# Patient Record
Sex: MALE | Race: OTHER | HISPANIC OR LATINO
[De-identification: names, ages, dates, MRNs, and addresses within clinical notes are randomized per-mention and may not be internally consistent; named-entity substitution may affect disease eponyms.]

---

## 2017-02-16 ENCOUNTER — RESULT REVIEW (OUTPATIENT)
Age: 82
End: 2017-02-16

## 2017-02-16 ENCOUNTER — INPATIENT (INPATIENT)
Facility: HOSPITAL | Age: 82
LOS: 7 days | Discharge: NURSING, REHAB OR LONG-TERM CARE | End: 2017-02-24
Attending: INTERNAL MEDICINE | Admitting: INTERNAL MEDICINE
Payer: MEDICARE

## 2017-02-16 VITALS
SYSTOLIC BLOOD PRESSURE: 157 MMHG | HEIGHT: 60 IN | WEIGHT: 126.1 LBS | TEMPERATURE: 98 F | RESPIRATION RATE: 18 BRPM | HEART RATE: 81 BPM | OXYGEN SATURATION: 99 % | DIASTOLIC BLOOD PRESSURE: 81 MMHG

## 2017-02-16 DIAGNOSIS — Z98.890 OTHER SPECIFIED POSTPROCEDURAL STATES: Chronic | ICD-10-CM

## 2017-02-16 LAB
ALBUMIN SERPL ELPH-MCNC: 3.1 G/DL — LOW (ref 3.3–5)
ALP SERPL-CCNC: 218 U/L — HIGH (ref 40–120)
ALT FLD-CCNC: 297 U/L — HIGH (ref 12–78)
AMYLASE P1 CFR SERPL: 68 U/L — SIGNIFICANT CHANGE UP (ref 25–115)
ANION GAP SERPL CALC-SCNC: 9 MMOL/L — SIGNIFICANT CHANGE UP (ref 5–17)
APPEARANCE UR: CLEAR — SIGNIFICANT CHANGE UP
APTT BLD: 31.7 SEC — SIGNIFICANT CHANGE UP (ref 27.5–37.4)
AST SERPL-CCNC: 47 U/L — HIGH (ref 15–37)
BACTERIA # UR AUTO: ABNORMAL
BASOPHILS # BLD AUTO: 0.1 K/UL — SIGNIFICANT CHANGE UP (ref 0–0.2)
BASOPHILS NFR BLD AUTO: 0 % — SIGNIFICANT CHANGE UP (ref 0–2)
BILIRUB SERPL-MCNC: 0.8 MG/DL — SIGNIFICANT CHANGE UP (ref 0.2–1.2)
BILIRUB UR-MCNC: NEGATIVE — SIGNIFICANT CHANGE UP
BUN SERPL-MCNC: 25 MG/DL — HIGH (ref 7–23)
CALCIUM SERPL-MCNC: 9.3 MG/DL — SIGNIFICANT CHANGE UP (ref 8.5–10.1)
CHLORIDE SERPL-SCNC: 112 MMOL/L — HIGH (ref 96–108)
CO2 SERPL-SCNC: 29 MMOL/L — SIGNIFICANT CHANGE UP (ref 22–31)
COLOR SPEC: YELLOW — SIGNIFICANT CHANGE UP
COMMENT - URINE: SIGNIFICANT CHANGE UP
COMMENT - URINE: SIGNIFICANT CHANGE UP
CREAT SERPL-MCNC: 0.98 MG/DL — SIGNIFICANT CHANGE UP (ref 0.5–1.3)
DIFF PNL FLD: ABNORMAL
EOSINOPHIL # BLD AUTO: 0 K/UL — SIGNIFICANT CHANGE UP (ref 0–0.5)
EOSINOPHIL NFR BLD AUTO: 0 % — SIGNIFICANT CHANGE UP (ref 0–6)
EPI CELLS # UR: SIGNIFICANT CHANGE UP
GLUCOSE SERPL-MCNC: 399 MG/DL — HIGH (ref 70–99)
GLUCOSE UR QL: 1000 MG/DL
HCT VFR BLD CALC: 37.7 % — LOW (ref 39–50)
HGB BLD-MCNC: 13.3 G/DL — SIGNIFICANT CHANGE UP (ref 13–17)
INR BLD: 1.04 RATIO — SIGNIFICANT CHANGE UP (ref 0.88–1.16)
KETONES UR-MCNC: ABNORMAL
LACTATE SERPL-SCNC: 2.8 MMOL/L — HIGH (ref 0.7–2)
LACTATE SERPL-SCNC: 3.6 MMOL/L — HIGH (ref 0.7–2)
LEUKOCYTE ESTERASE UR-ACNC: NEGATIVE — SIGNIFICANT CHANGE UP
LIDOCAIN IGE QN: 53 U/L — LOW (ref 73–393)
LYMPHOCYTES # BLD AUTO: 1.8 K/UL — SIGNIFICANT CHANGE UP (ref 1–3.3)
LYMPHOCYTES # BLD AUTO: 8 % — LOW (ref 13–44)
MCHC RBC-ENTMCNC: 32.9 PG — SIGNIFICANT CHANGE UP (ref 27–34)
MCHC RBC-ENTMCNC: 35.2 GM/DL — SIGNIFICANT CHANGE UP (ref 32–36)
MCV RBC AUTO: 93.6 FL — SIGNIFICANT CHANGE UP (ref 80–100)
MONOCYTES # BLD AUTO: 1.3 K/UL — HIGH (ref 0–0.9)
MONOCYTES NFR BLD AUTO: 2 % — SIGNIFICANT CHANGE UP (ref 2–14)
NEUTROPHILS # BLD AUTO: 26.4 K/UL — HIGH (ref 1.8–7.4)
NEUTROPHILS NFR BLD AUTO: 90 % — HIGH (ref 43–77)
NITRITE UR-MCNC: NEGATIVE — SIGNIFICANT CHANGE UP
PH UR: 5 — SIGNIFICANT CHANGE UP (ref 4.8–8)
PLAT MORPH BLD: NORMAL — SIGNIFICANT CHANGE UP
PLATELET # BLD AUTO: 223 K/UL — SIGNIFICANT CHANGE UP (ref 150–400)
POTASSIUM SERPL-MCNC: 4 MMOL/L — SIGNIFICANT CHANGE UP (ref 3.5–5.3)
POTASSIUM SERPL-SCNC: 4 MMOL/L — SIGNIFICANT CHANGE UP (ref 3.5–5.3)
PROT SERPL-MCNC: 7.4 GM/DL — SIGNIFICANT CHANGE UP (ref 6–8.3)
PROT UR-MCNC: 15 MG/DL
PROTHROM AB SERPL-ACNC: 11.7 SEC — SIGNIFICANT CHANGE UP (ref 10–13.1)
RBC # BLD: 4.03 M/UL — LOW (ref 4.2–5.8)
RBC # FLD: 12.1 % — SIGNIFICANT CHANGE UP (ref 11–15)
RBC BLD AUTO: NORMAL — SIGNIFICANT CHANGE UP
RBC CASTS # UR COMP ASSIST: ABNORMAL /HPF (ref 0–4)
SODIUM SERPL-SCNC: 150 MMOL/L — HIGH (ref 135–145)
SP GR SPEC: 1.01 — SIGNIFICANT CHANGE UP (ref 1.01–1.02)
UROBILINOGEN FLD QL: NEGATIVE MG/DL — SIGNIFICANT CHANGE UP
WBC # BLD: 29.6 K/UL — HIGH (ref 3.8–10.5)
WBC # FLD AUTO: 29.6 K/UL — HIGH (ref 3.8–10.5)
WBC UR QL: SIGNIFICANT CHANGE UP

## 2017-02-16 PROCEDURE — 76700 US EXAM ABDOM COMPLETE: CPT | Mod: 26

## 2017-02-16 PROCEDURE — 99291 CRITICAL CARE FIRST HOUR: CPT

## 2017-02-16 PROCEDURE — 74177 CT ABD & PELVIS W/CONTRAST: CPT | Mod: 26

## 2017-02-16 PROCEDURE — 71010: CPT | Mod: 26

## 2017-02-16 PROCEDURE — 70450 CT HEAD/BRAIN W/O DYE: CPT | Mod: 26

## 2017-02-16 RX ORDER — SODIUM CHLORIDE 9 MG/ML
500 INJECTION INTRAMUSCULAR; INTRAVENOUS; SUBCUTANEOUS ONCE
Qty: 0 | Refills: 0 | Status: COMPLETED | OUTPATIENT
Start: 2017-02-16 | End: 2017-02-16

## 2017-02-16 RX ORDER — SODIUM CHLORIDE 9 MG/ML
2000 INJECTION INTRAMUSCULAR; INTRAVENOUS; SUBCUTANEOUS ONCE
Qty: 0 | Refills: 0 | Status: COMPLETED | OUTPATIENT
Start: 2017-02-16 | End: 2017-02-16

## 2017-02-16 RX ORDER — SODIUM CHLORIDE 9 MG/ML
1000 INJECTION INTRAMUSCULAR; INTRAVENOUS; SUBCUTANEOUS ONCE
Qty: 0 | Refills: 0 | Status: COMPLETED | OUTPATIENT
Start: 2017-02-16 | End: 2017-02-16

## 2017-02-16 RX ORDER — CIPROFLOXACIN LACTATE 400MG/40ML
400 VIAL (ML) INTRAVENOUS ONCE
Qty: 0 | Refills: 0 | Status: COMPLETED | OUTPATIENT
Start: 2017-02-16 | End: 2017-02-16

## 2017-02-16 RX ORDER — PIPERACILLIN AND TAZOBACTAM 4; .5 G/20ML; G/20ML
3.38 INJECTION, POWDER, LYOPHILIZED, FOR SOLUTION INTRAVENOUS ONCE
Qty: 0 | Refills: 0 | Status: COMPLETED | OUTPATIENT
Start: 2017-02-16 | End: 2017-02-16

## 2017-02-16 RX ORDER — INSULIN HUMAN 100 [IU]/ML
5 INJECTION, SOLUTION SUBCUTANEOUS ONCE
Qty: 0 | Refills: 0 | Status: COMPLETED | OUTPATIENT
Start: 2017-02-16 | End: 2017-02-16

## 2017-02-16 RX ADMIN — SODIUM CHLORIDE 2000 MILLILITER(S): 9 INJECTION INTRAMUSCULAR; INTRAVENOUS; SUBCUTANEOUS at 23:40

## 2017-02-16 RX ADMIN — PIPERACILLIN AND TAZOBACTAM 200 GRAM(S): 4; .5 INJECTION, POWDER, LYOPHILIZED, FOR SOLUTION INTRAVENOUS at 19:12

## 2017-02-16 RX ADMIN — Medication 200 MILLIGRAM(S): at 20:00

## 2017-02-16 RX ADMIN — SODIUM CHLORIDE 4000 MILLILITER(S): 9 INJECTION INTRAMUSCULAR; INTRAVENOUS; SUBCUTANEOUS at 19:09

## 2017-02-16 RX ADMIN — SODIUM CHLORIDE 1000 MILLILITER(S): 9 INJECTION INTRAMUSCULAR; INTRAVENOUS; SUBCUTANEOUS at 19:05

## 2017-02-16 NOTE — ED ADULT TRIAGE NOTE - CHIEF COMPLAINT QUOTE
as per daughter pt is having difficulty swallowing and fever since yesterday. pt in no respiratory distress at triage. pt has history of dementia and htn.

## 2017-02-16 NOTE — ED PROVIDER NOTE - CARE PLAN
Principal Discharge DX:	Acute cholecystitis  Secondary Diagnosis:	Sepsis, due to unspecified organism

## 2017-02-16 NOTE — ED PROVIDER NOTE - OBJECTIVE STATEMENT
Patient presents with daughter c/o decreased po intake over last 24 hours and fever. Patient was taken to urgent care and then referred to ED. Patient with history of dementia, unable to provide information.

## 2017-02-16 NOTE — ED PROVIDER NOTE - CRITICAL CARE PROVIDED
interpretation of diagnostic studies/additional history taking/direct patient care (not related to procedure)/consultation with other physicians/documentation

## 2017-02-16 NOTE — ED ADULT NURSE NOTE - OBJECTIVE STATEMENT
patient received, daughter by bedside, stated patient has been having difficulty swallowing and is usually able to speak or respond, but not speaking since yesterday. daughter also stated patient had fever yesterday. no acute distress noted.

## 2017-02-17 ENCOUNTER — TRANSCRIPTION ENCOUNTER (OUTPATIENT)
Age: 82
End: 2017-02-17

## 2017-02-17 DIAGNOSIS — Z96.7 PRESENCE OF OTHER BONE AND TENDON IMPLANTS: Chronic | ICD-10-CM

## 2017-02-17 DIAGNOSIS — G92 TOXIC ENCEPHALOPATHY: ICD-10-CM

## 2017-02-17 DIAGNOSIS — F02.80 DEMENTIA IN OTHER DISEASES CLASSIFIED ELSEWHERE, UNSPECIFIED SEVERITY, WITHOUT BEHAVIORAL DISTURBANCE, PSYCHOTIC DISTURBANCE, MOOD DISTURBANCE, AND ANXIETY: ICD-10-CM

## 2017-02-17 DIAGNOSIS — E11.65 TYPE 2 DIABETES MELLITUS WITH HYPERGLYCEMIA: ICD-10-CM

## 2017-02-17 DIAGNOSIS — A41.9 SEPSIS, UNSPECIFIED ORGANISM: ICD-10-CM

## 2017-02-17 DIAGNOSIS — K83.0 CHOLANGITIS: ICD-10-CM

## 2017-02-17 LAB
ACETONE SERPL-MCNC: NEGATIVE — SIGNIFICANT CHANGE UP
ALBUMIN SERPL ELPH-MCNC: 2.7 G/DL — LOW (ref 3.3–5)
ALP SERPL-CCNC: 182 U/L — HIGH (ref 40–120)
ALT FLD-CCNC: 223 U/L — HIGH (ref 12–78)
AMYLASE P1 CFR SERPL: 110 U/L — SIGNIFICANT CHANGE UP (ref 25–115)
ANION GAP SERPL CALC-SCNC: 11 MMOL/L — SIGNIFICANT CHANGE UP (ref 5–17)
ANION GAP SERPL CALC-SCNC: 12 MMOL/L — SIGNIFICANT CHANGE UP (ref 5–17)
AST SERPL-CCNC: 33 U/L — SIGNIFICANT CHANGE UP (ref 15–37)
BASE EXCESS BLDA CALC-SCNC: 2.4 MMOL/L — HIGH (ref -2–2)
BILIRUB SERPL-MCNC: 0.9 MG/DL — SIGNIFICANT CHANGE UP (ref 0.2–1.2)
BLD GP AB SCN SERPL QL: SIGNIFICANT CHANGE UP
BLOOD GAS COMMENTS: SIGNIFICANT CHANGE UP
BLOOD GAS COMMENTS: SIGNIFICANT CHANGE UP
BLOOD GAS SOURCE: SIGNIFICANT CHANGE UP
BUN SERPL-MCNC: 10 MG/DL — SIGNIFICANT CHANGE UP (ref 7–23)
BUN SERPL-MCNC: 13 MG/DL — SIGNIFICANT CHANGE UP (ref 7–23)
CALCIUM SERPL-MCNC: 7.4 MG/DL — LOW (ref 8.5–10.1)
CALCIUM SERPL-MCNC: 8.2 MG/DL — LOW (ref 8.5–10.1)
CHLORIDE SERPL-SCNC: 116 MMOL/L — HIGH (ref 96–108)
CHLORIDE SERPL-SCNC: 119 MMOL/L — HIGH (ref 96–108)
CO2 SERPL-SCNC: 23 MMOL/L — SIGNIFICANT CHANGE UP (ref 22–31)
CO2 SERPL-SCNC: 26 MMOL/L — SIGNIFICANT CHANGE UP (ref 22–31)
CREAT SERPL-MCNC: 0.52 MG/DL — SIGNIFICANT CHANGE UP (ref 0.5–1.3)
CREAT SERPL-MCNC: 0.67 MG/DL — SIGNIFICANT CHANGE UP (ref 0.5–1.3)
GLUCOSE SERPL-MCNC: 248 MG/DL — HIGH (ref 70–99)
GLUCOSE SERPL-MCNC: 287 MG/DL — HIGH (ref 70–99)
HBA1C BLD-MCNC: 7.2 % — HIGH (ref 4–5.6)
HCO3 BLDA-SCNC: 26 MMOL/L — SIGNIFICANT CHANGE UP (ref 21–29)
HCT VFR BLD CALC: 27.4 % — LOW (ref 39–50)
HCT VFR BLD CALC: 28.9 % — LOW (ref 39–50)
HCT VFR BLD CALC: 36.2 % — LOW (ref 39–50)
HGB BLD-MCNC: 10.3 G/DL — LOW (ref 13–17)
HGB BLD-MCNC: 12.5 G/DL — LOW (ref 13–17)
HGB BLD-MCNC: 9.9 G/DL — LOW (ref 13–17)
HOROWITZ INDEX BLDA+IHG-RTO: 28 — SIGNIFICANT CHANGE UP
LIDOCAIN IGE QN: 55 U/L — LOW (ref 73–393)
MAGNESIUM SERPL-MCNC: 2.2 MG/DL — SIGNIFICANT CHANGE UP (ref 1.8–2.4)
MCHC RBC-ENTMCNC: 31.9 PG — SIGNIFICANT CHANGE UP (ref 27–34)
MCHC RBC-ENTMCNC: 32.2 PG — SIGNIFICANT CHANGE UP (ref 27–34)
MCHC RBC-ENTMCNC: 32.6 PG — SIGNIFICANT CHANGE UP (ref 27–34)
MCHC RBC-ENTMCNC: 34.7 GM/DL — SIGNIFICANT CHANGE UP (ref 32–36)
MCHC RBC-ENTMCNC: 35.6 GM/DL — SIGNIFICANT CHANGE UP (ref 32–36)
MCHC RBC-ENTMCNC: 36.2 GM/DL — HIGH (ref 32–36)
MCV RBC AUTO: 89.1 FL — SIGNIFICANT CHANGE UP (ref 80–100)
MCV RBC AUTO: 89.7 FL — SIGNIFICANT CHANGE UP (ref 80–100)
MCV RBC AUTO: 94.1 FL — SIGNIFICANT CHANGE UP (ref 80–100)
PCO2 BLDA: 36 MMHG — SIGNIFICANT CHANGE UP (ref 32–46)
PH BLD: 7.46 — HIGH (ref 7.35–7.45)
PHOSPHATE SERPL-MCNC: 1.6 MG/DL — LOW (ref 2.5–4.5)
PLATELET # BLD AUTO: 182 K/UL — SIGNIFICANT CHANGE UP (ref 150–400)
PLATELET # BLD AUTO: 188 K/UL — SIGNIFICANT CHANGE UP (ref 150–400)
PLATELET # BLD AUTO: 224 K/UL — SIGNIFICANT CHANGE UP (ref 150–400)
PO2 BLDA: 78 MMHG — SIGNIFICANT CHANGE UP (ref 74–108)
POTASSIUM SERPL-MCNC: 3.2 MMOL/L — LOW (ref 3.5–5.3)
POTASSIUM SERPL-MCNC: 3.2 MMOL/L — LOW (ref 3.5–5.3)
POTASSIUM SERPL-SCNC: 3.2 MMOL/L — LOW (ref 3.5–5.3)
POTASSIUM SERPL-SCNC: 3.2 MMOL/L — LOW (ref 3.5–5.3)
PROT SERPL-MCNC: 6.6 GM/DL — SIGNIFICANT CHANGE UP (ref 6–8.3)
RBC # BLD: 3.08 M/UL — LOW (ref 4.2–5.8)
RBC # BLD: 3.22 M/UL — LOW (ref 4.2–5.8)
RBC # BLD: 3.84 M/UL — LOW (ref 4.2–5.8)
RBC # FLD: 11.5 % — SIGNIFICANT CHANGE UP (ref 11–15)
RBC # FLD: 11.6 % — SIGNIFICANT CHANGE UP (ref 11–15)
RBC # FLD: 12.4 % — SIGNIFICANT CHANGE UP (ref 11–15)
SAO2 % BLDA: 96 % — SIGNIFICANT CHANGE UP (ref 92–96)
SODIUM SERPL-SCNC: 153 MMOL/L — HIGH (ref 135–145)
SODIUM SERPL-SCNC: 154 MMOL/L — HIGH (ref 135–145)
TSH SERPL-MCNC: 0.5 UIU/ML — SIGNIFICANT CHANGE UP (ref 0.36–3.74)
WBC # BLD: 20.8 K/UL — HIGH (ref 3.8–10.5)
WBC # BLD: 23.1 K/UL — HIGH (ref 3.8–10.5)
WBC # BLD: 26.2 K/UL — HIGH (ref 3.8–10.5)
WBC # FLD AUTO: 20.8 K/UL — HIGH (ref 3.8–10.5)
WBC # FLD AUTO: 23.1 K/UL — HIGH (ref 3.8–10.5)
WBC # FLD AUTO: 26.2 K/UL — HIGH (ref 3.8–10.5)

## 2017-02-17 PROCEDURE — 71010: CPT | Mod: 26

## 2017-02-17 PROCEDURE — 88304 TISSUE EXAM BY PATHOLOGIST: CPT | Mod: 26

## 2017-02-17 PROCEDURE — 78226 HEPATOBILIARY SYSTEM IMAGING: CPT | Mod: 26

## 2017-02-17 PROCEDURE — 99291 CRITICAL CARE FIRST HOUR: CPT

## 2017-02-17 RX ORDER — PIPERACILLIN AND TAZOBACTAM 4; .5 G/20ML; G/20ML
3.38 INJECTION, POWDER, LYOPHILIZED, FOR SOLUTION INTRAVENOUS EVERY 8 HOURS
Qty: 0 | Refills: 0 | Status: DISCONTINUED | OUTPATIENT
Start: 2017-02-17 | End: 2017-02-17

## 2017-02-17 RX ORDER — ACETAMINOPHEN 500 MG
650 TABLET ORAL ONCE
Qty: 0 | Refills: 0 | Status: COMPLETED | OUTPATIENT
Start: 2017-02-17 | End: 2017-02-17

## 2017-02-17 RX ORDER — CHLORHEXIDINE GLUCONATE 213 G/1000ML
15 SOLUTION TOPICAL
Qty: 0 | Refills: 0 | Status: DISCONTINUED | OUTPATIENT
Start: 2017-02-17 | End: 2017-02-18

## 2017-02-17 RX ORDER — SODIUM CHLORIDE 9 MG/ML
1000 INJECTION, SOLUTION INTRAVENOUS
Qty: 0 | Refills: 0 | Status: DISCONTINUED | OUTPATIENT
Start: 2017-02-17 | End: 2017-02-19

## 2017-02-17 RX ORDER — DEXTROSE 50 % IN WATER 50 %
1 SYRINGE (ML) INTRAVENOUS ONCE
Qty: 0 | Refills: 0 | Status: DISCONTINUED | OUTPATIENT
Start: 2017-02-17 | End: 2017-02-24

## 2017-02-17 RX ORDER — ENOXAPARIN SODIUM 100 MG/ML
40 INJECTION SUBCUTANEOUS EVERY 24 HOURS
Qty: 0 | Refills: 0 | Status: DISCONTINUED | OUTPATIENT
Start: 2017-02-17 | End: 2017-02-24

## 2017-02-17 RX ORDER — METRONIDAZOLE 500 MG
500 TABLET ORAL ONCE
Qty: 0 | Refills: 0 | Status: COMPLETED | OUTPATIENT
Start: 2017-02-17 | End: 2017-02-17

## 2017-02-17 RX ORDER — SODIUM CHLORIDE 9 MG/ML
1000 INJECTION, SOLUTION INTRAVENOUS
Qty: 0 | Refills: 0 | Status: DISCONTINUED | OUTPATIENT
Start: 2017-02-17 | End: 2017-02-24

## 2017-02-17 RX ORDER — SODIUM CHLORIDE 9 MG/ML
1000 INJECTION, SOLUTION INTRAVENOUS
Qty: 0 | Refills: 0 | Status: DISCONTINUED | OUTPATIENT
Start: 2017-02-17 | End: 2017-02-17

## 2017-02-17 RX ORDER — METRONIDAZOLE 500 MG
TABLET ORAL
Qty: 0 | Refills: 0 | Status: DISCONTINUED | OUTPATIENT
Start: 2017-02-17 | End: 2017-02-24

## 2017-02-17 RX ORDER — DEXTROSE 50 % IN WATER 50 %
12.5 SYRINGE (ML) INTRAVENOUS ONCE
Qty: 0 | Refills: 0 | Status: DISCONTINUED | OUTPATIENT
Start: 2017-02-17 | End: 2017-02-24

## 2017-02-17 RX ORDER — METRONIDAZOLE 500 MG
500 TABLET ORAL EVERY 8 HOURS
Qty: 0 | Refills: 0 | Status: DISCONTINUED | OUTPATIENT
Start: 2017-02-17 | End: 2017-02-24

## 2017-02-17 RX ORDER — PROPOFOL 10 MG/ML
10 INJECTION, EMULSION INTRAVENOUS
Qty: 1000 | Refills: 0 | Status: DISCONTINUED | OUTPATIENT
Start: 2017-02-17 | End: 2017-02-18

## 2017-02-17 RX ORDER — FENTANYL CITRATE 50 UG/ML
50 INJECTION INTRAVENOUS EVERY 4 HOURS
Qty: 0 | Refills: 0 | Status: DISCONTINUED | OUTPATIENT
Start: 2017-02-17 | End: 2017-02-18

## 2017-02-17 RX ORDER — POTASSIUM CHLORIDE 20 MEQ
10 PACKET (EA) ORAL
Qty: 0 | Refills: 0 | Status: COMPLETED | OUTPATIENT
Start: 2017-02-17 | End: 2017-02-17

## 2017-02-17 RX ORDER — POTASSIUM PHOSPHATE, MONOBASIC POTASSIUM PHOSPHATE, DIBASIC 236; 224 MG/ML; MG/ML
15 INJECTION, SOLUTION INTRAVENOUS ONCE
Qty: 0 | Refills: 0 | Status: COMPLETED | OUTPATIENT
Start: 2017-02-17 | End: 2017-02-17

## 2017-02-17 RX ORDER — GLUCAGON INJECTION, SOLUTION 0.5 MG/.1ML
1 INJECTION, SOLUTION SUBCUTANEOUS ONCE
Qty: 0 | Refills: 0 | Status: DISCONTINUED | OUTPATIENT
Start: 2017-02-17 | End: 2017-02-24

## 2017-02-17 RX ORDER — INSULIN LISPRO 100/ML
VIAL (ML) SUBCUTANEOUS EVERY 4 HOURS
Qty: 0 | Refills: 0 | Status: DISCONTINUED | OUTPATIENT
Start: 2017-02-17 | End: 2017-02-19

## 2017-02-17 RX ORDER — MORPHINE SULFATE 50 MG/1
2.3 CAPSULE, EXTENDED RELEASE ORAL ONCE
Qty: 0 | Refills: 0 | Status: DISCONTINUED | OUTPATIENT
Start: 2017-02-17 | End: 2017-02-17

## 2017-02-17 RX ORDER — CEFTRIAXONE 500 MG/1
2 INJECTION, POWDER, FOR SOLUTION INTRAMUSCULAR; INTRAVENOUS EVERY 24 HOURS
Qty: 0 | Refills: 0 | Status: DISCONTINUED | OUTPATIENT
Start: 2017-02-17 | End: 2017-02-24

## 2017-02-17 RX ADMIN — Medication 2: at 05:47

## 2017-02-17 RX ADMIN — Medication 100 MILLIEQUIVALENT(S): at 05:58

## 2017-02-17 RX ADMIN — POTASSIUM PHOSPHATE, MONOBASIC POTASSIUM PHOSPHATE, DIBASIC 63.75 MILLIMOLE(S): 236; 224 INJECTION, SOLUTION INTRAVENOUS at 13:39

## 2017-02-17 RX ADMIN — Medication 3: at 09:51

## 2017-02-17 RX ADMIN — Medication 2: at 14:08

## 2017-02-17 RX ADMIN — Medication 100 MILLIEQUIVALENT(S): at 09:40

## 2017-02-17 RX ADMIN — SODIUM CHLORIDE 75 MILLILITER(S): 9 INJECTION, SOLUTION INTRAVENOUS at 01:25

## 2017-02-17 RX ADMIN — FENTANYL CITRATE 50 MICROGRAM(S): 50 INJECTION INTRAVENOUS at 23:00

## 2017-02-17 RX ADMIN — Medication 650 MILLIGRAM(S): at 13:45

## 2017-02-17 RX ADMIN — Medication 100 MILLIGRAM(S): at 13:40

## 2017-02-17 RX ADMIN — INSULIN HUMAN 5 UNIT(S): 100 INJECTION, SOLUTION SUBCUTANEOUS at 02:19

## 2017-02-17 RX ADMIN — SODIUM CHLORIDE 75 MILLILITER(S): 9 INJECTION, SOLUTION INTRAVENOUS at 05:47

## 2017-02-17 RX ADMIN — Medication 100 MILLIEQUIVALENT(S): at 07:54

## 2017-02-17 RX ADMIN — CHLORHEXIDINE GLUCONATE 15 MILLILITER(S): 213 SOLUTION TOPICAL at 18:24

## 2017-02-17 RX ADMIN — MORPHINE SULFATE 2.3 MILLIGRAM(S): 50 CAPSULE, EXTENDED RELEASE ORAL at 12:28

## 2017-02-17 RX ADMIN — PIPERACILLIN AND TAZOBACTAM 25 GRAM(S): 4; .5 INJECTION, POWDER, LYOPHILIZED, FOR SOLUTION INTRAVENOUS at 05:49

## 2017-02-17 RX ADMIN — CEFTRIAXONE 100 GRAM(S): 500 INJECTION, POWDER, FOR SOLUTION INTRAMUSCULAR; INTRAVENOUS at 18:23

## 2017-02-17 RX ADMIN — FENTANYL CITRATE 50 MICROGRAM(S): 50 INJECTION INTRAVENOUS at 22:48

## 2017-02-17 RX ADMIN — Medication 3: at 18:35

## 2017-02-17 RX ADMIN — Medication 100 MILLIGRAM(S): at 22:48

## 2017-02-17 RX ADMIN — PROPOFOL 3.43 MICROGRAM(S)/KG/MIN: 10 INJECTION, EMULSION INTRAVENOUS at 17:30

## 2017-02-17 RX ADMIN — ENOXAPARIN SODIUM 40 MILLIGRAM(S): 100 INJECTION SUBCUTANEOUS at 05:48

## 2017-02-17 RX ADMIN — Medication 4: at 23:26

## 2017-02-17 RX ADMIN — MORPHINE SULFATE 2.3 MILLIGRAM(S): 50 CAPSULE, EXTENDED RELEASE ORAL at 11:58

## 2017-02-17 RX ADMIN — SODIUM CHLORIDE 1000 MILLILITER(S): 9 INJECTION, SOLUTION INTRAVENOUS at 13:55

## 2017-02-17 RX ADMIN — Medication 3: at 03:07

## 2017-02-17 NOTE — H&P ADULT. - HISTORY OF PRESENT ILLNESS
81 y/o male with h/o dementia ans DM presented to ED  with daughter c/o decreased po intake over last 24 hours and fever. Patient ED workup revealed cholecystitis and elevated lactate. Patient with altered mental status unable to participate as historian. Signs and symptoms of icterus, dehydration and elevated blood sugar. Admitted to ICU for stabilization before surgical or interventional radiology intervention

## 2017-02-17 NOTE — H&P ADULT. - ATTENDING COMMENTS
Agree with above.  82M with dementia (performs ADLs with assistance of home health aides) with decreased PO intake.  Lactate elevated, admitted to ICU.  Equivocal data for possible cholecystitis  - On exam with abdominal tenderness (?RUQ > LUQ) but no rebound. No icterus  - Normal bilirubin levels, but elevated alk phos and ALT with normal AST.  - U/S with normal CBD but CT abd/pelvis distended gallbladder ?pericholecystic stranding.  - HIDA scan to determine if there is cholecystitis. Surgery following patient.  Empiric antibiotics with ceftriaxone / Flagyl.  Trend lactate. Now s/p 3.5L IVF.  NPO for now.  Patient will remain in ICU for now for closer monitoring.

## 2017-02-17 NOTE — CONSULT NOTE ADULT - ASSESSMENT
the patient has been unable to clear his lactate and in fact has risen  Will get a stat HIDA and if (+) will aske IR to perc the GB.

## 2017-02-17 NOTE — CONSULT NOTE ADULT - SUBJECTIVE AND OBJECTIVE BOX
Dr. Claude Nunez contact information [phone]    GENERAL SURGERY CONSULT NOTE    Patient is a 82y old  Male who presents with a chief complaint of Decreased PO intake and fever as per patient's H and P. (2017 03:52)      HPI:  83 y/o male with h/o dementia ans DM presented to ED  with daughter c/o decreased po intake over last 24 hours and fever. Patient ED workup revealed cholecystitis and elevated lactate. Patient with altered mental status unable to participate as historian. Signs and symptoms of icterus, dehydration and elevated blood sugar. Admitted to ICU for stabilization before surgical or interventional radiology intervention (2017 00:25)      PAST MEDICAL & SURGICAL HISTORY:  Glaucoma  Dementia  DM (diabetes mellitus)  HTN (hypertension)  Status post open reduction with internal fixation of fracture: right femur  History of hip surgery: right      Review of Systems:    I have reviewed 9 systems with the patient and the only positive findings were     MEDICATIONS  (STANDING):  enoxaparin Injectable 40milliGRAM(s) SubCutaneous every 24 hours  insulin lispro (HumaLOG) corrective regimen sliding scale  SubCutaneous every 4 hours  dextrose 5%. 1000milliLiter(s) IV Continuous <Continuous>  dextrose 50% Injectable 12.5Gram(s) IV Push once  dextrose 5% + sodium chloride 0.45%. 1000milliLiter(s) IV Continuous <Continuous>  cefTRIAXone   IVPB 2Gram(s) IV Intermittent every 24 hours  metroNIDAZOLE  IVPB  IV Intermittent   potassium phosphate IVPB 15milliMole(s) IV Intermittent once  metroNIDAZOLE  IVPB 500milliGRAM(s) IV Intermittent once  metroNIDAZOLE  IVPB 500milliGRAM(s) IV Intermittent every 8 hours  sodium chloride 0.45%. 1000milliLiter(s) IV Continuous <Continuous>    MEDICATIONS  (PRN):  dextrose Gel 1Dose(s) Oral once PRN Blood Glucose LESS THAN 70 milliGRAM(s)/deciliter  glucagon  Injectable 1milliGRAM(s) IntraMuscular once PRN Glucose LESS THAN 70 milligrams/deciliter      Allergies    No Known Allergies    Intolerances      FAMILY HISTORY:  No pertinent family history in first degree relatives      Vital Signs Last 24 Hrs  T(C): 37.7, Max: 37.7 ( @ 07:52)  T(F): 99.8, Max: 99.8 ( @ 07:52)  HR: 67 (60 - 81)  BP: 117/49 (107/86 - 157/81)  BP(mean): 64 (64 - 146)  RR: 19 (15 - 20)  SpO2: 96% (96% - 99%)    I&O's Detail    I & Os for current day (as of 2017 10:21)  =============================================  IN:    dextrose 5% + sodium chloride 0.9%: 300 ml    dextrose 5% + sodium chloride 0.45%.: 150 ml    Solution: 100 ml    Total IN: 550 ml  ---------------------------------------------  OUT:    Total OUT: 0 ml  ---------------------------------------------  Total NET: 550 ml      Physical Exam:    General:  Appears stated age  Eyes : ROSE - no icterus  Abdomen:  some RUQ tenderness - no mass    LABS:                        12.5   26.2  )-----------( 224      ( 2017 08:22 )             36.2     2017 04:06    153    |  116    |  13     ----------------------------<  287    3.2     |  26     |  0.67     Ca    8.2        2017 04:06  Phos  1.6       2017 04:06  Mg     2.2       2017 04:06    TPro  6.6    /  Alb  2.7    /  TBili  0.9    /  DBili  x      /  AST  33     /  ALT  223    /  AlkPhos  182    2017 04:06    PT/INR - ( 2017 18:31 )   PT: 11.7 sec;   INR: 1.04 ratio         PTT - ( 2017 18:31 )  PTT:31.7 sec  Urinalysis Basic - ( 2017 22:20 )    Color: Yellow / Appearance: Clear / S.010 / pH: x  Gluc: x / Ketone: Trace  / Bili: Negative / Urobili: Negative mg/dL   Blood: x / Protein: 15 mg/dL / Nitrite: Negative   Leuk Esterase: Negative / RBC: 11-25 /HPF / WBC 3-5   Sq Epi: x / Non Sq Epi: Occasional / Bacteria: Occasional        RADIOLOGY & ADDITIONAL STUDIES:  IMPRESSION:     No significant bowel wall thickening .    Distended gallbladder. Question pericholecystic stranding. If there is   clinical suspicion for acute cholecystitis, additional imaging may be   obtained for further evaluation.    Indeterminate pancreatic lesions, which canbe further characterized on a   nonemergent MRI/MRCP.

## 2017-02-17 NOTE — H&P ADULT. - PROBLEM SELECTOR PLAN 1
Admit to ICU, NPO, continue IV Zosyn, surgical consult, referral to interventional radiology in am for assessment of percutaneous cholecystectomy. Case d/w EICU Dr. RICKEY Calero

## 2017-02-18 DIAGNOSIS — D64.9 ANEMIA, UNSPECIFIED: ICD-10-CM

## 2017-02-18 DIAGNOSIS — E87.6 HYPOKALEMIA: ICD-10-CM

## 2017-02-18 DIAGNOSIS — F03.90 UNSPECIFIED DEMENTIA, UNSPECIFIED SEVERITY, WITHOUT BEHAVIORAL DISTURBANCE, PSYCHOTIC DISTURBANCE, MOOD DISTURBANCE, AND ANXIETY: ICD-10-CM

## 2017-02-18 DIAGNOSIS — Z90.49 ACQUIRED ABSENCE OF OTHER SPECIFIED PARTS OF DIGESTIVE TRACT: ICD-10-CM

## 2017-02-18 DIAGNOSIS — E83.39 OTHER DISORDERS OF PHOSPHORUS METABOLISM: ICD-10-CM

## 2017-02-18 LAB
ANION GAP SERPL CALC-SCNC: 10 MMOL/L — SIGNIFICANT CHANGE UP (ref 5–17)
ANION GAP SERPL CALC-SCNC: 11 MMOL/L — SIGNIFICANT CHANGE UP (ref 5–17)
BASE EXCESS BLDA CALC-SCNC: 1.4 MMOL/L — SIGNIFICANT CHANGE UP (ref -2–2)
BLOOD GAS COMMENTS: SIGNIFICANT CHANGE UP
BLOOD GAS SOURCE: SIGNIFICANT CHANGE UP
BUN SERPL-MCNC: 10 MG/DL — SIGNIFICANT CHANGE UP (ref 7–23)
BUN SERPL-MCNC: 5 MG/DL — LOW (ref 7–23)
CALCIUM SERPL-MCNC: 7.1 MG/DL — LOW (ref 8.5–10.1)
CALCIUM SERPL-MCNC: 7.2 MG/DL — LOW (ref 8.5–10.1)
CHLORIDE SERPL-SCNC: 113 MMOL/L — HIGH (ref 96–108)
CHLORIDE SERPL-SCNC: 117 MMOL/L — HIGH (ref 96–108)
CO2 SERPL-SCNC: 24 MMOL/L — SIGNIFICANT CHANGE UP (ref 22–31)
CO2 SERPL-SCNC: 24 MMOL/L — SIGNIFICANT CHANGE UP (ref 22–31)
CREAT SERPL-MCNC: 0.44 MG/DL — LOW (ref 0.5–1.3)
CREAT SERPL-MCNC: 0.56 MG/DL — SIGNIFICANT CHANGE UP (ref 0.5–1.3)
CULTURE RESULTS: NO GROWTH — SIGNIFICANT CHANGE UP
GLUCOSE SERPL-MCNC: 284 MG/DL — HIGH (ref 70–99)
GLUCOSE SERPL-MCNC: 285 MG/DL — HIGH (ref 70–99)
HCO3 BLDA-SCNC: 24 MMOL/L — SIGNIFICANT CHANGE UP (ref 21–29)
HCT VFR BLD CALC: 26.8 % — LOW (ref 39–50)
HCT VFR BLD CALC: 26.8 % — LOW (ref 39–50)
HCT VFR BLD CALC: 28.3 % — LOW (ref 39–50)
HGB BLD-MCNC: 10.2 G/DL — LOW (ref 13–17)
HGB BLD-MCNC: 9.6 G/DL — LOW (ref 13–17)
HGB BLD-MCNC: 9.7 G/DL — LOW (ref 13–17)
HOROWITZ INDEX BLDA+IHG-RTO: 32 — SIGNIFICANT CHANGE UP
MAGNESIUM SERPL-MCNC: 1.8 MG/DL — SIGNIFICANT CHANGE UP (ref 1.8–2.4)
MAGNESIUM SERPL-MCNC: 2 MG/DL — SIGNIFICANT CHANGE UP (ref 1.8–2.4)
MCHC RBC-ENTMCNC: 32.8 PG — SIGNIFICANT CHANGE UP (ref 27–34)
MCHC RBC-ENTMCNC: 32.9 PG — SIGNIFICANT CHANGE UP (ref 27–34)
MCHC RBC-ENTMCNC: 33 PG — SIGNIFICANT CHANGE UP (ref 27–34)
MCHC RBC-ENTMCNC: 36 GM/DL — SIGNIFICANT CHANGE UP (ref 32–36)
MCHC RBC-ENTMCNC: 36.1 GM/DL — HIGH (ref 32–36)
MCHC RBC-ENTMCNC: 36.2 GM/DL — HIGH (ref 32–36)
MCV RBC AUTO: 90.8 FL — SIGNIFICANT CHANGE UP (ref 80–100)
MCV RBC AUTO: 90.9 FL — SIGNIFICANT CHANGE UP (ref 80–100)
MCV RBC AUTO: 91.7 FL — SIGNIFICANT CHANGE UP (ref 80–100)
PCO2 BLDA: 30 MMHG — LOW (ref 32–46)
PH BLD: 7.51 — HIGH (ref 7.35–7.45)
PHOSPHATE SERPL-MCNC: 1.3 MG/DL — LOW (ref 2.5–4.5)
PHOSPHATE SERPL-MCNC: 1.3 MG/DL — LOW (ref 2.5–4.5)
PLATELET # BLD AUTO: 164 K/UL — SIGNIFICANT CHANGE UP (ref 150–400)
PLATELET # BLD AUTO: 166 K/UL — SIGNIFICANT CHANGE UP (ref 150–400)
PLATELET # BLD AUTO: 184 K/UL — SIGNIFICANT CHANGE UP (ref 150–400)
PO2 BLDA: 165 MMHG — HIGH (ref 74–108)
POTASSIUM SERPL-MCNC: 2.8 MMOL/L — CRITICAL LOW (ref 3.5–5.3)
POTASSIUM SERPL-MCNC: 2.9 MMOL/L — CRITICAL LOW (ref 3.5–5.3)
POTASSIUM SERPL-SCNC: 2.8 MMOL/L — CRITICAL LOW (ref 3.5–5.3)
POTASSIUM SERPL-SCNC: 2.9 MMOL/L — CRITICAL LOW (ref 3.5–5.3)
RBC # BLD: 2.92 M/UL — LOW (ref 4.2–5.8)
RBC # BLD: 2.95 M/UL — LOW (ref 4.2–5.8)
RBC # BLD: 3.11 M/UL — LOW (ref 4.2–5.8)
RBC # FLD: 11.5 % — SIGNIFICANT CHANGE UP (ref 11–15)
RBC # FLD: 11.6 % — SIGNIFICANT CHANGE UP (ref 11–15)
RBC # FLD: 11.9 % — SIGNIFICANT CHANGE UP (ref 11–15)
SAO2 % BLDA: 99 % — HIGH (ref 92–96)
SODIUM SERPL-SCNC: 148 MMOL/L — HIGH (ref 135–145)
SODIUM SERPL-SCNC: 151 MMOL/L — HIGH (ref 135–145)
SPECIMEN SOURCE: SIGNIFICANT CHANGE UP
WBC # BLD: 17.2 K/UL — HIGH (ref 3.8–10.5)
WBC # BLD: 17.7 K/UL — HIGH (ref 3.8–10.5)
WBC # BLD: 18.3 K/UL — HIGH (ref 3.8–10.5)
WBC # FLD AUTO: 17.2 K/UL — HIGH (ref 3.8–10.5)
WBC # FLD AUTO: 17.7 K/UL — HIGH (ref 3.8–10.5)
WBC # FLD AUTO: 18.3 K/UL — HIGH (ref 3.8–10.5)

## 2017-02-18 PROCEDURE — 99291 CRITICAL CARE FIRST HOUR: CPT

## 2017-02-18 RX ORDER — FENTANYL CITRATE 50 UG/ML
25 INJECTION INTRAVENOUS EVERY 4 HOURS
Qty: 0 | Refills: 0 | Status: DISCONTINUED | OUTPATIENT
Start: 2017-02-18 | End: 2017-02-24

## 2017-02-18 RX ORDER — POTASSIUM CHLORIDE 20 MEQ
20 PACKET (EA) ORAL
Qty: 0 | Refills: 0 | Status: DISCONTINUED | OUTPATIENT
Start: 2017-02-18 | End: 2017-02-18

## 2017-02-18 RX ORDER — METOPROLOL TARTRATE 50 MG
2.5 TABLET ORAL ONCE
Qty: 0 | Refills: 0 | Status: COMPLETED | OUTPATIENT
Start: 2017-02-18 | End: 2017-02-18

## 2017-02-18 RX ORDER — MAGNESIUM SULFATE 500 MG/ML
2 VIAL (ML) INJECTION ONCE
Qty: 0 | Refills: 0 | Status: COMPLETED | OUTPATIENT
Start: 2017-02-18 | End: 2017-02-18

## 2017-02-18 RX ORDER — POTASSIUM PHOSPHATE, MONOBASIC POTASSIUM PHOSPHATE, DIBASIC 236; 224 MG/ML; MG/ML
30 INJECTION, SOLUTION INTRAVENOUS ONCE
Qty: 0 | Refills: 0 | Status: COMPLETED | OUTPATIENT
Start: 2017-02-18 | End: 2017-02-18

## 2017-02-18 RX ORDER — POTASSIUM PHOSPHATE, MONOBASIC POTASSIUM PHOSPHATE, DIBASIC 236; 224 MG/ML; MG/ML
15 INJECTION, SOLUTION INTRAVENOUS ONCE
Qty: 0 | Refills: 0 | Status: COMPLETED | OUTPATIENT
Start: 2017-02-18 | End: 2017-02-18

## 2017-02-18 RX ORDER — METOPROLOL TARTRATE 50 MG
2.5 TABLET ORAL EVERY 6 HOURS
Qty: 0 | Refills: 0 | Status: DISCONTINUED | OUTPATIENT
Start: 2017-02-18 | End: 2017-02-18

## 2017-02-18 RX ORDER — METOPROLOL TARTRATE 50 MG
5 TABLET ORAL EVERY 6 HOURS
Qty: 0 | Refills: 0 | Status: DISCONTINUED | OUTPATIENT
Start: 2017-02-18 | End: 2017-02-19

## 2017-02-18 RX ORDER — PANTOPRAZOLE SODIUM 20 MG/1
40 TABLET, DELAYED RELEASE ORAL DAILY
Qty: 0 | Refills: 0 | Status: DISCONTINUED | OUTPATIENT
Start: 2017-02-18 | End: 2017-02-24

## 2017-02-18 RX ORDER — POTASSIUM CHLORIDE 20 MEQ
10 PACKET (EA) ORAL
Qty: 0 | Refills: 0 | Status: COMPLETED | OUTPATIENT
Start: 2017-02-18 | End: 2017-02-18

## 2017-02-18 RX ADMIN — PANTOPRAZOLE SODIUM 40 MILLIGRAM(S): 20 TABLET, DELAYED RELEASE ORAL at 14:18

## 2017-02-18 RX ADMIN — Medication 2: at 14:17

## 2017-02-18 RX ADMIN — POTASSIUM PHOSPHATE, MONOBASIC POTASSIUM PHOSPHATE, DIBASIC 63.75 MILLIMOLE(S): 236; 224 INJECTION, SOLUTION INTRAVENOUS at 07:39

## 2017-02-18 RX ADMIN — SODIUM CHLORIDE 75 MILLILITER(S): 9 INJECTION, SOLUTION INTRAVENOUS at 14:18

## 2017-02-18 RX ADMIN — Medication 100 MILLIEQUIVALENT(S): at 11:48

## 2017-02-18 RX ADMIN — SODIUM CHLORIDE 75 MILLILITER(S): 9 INJECTION, SOLUTION INTRAVENOUS at 06:03

## 2017-02-18 RX ADMIN — Medication 100 MILLIEQUIVALENT(S): at 07:37

## 2017-02-18 RX ADMIN — Medication 2.5 MILLIGRAM(S): at 11:17

## 2017-02-18 RX ADMIN — Medication 100 MILLIEQUIVALENT(S): at 21:21

## 2017-02-18 RX ADMIN — Medication 100 MILLIGRAM(S): at 14:18

## 2017-02-18 RX ADMIN — FENTANYL CITRATE 50 MICROGRAM(S): 50 INJECTION INTRAVENOUS at 08:26

## 2017-02-18 RX ADMIN — Medication 100 MILLIEQUIVALENT(S): at 22:30

## 2017-02-18 RX ADMIN — CEFTRIAXONE 100 GRAM(S): 500 INJECTION, POWDER, FOR SOLUTION INTRAMUSCULAR; INTRAVENOUS at 17:12

## 2017-02-18 RX ADMIN — FENTANYL CITRATE 50 MICROGRAM(S): 50 INJECTION INTRAVENOUS at 12:12

## 2017-02-18 RX ADMIN — Medication 100 MILLIGRAM(S): at 06:01

## 2017-02-18 RX ADMIN — ENOXAPARIN SODIUM 40 MILLIGRAM(S): 100 INJECTION SUBCUTANEOUS at 06:03

## 2017-02-18 RX ADMIN — Medication 50 MILLIEQUIVALENT(S): at 18:03

## 2017-02-18 RX ADMIN — PROPOFOL 3.43 MICROGRAM(S)/KG/MIN: 10 INJECTION, EMULSION INTRAVENOUS at 06:06

## 2017-02-18 RX ADMIN — Medication 3: at 06:06

## 2017-02-18 RX ADMIN — FENTANYL CITRATE 50 MICROGRAM(S): 50 INJECTION INTRAVENOUS at 08:46

## 2017-02-18 RX ADMIN — Medication 100 MILLIEQUIVALENT(S): at 06:02

## 2017-02-18 RX ADMIN — Medication 5 MILLIGRAM(S): at 17:12

## 2017-02-18 RX ADMIN — Medication 2.5 MILLIGRAM(S): at 09:51

## 2017-02-18 RX ADMIN — POTASSIUM PHOSPHATE, MONOBASIC POTASSIUM PHOSPHATE, DIBASIC 65 MILLIMOLE(S): 236; 224 INJECTION, SOLUTION INTRAVENOUS at 20:12

## 2017-02-18 RX ADMIN — Medication 100 MILLIGRAM(S): at 21:21

## 2017-02-18 RX ADMIN — SODIUM CHLORIDE 75 MILLILITER(S): 9 INJECTION, SOLUTION INTRAVENOUS at 20:12

## 2017-02-18 RX ADMIN — Medication 2: at 10:07

## 2017-02-18 RX ADMIN — Medication 2: at 21:23

## 2017-02-18 RX ADMIN — FENTANYL CITRATE 50 MICROGRAM(S): 50 INJECTION INTRAVENOUS at 11:57

## 2017-02-18 RX ADMIN — Medication 50 GRAM(S): at 18:54

## 2017-02-18 RX ADMIN — CHLORHEXIDINE GLUCONATE 15 MILLILITER(S): 213 SOLUTION TOPICAL at 06:01

## 2017-02-18 RX ADMIN — Medication 4: at 17:12

## 2017-02-18 RX ADMIN — FENTANYL CITRATE 25 MICROGRAM(S): 50 INJECTION INTRAVENOUS at 18:40

## 2017-02-18 RX ADMIN — Medication: at 03:27

## 2017-02-18 RX ADMIN — Medication 100 MILLIEQUIVALENT(S): at 20:12

## 2017-02-18 NOTE — PROGRESS NOTE ADULT - SUBJECTIVE AND OBJECTIVE BOX
Surgery Attending    Tmax 38.2, afeb at present  Still intubated but weaning trial in progress  WBC 17,700  UO 2,600 yest    Abdomen:  Soft, nondistended, apparent mild incisional tenderness. Dressings intact    Plan:  Wean & extubate per Critical Care  Continue anti'bx  DVT prophylaxis

## 2017-02-18 NOTE — PROGRESS NOTE ADULT - SUBJECTIVE AND OBJECTIVE BOX
History of Present Illness:  History of Present Illness		  81 y/o male with h/o dementia ans DM presented to ED  with daughter c/o decreased po intake over last 24 hours and fever. Patient ED workup revealed cholecystitis and elevated lactate. Patient s/p open  cholecystectomy secondary  to  gangrenous  cholecystitis now  intubated ventilated medicated    INTERVAL HPI/OVERNIGHT EVENTS:        REVIEW OF SYSTEMS:  patient  intubated  medicated      MEDICATION:  enoxaparin Injectable 40milliGRAM(s) SubCutaneous every 24 hours  insulin lispro (HumaLOG) corrective regimen sliding scale  SubCutaneous every 4 hours  dextrose 5%. 1000milliLiter(s) IV Continuous <Continuous>  dextrose Gel 1Dose(s) Oral once PRN  dextrose 50% Injectable 12.5Gram(s) IV Push once  glucagon  Injectable 1milliGRAM(s) IntraMuscular once PRN  dextrose 5% + sodium chloride 0.45%. 1000milliLiter(s) IV Continuous <Continuous>  cefTRIAXone   IVPB 2Gram(s) IV Intermittent every 24 hours  metroNIDAZOLE  IVPB  IV Intermittent   metroNIDAZOLE  IVPB 500milliGRAM(s) IV Intermittent every 8 hours  sodium chloride 0.45%. 1000milliLiter(s) IV Continuous <Continuous>  chlorhexidine 0.12% Liquid 15milliLiter(s) Swish and Spit two times a day  propofol Infusion 10MICROgram(s)/kG/Min IV Continuous <Continuous>  fentaNYL    Injectable 50MICROGram(s) IV Push every 4 hours PRN  potassium chloride  10 mEq/100 mL IVPB 10milliEquivalent(s) IV Intermittent every 1 hour    Vital Signs Last 24 Hrs  T(C): 37.7, Max: 38.3 (-17 @ 13:12)  T(F): 99.8, Max: 101 (02-17 @ 13:12)  HR: 63 (51 - 73)  BP: 120/44 (97/40 - 135/53)  BP(mean): 64 (62 - 106)  RR: 14 (14 - 18)  SpO2: 100% (86% - 100%)    PHYSICAL EXAM:  GENERAL: Medicated  NAD    EYES:  conjunctiva and sclera clear     NECK: Supple, No JVD, Normal thyroid  NERVOUS SYSTEM:   CHEST/LUNG: Clear    HEART: Regular rate and rhythm; No murmurs, rubs, or gallops  ABDOMEN: Soft, Nontender, Nondistended; Bowel sounds present  EXTREMITIES:  no  edema no  tenderness  SKIN: No rashes   LABS:                        9.6    17.2  )-----------( 164      ( 2017 03:24 )             26.8     2017 03:24    151    |  117    |  10     ----------------------------<  284    2.9     |  24     |  0.56     Ca    7.2        2017 03:24  Phos  1.3       2017 03:24  Mg     2.0       2017 03:24    TPro  6.6    /  Alb  2.7    /  TBili  0.9    /  DBili  x      /  AST  33     /  ALT  223    /  AlkPhos  182    2017 04:06    PT/INR - ( 2017 18:31 )   PT: 11.7 sec;   INR: 1.04 ratio         PTT - ( 2017 18:31 )  PTT:31.7 sec  Urinalysis Basic - ( 2017 22:20 )    Color: Yellow / Appearance: Clear / S.010 / pH: x  Gluc: x / Ketone: Trace  / Bili: Negative / Urobili: Negative mg/dL   Blood: x / Protein: 15 mg/dL / Nitrite: Negative   Leuk Esterase: Negative / RBC: 11-25 /HPF / WBC 3-5   Sq Epi: x / Non Sq Epi: Occasional / Bacteria: Occasional      CAPILLARY BLOOD GLUCOSE  284 (2017 03:27)  348 (2017 23:23)  258 (2017 18:33)  233 (2017 14:05)  272 (2017 09:48)      RADIOLOGY & ADDITIONAL TESTS:    Imaging reports  Personally Reviewed:  [ x] YES  [ ] NO    Consultant(s) Notes Reviewed:  [x ] YES  [ ] NO    Care Discussed with Consultants/Other Providers [x ] YES  [ ] NO

## 2017-02-18 NOTE — PROGRESS NOTE ADULT - SUBJECTIVE AND OBJECTIVE BOX
Patient seen and examined at bedside, intubated responsive to touch.     Vital Signs Last 24 Hrs  T(F): 100.8, Max: 101 (02-17 @ 13:12)  HR: 69  BP: 138/59  RR: 14  SpO2: 100%  CAPILLARY BLOOD GLUCOSE:  284 (18 Feb 2017 03:27)    GENERAL: Sedated, intubated, responsive to light touch  CHEST/LUNG: Clear to auscultation bilaterally, respirations nonlabored  HEART: S1S2, Regular rate and rhythm; mild bradycardia on telemetry in 50's  ABDOMEN: quiet bowel sounds, soft, appropriate incisional tenderness, Nondistended, RUQ and midline dressing dry and intact with mild blood staining. NGT in place, not on suction  EXTREMITIES:  IPCD's in place, No edema  : parrish catheter in place draining clear yellow urine. 550ml/24 hours.     I&O's Detail  I & Os for 24h ending 18 Feb 2017 07:00  =============================================  IN:    dextrose 5% + sodium chloride 0.45%.: 1800 ml    sodium chloride 0.45%.: 1000 ml    Solution: 500 ml    Solution: 300 ml    propofol Infusion: 230.4 ml    Total IN: 3830.4 ml  ---------------------------------------------  OUT:    Indwelling Catheter - Urethral: 550 ml    Total OUT: 550 ml  ---------------------------------------------  Total NET: 3280.4 ml    I & Os for current day (as of 18 Feb 2017 09:30)  =============================================  IN:    dextrose 5% + sodium chloride 0.45%.: 150 ml    propofol Infusion: 17.2 ml    Total IN: 167.2 ml  ---------------------------------------------  OUT:    Indwelling Catheter - Urethral: 250 ml    Total OUT: 250 ml  ---------------------------------------------  Total NET: -82.8 ml    LABS:                        9.7    17.7  )-----------( 166      ( 18 Feb 2017 09:09 )             26.8     18 Feb 2017 03:24    151    |  117    |  10     ----------------------------<  284    2.9     |  24     |  0.56     Ca    7.2        18 Feb 2017 03:24  Phos  1.3       18 Feb 2017 03:24  Mg     2.0       18 Feb 2017 03:24    TPro  6.6    /  Alb  2.7    /  TBili  0.9    /  DBili  x      /  AST  33     /  ALT  223    /  AlkPhos  182    17 Feb 2017 04:06    82y old  Male s/p laparoscopic cholecystectomy converted to open cholecystectomy secondary to sepsis secondary to acute cholecystitis, POD# 1. Leukocytosis improving. Hypokalemia. PMH Glaucoma, Dementia, DM (diabetes mellitus), HTN (hypertension)    - continue local wound care  - antibiotics per CCU, trend WBC and vital signs  - f/u labs, replace potassium   - cont NGT  - cont parrish catheter, monitor urine output, trend BUN/Cr  - vent management per CCU, wean as tolerated  - DVT prophylaxis, Incentive Spirometer, pain control  - continue current management per medicine  - will discuss with surgical attending

## 2017-02-18 NOTE — PROGRESS NOTE ADULT - SUBJECTIVE AND OBJECTIVE BOX
CC: Decreased PO intake and fever    ## HPI:  82M PMH dementia / DM2 p/w decreased PO intake and fever.  Lactate elevated, admitted to ICU.  Initially, equivocal data for possible cholecystitis  - Abdominal tenderness (?RUQ > LUQ) but no rebound. No icterus  - Normal bilirubin levels, but elevated alk phos and ALT with normal AST.  - U/S with normal CBD but CT abd/pelvis distended gallbladder ?pericholecystic stranding.  HIDA scan positive, so sent to OR. Now s/p lap to open cholecystectomy found gangrenous gallbladder; sent back to ICU post-op, intubated.    O/N: Remained intubated and sedated. Hgb trended: 12.5 / 10.3 / 9.9 9.6 9.7.    ## ROS: Unobtainable due to intubation and sedation    ## Labs:  CBC:             10.2   18.3  )-----------( 184      ( 18 Feb 2017 17:01 )             28.3     Chem:  18 Feb 2017 17:01    148    |  113    |  5      ----------------------------<  285    2.8     |  24     |  0.44     Ca    7.1        18 Feb 2017 17:01  Phos  1.3       18 Feb 2017 17:01  Mg     1.8       18 Feb 2017 17:01    TPro  6.6    /  Alb  2.7    /  TBili  0.9    /  DBili  x      /  AST  33     /  ALT  223    /  AlkPhos  182    17 Feb 2017 04:06      Coags:  PT/INR - ( 16 Feb 2017 18:31 )   PT: 11.7 sec;   INR: 1.04 ratio    PTT - ( 16 Feb 2017 18:31 )  PTT:31.7 sec    CAPILLARY BLOOD GLUCOSE  304 (18 Feb 2017 17:10)  252 (18 Feb 2017 14:14)  205 (18 Feb 2017 10:05)  284 (18 Feb 2017 03:27)  348 (17 Feb 2017 23:23)  258 (17 Feb 2017 18:33)    Hemoglobin:  10.2 g/dL <L> (02-18 @ 17:01)  9.7 g/dL <L> (02-18 @ 09:09)  9.6 g/dL <L> (02-18 @ 03:24)  9.9 g/dL <L> (02-17 @ 21:30)  10.3 g/dL <L> (02-17 @ 18:59)  12.5 g/dL <L> (02-17 @ 08:22)  13.3 g/dL (02-16 @ 18:31)    ## Imaging:  No new imaging    ## Medications:  ## Meds:  cefTRIAXone   IVPB 2Gram(s) IV Intermittent every 24 hours  metroNIDAZOLE  IVPB 500milliGRAM(s) IV Intermittent every 8 hours    propofol Infusion 10MICROgram(s)/kG/Min IV Continuous <Continuous>  fentaNYL    Injectable 25MICROGram(s) IV Push every 4 hours PRN    metoprolol Injectable 5milliGRAM(s) IV Push every 6 hours  insulin lispro (HumaLOG) corrective regimen sliding scale  SubCutaneous every 4 hours    enoxaparin Injectable 40milliGRAM(s) SubCutaneous every 24 hours  pantoprazole  Injectable 40milliGRAM(s) IV Push daily    ## O/E:  T(C): 37.6, Max: 38.2 (02-18 @ 08:00)  HR: 71 (51 - 74)  BP: 133/52 (119/55 - 164/71)  BP(mean): 80 (64 - 80)  ABP: 170/58 (106/44 - 177/72)  ABP(mean): 96 (64 - 112)  RR: 22 (14 - 22)  SpO2: 100% (97% - 100%)  IN: 3830.4 ml / OUT: 550 ml / NET: 3280.4 ml    Mode: CPAP with PS, FiO2: 40, PEEP: 5, PS: 5, ITime: 0.9  Gen: somnolent on vent but moving all four extremities, opens eyes to voice  HEENT: PERRL, ETT in place  Resp: mechanical breath sounds B/L no c/r/w  CVS: S1S2 no m/r/g  Abd: abd dressing in place, c/d/i, mild tenderness to palpation  Ext: no c/c/e  Neuro: sedated but arousable    ## Daily Issues  - Analgesia: fentanyl  - Sedation: propofol  - HOB elevation: Y  - GI ppx (ulcers): PPI  - DVT ppx: Lovenox  - Nutrition: NPO  - Central line: N  - Parrish: Y    ## Assessment / Plan:  82M with cholecystitis s/p open cholecystectomy  - Patient tolerated pressure support this AM during rounds and was extubated successfully.  - Continue fentanyl PRN pain given abdominal surgery  - Remove parrish  - Post-op CBCs are stable, change to CBC Q12h and continue to monitor.  - c/w ceftriaxone / Flagyl for possible infection. No positive cultures. If cultures remain negative, can continue antibiotics for total of four days after surgery [ref: Maribel NÚÑEZ et. al. J Gastrointest Surg. 2013 Nov; 17 (11): 1947-52].  - NPO for now. F/U with surgery re: any additional post-op care    ## Code status:  Goals of care discussion: [x] yes [ ] no  [x] full code  [ ] DNR  [ ] DNI  [ ] MOLST    Critical care time: 40 min

## 2017-02-19 LAB
ALBUMIN SERPL ELPH-MCNC: 2 G/DL — LOW (ref 3.3–5)
ALP SERPL-CCNC: 124 U/L — HIGH (ref 40–120)
ALT FLD-CCNC: 109 U/L — HIGH (ref 12–78)
ANION GAP SERPL CALC-SCNC: 12 MMOL/L — SIGNIFICANT CHANGE UP (ref 5–17)
AST SERPL-CCNC: 39 U/L — HIGH (ref 15–37)
BILIRUB SERPL-MCNC: 0.7 MG/DL — SIGNIFICANT CHANGE UP (ref 0.2–1.2)
BUN SERPL-MCNC: 4 MG/DL — LOW (ref 7–23)
CALCIUM SERPL-MCNC: 7 MG/DL — LOW (ref 8.5–10.1)
CHLORIDE SERPL-SCNC: 112 MMOL/L — HIGH (ref 96–108)
CO2 SERPL-SCNC: 23 MMOL/L — SIGNIFICANT CHANGE UP (ref 22–31)
CREAT SERPL-MCNC: 0.41 MG/DL — LOW (ref 0.5–1.3)
GLUCOSE SERPL-MCNC: 272 MG/DL — HIGH (ref 70–99)
HCT VFR BLD CALC: 27.4 % — LOW (ref 39–50)
HGB BLD-MCNC: 9.9 G/DL — LOW (ref 13–17)
MAGNESIUM SERPL-MCNC: 2.2 MG/DL — SIGNIFICANT CHANGE UP (ref 1.8–2.4)
MCHC RBC-ENTMCNC: 31.8 PG — SIGNIFICANT CHANGE UP (ref 27–34)
MCHC RBC-ENTMCNC: 36 GM/DL — SIGNIFICANT CHANGE UP (ref 32–36)
MCV RBC AUTO: 88.5 FL — SIGNIFICANT CHANGE UP (ref 80–100)
PHOSPHATE SERPL-MCNC: 1.6 MG/DL — LOW (ref 2.5–4.5)
PLATELET # BLD AUTO: 176 K/UL — SIGNIFICANT CHANGE UP (ref 150–400)
POTASSIUM SERPL-MCNC: 3.5 MMOL/L — SIGNIFICANT CHANGE UP (ref 3.5–5.3)
POTASSIUM SERPL-SCNC: 3.5 MMOL/L — SIGNIFICANT CHANGE UP (ref 3.5–5.3)
PROT SERPL-MCNC: 5.1 GM/DL — LOW (ref 6–8.3)
RBC # BLD: 3.1 M/UL — LOW (ref 4.2–5.8)
RBC # FLD: 11 % — SIGNIFICANT CHANGE UP (ref 11–15)
SODIUM SERPL-SCNC: 147 MMOL/L — HIGH (ref 135–145)
WBC # BLD: 17.8 K/UL — HIGH (ref 3.8–10.5)
WBC # FLD AUTO: 17.8 K/UL — HIGH (ref 3.8–10.5)

## 2017-02-19 PROCEDURE — 99233 SBSQ HOSP IP/OBS HIGH 50: CPT | Mod: GC

## 2017-02-19 RX ORDER — DORZOLAMIDE HYDROCHLORIDE TIMOLOL MALEATE 20; 5 MG/ML; MG/ML
1 SOLUTION/ DROPS OPHTHALMIC
Qty: 0 | Refills: 0 | Status: DISCONTINUED | OUTPATIENT
Start: 2017-02-19 | End: 2017-02-24

## 2017-02-19 RX ORDER — POTASSIUM PHOSPHATE, MONOBASIC POTASSIUM PHOSPHATE, DIBASIC 236; 224 MG/ML; MG/ML
15 INJECTION, SOLUTION INTRAVENOUS ONCE
Qty: 0 | Refills: 0 | Status: COMPLETED | OUTPATIENT
Start: 2017-02-19 | End: 2017-02-19

## 2017-02-19 RX ORDER — METOPROLOL TARTRATE 50 MG
25 TABLET ORAL
Qty: 0 | Refills: 0 | Status: DISCONTINUED | OUTPATIENT
Start: 2017-02-19 | End: 2017-02-24

## 2017-02-19 RX ORDER — AMLODIPINE BESYLATE 2.5 MG/1
5 TABLET ORAL DAILY
Qty: 0 | Refills: 0 | Status: DISCONTINUED | OUTPATIENT
Start: 2017-02-19 | End: 2017-02-24

## 2017-02-19 RX ORDER — INSULIN LISPRO 100/ML
VIAL (ML) SUBCUTANEOUS
Qty: 0 | Refills: 0 | Status: DISCONTINUED | OUTPATIENT
Start: 2017-02-19 | End: 2017-02-24

## 2017-02-19 RX ORDER — INSULIN LISPRO 100/ML
VIAL (ML) SUBCUTANEOUS AT BEDTIME
Qty: 0 | Refills: 0 | Status: DISCONTINUED | OUTPATIENT
Start: 2017-02-19 | End: 2017-02-24

## 2017-02-19 RX ORDER — MEMANTINE HYDROCHLORIDE 10 MG/1
5 TABLET ORAL DAILY
Qty: 0 | Refills: 0 | Status: DISCONTINUED | OUTPATIENT
Start: 2017-02-19 | End: 2017-02-23

## 2017-02-19 RX ADMIN — Medication 25 MILLIGRAM(S): at 17:58

## 2017-02-19 RX ADMIN — Medication 5 MILLIGRAM(S): at 05:51

## 2017-02-19 RX ADMIN — DORZOLAMIDE HYDROCHLORIDE TIMOLOL MALEATE 1 DROP(S): 20; 5 SOLUTION/ DROPS OPHTHALMIC at 17:57

## 2017-02-19 RX ADMIN — POTASSIUM PHOSPHATE, MONOBASIC POTASSIUM PHOSPHATE, DIBASIC 63.75 MILLIMOLE(S): 236; 224 INJECTION, SOLUTION INTRAVENOUS at 06:47

## 2017-02-19 RX ADMIN — PANTOPRAZOLE SODIUM 40 MILLIGRAM(S): 20 TABLET, DELAYED RELEASE ORAL at 12:30

## 2017-02-19 RX ADMIN — MEMANTINE HYDROCHLORIDE 5 MILLIGRAM(S): 10 TABLET ORAL at 14:23

## 2017-02-19 RX ADMIN — Medication 2: at 02:29

## 2017-02-19 RX ADMIN — Medication 100 MILLIGRAM(S): at 14:24

## 2017-02-19 RX ADMIN — Medication 100 MILLIGRAM(S): at 22:51

## 2017-02-19 RX ADMIN — Medication 100 MILLIGRAM(S): at 05:51

## 2017-02-19 RX ADMIN — Medication 2: at 10:52

## 2017-02-19 RX ADMIN — AMLODIPINE BESYLATE 5 MILLIGRAM(S): 2.5 TABLET ORAL at 12:30

## 2017-02-19 RX ADMIN — CEFTRIAXONE 100 GRAM(S): 500 INJECTION, POWDER, FOR SOLUTION INTRAMUSCULAR; INTRAVENOUS at 17:57

## 2017-02-19 RX ADMIN — Medication 2: at 05:52

## 2017-02-19 RX ADMIN — Medication 6: at 16:42

## 2017-02-19 RX ADMIN — Medication 5 MILLIGRAM(S): at 00:06

## 2017-02-19 RX ADMIN — ENOXAPARIN SODIUM 40 MILLIGRAM(S): 100 INJECTION SUBCUTANEOUS at 05:51

## 2017-02-19 NOTE — DIETITIAN INITIAL EVALUATION ADULT. - PERTINENT LABORATORY DATA
02-19 Na147 mmol/L<H> Glu 272 mg/dL<H> K+ 3.5 mmol/L Cr  0.41 mg/dL<L> BUN 4 mg/dL<L> Phos 1.6 mg/dL<L> Alb 2.0 g/dL<L> PAB n/a, LFTs all elevated, Phos 1.6L; (2/17) HgbA1c 7.2H

## 2017-02-19 NOTE — PROGRESS NOTE ADULT - ASSESSMENT
Stable POD#2 since extubation  Leukocytosis noted  Continuing AB Rx  Continuing supportive ICU care  BS management    Requires better BS control: Level 272 this AM

## 2017-02-19 NOTE — PROGRESS NOTE ADULT - SUBJECTIVE AND OBJECTIVE BOX
CC: Decreased PO intake and fever    ## HPI:  82M PMH dementia / DM2 p/w decreased PO intake and fever.  Lactate elevated, admitted to ICU.  Initially, equivocal data for possible cholecystitis  - Abdominal tenderness (?RUQ > LUQ) but no rebound. No icterus  - Normal bilirubin levels, but elevated alk phos and ALT with normal AST.  - U/S with normal CBD but CT abd/pelvis distended gallbladder ?pericholecystic stranding.  HIDA scan positive, so sent to OR. Now s/p lap to open cholecystectomy found gangrenous gallbladder; sent back to ICU post-op, intubated.    O/N: Extubated, no events.    ## ROS:  Somnolent but arousable.  Dementia makes patient poor historian and .  Denies any complaints other than thirst.    ## Labs:  CBC:             9.9    17.8  )-----------( 176      ( 19 Feb 2017 03:27 )             27.4       Chem:  19 Feb 2017 03:27  147    |  112    |  4      ----------------------------<  272    3.5     |  23     |  0.41     Ca    7.0        19 Feb 2017 03:27  Phos  1.6       19 Feb 2017 03:27  Mg     2.2       19 Feb 2017 03:27    TPro  5.1    /  Alb  2.0    /  TBili  0.7    /  DBili  x      /  AST  39     /  ALT  109    /  AlkPhos  124    19 Feb 2017 03:27    CAPILLARY BLOOD GLUCOSE  248 (19 Feb 2017 09:59)  244 (19 Feb 2017 05:50)  243 (19 Feb 2017 02:27)  239 (18 Feb 2017 21:23)  304 (18 Feb 2017 17:10)  252 (18 Feb 2017 14:14)    Hemoglobin / stable:  9.9 g/dL <L> (02-19 @ 03:27)  10.2 g/dL <L> (02-18 @ 17:01)  9.7 g/dL <L> (02-18 @ 09:09)  9.6 g/dL <L> (02-18 @ 03:24)    ## Imaging:  No new imaging    ## Medications:  amLODIPine   Tablet 5milliGRAM(s) Oral daily  metoprolol 25milliGRAM(s) Oral two times a day    cefTRIAXone   IVPB 2Gram(s) IV Intermittent every 24 hours  metroNIDAZOLE  IVPB 500milliGRAM(s) IV Intermittent every 8 hours    insulin lispro (HumaLOG) corrective regimen sliding scale  SubCutaneous three times a day before meals  insulin lispro (HumaLOG) corrective regimen sliding scale  SubCutaneous at bedtime    fentaNYL    Injectable 25MICROGram(s) IV Push every 4 hours PRN  memantine 5milliGRAM(s) Oral daily    enoxaparin Injectable 40milliGRAM(s) SubCutaneous every 24 hours  pantoprazole  Injectable 40milliGRAM(s) IV Push daily    ## O/E:  T(C): 37.4, Max: 37.8 (02-19 @ 08:00)  HR: 68 (63 - 91)  BP: 142/57 (127/46 - 171/105)  BP(mean): 78 (67 - 115)  ABP: 165/61 (162/60 - 167/70)  ABP(mean): 95 (94 - 106)  RR: 18 (12 - 26)  SpO2: 97% (97% - 100%)    Total IN: 3145.6 ml  Total OUT: 1000 ml  Total NET: + 2145.6 ml    Gen: somnolent but arousable  Resp: CTA B/L no c/r/w  CVS: S1S2 no m/r/g  Abd: abd dressing in place, c/d/i, mild tenderness to palpation  Ext: no c/c/e  Neuro: responsive no gross focal deficits    ## Daily Issues  - Analgesia: fentanyl PRN  - Sedation: N/A  - HOB elevation: Y  - GI ppx (ulcers): N/A  - DVT ppx: Lovenox  - Nutrition: PO diet  - Central line: N  - Christina: Y    ## Assessment / Plan:  82M with cholecystitis s/p open cholecystectomy  - Extubated yesterday with no respiratory issues overnight.  - Continue fentanyl PRN pain given abdominal surgery  - Post-op CBCs are stable, can do daily  - c/w ceftriaxone / Flagyl for possible infection. No positive cultures, afebrile. If cultures remain negative, can continue antibiotics for total of four days after surgery, currently POD #2.  - Dysphagia diet  - F/U with surgery re: any additional post-op care    ## Code status:  Goals of care discussion: [x] yes [ ] no  [x] full code  [ ] DNR  [ ] DNI  [ ] MOLST    Patient can be transferred to general medical floors for further care.

## 2017-02-19 NOTE — DIETITIAN INITIAL EVALUATION ADULT. - ENERGY NEEDS
Height (cm): 144.8 (02-17)  Weight (kg): 57.2 (02-17)  BMI (kg/m2): 27.3 (02-17)  IBW:  42.7 kg +/- 10%        % IBW:  140%           UBW:  unknown     %UBW: unknown

## 2017-02-19 NOTE — DIETITIAN INITIAL EVALUATION ADULT. - PERTINENT MEDS FT
Lovenox, Rocephin, Glagly, Fentanyl, 5% dextrose + 1/2 NS, Lopressor, Protonix, Humalog sliding scale

## 2017-02-19 NOTE — DIETITIAN INITIAL EVALUATION ADULT. - NS AS NUTRI INTERV MEALS SNACK
[place] - modified diet/When medically appropriate, advance to PO diet: CCHO c snack + low Na diets/Carbohydrate - modified diet

## 2017-02-19 NOTE — DIETITIAN INITIAL EVALUATION ADULT. - NS AS NUTRI INTERV MEDICAL AND FOOD SUPPLEMENTS
Commercial beverage/When medically appropriate, add Glucerna Shakes x 2/day (provides 440 kcal, 20 g protein) to promote wound healing

## 2017-02-19 NOTE — DIETITIAN INITIAL EVALUATION ADULT. - OTHER INFO
Pt seen for consult - chew/swallowing difficulty, pressure ulcer > 2; also HgbA1c > 7%. Unable to interview pt; no family present. Pt NPO s/p cholecystectomy; extubated this am; per MD note, diet to be advanced as medically appropriate. Per chart info, pt c dementia; lives c daughter. No reports of N/V/C/D or chew/swallowing difficulty PTA. BM regular; last x 3 (2/18)

## 2017-02-19 NOTE — CHART NOTE - NSCHARTNOTEFT_GEN_A_CORE
[facility] NP NOTE    HPI:  81 y/o male with h/o dementia ans DM presented to ED  with daughter c/o decreased po intake over last 24 hours and fever. Patient ED workup revealed cholecystitis and elevated lactate. Patient with altered mental status unable to participate as historian. Signs and symptoms of icterus, dehydration and elevated blood sugar. Admitted to ICU for stabilization before surgical or interventional radiology intervention. U/S with normal CBD but CT abd/pelvis distended gallbladder ?pericholecystic stranding. HIDA scan positive, so sent to OR. Now s/p lap to open cholecystectomy found gangrenous gallbladder; sent back to ICU post-op, intubated. Extubated 2/18. FS still remain elevated today adjusted insulin sliding scale. VSS for transfer to non monitored bed.  Further management as per Dr. Patricio medical service.   Report given to Dr. Patricio. Medicine service.

## 2017-02-19 NOTE — PROGRESS NOTE ADULT - SUBJECTIVE AND OBJECTIVE BOX
INTERVAL HPI/OVERNIGHT EVENTS:  patient  succesfully  extubated  comfortable no  O2  via NC    REVIEW OF SYSTEMS:  CONSTITUTIONAL:  no  complaints      MEDICATION:  enoxaparin Injectable 40milliGRAM(s) SubCutaneous every 24 hours  insulin lispro (HumaLOG) corrective regimen sliding scale  SubCutaneous every 4 hours  dextrose 5%. 1000milliLiter(s) IV Continuous <Continuous>  dextrose Gel 1Dose(s) Oral once PRN  dextrose 50% Injectable 12.5Gram(s) IV Push once  glucagon  Injectable 1milliGRAM(s) IntraMuscular once PRN  dextrose 5% + sodium chloride 0.45%. 1000milliLiter(s) IV Continuous <Continuous>  cefTRIAXone   IVPB 2Gram(s) IV Intermittent every 24 hours  metroNIDAZOLE  IVPB  IV Intermittent   metroNIDAZOLE  IVPB 500milliGRAM(s) IV Intermittent every 8 hours  sodium chloride 0.45%. 1000milliLiter(s) IV Continuous <Continuous>  metoprolol Injectable 5milliGRAM(s) IV Push every 6 hours  pantoprazole  Injectable 40milliGRAM(s) IV Push daily  fentaNYL    Injectable 25MICROGram(s) IV Push every 4 hours PRN    Vital Signs Last 24 Hrs  T(C): 37.2, Max: 37.6 (02-18 @ 12:00)  T(F): 99, Max: 99.6 (02-18 @ 12:00)  HR: 63 (58 - 91)  BP: 140/53 (127/46 - 171/105)  BP(mean): 76 (67 - 115)  RR: 18 (14 - 26)  SpO2: 99% (97% - 100%)    PHYSICAL EXAM:  GENERAL: NAD,   EYES:  conjunctiva and sclera clear  ENMT:  Moist mucous membranes,   NECK: Supple, No JVD, Normal thyroid  NERVOUS SYSTEM:  Alert confused moves  all extr  CHEST/LUNG: Clear    HEART: Regular rate and rhythm; No murmurs, rubs, or gallops  ABDOMEN: Soft, Nontender, Nondistended; Bowel sounds present  EXTREMITIES:  no  edema no  tenderness  SKIN: No rashes   LABS:                        9.9    17.8  )-----------( 176      ( 19 Feb 2017 03:27 )             27.4     19 Feb 2017 03:27    147    |  112    |  4      ----------------------------<  272    3.5     |  23     |  0.41     Ca    7.0        19 Feb 2017 03:27  Phos  1.6       19 Feb 2017 03:27  Mg     2.2       19 Feb 2017 03:27    TPro  5.1    /  Alb  2.0    /  TBili  0.7    /  DBili  x      /  AST  39     /  ALT  109    /  AlkPhos  124    19 Feb 2017 03:27        CAPILLARY BLOOD GLUCOSE  244 (19 Feb 2017 05:50)  243 (19 Feb 2017 02:27)  239 (18 Feb 2017 21:23)  304 (18 Feb 2017 17:10)  252 (18 Feb 2017 14:14)  205 (18 Feb 2017 10:05)      RADIOLOGY & ADDITIONAL TESTS:    Imaging reports  Personally Reviewed:  [ x] YES  [ ] NO    Consultant(s) Notes Reviewed:  [x ] YES  [ ] NO    Care Discussed with Consultants/Other Providers [x ] YES  [ ] NO  Assessment and Plan:   Problem/Plan - 1:  ·  Problem: Status post cholecystectomy  diet  as  per  surgery    Problem/Plan - 2:  ·  Problem: Sepsis, due to unspecified organism.  Plan: ceftriaxone  flagyl.     Problem/Plan - 3:  ·  Problem: Type 2 diabetes mellitus with hyperglycemia, without long-term current use of insulin.  Plan: insulin  coverage.     Problem/Plan - 4:  ·  Problem: Dementia.  Plan: continue  aricept. once  oral  diet  resumed    Problem/Plan - 5:  ·  Problem: Hypokalemia.  Plan: supplement.     Problem/Plan - 6:  Problem: Anemia. Plan: monitor further  w/u  and treatment  as per  clinical  course.    Problem/Plan - 7:  ·  Problem: Hypophosphatemia. improving will  probably need  supplement  once oral  diet  resumed

## 2017-02-19 NOTE — PROGRESS NOTE ADULT - SUBJECTIVE AND OBJECTIVE BOX
Surgical Attending Roland Gilbert MD  [phone]    Post Op Day #: 2    Procedure:  Lap to Open Cholecystectomy for acute gangrenous cholecystitis      Stable clinically since extubation yesterday  Tmax 99.6  BP OK   noted   Bili is WNL      Poorly responsive this AM.  Dressing intact: C&D  Abdomen is soft with some apparent incisional tenderness.    Vital Signs Last 24 Hrs  T(C): 37.2, Max: 37.6 (02-18 @ 12:00)  T(F): 99, Max: 99.6 (02-18 @ 12:00)  HR: 66 (58 - 91)  BP: 145/59 (127/46 - 171/105)  BP(mean): 82 (67 - 115)  RR: 20 (14 - 26)  SpO2: 100% (97% - 100%)    I&O's Detail  I & Os for 24h ending 19 Feb 2017 07:00  =============================================  IN:    dextrose 5% + sodium chloride 0.45%.: 1725 ml    Solution: 600 ml    Solution: 500 ml    Solution: 200 ml    Solution: 100 ml    propofol Infusion: 20.6 ml    Total IN: 3145.6 ml  ---------------------------------------------  OUT:    Indwelling Catheter - Urethral: 1000 ml    Total OUT: 1000 ml  ---------------------------------------------  Total NET: 2145.6 ml    I & Os for current day (as of 19 Feb 2017 09:38)  =============================================  IN:    dextrose 5% + sodium chloride 0.45%.: 150 ml    Total IN: 150 ml  ---------------------------------------------  OUT:    Total OUT: 0 ml  ---------------------------------------------  Total NET: 150 ml                            9.9    17.8  )-----------( 176      ( 19 Feb 2017 03:27 )             27.4       19 Feb 2017 03:27    147    |  112    |  4      ----------------------------<  272    3.5     |  23     |  0.41     Ca    7.0        19 Feb 2017 03:27  Phos  1.6       19 Feb 2017 03:27  Mg     2.2       19 Feb 2017 03:27    TPro  5.1    /  Alb  2.0    /  TBili  0.7    /  DBili  x      /  AST  39     /  ALT  109    /  AlkPhos  124    19 Feb 2017 03:27

## 2017-02-20 LAB
ALBUMIN SERPL ELPH-MCNC: 1.9 G/DL — LOW (ref 3.3–5)
ALP SERPL-CCNC: 119 U/L — SIGNIFICANT CHANGE UP (ref 40–120)
ALT FLD-CCNC: 78 U/L — SIGNIFICANT CHANGE UP (ref 12–78)
ANION GAP SERPL CALC-SCNC: 10 MMOL/L — SIGNIFICANT CHANGE UP (ref 5–17)
AST SERPL-CCNC: 33 U/L — SIGNIFICANT CHANGE UP (ref 15–37)
BILIRUB SERPL-MCNC: 0.6 MG/DL — SIGNIFICANT CHANGE UP (ref 0.2–1.2)
BUN SERPL-MCNC: 5 MG/DL — LOW (ref 7–23)
CALCIUM SERPL-MCNC: 7 MG/DL — LOW (ref 8.5–10.1)
CHLORIDE SERPL-SCNC: 107 MMOL/L — SIGNIFICANT CHANGE UP (ref 96–108)
CO2 SERPL-SCNC: 26 MMOL/L — SIGNIFICANT CHANGE UP (ref 22–31)
CREAT SERPL-MCNC: 0.43 MG/DL — LOW (ref 0.5–1.3)
GLUCOSE SERPL-MCNC: 234 MG/DL — HIGH (ref 70–99)
MAGNESIUM SERPL-MCNC: 1.9 MG/DL — SIGNIFICANT CHANGE UP (ref 1.8–2.4)
PHOSPHATE SERPL-MCNC: 1 MG/DL — CRITICAL LOW (ref 2.5–4.5)
POTASSIUM SERPL-MCNC: 3.3 MMOL/L — LOW (ref 3.5–5.3)
POTASSIUM SERPL-SCNC: 3.3 MMOL/L — LOW (ref 3.5–5.3)
PROT SERPL-MCNC: 5 GM/DL — LOW (ref 6–8.3)
SODIUM SERPL-SCNC: 143 MMOL/L — SIGNIFICANT CHANGE UP (ref 135–145)

## 2017-02-20 RX ORDER — POTASSIUM PHOSPHATE, MONOBASIC POTASSIUM PHOSPHATE, DIBASIC 236; 224 MG/ML; MG/ML
15 INJECTION, SOLUTION INTRAVENOUS ONCE
Qty: 0 | Refills: 0 | Status: COMPLETED | OUTPATIENT
Start: 2017-02-20 | End: 2017-02-20

## 2017-02-20 RX ORDER — POTASSIUM CHLORIDE 20 MEQ
40 PACKET (EA) ORAL EVERY 4 HOURS
Qty: 0 | Refills: 0 | Status: DISCONTINUED | OUTPATIENT
Start: 2017-02-20 | End: 2017-02-20

## 2017-02-20 RX ORDER — POTASSIUM PHOSPHATE, MONOBASIC POTASSIUM PHOSPHATE, DIBASIC 236; 224 MG/ML; MG/ML
30 INJECTION, SOLUTION INTRAVENOUS ONCE
Qty: 0 | Refills: 0 | Status: DISCONTINUED | OUTPATIENT
Start: 2017-02-20 | End: 2017-02-20

## 2017-02-20 RX ORDER — POTASSIUM CHLORIDE 20 MEQ
40 PACKET (EA) ORAL EVERY 4 HOURS
Qty: 0 | Refills: 0 | Status: COMPLETED | OUTPATIENT
Start: 2017-02-20 | End: 2017-02-20

## 2017-02-20 RX ADMIN — AMLODIPINE BESYLATE 5 MILLIGRAM(S): 2.5 TABLET ORAL at 05:33

## 2017-02-20 RX ADMIN — POTASSIUM PHOSPHATE, MONOBASIC POTASSIUM PHOSPHATE, DIBASIC 63.75 MILLIMOLE(S): 236; 224 INJECTION, SOLUTION INTRAVENOUS at 09:11

## 2017-02-20 RX ADMIN — ENOXAPARIN SODIUM 40 MILLIGRAM(S): 100 INJECTION SUBCUTANEOUS at 05:33

## 2017-02-20 RX ADMIN — CEFTRIAXONE 100 GRAM(S): 500 INJECTION, POWDER, FOR SOLUTION INTRAMUSCULAR; INTRAVENOUS at 17:35

## 2017-02-20 RX ADMIN — Medication 40 MILLIEQUIVALENT(S): at 13:27

## 2017-02-20 RX ADMIN — DORZOLAMIDE HYDROCHLORIDE TIMOLOL MALEATE 1 DROP(S): 20; 5 SOLUTION/ DROPS OPHTHALMIC at 17:03

## 2017-02-20 RX ADMIN — Medication 100 MILLIGRAM(S): at 22:00

## 2017-02-20 RX ADMIN — Medication 100 MILLIGRAM(S): at 13:28

## 2017-02-20 RX ADMIN — Medication 100 MILLIGRAM(S): at 05:33

## 2017-02-20 RX ADMIN — Medication 2: at 11:09

## 2017-02-20 RX ADMIN — PANTOPRAZOLE SODIUM 40 MILLIGRAM(S): 20 TABLET, DELAYED RELEASE ORAL at 11:11

## 2017-02-20 RX ADMIN — Medication 25 MILLIGRAM(S): at 17:03

## 2017-02-20 RX ADMIN — Medication 4: at 08:00

## 2017-02-20 RX ADMIN — MEMANTINE HYDROCHLORIDE 5 MILLIGRAM(S): 10 TABLET ORAL at 11:10

## 2017-02-20 RX ADMIN — Medication 40 MILLIEQUIVALENT(S): at 11:10

## 2017-02-20 RX ADMIN — DORZOLAMIDE HYDROCHLORIDE TIMOLOL MALEATE 1 DROP(S): 20; 5 SOLUTION/ DROPS OPHTHALMIC at 05:33

## 2017-02-20 RX ADMIN — Medication 25 MILLIGRAM(S): at 05:33

## 2017-02-20 RX ADMIN — Medication 2: at 16:13

## 2017-02-20 NOTE — CONSULT NOTE ADULT - SUBJECTIVE AND OBJECTIVE BOX
Patient is a 82y old  Male who presents with a chief complaint of Decreased PO intake and fever as per patient's H and P. (17 Feb 2017 03:52)    HPI: 83 y/o male with h/o dementia ans DM presented to ED  with daughter c/o decreased po intake over last 24 hours and fever. Patient ED workup revealed cholecystitis and elevated lactate, underwent laparoscopic -> converted to open cholecystectomy by Dr. Nunez 2/17, intubated in ICU afterward. Now stable enough for med/surg floor.    Currently lying in bed, asleep, responds to stimuli, NAD      REVIEW OF SYSTEMS - Unobtainable    PAST MEDICAL & SURGICAL HISTORY  Glaucoma  Dementia  DM (diabetes mellitus)  HTN (hypertension)  R hip ORIF 2013    FAMILY HISTORY:  No pertinent family history in first degree relatives    SOCHX: Unknown    FMHX: FA/MO Unobtainalbe    ALLERGIES  No Known Allergies      MEDICATIONS   MEDICATIONS  (STANDING):  enoxaparin Injectable 40milliGRAM(s) SubCutaneous every 24 hours  dextrose 5%. 1000milliLiter(s) IV Continuous <Continuous>  dextrose 50% Injectable 12.5Gram(s) IV Push once  cefTRIAXone   IVPB 2Gram(s) IV Intermittent every 24 hours  metroNIDAZOLE  IVPB  IV Intermittent   metroNIDAZOLE  IVPB 500milliGRAM(s) IV Intermittent every 8 hours  pantoprazole  Injectable 40milliGRAM(s) IV Push daily  insulin lispro (HumaLOG) corrective regimen sliding scale  SubCutaneous three times a day before meals  insulin lispro (HumaLOG) corrective regimen sliding scale  SubCutaneous at bedtime  amLODIPine   Tablet 5milliGRAM(s) Oral daily  memantine 5milliGRAM(s) Oral daily  dorzolamide 2%/timolol 0.5% Ophthalmic Solution 1Drop(s) Both EYES two times a day  metoprolol 25milliGRAM(s) Oral two times a day  potassium phosphate IVPB 15milliMole(s) IV Intermittent once    MEDICATIONS  (PRN):  dextrose Gel 1Dose(s) Oral once PRN Blood Glucose LESS THAN 70 milliGRAM(s)/deciliter  glucagon  Injectable 1milliGRAM(s) IntraMuscular once PRN Glucose LESS THAN 70 milligrams/deciliter  fentaNYL    Injectable 25MICROGram(s) IV Push every 4 hours PRN Moderate Pain (4 - 6)      --------------------------------------------------------------------  VITALS  T(C): 37.3, Max: 37.8 (02-19 @ 21:17)  HR: 70 (63 - 83)  BP: 141/52 (99/58 - 157/63)  RR: 17 (12 - 22)  SpO2: 99% (97% - 100%)  Wt(kg): --      PHYSICAL EXAM Lying comfortably in bed, O2 NC, responds to stimulus - voice, but nonverbal.  Constitutional - NAD, Comfortable  HEENT - NCAT  Neck - Supple, functional ROM  Cardiovascular - RRR  Abdomen - DPD over sound, binder  Extremities - Functional range of motion groslly   Neurologic -                    Cognitive - arousable     Speech cannot evaluate     Lanuage  cannot evaluate     Motor - cannot evaluate     Sensory -cannot evaluate     Psychiatric - cannot evaluate      RECENT LABS/IMAGING  CBC Full  -  ( 19 Feb 2017 03:27 )  WBC Count : 17.8 K/uL  Hemoglobin : 9.9 g/dL  Hematocrit : 27.4 %  Platelet Count - Automated : 176 K/uL  Mean Cell Volume : 88.5 fl  Mean Cell Hemoglobin : 31.8 pg  Mean Cell Hemoglobin Concentration : 36.0 gm/dL  20 Feb 2017 06:41    143    |  107    |  5      ----------------------------<  234    3.3     |  26     |  0.43     Ca    7.0        20 Feb 2017 06:41  Phos  1.0       20 Feb 2017 06:41  Mg     1.9       20 Feb 2017 06:41    TPro  5.0    /  Alb  1.9    /  TBili  0.6    /  DBili  x      /  AST  33     /  ALT  78     /  AlkPhos  119    20 Feb 2017 06:41    IMAGING reviewed:    FUNCTIONAL HISTORY - Lives at home with daughter - will determine.    CURRENT FUNCTIONAL STATUS - cannot evaluate    IMPRESSION: Dementia, s/p open choley for gangrenous cholecystitis,     PLAN: Will follow.  Recommendations will depend upon clinical and functional course.

## 2017-02-20 NOTE — PROGRESS NOTE ADULT - SUBJECTIVE AND OBJECTIVE BOX
INTERVAL HPI/OVERNIGHT EVENTS:        REVIEW OF SYSTEMS:  CONSTITUTIONAL:  no  complaints    NECK: No pain or stiffnes  RESPIRATORY: No SOB   CARDIOVASCULAR: No chest pain, palpitations, dizziness,   GASTROINTESTINAL: No abdominal pain. No nausea, vomiting,   NEUROLOGICAL: No headaches, no  blurry  vision no  dizziness  SKIN: No itching,   MUSCULOSKELETAL: No pain    MEDICATION:  enoxaparin Injectable 40milliGRAM(s) SubCutaneous every 24 hours  dextrose 5%. 1000milliLiter(s) IV Continuous <Continuous>  dextrose Gel 1Dose(s) Oral once PRN  dextrose 50% Injectable 12.5Gram(s) IV Push once  glucagon  Injectable 1milliGRAM(s) IntraMuscular once PRN  cefTRIAXone   IVPB 2Gram(s) IV Intermittent every 24 hours  metroNIDAZOLE  IVPB  IV Intermittent   metroNIDAZOLE  IVPB 500milliGRAM(s) IV Intermittent every 8 hours  pantoprazole  Injectable 40milliGRAM(s) IV Push daily  fentaNYL    Injectable 25MICROGram(s) IV Push every 4 hours PRN  insulin lispro (HumaLOG) corrective regimen sliding scale  SubCutaneous three times a day before meals  insulin lispro (HumaLOG) corrective regimen sliding scale  SubCutaneous at bedtime  amLODIPine   Tablet 5milliGRAM(s) Oral daily  memantine 5milliGRAM(s) Oral daily  dorzolamide 2%/timolol 0.5% Ophthalmic Solution 1Drop(s) Both EYES two times a day  metoprolol 25milliGRAM(s) Oral two times a day    Vital Signs Last 24 Hrs  T(C): 37.3, Max: 37.8 (02-19 @ 21:17)  T(F): 99.2, Max: 100 (02-19 @ 21:17)  HR: 70 (63 - 83)  BP: 141/52 (99/58 - 157/63)  BP(mean): 78 (61 - 92)  RR: 17 (12 - 22)  SpO2: 99% (97% - 100%)    PHYSICAL EXAM:  GENERAL: NAD, well-groomed, well-developed  EYES:  conjunctiva and sclera clear  ENMT:  Moist mucous membranes,   NECK: Supple, No JVD, Normal thyroid  NERVOUS SYSTEM:  Alert oriented   no  focal  deficits;   CHEST/LUNG: Clear    HEART: Regular rate and rhythm; No murmurs, rubs, or gallops  ABDOMEN: Soft, Nontender, Nondistended; Bowel sounds present  EXTREMITIES:  no  edema no  tenderness  SKIN: No rashes   LABS:                        9.9    17.8  )-----------( 176      ( 19 Feb 2017 03:27 )             27.4     20 Feb 2017 06:41    143    |  107    |  5      ----------------------------<  234    3.3     |  26     |  0.43     Ca    7.0        20 Feb 2017 06:41  Phos  1.0       20 Feb 2017 06:41  Mg     1.9       20 Feb 2017 06:41    TPro  5.0    /  Alb  1.9    /  TBili  0.6    /  DBili  x      /  AST  33     /  ALT  78     /  AlkPhos  119    20 Feb 2017 06:41        CAPILLARY BLOOD GLUCOSE  222 (20 Feb 2017 07:59)  233 (19 Feb 2017 22:51)  292 (19 Feb 2017 16:40)  248 (19 Feb 2017 09:59)      RADIOLOGY & ADDITIONAL TESTS:    Imaging reports  Personally Reviewed:  [x ] YES  [ ] NO    Consultant(s) Notes Reviewed:  [x ] YES  [ ] NO    Care Discussed with Consultants/Other Providers [x ] YES  [ ] NO  Assessment and Plan:   Problem/Plan - 1:  ·  Problem: Status post cholecystectomy  diet  as  per  surgery    Problem/Plan - 2:  ·  Problem: Sepsis, due to unspecified organism.  Plan: ceftriaxone  flagyl.     Problem/Plan - 3:  ·  Problem: Type 2 diabetes mellitus with hyperglycemia, without long-term current use of insulin.  Plan: insulin  coverage.     Problem/Plan - 4:  ·  Problem: Dementia.  Plan: continue  aricept. once  oral  diet  resumed    Problem/Plan - 5:  ·  Problem: Hypokalemia.  Plan: supplement.     Problem/Plan - 6:  Problem: Anemia. Plan: monitor further  w/u  and treatment  as per  clinical  course.    Problem/Plan - 7:  ·  Problem: Hypophosphatemia. improving will  probably need  supplement  once oral  diet  resumed

## 2017-02-20 NOTE — PROGRESS NOTE ADULT - SUBJECTIVE AND OBJECTIVE BOX
Patient seen and examined at bedside. Pt lethargic and poorly responsive; arousable to verbal stimuli.     Vital Signs Last 24 Hrs  T(F): 99.2, Max: 100 (02-19 @ 08:00)  HR: 70  BP: 141/52  RR: 17  SpO2: 99%    GENERAL: Alert, somnolent, NAD  CHEST/LUNG: Clear to auscultation bilaterally, respirations nonlabored  HEART: S1S2, Regular rate and rhythm  ABDOMEN: + Bowel sounds, soft, nondistended, appropriate incisional tenderness. Abd binder removed to reveal RUQ and midline incisions with dressing c/d/i. Binder reapplied.   EXTREMITIES:  no calf tenderness, no pedal edema b/l, SCDs in situ    AM labs pending    Impression: 82 year old male PMH dementia, DM, HTN, POD#3 s/p lap converted to open cholecystectomy 2/2 gangrenous cholecystitis, remained intubated postoperatively- now extubated and downgraded from CCU to med/surg floor, leukocytosis, ANSLEY- improving  Plan:   - continue with abx  - f/u AM labs, monitor WBC  - continue with IV hydration  - continue with medical management; monitor blood sugar  - VTE ppx  - incentive spirometer, OOB to ambulate as tolerated  - pain control PRN  - will discuss with surgical attending

## 2017-02-20 NOTE — PHYSICAL THERAPY INITIAL EVALUATION ADULT - ADDITIONAL COMMENTS
Pt is nonverbal at this times and is a poor historian. Difficult to arouse during evaluation. Pt followed manual cues at times.

## 2017-02-20 NOTE — PHYSICAL THERAPY INITIAL EVALUATION ADULT - MODIFIED CLINICAL TEST OF SENSORY INTEGRATION IN BALANCE TEST
Barthel Index: Feeding Score __0_, Bathing Score _0__, Grooming Score _0__, Dressing Score __0_, Bowels Score 0___, Bladder Score _0__, Toilet Score _0__, Transfers Score _5__, Mobility Score _0__, Stairs Score __0_,     Total Score ___

## 2017-02-21 LAB
ANION GAP SERPL CALC-SCNC: 8 MMOL/L — SIGNIFICANT CHANGE UP (ref 5–17)
BUN SERPL-MCNC: 6 MG/DL — LOW (ref 7–23)
CALCIUM SERPL-MCNC: 7.5 MG/DL — LOW (ref 8.5–10.1)
CHLORIDE SERPL-SCNC: 107 MMOL/L — SIGNIFICANT CHANGE UP (ref 96–108)
CO2 SERPL-SCNC: 26 MMOL/L — SIGNIFICANT CHANGE UP (ref 22–31)
CREAT SERPL-MCNC: 0.47 MG/DL — LOW (ref 0.5–1.3)
GLUCOSE SERPL-MCNC: 246 MG/DL — HIGH (ref 70–99)
HCT VFR BLD CALC: 28.6 % — LOW (ref 39–50)
HGB BLD-MCNC: 10.5 G/DL — LOW (ref 13–17)
MCHC RBC-ENTMCNC: 32.5 PG — SIGNIFICANT CHANGE UP (ref 27–34)
MCHC RBC-ENTMCNC: 36.8 GM/DL — HIGH (ref 32–36)
MCV RBC AUTO: 88.4 FL — SIGNIFICANT CHANGE UP (ref 80–100)
PHOSPHATE SERPL-MCNC: 1.3 MG/DL — LOW (ref 2.5–4.5)
PLATELET # BLD AUTO: 264 K/UL — SIGNIFICANT CHANGE UP (ref 150–400)
POTASSIUM SERPL-MCNC: 3.9 MMOL/L — SIGNIFICANT CHANGE UP (ref 3.5–5.3)
POTASSIUM SERPL-SCNC: 3.9 MMOL/L — SIGNIFICANT CHANGE UP (ref 3.5–5.3)
RBC # BLD: 3.23 M/UL — LOW (ref 4.2–5.8)
RBC # FLD: 11.1 % — SIGNIFICANT CHANGE UP (ref 11–15)
SODIUM SERPL-SCNC: 141 MMOL/L — SIGNIFICANT CHANGE UP (ref 135–145)
WBC # BLD: 13.2 K/UL — HIGH (ref 3.8–10.5)
WBC # FLD AUTO: 13.2 K/UL — HIGH (ref 3.8–10.5)

## 2017-02-21 RX ORDER — POTASSIUM PHOSPHATE, MONOBASIC POTASSIUM PHOSPHATE, DIBASIC 236; 224 MG/ML; MG/ML
15 INJECTION, SOLUTION INTRAVENOUS ONCE
Qty: 0 | Refills: 0 | Status: COMPLETED | OUTPATIENT
Start: 2017-02-21 | End: 2017-02-21

## 2017-02-21 RX ADMIN — Medication 6: at 07:49

## 2017-02-21 RX ADMIN — Medication 100 MILLIGRAM(S): at 05:53

## 2017-02-21 RX ADMIN — DORZOLAMIDE HYDROCHLORIDE TIMOLOL MALEATE 1 DROP(S): 20; 5 SOLUTION/ DROPS OPHTHALMIC at 17:06

## 2017-02-21 RX ADMIN — Medication 2: at 16:18

## 2017-02-21 RX ADMIN — Medication 25 MILLIGRAM(S): at 17:07

## 2017-02-21 RX ADMIN — Medication 100 MILLIGRAM(S): at 13:26

## 2017-02-21 RX ADMIN — Medication 100 MILLIGRAM(S): at 22:28

## 2017-02-21 RX ADMIN — Medication 25 MILLIGRAM(S): at 05:53

## 2017-02-21 RX ADMIN — CEFTRIAXONE 100 GRAM(S): 500 INJECTION, POWDER, FOR SOLUTION INTRAMUSCULAR; INTRAVENOUS at 17:58

## 2017-02-21 RX ADMIN — Medication 4: at 11:25

## 2017-02-21 RX ADMIN — MEMANTINE HYDROCHLORIDE 5 MILLIGRAM(S): 10 TABLET ORAL at 11:26

## 2017-02-21 RX ADMIN — AMLODIPINE BESYLATE 5 MILLIGRAM(S): 2.5 TABLET ORAL at 05:53

## 2017-02-21 RX ADMIN — ENOXAPARIN SODIUM 40 MILLIGRAM(S): 100 INJECTION SUBCUTANEOUS at 05:53

## 2017-02-21 RX ADMIN — PANTOPRAZOLE SODIUM 40 MILLIGRAM(S): 20 TABLET, DELAYED RELEASE ORAL at 11:26

## 2017-02-21 RX ADMIN — DORZOLAMIDE HYDROCHLORIDE TIMOLOL MALEATE 1 DROP(S): 20; 5 SOLUTION/ DROPS OPHTHALMIC at 05:54

## 2017-02-21 RX ADMIN — POTASSIUM PHOSPHATE, MONOBASIC POTASSIUM PHOSPHATE, DIBASIC 63.75 MILLIMOLE(S): 236; 224 INJECTION, SOLUTION INTRAVENOUS at 18:55

## 2017-02-21 NOTE — PROGRESS NOTE ADULT - SUBJECTIVE AND OBJECTIVE BOX
Dr. Claude Nunez contact information [phone]    Post Op Day#:     Subjective:     Procedure:  Cholecystectomy      Vital Signs Last 24 Hrs  T(C): 37.4, Max: 37.4 (02-21 @ 06:02)  T(F): 99.4, Max: 99.4 (02-21 @ 06:02)  HR: 66 (64 - 75)  BP: 115/54 (94/44 - 148/63)  BP(mean): --  RR: 17 (16 - 17)  SpO2: 97% (97% - 98%)    I&O's Summary    I & Os for current day (as of 21 Feb 2017 09:35)  =============================================  IN: 620 ml / OUT: 0 ml / NET: 620 ml      I&O's Detail    I & Os for current day (as of 21 Feb 2017 09:35)  =============================================  IN:    Solution: 250 ml    Solution: 200 ml    Oral Fluid: 120 ml    Solution: 50 ml    Total IN: 620 ml  ---------------------------------------------  OUT:    Total OUT: 0 ml  ---------------------------------------------  Total NET: 620 ml                            10.5   13.2  )-----------( 264      ( 21 Feb 2017 07:47 )             28.6       21 Feb 2017 07:47    141    |  107    |  6      ----------------------------<  246    3.9     |  26     |  0.47     Ca    7.5        21 Feb 2017 07:47  Phos  1.0       20 Feb 2017 06:41  Mg     1.9       20 Feb 2017 06:41    TPro  5.0    /  Alb  1.9    /  TBili  0.6    /  DBili  x      /  AST  33     /  ALT  78     /  AlkPhos  119    20 Feb 2017 06:41      INR:     Radiology:    Physical Exam:    General:  Appears stated age, no distress    Abdomen:  soft and non-tender except in the are of the incision which is clean and dry.

## 2017-02-21 NOTE — PROGRESS NOTE ADULT - SUBJECTIVE AND OBJECTIVE BOX
INTERVAL HPI/OVERNIGHT EVENTS:        REVIEW OF SYSTEMS:  CONSTITUTIONAL: somnolent  tolerating  oral  diet     NECK: No pain or stiffnes  RESPIRATORY: No SOB   CARDIOVASCULAR: No chest pain, palpitations, dizziness,   GASTROINTESTINAL: No abdominal pain. No nausea, vomiting,   NEUROLOGICAL: No headaches, no  blurry  vision no  dizziness  SKIN: No itching,   MUSCULOSKELETAL: No pain    MEDICATION:  enoxaparin Injectable 40milliGRAM(s) SubCutaneous every 24 hours  dextrose 5%. 1000milliLiter(s) IV Continuous <Continuous>  dextrose Gel 1Dose(s) Oral once PRN  dextrose 50% Injectable 12.5Gram(s) IV Push once  glucagon  Injectable 1milliGRAM(s) IntraMuscular once PRN  cefTRIAXone   IVPB 2Gram(s) IV Intermittent every 24 hours  metroNIDAZOLE  IVPB  IV Intermittent   metroNIDAZOLE  IVPB 500milliGRAM(s) IV Intermittent every 8 hours  pantoprazole  Injectable 40milliGRAM(s) IV Push daily  fentaNYL    Injectable 25MICROGram(s) IV Push every 4 hours PRN  insulin lispro (HumaLOG) corrective regimen sliding scale  SubCutaneous three times a day before meals  insulin lispro (HumaLOG) corrective regimen sliding scale  SubCutaneous at bedtime  amLODIPine   Tablet 5milliGRAM(s) Oral daily  memantine 5milliGRAM(s) Oral daily  dorzolamide 2%/timolol 0.5% Ophthalmic Solution 1Drop(s) Both EYES two times a day  metoprolol 25milliGRAM(s) Oral two times a day    Vital Signs Last 24 Hrs  T(C): 37.4, Max: 37.4 (02-21 @ 06:02)  T(F): 99.4, Max: 99.4 (02-21 @ 06:02)  HR: 75 (64 - 75)  BP: 143/84 (137/55 - 148/63)  BP(mean): --  RR: 17 (16 - 18)  SpO2: 98% (98% - 99%)    PHYSICAL EXAM:  GENERAL: NAD, well-groomed, well-developed  EYES:  conjunctiva and sclera clear  ENMT:  Moist mucous membranes,   NECK: Supple, No JVD, Normal thyroid  NERVOUS SYSTEM:  somnolelnt arousable moves all  extr   no  focal  deficits;   CHEST/LUNG: Clear    HEART: Regular rate and rhythm; No murmurs, rubs, or gallops  ABDOMEN: Soft, Nontender, Nondistended; Bowel sounds present  EXTREMITIES:  no  edema no  tenderness  SKIN: No rashes   LABS:                        10.5   13.2  )-----------( 264      ( 21 Feb 2017 07:47 )             28.6     21 Feb 2017 07:47    141    |  107    |  6      ----------------------------<  246    3.9     |  26     |  0.47     Ca    7.5        21 Feb 2017 07:47  Phos  1.0       20 Feb 2017 06:41  Mg     1.9       20 Feb 2017 06:41    TPro  5.0    /  Alb  1.9    /  TBili  0.6    /  DBili  x      /  AST  33     /  ALT  78     /  AlkPhos  119    20 Feb 2017 06:41        CAPILLARY BLOOD GLUCOSE  278 (21 Feb 2017 07:49)  186 (20 Feb 2017 21:59)  190 (20 Feb 2017 16:12)  173 (20 Feb 2017 11:09)      RADIOLOGY & ADDITIONAL TESTS:    Imaging reports  Personally Reviewed:  [ x] YES  [ ] NO    Consultant(s) Notes Reviewed:  [x ] YES  [ ] NO    Care Discussed with Consultants/Other Providers [x ] YES  [ ] NO  Problem/Plan - 1:  ·  Problem: Status post cholecystectomy  diet  as  per  surgery     Problem/Plan - 2:  ·  Problem: Sepsis, due to unspecified organism.  Plan: ceftriaxone  flagyl.     Problem/Plan - 3:  ·  Problem: Type 2 diabetes mellitus with hyperglycemia, without long-term current use of insulin.  Plan: insulin  coverage.     Problem/Plan - 4:  ·  Problem: Dementia.  Plan: discussed  with pt's  daughter  at  bedside patient  is  somnolent  most  of  times  at  home and  He's  presently  at  his  normal  baseline. Do  not feel Aricept  and  Namenda  are  of  clinical  benefit  it  this  patient  with  advanced  dementia    Problem/Plan - 5:  ·  Problem: Hypokalemia.  Plan: supplement.     Problem/Plan - 6:  Problem: Anemia. Plan: monitor further  w/u  and treatment  as per  clinical  course.    Problem/Plan - 7:  ·  Problem: Hypophosphatemia. will re check one two  days

## 2017-02-21 NOTE — OCCUPATIONAL THERAPY INITIAL EVALUATION ADULT - BALANCE DISTURBANCE, IDENTIFIED IMPAIRMENT CONTRIBUTE, REHAB EVAL
impaired motor control/decreased strength/impaired coordination/decreased ROM/pain/abnormal muscle tone/impaired postural control

## 2017-02-21 NOTE — OCCUPATIONAL THERAPY INITIAL EVALUATION ADULT - PERTINENT HX OF CURRENT PROBLEM, REHAB EVAL
Pt presented to ER on 2/16/17 due to acute onset of neurological deficit. Pt is diagnosed with _. MRI on _ results confirm Pt presented to ER on 2/16/17 due to daughter reporting c/o of decreased po intake over last 24 hours and fever. Pt is diagnosed with acute choleycystitis & sepsis. Hepatobiliary scan of gallbladder on 2-17-17 confirmed abnormal morphine augmented hepatobiliary scan with acute cholecystysis. Pt presented to ER on 2/16/17 due to daughter reporting c/o of decreased po intake over last 24 hours and fever. Pt is diagnosed with acute choleycystitis & sepsis. Pt underwent gall bladder surgery on 2-17-17. Hepatobiliary scan of gallbladder on 2-17-17 confirmed abnormal morphine augmented hepatobiliary scan with acute cholecystysis.

## 2017-02-21 NOTE — OCCUPATIONAL THERAPY INITIAL EVALUATION ADULT - IMPAIRMENTS CONTRIBUTING IMPAIRED BED MOBILITY, REHAB EVAL
decreased flexibility/impaired coordination/impaired postural control/cognition/impaired balance/decreased ROM/decreased strength/pain/impaired motor control

## 2017-02-21 NOTE — OCCUPATIONAL THERAPY INITIAL EVALUATION ADULT - ADDITIONAL COMMENTS
Prior to admission, pt was ambulating with a rolling walker & assistance from HHA. HHA provided assistance for all BADL & IADL. Daughter reports that pt was more conversant, alert, and able to assist with functional tasks. Presently, pt requires more assistance with self care & functional mobility due to decreased strength, balance, endurance, coordination, & cognition. The scale below depicts a picture of the pt's current level of functioning. Barthel Index: Feeding Score___0___, Bathing Score__0____, Grooming Score___0__, Dressing Score___0__, Bowel Score___0__, Bladder Score__0____, Toilet Score_0____, Transfer Score__0____, Mobility Score__0___, Stairs Score__0___, Total Score__0___. Prior to admission, pt was ambulating with a rolling walker & assistance from HHA. HHA provided assistance for all BADL & IADL. Daughter reports that pt was more conversant, alert, and able to assist with functional tasks. Pt sat at edge of bed with support for 5 minutes to improve postural control, sitting balance, & endurance. Presently, pt requires more assistance with self care & functional mobility due to decreased strength, balance, endurance, coordination, & cognition. The scale below depicts a picture of the pt's current level of functioning. Barthel Index: Feeding Score___0___, Bathing Score__0____, Grooming Score___0__, Dressing Score___0__, Bowel Score___0__, Bladder Score__0____, Toilet Score_0____, Transfer Score__0____, Mobility Score__0___, Stairs Score__0___, Total Score__0___.

## 2017-02-21 NOTE — OCCUPATIONAL THERAPY INITIAL EVALUATION ADULT - PATIENT/FAMILY/SIGNIFICANT OTHER GOALS STATEMENT, OT EVAL
Pt's daughter & Proxy Geraldine Chandra stated that she agrees with recommendation for pt to go to rehab to assist with return to prior level of function.

## 2017-02-21 NOTE — OCCUPATIONAL THERAPY INITIAL EVALUATION ADULT - DIAGNOSIS, OT EVAL
Muscle weakness M68.2; deficits in balance, coordination, endurance, ADL tasks & functional mobility

## 2017-02-21 NOTE — OCCUPATIONAL THERAPY INITIAL EVALUATION ADULT - RANGE OF MOTION EXAMINATION, LOWER EXTREMITY
bilateral LE Passive ROM was WFL  (within functional limits)/bilateral LE Active ROM was WFL  (within functional limits)

## 2017-02-21 NOTE — PROGRESS NOTE ADULT - ASSESSMENT
Diet to be advanced.  Will follow WBC and when normal and patient tolerating a regular diet will discharge home where he has 24/7 help/

## 2017-02-21 NOTE — OCCUPATIONAL THERAPY INITIAL EVALUATION ADULT - GENERAL OBSERVATIONS, REHAB EVAL
Mr. Don seen in bed, on cardiac monitor, with daughter Sarah at bed side providing Libyan translation. Proxy Geraldine (2nd daughter) gave permission for translation over phone.  Pt nonverbal, difficult to arouse with eyes closed throughout evaluation & followed manual cues inconsistently. Pt able to move all extremities with increased stiffness in BUE/BLE. Facial grimace noted due to pain with movement. Pt has band around stomach s/p gallbladder surgery. Decreased arousal level with deficits in memory & cognition noted during functional activity, requiring redirection to task. Mr. Don seen in bed, on cardiac monitor, with daughter Sarah at bed side providing Finnish translation. Proxy Geraldine Chandra (2nd daughter) gave permission for translation over phone.  Pt nonverbal, difficult to arouse with eyes closed throughout evaluation & followed manual cues inconsistently. Pt able to move all extremities but has increased stiffness in BUE/BLE. Facial grimace noted due to pain with movement. Pt has band around stomach s/p gallbladder surgery. Decreased arousal level with deficits in memory & cognition noted during functional activity, requiring redirection to task. Mr. Don seen in bed, on cardiac monitor, with daughter Sarah at bed side providing Macedonian translation. Proxy Geraldine Chandra (2nd daughter) gave permission for Sarah translation over phone.  Pt was confused, nonverbal, & difficult to arouse with eyes closed throughout evaluation & followed manual cues inconsistently. Pt able to move all extremities but has increased stiffness in BUE/BLE. Facial grimace noted due to pain with movements. Pt has band around stomach s/p gallbladder surgery. Decreased arousal level with deficits in memory & cognition noted during functional activity, requiring redirection to task. Mr. Don seen in bed, on cardiac monitor, with daughter Sarah at bed side providing Ugandan translation. Proxy Geraldine Chandra (2nd daughter) gave permission for Sarah translation over phone.  Pt was confused, nonverbal, & difficult to arouse with eyes closed throughout evaluation & followed manual cues inconsistently. Pt able to move all extremities but has increased stiffness in BUE/BLE. Facial grimace noted due to pain with movements. Pt has abdominal binder donned due to gallbladder surgery. Decreased arousal level with deficits in memory & cognition noted during functional activity, requiring redirection to task.

## 2017-02-21 NOTE — PROGRESS NOTE ADULT - SUBJECTIVE AND OBJECTIVE BOX
Patient seen and examined on rounds with Dr Nunez.  No acute events overnight.  No new complaints. S/P laparoscopic converted to open cholecystectomy POD #4  Daughter at the bedside    HPI:  83 y/o male with h/o dementia ans DM presented to ED  with daughter c/o decreased po intake over last 24 hours and fever. Patient ED workup revealed cholecystitis and elevated lactate. Patient with altered mental status unable to participate as historian. Signs and symptoms of icterus, dehydration and elevated blood sugar. Admitted to ICU for stabilization before surgical or interventional radiology intervention (17 Feb 2017 00:25)      Vital Signs Last 24 Hrs  T(C): 37.4, Max: 37.4 (02-21 @ 06:02)  T(F): 99.4, Max: 99.4 (02-21 @ 06:02)  HR: 75 (64 - 75)  BP: 143/84 (137/55 - 148/63)  BP(mean): --  RR: 17 (16 - 18)  SpO2: 98% (98% - 99%)                          10.5   13.2  )-----------( 264      ( 21 Feb 2017 07:47 )             28.6       21 Feb 2017 07:47    141    |  107    |  6      ----------------------------<  246    3.9     |  26     |  0.47     Ca    7.5        21 Feb 2017 07:47  Phos  1.0       20 Feb 2017 06:41  Mg     1.9       20 Feb 2017 06:41    TPro  5.0    /  Alb  1.9    /  TBili  0.6    /  DBili  x      /  AST  33     /  ALT  78     /  AlkPhos  119    20 Feb 2017 06:41  LIVER FUNCTIONS - ( 20 Feb 2017 06:41 )  Alb: 1.9 g/dL / Pro: 5.0 gm/dL / ALK PHOS: 119 U/L / ALT: 78 U/L / AST: 33 U/L / GGT: x           I&O's Summary    I & Os for current day (as of 21 Feb 2017 08:41)  =============================================  IN: 620 ml / OUT: 0 ml / NET: 620 ml        Physical Exam  GEN: responds to touch and voice, comfortable appearing  HEENT: NCAT, Trachea midline, no scleral icterus  Resp: unlabored, No SOB  CV: No Tachycardia,  ABD: Soft, ND RUQ surgical site, milan umbilical surgical sites all benign no drainage, erythema or purulence evident.  Surgical clips in place. Dressings changed. +abd binder  EXT: warm well perfused,     A/P: 82yMale dementia at baseline, a/w cholecystitis now S/P laparoscopic converted to open cholecystectomy POD #4, improving, tolerating dysphagia diet, leukocytosis down trending, anticipate d/c likely Thursday will require re-instatement of home services.    -pain control prn  -DVT prophylaxis  -daily labs, trend wbc, replete prn  -change dressing prn or leave open to air tomorrow.  -s/w for d/c planning      ACUTE CHOLEYCYSTITIS AND SEPSIS  TROUBLE SWALLOWING, FEVER, SWEATS, NON VERBAL, BODY ACHES  No h/o HF  No pertinent family history in first degree relatives  Handoff  MEWS Score  Glaucoma  Dementia  DM (diabetes mellitus)  HTN (hypertension)  Cholecystitis, acute  Cholangitis  Dementia  Acute cholecystitis  Sepsis  Hypophosphatemia  Anemia  Hypokalemia  Dementia  Status post cholecystectomy  Toxic encephalopathy  Dementia associated with other underlying disease without behavioral disturbance  Sepsis, due to unspecified organism  Type 2 diabetes mellitus with hyperglycemia, without long-term current use of insulin  Cholangitis  Cholecystectomy  Status post open reduction with internal fixation of fracture  History of hip surgery  90+  Sepsis, due to unspecified organism

## 2017-02-21 NOTE — PROGRESS NOTE ADULT - SUBJECTIVE AND OBJECTIVE BOX
Patient is a 82y old  Male who presents with a chief complaint of Decreased PO intake and fever as per patient's H and P. (17 Feb 2017 03:52)    HPI: 81 y/o male with h/o dementia ans DM presented to ED  with daughter c/o decreased po intake over last 24 hours and fever. Patient ED workup revealed cholecystitis and elevated lactate, underwent laparoscopic -> converted to open cholecystectomy by Dr. Nunez 2/17, intubated in ICU afterward. Now stable enough for med/surg floor.    Currently lying in bed, asleep, responds to stimuli, NAD      REVIEW OF SYSTEMS - Unobtainable    PAST MEDICAL & SURGICAL HISTORY  Glaucoma  Dementia  DM (diabetes mellitus)  HTN (hypertension)  R hip ORIF 2013    SOCHX: Lives with his daughter, has an aide 24/7. Was able to transfer, ambulate with directions.      FMHX: FA/MO Unobtainable    ALLERGIES  No Known Allergies    MEDICATIONS reviewed    PHYSICAL EXAM Lying comfortably in bed, O2 NC, more responsive, but still nonverbal.  Constitutional - NAD, appears comfortable  Cardiovascular - RRR  Abdomen - DPD over sound, binder  Extremities - Functional range of motion grossly   Neurologic -                    Cognitive - arousable     Speech cannot evaluate     Lanuage  cannot evaluate     Motor - cannot evaluate     Sensory -cannot evaluate     Psychiatric - cannot evaluate      RECENT LABS/IMAGING - No recent studies    FUNCTIONAL HISTORY - Lives at home with daughter - HCP.      CURRENT FUNCTIONAL STATUS - cannot evaluate    IMPRESSION: Dementia, s/p open choley for gangrenous cholecystitis.      PLAN: Family states they will take him home with 24/7 care.  We discussed rehab programs - can discuss further if family feels they cannot provide sufficient care.  Will follow. Patient is a 82y old  Male who presents with a chief complaint of Decreased PO intake and fever as per patient's H and P. (17 Feb 2017 03:52)    HPI: 81 y/o male with h/o dementia ans DM presented to ED  with daughter c/o decreased po intake over last 24 hours and fever. Patient ED workup revealed cholecystitis and elevated lactate, underwent laparoscopic -> converted to open cholecystectomy by Dr. Nunez 2/17, intubated in ICU afterward. Now stable enough for med/surg floor.    Currently lying in bed, asleep, responds to stimuli, NAD      REVIEW OF SYSTEMS - Unobtainable    PAST MEDICAL & SURGICAL HISTORY  Glaucoma  Dementia  DM (diabetes mellitus)  HTN (hypertension)  R hip ORIF 2013    SOCHX: Lives with his daughter, has an aide 24/7. Was able to transfer, ambulate with directions.      FMHX: FA/MO Unobtainable    ALLERGIES  No Known Allergies    MEDICATIONS reviewed    PHYSICAL EXAM Lying comfortably in bed, O2 NC, more responsive, but still nonverbal.  Constitutional - NAD, appears comfortable  Cardiovascular - RRR  Abdomen - DPD over sound, binder  Extremities - Functional range of motion grossly   Neurologic -                    Cognitive - arousable     Speech cannot evaluate     Lanuage  cannot evaluate     Motor - cannot evaluate     Sensory -cannot evaluate     Psychiatric - cannot evaluate      RECENT LABS/IMAGING - No recent studies    FUNCTIONAL HISTORY - Lives at home with daughter - HCP.      CURRENT FUNCTIONAL STATUS - cannot evaluate    IMPRESSION: Dementia, s/p open choley for gangrenous cholecystitis.      PLAN: Family states they will take him home with 24/7 care.  We discussed rehab programs as he may be too weak and dependent to return home with the assistance they have - they can discuss rehab further if family feels they cannot provide sufficient care.  Will follow.

## 2017-02-21 NOTE — OCCUPATIONAL THERAPY INITIAL EVALUATION ADULT - PRECAUTIONS/LIMITATIONS, REHAB EVAL
fall precautions/cardiac precautions/diminished reaction time due to age related changes. Pt's vitals need to be monitored as BP drop noted during transfer from supine to sit. Pt at risk for injury due to decreased safety awareness, memory deficits & cognitive limitations. diminished reaction time due to age related changes. Pt's vitals need to be monitored as BP drop noted during transfer from supine to sit. Pt at risk for injury due to decreased safety awareness, memory deficits & cognitive limitations. Pt should be turned & positioned every 2 hours due to lack of mobility to prevent skin breakdown./fall precautions/cardiac precautions

## 2017-02-21 NOTE — OCCUPATIONAL THERAPY INITIAL EVALUATION ADULT - PLANNED THERAPY INTERVENTIONS, OT EVAL
balance training/parent/caregiver training.../ADL retraining/IADL retraining/bed mobility training/cognitive, visual perceptual/joint mobilization/neuromuscular re-education/ROM/energy conservation techniques/strengthening/fine motor coordination training/motor coordination training/transfer training/stretching

## 2017-02-21 NOTE — OCCUPATIONAL THERAPY INITIAL EVALUATION ADULT - PERSONAL SAFETY AND JUDGMENT, REHAB EVAL
at risk behaviors demonstrated/Pt has decreased safety awareness due to memory deficits & cognitive limitations. Pt is unaware of surroundings.

## 2017-02-21 NOTE — OCCUPATIONAL THERAPY INITIAL EVALUATION ADULT - LIVES WITH, PROFILE
alone/in an apartment with a 24 hour HHA. Pt has no steps to enter. Pt's bathroom has a walk in shower with shower chair & grab bars.

## 2017-02-22 LAB
ANION GAP SERPL CALC-SCNC: 8 MMOL/L — SIGNIFICANT CHANGE UP (ref 5–17)
BUN SERPL-MCNC: 6 MG/DL — LOW (ref 7–23)
CALCIUM SERPL-MCNC: 7.5 MG/DL — LOW (ref 8.5–10.1)
CHLORIDE SERPL-SCNC: 107 MMOL/L — SIGNIFICANT CHANGE UP (ref 96–108)
CO2 SERPL-SCNC: 26 MMOL/L — SIGNIFICANT CHANGE UP (ref 22–31)
CREAT SERPL-MCNC: 0.53 MG/DL — SIGNIFICANT CHANGE UP (ref 0.5–1.3)
CULTURE RESULTS: SIGNIFICANT CHANGE UP
GLUCOSE SERPL-MCNC: 281 MG/DL — HIGH (ref 70–99)
HCT VFR BLD CALC: 28.4 % — LOW (ref 39–50)
HGB BLD-MCNC: 10.2 G/DL — LOW (ref 13–17)
MAGNESIUM SERPL-MCNC: 2 MG/DL — SIGNIFICANT CHANGE UP (ref 1.8–2.4)
MCHC RBC-ENTMCNC: 32.4 PG — SIGNIFICANT CHANGE UP (ref 27–34)
MCHC RBC-ENTMCNC: 35.8 GM/DL — SIGNIFICANT CHANGE UP (ref 32–36)
MCV RBC AUTO: 90.5 FL — SIGNIFICANT CHANGE UP (ref 80–100)
PHOSPHATE SERPL-MCNC: 1.5 MG/DL — LOW (ref 2.5–4.5)
PLATELET # BLD AUTO: 294 K/UL — SIGNIFICANT CHANGE UP (ref 150–400)
POTASSIUM SERPL-MCNC: 4 MMOL/L — SIGNIFICANT CHANGE UP (ref 3.5–5.3)
POTASSIUM SERPL-SCNC: 4 MMOL/L — SIGNIFICANT CHANGE UP (ref 3.5–5.3)
RBC # BLD: 3.14 M/UL — LOW (ref 4.2–5.8)
RBC # FLD: 11.6 % — SIGNIFICANT CHANGE UP (ref 11–15)
SODIUM SERPL-SCNC: 141 MMOL/L — SIGNIFICANT CHANGE UP (ref 135–145)
SPECIMEN SOURCE: SIGNIFICANT CHANGE UP
SURGICAL PATHOLOGY FINAL REPORT - CH: SIGNIFICANT CHANGE UP
WBC # BLD: 11.9 K/UL — HIGH (ref 3.8–10.5)
WBC # FLD AUTO: 11.9 K/UL — HIGH (ref 3.8–10.5)

## 2017-02-22 RX ADMIN — ENOXAPARIN SODIUM 40 MILLIGRAM(S): 100 INJECTION SUBCUTANEOUS at 05:45

## 2017-02-22 RX ADMIN — Medication 100 MILLIGRAM(S): at 05:44

## 2017-02-22 RX ADMIN — Medication 100 MILLIGRAM(S): at 14:56

## 2017-02-22 RX ADMIN — Medication 4: at 16:50

## 2017-02-22 RX ADMIN — CEFTRIAXONE 100 GRAM(S): 500 INJECTION, POWDER, FOR SOLUTION INTRAMUSCULAR; INTRAVENOUS at 17:30

## 2017-02-22 RX ADMIN — Medication 25 MILLIGRAM(S): at 17:01

## 2017-02-22 RX ADMIN — DORZOLAMIDE HYDROCHLORIDE TIMOLOL MALEATE 1 DROP(S): 20; 5 SOLUTION/ DROPS OPHTHALMIC at 17:01

## 2017-02-22 RX ADMIN — Medication 6: at 08:49

## 2017-02-22 RX ADMIN — AMLODIPINE BESYLATE 5 MILLIGRAM(S): 2.5 TABLET ORAL at 05:45

## 2017-02-22 RX ADMIN — Medication 25 MILLIGRAM(S): at 05:45

## 2017-02-22 RX ADMIN — MEMANTINE HYDROCHLORIDE 5 MILLIGRAM(S): 10 TABLET ORAL at 11:37

## 2017-02-22 RX ADMIN — PANTOPRAZOLE SODIUM 40 MILLIGRAM(S): 20 TABLET, DELAYED RELEASE ORAL at 11:35

## 2017-02-22 RX ADMIN — Medication 6: at 11:42

## 2017-02-22 RX ADMIN — DORZOLAMIDE HYDROCHLORIDE TIMOLOL MALEATE 1 DROP(S): 20; 5 SOLUTION/ DROPS OPHTHALMIC at 05:45

## 2017-02-22 RX ADMIN — Medication 100 MILLIGRAM(S): at 21:47

## 2017-02-22 NOTE — PROGRESS NOTE ADULT - SUBJECTIVE AND OBJECTIVE BOX
Patient seen and examined bedside resting comfortably.  Pt is responsive to verbal and tactile stimuli. Lethargic and not answering questions.  As per PCA Cristela, pt is tolerating diet. States he has +BM.     T(F): 99.4, Max: 99.8 (02-21 @ 17:05)  HR: 67 (56 - 67)  BP: 160/64 (124/74 - 160/64)  RR: 17 (17 - 17)  SpO2: 95% (94% - 99%)  252 (22 Feb 2017 08:48)  231 (21 Feb 2017 22:27)  189 (21 Feb 2017 16:18)  201 (21 Feb 2017 11:25)    PHYSICAL EXAM:  General: NAD  Heart: +S1,S2. Rate and rhythm regular   Lungs: CTA b/l  Abdomen: Non distended. Abdominal binder removed. Abd dressings c/d/i. +BS. Soft. +TTP around incision sites. Abdominal binder reapplied.   : No SP tenderness  Extremities: No LE edema. No calf tenderness      LABS:                        10.2   11.9  )-----------( 294      ( 22 Feb 2017 07:50 )             28.4     22 Feb 2017 07:50    141    |  107    |  6      ----------------------------<  281    4.0     |  26     |  0.53     Ca    7.5        22 Feb 2017 07:50  Phos  1.5       22 Feb 2017 07:50  Mg     2.0       22 Feb 2017 07:50        I&O's Detail    I & Os for current day (as of 22 Feb 2017 10:43)  =============================================  IN:    Solution: 200 ml    Oral Fluid: 170 ml    Total IN: 370 ml  ---------------------------------------------  OUT:    Total OUT: 0 ml  ---------------------------------------------  Total NET: 370 ml      Impression: 82 year old male PMH dementia, DM, HTN, POD#5 s/p lap converted to open cholecystectomy 2/2 gangrenous cholecystitis. WBC trending down     Plan:  -As per Dr. Nunez, pt to be d/c home when WBC stable and tolerating regular diet   -continue ABX   -continue dysphagia 1 diet. speech and swallow pending to advance diet??  -f/u AM labs   -DVT ppx with enoxaparin and venodynes   -continue medical mgmt Patient seen and examined bedside resting comfortably.  Pt is responsive to verbal and tactile stimuli. Lethargic and not answering questions.  As per PCA Cristela, pt is tolerating diet. States he has +BM.     T(F): 99.4, Max: 99.8 (02-21 @ 17:05)  HR: 67 (56 - 67)  BP: 160/64 (124/74 - 160/64)  RR: 17 (17 - 17)  SpO2: 95% (94% - 99%)  252 (22 Feb 2017 08:48)  231 (21 Feb 2017 22:27)  189 (21 Feb 2017 16:18)  201 (21 Feb 2017 11:25)    PHYSICAL EXAM:  General: NAD  Heart: +S1,S2. Rate and rhythm regular   Lungs: CTA b/l  Abdomen: Non distended. Abdominal binder removed. c/d/i dressings removed. Incision sites with minimal erythema and staples intact. No drainage from incision sites. +BS. Soft. +TTP around incision sites. Abdominal binder reapplied with underlying abdominal pads.   : No SP tenderness  Extremities: No LE edema. No calf tenderness      LABS:                        10.2   11.9  )-----------( 294      ( 22 Feb 2017 07:50 )             28.4     22 Feb 2017 07:50    141    |  107    |  6      ----------------------------<  281    4.0     |  26     |  0.53     Ca    7.5        22 Feb 2017 07:50  Phos  1.5       22 Feb 2017 07:50  Mg     2.0       22 Feb 2017 07:50        I&O's Detail    I & Os for current day (as of 22 Feb 2017 10:43)  =============================================  IN:    Solution: 200 ml    Oral Fluid: 170 ml    Total IN: 370 ml  ---------------------------------------------  OUT:    Total OUT: 0 ml  ---------------------------------------------  Total NET: 370 ml      Impression: 82 year old male PMH dementia, DM, HTN, POD#5 s/p lap converted to open cholecystectomy 2/2 gangrenous cholecystitis. WBC trending down     Plan:  -As per Dr. Nunez, pt to be d/c home when WBC stable and tolerating regular diet   -continue ABX   -continue dysphagia 1 diet. speech and swallow pending to advance diet??  -f/u AM labs   -DVT ppx with enoxaparin and venodynes   -continue medical mgmt   -will discuss with surgical attending     TheSHARYN Penn Patient seen and examined bedside resting comfortably.  Pt is responsive to verbal and tactile stimuli. Lethargic and not answering questions.  As per PCA Cristela, pt is tolerating diet. States he has +BM.     T(F): 99.4, Max: 99.8 (02-21 @ 17:05)  HR: 67 (56 - 67)  BP: 160/64 (124/74 - 160/64)  RR: 17 (17 - 17)  SpO2: 95% (94% - 99%)  252 (22 Feb 2017 08:48)  231 (21 Feb 2017 22:27)  189 (21 Feb 2017 16:18)  201 (21 Feb 2017 11:25)    PHYSICAL EXAM:  General: NAD  Heart: +S1,S2. Rate and rhythm regular   Lungs: CTA b/l  Abdomen: Non distended. Abdominal binder removed. c/d/i dressings removed. Incision sites with minimal erythema and staples intact. No drainage from incision sites. +BS. Soft. +TTP around incision sites. Abdominal binder reapplied with underlying abdominal pads.   : No SP tenderness  Extremities: No LE edema. No calf tenderness      LABS:                        10.2   11.9  )-----------( 294      ( 22 Feb 2017 07:50 )             28.4     22 Feb 2017 07:50    141    |  107    |  6      ----------------------------<  281    4.0     |  26     |  0.53     Ca    7.5        22 Feb 2017 07:50  Phos  1.5       22 Feb 2017 07:50  Mg     2.0       22 Feb 2017 07:50        I&O's Detail    I & Os for current day (as of 22 Feb 2017 10:43)  =============================================  IN:    Solution: 200 ml    Oral Fluid: 170 ml    Total IN: 370 ml  ---------------------------------------------  OUT:    Total OUT: 0 ml  ---------------------------------------------  Total NET: 370 ml      Impression: 82 year old male PMH dementia, DM, HTN, POD#5 s/p lap converted to open cholecystectomy 2/2 gangrenous cholecystitis. WBC trending down     Plan:  -As per Dr. Nunez, pt to be d/c home when WBC stable and tolerating regular diet   -continue ABX   -continue dysphagia 1 diet. speech and swallow pending to advance diet??  -f/u AM labs   -DVT ppx with enoxaparin and venodynes   -continue medical mgmt   -will discuss with surgical attending     [employer] SHARYN Bernardo     I have examined this patient. I agree with the above.  J.M.Behan, PA-C

## 2017-02-22 NOTE — SWALLOW BEDSIDE ASSESSMENT ADULT - SWALLOW EVAL: DIAGNOSIS
pt presented with oropharyngeal phases of swallow marked by dementia- suspected impacting performance/behaviors, at times difficulty initiating mastication, reduced labial seal to the cup, suspect timely swallow with adequate laryngeal elevation. no overt signs of aspiration

## 2017-02-22 NOTE — CHART NOTE - NSCHARTNOTEFT_GEN_A_CORE
Seen and examined. Arousable but sleeping most of the time. Tolerating full liquids when fed. Abdomen is soft. Incision with drsg D&I. Medicine following and treating hypokalemia and hypophosphatemia. Being evaluated for subacute rehab. To advance diet.

## 2017-02-22 NOTE — SWALLOW BEDSIDE ASSESSMENT ADULT - ORAL PREPARATORY PHASE
Decreased mastication ability/initially pt with difficulty accepting solid bolus and initiating mastication. Reduced oral grading/reduced labial seal to the cup

## 2017-02-22 NOTE — SWALLOW BEDSIDE ASSESSMENT ADULT - SLP PERTINENT HISTORY OF CURRENT PROBLEM
pt admitted Impression: 82 year old male PMH dementia, DM, HTN, POD#5 s/p lap converted to open cholecystectomy 2/2 gangrenous cholecystitis. WBC trending down

## 2017-02-22 NOTE — PROGRESS NOTE ADULT - SUBJECTIVE AND OBJECTIVE BOX
INTERVAL HPI/OVERNIGHT EVENTS:        REVIEW OF SYSTEMS:  CONSTITUTIONAL: somnolence  arouseable feels  OK no  complaints    NECK: No pain or stiffnes  RESPIRATORY: No SOB   CARDIOVASCULAR: No chest pain, palpitations, dizziness,   GASTROINTESTINAL: No abdominal pain. No nausea, vomiting,   NEUROLOGICAL: No headaches, no  blurry  vision no  dizziness  SKIN: No itching,   MUSCULOSKELETAL: No pain    MEDICATION:  enoxaparin Injectable 40milliGRAM(s) SubCutaneous every 24 hours  dextrose 5%. 1000milliLiter(s) IV Continuous <Continuous>  dextrose Gel 1Dose(s) Oral once PRN  dextrose 50% Injectable 12.5Gram(s) IV Push once  glucagon  Injectable 1milliGRAM(s) IntraMuscular once PRN  cefTRIAXone   IVPB 2Gram(s) IV Intermittent every 24 hours  metroNIDAZOLE  IVPB  IV Intermittent   metroNIDAZOLE  IVPB 500milliGRAM(s) IV Intermittent every 8 hours  pantoprazole  Injectable 40milliGRAM(s) IV Push daily  fentaNYL    Injectable 25MICROGram(s) IV Push every 4 hours PRN  insulin lispro (HumaLOG) corrective regimen sliding scale  SubCutaneous three times a day before meals  insulin lispro (HumaLOG) corrective regimen sliding scale  SubCutaneous at bedtime  amLODIPine   Tablet 5milliGRAM(s) Oral daily  memantine 5milliGRAM(s) Oral daily  dorzolamide 2%/timolol 0.5% Ophthalmic Solution 1Drop(s) Both EYES two times a day  metoprolol 25milliGRAM(s) Oral two times a day    Vital Signs Last 24 Hrs  T(C): 37.4, Max: 37.7 (02-21 @ 17:05)  T(F): 99.4, Max: 99.8 (02-21 @ 17:05)  HR: 67 (56 - 67)  BP: 160/64 (124/74 - 160/64)  BP(mean): --  RR: 17 (17 - 17)  SpO2: 95% (94% - 99%)    PHYSICAL EXAM:  GENERAL: NAD, well-groomed, well-developed  EYES:  conjunctiva and sclera clear  ENMT:  Moist mucous membranes,   NECK: Supple, No JVD, Normal thyroid  NERVOUS SYSTEM:  Alert oriented   no  focal  deficits;   CHEST/LUNG: Clear    HEART: Regular rate and rhythm; No murmurs, rubs, or gallops  ABDOMEN: Soft, Nontender, Nondistended; Bowel sounds present  EXTREMITIES:  no  edema no  tenderness  SKIN: No rashes   LABS:                        10.2   11.9  )-----------( 294      ( 22 Feb 2017 07:50 )             28.4     22 Feb 2017 07:50    141    |  107    |  6      ----------------------------<  281    4.0     |  26     |  0.53     Ca    7.5        22 Feb 2017 07:50  Phos  1.5       22 Feb 2017 07:50  Mg     2.0       22 Feb 2017 07:50          CAPILLARY BLOOD GLUCOSE  252 (22 Feb 2017 08:48)  231 (21 Feb 2017 22:27)  189 (21 Feb 2017 16:18)  201 (21 Feb 2017 11:25)      RADIOLOGY & ADDITIONAL TESTS:    Imaging reports  Personally Reviewed:  [x ] YES  [ ] NO    Consultant(s) Notes Reviewed:  [x ] YES  [ ] NO    Care Discussed with Consultants/Other Providers [x ] YES  [ ] NO    Problem: Status post cholecystectomy  diet  as  per  surgery     Problem/Plan - 2:  ·  Problem: Sepsis, due to unspecified organism.  Plan: ceftriaxone  flagyl.     Problem/Plan - 3:  ·  Problem: Type 2 diabetes mellitus with hyperglycemia, without long-term current use of insulin.  Plan: insulin  coverage.     Problem/Plan - 4:  ·  Problem: Dementia.  Plan: discussed  with pt's  daughter  at  bedside patient  is  somnolent  most  of  times  at  home and  He's  presently  at  his  normal  baseline.continue  off  aricept  and  namenda    Problem/Plan - 5:  ·  Problem: Hypokalemia.  resolved    Problem/Plan - 6:  Problem: Anemia. Plan: monitor further  w/u  and treatment  as per  clinical  course.    Problem/Plan - 7:  ·  Problem: Hypophosphatemia. improving  with  diet

## 2017-02-22 NOTE — SWALLOW BEDSIDE ASSESSMENT ADULT - COMMENTS
CXR 2/17/2017 IMPRESSION: Stable chest. There lungs.    CT head 2/16/2017Impression: Motion degraded study. No acute intracranial hemorrhage, large cortical infarct or mass effect, given the extent of artifact.

## 2017-02-22 NOTE — SWALLOW BEDSIDE ASSESSMENT ADULT - SLP GENERAL OBSERVATIONS
pt seen bedside, alert and oriented to self. pt responded to questions however noted confusion and reduced speech intelligibility. he did follow directions/models and poor eye contact. dtr at the bedside.

## 2017-02-22 NOTE — SWALLOW BEDSIDE ASSESSMENT ADULT - SPECIFY REASON(S)
possible diet upgrade. CNA reported the pt ate breakfast without difficulty and the dtr denied difficulty swallowing

## 2017-02-22 NOTE — PROGRESS NOTE ADULT - SUBJECTIVE AND OBJECTIVE BOX
Patient is a 82y old  Male who presents with a chief complaint of Decreased PO intake and fever as per patient's H and P. (17 Feb 2017 03:52)    HPI: 83 y/o male with h/o dementia ans DM presented to ED  with daughter c/o decreased po intake over last 24 hours and fever. Patient ED workup revealed cholecystitis and elevated lactate, underwent laparoscopic -> converted to open cholecystectomy by Dr. Nunez 2/17, intubated in ICU afterward. Now stable enough for med/surg floor.    Currently lying in bed, awake, more alert - in NAD.  Not yet following daughters directions.      REVIEW OF SYSTEMS - Unobtainable    PAST MEDICAL & SURGICAL HISTORY  Glaucoma  Dementia  DM (diabetes mellitus)  HTN (hypertension)  R hip ORIF 2013    SOCHX: Lives with his daughter, has an aide 24/7. Was able to transfer, ambulate with directions.      FMHX: FA/MO Unobtainable    ALLERGIES  No Known Allergies    MEDICATIONS reviewed    PHYSICAL EXAM Lying comfortably in bed, still nonverbal.  Constitutional - NAD, appears comfortable  Abdomen - DPD over sound, binder  Extremities - Functional range of motion grossly   Neurologic -                    Cognitive - arousable     Speech cannot evaluate     Lanuage  cannot evaluate     Motor - cannot evaluate     Sensory -cannot evaluate     Psychiatric - cannot evaluate    RECENT LABS - WBC returning to normal  IMAGING  - No recent studies    FUNCTIONAL HISTORY - Lives at home with daughter, A 24/7 - dtr is HCP.      CURRENT FUNCTIONAL STATUS - OTx note appreciated - requires Max A for most ADLs - Rec: Short term rehab    IMPRESSION: Dementia, s/p open choley for gangrenous cholecystitis.    Family was awaiting OTx recommendation.  I spoke with daughter at bedside and daughter who is HCP by phone - discussed OTx recommendation - now considering STR when stable, and appreciate the need for him to participate in program.  If he is not able to do so, they appreciate they will need A 24/7, but likely increased services overall.    PLAN: Will follow.

## 2017-02-23 LAB
ALBUMIN SERPL ELPH-MCNC: 2.1 G/DL — LOW (ref 3.3–5)
ALP SERPL-CCNC: 107 U/L — SIGNIFICANT CHANGE UP (ref 40–120)
ALT FLD-CCNC: 47 U/L — SIGNIFICANT CHANGE UP (ref 12–78)
ANION GAP SERPL CALC-SCNC: 6 MMOL/L — SIGNIFICANT CHANGE UP (ref 5–17)
AST SERPL-CCNC: 29 U/L — SIGNIFICANT CHANGE UP (ref 15–37)
BILIRUB SERPL-MCNC: 0.4 MG/DL — SIGNIFICANT CHANGE UP (ref 0.2–1.2)
BUN SERPL-MCNC: 5 MG/DL — LOW (ref 7–23)
CALCIUM SERPL-MCNC: 7.6 MG/DL — LOW (ref 8.5–10.1)
CHLORIDE SERPL-SCNC: 105 MMOL/L — SIGNIFICANT CHANGE UP (ref 96–108)
CO2 SERPL-SCNC: 29 MMOL/L — SIGNIFICANT CHANGE UP (ref 22–31)
CREAT SERPL-MCNC: 0.61 MG/DL — SIGNIFICANT CHANGE UP (ref 0.5–1.3)
GLUCOSE SERPL-MCNC: 284 MG/DL — HIGH (ref 70–99)
HCT VFR BLD CALC: 28.5 % — LOW (ref 39–50)
HGB BLD-MCNC: 9.9 G/DL — LOW (ref 13–17)
MCHC RBC-ENTMCNC: 32 PG — SIGNIFICANT CHANGE UP (ref 27–34)
MCHC RBC-ENTMCNC: 34.8 GM/DL — SIGNIFICANT CHANGE UP (ref 32–36)
MCV RBC AUTO: 91.9 FL — SIGNIFICANT CHANGE UP (ref 80–100)
PHOSPHATE SERPL-MCNC: 1.6 MG/DL — LOW (ref 2.5–4.5)
PLATELET # BLD AUTO: 342 K/UL — SIGNIFICANT CHANGE UP (ref 150–400)
POTASSIUM SERPL-MCNC: 3.8 MMOL/L — SIGNIFICANT CHANGE UP (ref 3.5–5.3)
POTASSIUM SERPL-SCNC: 3.8 MMOL/L — SIGNIFICANT CHANGE UP (ref 3.5–5.3)
PROT SERPL-MCNC: 5.2 GM/DL — LOW (ref 6–8.3)
RBC # BLD: 3.1 M/UL — LOW (ref 4.2–5.8)
RBC # FLD: 12.1 % — SIGNIFICANT CHANGE UP (ref 11–15)
SODIUM SERPL-SCNC: 140 MMOL/L — SIGNIFICANT CHANGE UP (ref 135–145)
WBC # BLD: 12.4 K/UL — HIGH (ref 3.8–10.5)
WBC # FLD AUTO: 12.4 K/UL — HIGH (ref 3.8–10.5)

## 2017-02-23 RX ORDER — METOPROLOL TARTRATE 50 MG
1 TABLET ORAL
Qty: 0 | Refills: 0 | COMMUNITY
Start: 2017-02-23

## 2017-02-23 RX ORDER — SODIUM,POTASSIUM PHOSPHATES 278-250MG
1 POWDER IN PACKET (EA) ORAL
Qty: 0 | Refills: 0 | Status: DISCONTINUED | OUTPATIENT
Start: 2017-02-23 | End: 2017-02-23

## 2017-02-23 RX ORDER — DORZOLAMIDE HYDROCHLORIDE TIMOLOL MALEATE 20; 5 MG/ML; MG/ML
1 SOLUTION/ DROPS OPHTHALMIC
Qty: 0 | Refills: 0 | COMMUNITY
Start: 2017-02-23

## 2017-02-23 RX ORDER — DONEPEZIL HYDROCHLORIDE 10 MG/1
1 TABLET, FILM COATED ORAL
Qty: 0 | Refills: 0 | COMMUNITY

## 2017-02-23 RX ORDER — MEMANTINE HYDROCHLORIDE 10 MG/1
1 TABLET ORAL
Qty: 0 | Refills: 0 | COMMUNITY

## 2017-02-23 RX ORDER — ENOXAPARIN SODIUM 100 MG/ML
40 INJECTION SUBCUTANEOUS
Qty: 0 | Refills: 0 | COMMUNITY
Start: 2017-02-23

## 2017-02-23 RX ORDER — METRONIDAZOLE 500 MG
500 TABLET ORAL
Qty: 0 | Refills: 0 | COMMUNITY
Start: 2017-02-23

## 2017-02-23 RX ORDER — INSULIN LISPRO 100/ML
0 VIAL (ML) SUBCUTANEOUS
Qty: 0 | Refills: 0 | COMMUNITY
Start: 2017-02-23

## 2017-02-23 RX ORDER — POTASSIUM PHOSPHATE, MONOBASIC POTASSIUM PHOSPHATE, DIBASIC 236; 224 MG/ML; MG/ML
15 INJECTION, SOLUTION INTRAVENOUS ONCE
Qty: 0 | Refills: 0 | Status: COMPLETED | OUTPATIENT
Start: 2017-02-23 | End: 2017-02-23

## 2017-02-23 RX ORDER — CEFTRIAXONE 500 MG/1
2 INJECTION, POWDER, FOR SOLUTION INTRAMUSCULAR; INTRAVENOUS
Qty: 0 | Refills: 0 | COMMUNITY
Start: 2017-02-23

## 2017-02-23 RX ADMIN — DORZOLAMIDE HYDROCHLORIDE TIMOLOL MALEATE 1 DROP(S): 20; 5 SOLUTION/ DROPS OPHTHALMIC at 05:44

## 2017-02-23 RX ADMIN — DORZOLAMIDE HYDROCHLORIDE TIMOLOL MALEATE 1 DROP(S): 20; 5 SOLUTION/ DROPS OPHTHALMIC at 18:37

## 2017-02-23 RX ADMIN — ENOXAPARIN SODIUM 40 MILLIGRAM(S): 100 INJECTION SUBCUTANEOUS at 05:43

## 2017-02-23 RX ADMIN — Medication 100 MILLIGRAM(S): at 14:31

## 2017-02-23 RX ADMIN — Medication 25 MILLIGRAM(S): at 05:43

## 2017-02-23 RX ADMIN — Medication 8: at 11:10

## 2017-02-23 RX ADMIN — CEFTRIAXONE 100 GRAM(S): 500 INJECTION, POWDER, FOR SOLUTION INTRAMUSCULAR; INTRAVENOUS at 18:37

## 2017-02-23 RX ADMIN — Medication 100 MILLIGRAM(S): at 21:56

## 2017-02-23 RX ADMIN — Medication 100 MILLIGRAM(S): at 05:44

## 2017-02-23 RX ADMIN — PANTOPRAZOLE SODIUM 40 MILLIGRAM(S): 20 TABLET, DELAYED RELEASE ORAL at 11:04

## 2017-02-23 RX ADMIN — Medication 6: at 07:36

## 2017-02-23 RX ADMIN — Medication 4: at 18:37

## 2017-02-23 RX ADMIN — AMLODIPINE BESYLATE 5 MILLIGRAM(S): 2.5 TABLET ORAL at 05:43

## 2017-02-23 RX ADMIN — POTASSIUM PHOSPHATE, MONOBASIC POTASSIUM PHOSPHATE, DIBASIC 63.75 MILLIMOLE(S): 236; 224 INJECTION, SOLUTION INTRAVENOUS at 11:03

## 2017-02-23 RX ADMIN — Medication 25 MILLIGRAM(S): at 18:44

## 2017-02-23 NOTE — DISCHARGE NOTE ADULT - HOSPITAL COURSE
patient underwent  open  cholecystectomy  treated  for  sepsis  hypokalemia  supplemented hypophosphatemia  improving  with  nutrition aricept  and  namenda  discontinued  as patient  continue s with  baseline  somnolence  poorly interactive  appetite  good  discharged  to  rehab  to  achieve  previous  ADL  walking  with  assistance

## 2017-02-23 NOTE — DISCHARGE NOTE ADULT - MEDICATION SUMMARY - MEDICATIONS TO STOP TAKING
I will STOP taking the medications listed below when I get home from the hospital:    Namenda 5 mg oral tablet  -- 1 tab(s) by mouth 2 times a day    Aricept 5 mg oral tablet  -- 1 tab(s) by mouth once a day (at bedtime)

## 2017-02-23 NOTE — DISCHARGE NOTE ADULT - CARE PROVIDERS DIRECT ADDRESSES
,DirectAddress_Unknown,[email or web address]

## 2017-02-23 NOTE — PROGRESS NOTE ADULT - SUBJECTIVE AND OBJECTIVE BOX
Patient seen and examined at bedside with no complaints.     Vital Signs Last 24 Hrs  T(F): 99.6, Max: 99.6 (02-23 @ 05:10)  HR: 75  BP: 145/96  RR: 16  SpO2: 98%  CAPILLARY BLOOD GLUCOSE:  263 (23 Feb 2017 07:36)    GENERAL: Alert, NAD  CHEST/LUNG: Clear to auscultation bilaterally, respirations nonlabored  HEART: S1S2, Regular rate and rhythm;   ABDOMEN: + Bowel sounds, soft, appropriate incisional tenderness, Nondistended, staple line c/d/i, no erythema or discharge noted. Abd binder repositioned.   EXTREMITIES:  contractures of upper and lower extremities    I&O's Detail    I & Os for current day (as of 23 Feb 2017 08:44)  =============================================  IN:    Oral Fluid: 700 ml    Solution: 200 ml    Solution: 50 ml    Total IN: 950 ml  ---------------------------------------------  OUT:    Total OUT: 0 ml  ---------------------------------------------  Total NET: 950 ml      LABS:                        9.9    12.4  )-----------( 342      ( 23 Feb 2017 07:07 )             28.5     23 Feb 2017 07:07    140    |  105    |  5      ----------------------------<  284    3.8     |  29     |  0.61     Ca    7.6        23 Feb 2017 07:07  Phos  1.6       23 Feb 2017 07:07  Mg     2.0       22 Feb 2017 07:50    TPro  5.2    /  Alb  2.1    /  TBili  0.4    /  DBili  x      /  AST  29     /  ALT  47     /  AlkPhos  107    23 Feb 2017 07:07    82y old  Male s/p open cholecystectomy secondary to acute gangrenous cholecystitis, POD# 6 with PMH Glaucoma, Dementia, DM, HTN     - continue local wound care  - antibiotics per medicine  - f/u labs, replace potassium phosphate ordered  - DVT prophylaxis, Incentive Spirometer, pain control  - continue current management per medicine  - surgically stable for discharge  - will discuss with surgical attending

## 2017-02-23 NOTE — DISCHARGE NOTE ADULT - MEDICATION SUMMARY - MEDICATIONS TO TAKE
I will START or STAY ON the medications listed below when I get home from the hospital:    metroNIDAZOLE 500 mg/100 mL intravenous solution  -- 500 milligram(s) intravenous 3 times a day for  5  days  -- Indication: For Sepsis    enoxaparin  -- 40 milligram(s) subcutaneous once a day  -- Indication: For Dvt prophylaxis    insulin lispro 100 units/mL subcutaneous solution  --  subcutaneous 3 times a day (before meals) ; 2 Unit(s) if Glucose 151 - 200  4 Unit(s) if Glucose 201 - 250  6 Unit(s) if Glucose 251 - 300  8 Unit(s) if Glucose 301 - 350  10 Unit(s) if Glucose 351 - 400  12 Unit(s) if Glucose Greater Than 400  -- Indication: For DM (diabetes mellitus)    Lantus 100 units/mL subcutaneous solution  -- 5 unit(s) subcutaneous once a day  -- Indication: For Diabetes    metoprolol tartrate 25 mg oral tablet  -- 1 tab(s) by mouth 2 times a day  -- Indication: For HTN (hypertension)    amLODIPine 10 mg oral tablet  -- 1 tab(s) by mouth once a day  -- Indication: For HTN (hypertension)    cefTRIAXone  -- 2 gram(s) intravenous once a day for 5 days  -- Indication: For Sepsis    dorzolamide-timolol 2%-0.5% preservative-free ophthalmic solution  -- 1 drop(s) to each affected eye 2 times a day  -- Indication: For Glaucoma    pantoprazole 40 mg oral granule, enteric coated  -- 1 each by mouth once a day  -- Indication: For Gastritis

## 2017-02-23 NOTE — PROGRESS NOTE ADULT - SUBJECTIVE AND OBJECTIVE BOX
INTERVAL HPI/OVERNIGHT EVENTS:        REVIEW OF SYSTEMS:  CONSTITUTIONAL: somnolent  arousebale appetite  good no  complaints    NECK: No pain or stiffnes  RESPIRATORY: No SOB   CARDIOVASCULAR: No chest pain, palpitations, dizziness,   GASTROINTESTINAL: No abdominal pain. No nausea, vomiting,   NEUROLOGICAL: No headaches, no  blurry  vision no  dizziness  SKIN: No itching,   MUSCULOSKELETAL: No pain    MEDICATION:  enoxaparin Injectable 40milliGRAM(s) SubCutaneous every 24 hours  dextrose 5%. 1000milliLiter(s) IV Continuous <Continuous>  dextrose Gel 1Dose(s) Oral once PRN  dextrose 50% Injectable 12.5Gram(s) IV Push once  glucagon  Injectable 1milliGRAM(s) IntraMuscular once PRN  cefTRIAXone   IVPB 2Gram(s) IV Intermittent every 24 hours  metroNIDAZOLE  IVPB  IV Intermittent   metroNIDAZOLE  IVPB 500milliGRAM(s) IV Intermittent every 8 hours  pantoprazole  Injectable 40milliGRAM(s) IV Push daily  fentaNYL    Injectable 25MICROGram(s) IV Push every 4 hours PRN  insulin lispro (HumaLOG) corrective regimen sliding scale  SubCutaneous three times a day before meals  insulin lispro (HumaLOG) corrective regimen sliding scale  SubCutaneous at bedtime  amLODIPine   Tablet 5milliGRAM(s) Oral daily  dorzolamide 2%/timolol 0.5% Ophthalmic Solution 1Drop(s) Both EYES two times a day  metoprolol 25milliGRAM(s) Oral two times a day  potassium phosphate IVPB 15milliMole(s) IV Intermittent once    Vital Signs Last 24 Hrs  T(C): 37.6, Max: 37.6 (02-23 @ 05:10)  T(F): 99.6, Max: 99.6 (02-23 @ 05:10)  HR: 75 (64 - 75)  BP: 145/96 (108/50 - 151/59)  BP(mean): --  RR: 16 (16 - 17)  SpO2: 98% (96% - 99%)    PHYSICAL EXAM:  GENERAL: NAD, well-groomed, well-developed  EYES:  conjunctiva and sclera clear  ENMT:  Moist mucous membranes,   NECK: Supple, No JVD, Normal thyroid  NERVOUS SYSTEM:  somnolent arouseable  no  focal  deficits;   CHEST/LUNG: Clear    HEART: Regular rate and rhythm; No murmurs, rubs, or gallops  ABDOMEN: Soft, Nontender, Nondistended; Bowel sounds present  EXTREMITIES:  no  edema no  tenderness  SKIN: No rashes   LABS:                        9.9    12.4  )-----------( 342      ( 23 Feb 2017 07:07 )             28.5     23 Feb 2017 07:07    140    |  105    |  5      ----------------------------<  284    3.8     |  29     |  0.61     Ca    7.6        23 Feb 2017 07:07  Phos  1.6       23 Feb 2017 07:07  Mg     2.0       22 Feb 2017 07:50    TPro  5.2    /  Alb  2.1    /  TBili  0.4    /  DBili  x      /  AST  29     /  ALT  47     /  AlkPhos  107    23 Feb 2017 07:07        CAPILLARY BLOOD GLUCOSE  263 (23 Feb 2017 07:36)  212 (22 Feb 2017 21:48)  243 (22 Feb 2017 16:49)  252 (22 Feb 2017 11:42)      RADIOLOGY & ADDITIONAL TESTS:    Imaging reports  Personally Reviewed:  [ x] YES  [ ] NO    Consultant(s) Notes Reviewed:  [x ] YES  [ ] NO    Care Discussed with Consultants/Other Providers [ x] YES  [ ] NO  Problem/Plan - 2:  ·  Problem: Sepsis, due to unspecified organism.  Plan: ceftriaxone  flagyl.     Problem/Plan - 3:  ·  Problem: Type 2 diabetes mellitus with hyperglycemia, without long-term current use of insulin.  Plan: insulin  coverage.     Problem/Plan - 4:  ·  Problem: Dementia.  Plan: discussed  with pt's  daughter  at  bedside patient  is  somnolent  most  of  times  at  home and  He's  presently  at  his  normal  baseline.continue  off  aricept  and  namenda    Problem/Plan - 5:  ·  Problem: Hypokalemia.  resolved    Problem/Plan - 6:  Problem: Anemia. Plan: monitor further  w/u  and treatment  as per  clinical  course.    Problem/Plan - 7:  ·  Problem: Hypophosphatemia. improving  with  diet  discussed  with  pt's  family  prefer  rehab  ar  [facility]

## 2017-02-23 NOTE — DISCHARGE NOTE ADULT - SECONDARY DIAGNOSIS.
Sepsis, due to unspecified organism HTN (hypertension) Type 2 diabetes mellitus with hyperglycemia, without long-term current use of insulin Glaucoma Hypokalemia Hypophosphatemia

## 2017-02-23 NOTE — DISCHARGE NOTE ADULT - CARE PLAN
Principal Discharge DX:	Acute cholecystitis  Goal:	return  to  baseline  Instructions for follow-up, activity and diet:	finish  AB  Secondary Diagnosis:	Sepsis, due to unspecified organism  Secondary Diagnosis:	HTN (hypertension)  Secondary Diagnosis:	Type 2 diabetes mellitus with hyperglycemia, without long-term current use of insulin  Secondary Diagnosis:	Glaucoma  Secondary Diagnosis:	Hypokalemia  Secondary Diagnosis:	Hypophosphatemia

## 2017-02-23 NOTE — PROGRESS NOTE ADULT - SUBJECTIVE AND OBJECTIVE BOX
Patient is a 82y old  Male who presents with a chief complaint of Decreased PO intake and fever as per patient's H and P. (17 Feb 2017 03:52)    HPI: 83 y/o male with h/o dementia ans DM presented to ED  with daughter c/o decreased po intake over last 24 hours and fever. Patient ED workup revealed cholecystitis and elevated lactate, underwent laparoscopic -> converted to open cholecystectomy by Dr. Nunez 2/17, intubated in ICU afterward. Now stable enough for med/surg floor.    Sill lying in bed, awake, more alert - in NAD. Responds to voice, karl. daughter's.  Following daughter's commands.    REVIEW OF SYSTEMS - Unobtainable    PAST MEDICAL & SURGICAL HISTORY  Glaucoma  Dementia  DM (diabetes mellitus)  HTN (hypertension)  R hip ORIF 2013    SOCHX: Lives with his daughter, has an aide 24/7. Was able to transfer, ambulate with directions.      ALLERGIES  No Known Allergies    MEDICATIONS reviewed    PHYSICAL EXAM Lying comfortably in bed, still nonverbal.  Constitutional - NAD, appears comfortable  Abdomen - DPD over sound, binder  Extremities - Functional range of motion grossly   Neurologic -                    Cognitive - Improving     Speech cannot evaluate     Lanuage  cannot evaluate     Motor - Moving 4 extremities upon daughter's direction.     Sensory -cannot evaluate     Psychiatric - cannot evaluate    IMAGING  - No recent studies    FUNCTIONAL HISTORY - Lives at home with daughter, [facility] 24/7 - dtr is HCP.      CURRENT FUNCTIONAL STATUS - OTx note appreciated - Getting out of bed with OTx - Rec: Short term rehab    IMPRESSION: Dementia, s/p open choley for gangrenous cholecystitis.    Family was awaiting OTx recommendation.  I spoke with daughter at bedside and daughter who is HCP by phone - discussed OTx recommendation - now considering STR when stable, and appreciate the need for him to participate in program.  If he is not able to do so, they appreciate they will need A 24/7, but likely increased services overall.    PLAN: Will follow.

## 2017-02-23 NOTE — DISCHARGE NOTE ADULT - PATIENT PORTAL LINK FT
“You can access the [facility] Patient Portal, offered by [facility], by registering with the following website: [email or web address]”

## 2017-02-24 VITALS
DIASTOLIC BLOOD PRESSURE: 56 MMHG | TEMPERATURE: 98 F | RESPIRATION RATE: 16 BRPM | SYSTOLIC BLOOD PRESSURE: 118 MMHG | OXYGEN SATURATION: 96 % | HEART RATE: 70 BPM

## 2017-02-24 LAB
ALBUMIN SERPL ELPH-MCNC: 2.3 G/DL — LOW (ref 3.3–5)
ALP SERPL-CCNC: 110 U/L — SIGNIFICANT CHANGE UP (ref 40–120)
ALT FLD-CCNC: 41 U/L — SIGNIFICANT CHANGE UP (ref 12–78)
ANION GAP SERPL CALC-SCNC: 10 MMOL/L — SIGNIFICANT CHANGE UP (ref 5–17)
AST SERPL-CCNC: 26 U/L — SIGNIFICANT CHANGE UP (ref 15–37)
BILIRUB SERPL-MCNC: 0.3 MG/DL — SIGNIFICANT CHANGE UP (ref 0.2–1.2)
BUN SERPL-MCNC: 5 MG/DL — LOW (ref 7–23)
CALCIUM SERPL-MCNC: 7.7 MG/DL — LOW (ref 8.5–10.1)
CHLORIDE SERPL-SCNC: 104 MMOL/L — SIGNIFICANT CHANGE UP (ref 96–108)
CO2 SERPL-SCNC: 27 MMOL/L — SIGNIFICANT CHANGE UP (ref 22–31)
CREAT SERPL-MCNC: 0.54 MG/DL — SIGNIFICANT CHANGE UP (ref 0.5–1.3)
GLUCOSE SERPL-MCNC: 224 MG/DL — HIGH (ref 70–99)
HCT VFR BLD CALC: 30.5 % — LOW (ref 39–50)
HGB BLD-MCNC: 10.9 G/DL — LOW (ref 13–17)
MAGNESIUM SERPL-MCNC: 1.8 MG/DL — SIGNIFICANT CHANGE UP (ref 1.8–2.4)
MCHC RBC-ENTMCNC: 33.1 PG — SIGNIFICANT CHANGE UP (ref 27–34)
MCHC RBC-ENTMCNC: 35.7 GM/DL — SIGNIFICANT CHANGE UP (ref 32–36)
MCV RBC AUTO: 92.6 FL — SIGNIFICANT CHANGE UP (ref 80–100)
PHOSPHATE SERPL-MCNC: 1.8 MG/DL — LOW (ref 2.5–4.5)
PLATELET # BLD AUTO: 377 K/UL — SIGNIFICANT CHANGE UP (ref 150–400)
POTASSIUM SERPL-MCNC: 4 MMOL/L — SIGNIFICANT CHANGE UP (ref 3.5–5.3)
POTASSIUM SERPL-SCNC: 4 MMOL/L — SIGNIFICANT CHANGE UP (ref 3.5–5.3)
PROT SERPL-MCNC: 5.6 GM/DL — LOW (ref 6–8.3)
RBC # BLD: 3.29 M/UL — LOW (ref 4.2–5.8)
RBC # FLD: 13.2 % — SIGNIFICANT CHANGE UP (ref 11–15)
SODIUM SERPL-SCNC: 141 MMOL/L — SIGNIFICANT CHANGE UP (ref 135–145)
WBC # BLD: 13.4 K/UL — HIGH (ref 3.8–10.5)
WBC # FLD AUTO: 13.4 K/UL — HIGH (ref 3.8–10.5)

## 2017-02-24 RX ADMIN — Medication 6: at 07:43

## 2017-02-24 RX ADMIN — DORZOLAMIDE HYDROCHLORIDE TIMOLOL MALEATE 1 DROP(S): 20; 5 SOLUTION/ DROPS OPHTHALMIC at 06:03

## 2017-02-24 RX ADMIN — Medication 63.75 MILLIMOLE(S): at 14:22

## 2017-02-24 RX ADMIN — AMLODIPINE BESYLATE 5 MILLIGRAM(S): 2.5 TABLET ORAL at 06:03

## 2017-02-24 RX ADMIN — Medication 25 MILLIGRAM(S): at 06:03

## 2017-02-24 RX ADMIN — PANTOPRAZOLE SODIUM 40 MILLIGRAM(S): 20 TABLET, DELAYED RELEASE ORAL at 11:02

## 2017-02-24 RX ADMIN — ENOXAPARIN SODIUM 40 MILLIGRAM(S): 100 INJECTION SUBCUTANEOUS at 06:04

## 2017-02-24 RX ADMIN — Medication 100 MILLIGRAM(S): at 13:50

## 2017-02-24 RX ADMIN — Medication 100 MILLIGRAM(S): at 06:03

## 2017-02-24 RX ADMIN — Medication 10: at 10:57

## 2017-02-24 NOTE — PROGRESS NOTE ADULT - ASSESSMENT
patient needs 5 dasy of IV ABX and probably will go to [facility]

## 2017-02-24 NOTE — PROGRESS NOTE ADULT - SUBJECTIVE AND OBJECTIVE BOX
Dr. Claude Nunez contact information [phone]    Post Op Day#:7     Subjective: in acute issues    Procedure: laparoscopic converted to open Cholecystectomy      Vital Signs Last 24 Hrs  T(C): 36.8, Max: 37.4 (02-23 @ 23:56)  T(F): 98.2, Max: 99.4 (02-24 @ 06:15)  HR: 70 (70 - 77)  BP: 118/56 (118/56 - 150/80)  BP(mean): --  RR: 16 (16 - 17)  SpO2: 96% (96% - 100%)    I&O's Summary  I & Os for 24h ending 24 Feb 2017 07:00  =============================================  IN: 1325 ml / OUT: 0 ml / NET: 1325 ml    I & Os for current day (as of 24 Feb 2017 14:21)  =============================================  IN: 960 ml / OUT: 0 ml / NET: 960 ml      I&O's Detail  I & Os for 24h ending 24 Feb 2017 07:00  =============================================  IN:    Oral Fluid: 720 ml    Solution: 300 ml    Solution: 255 ml    Solution: 50 ml    Total IN: 1325 ml  ---------------------------------------------  OUT:    Total OUT: 0 ml  ---------------------------------------------  Total NET: 1325 ml    I & Os for current day (as of 24 Feb 2017 14:21)  =============================================  IN:    Oral Fluid: 960 ml    Total IN: 960 ml  ---------------------------------------------  OUT:    Total OUT: 0 ml  ---------------------------------------------  Total NET: 960 ml                            10.9   13.4  )-----------( 377      ( 24 Feb 2017 06:25 )             30.5       24 Feb 2017 06:25    141    |  104    |  5      ----------------------------<  224    4.0     |  27     |  0.54     Ca    7.7        24 Feb 2017 06:25  Phos  1.8       24 Feb 2017 06:25  Mg     1.8       24 Feb 2017 06:25    TPro  5.6    /  Alb  2.3    /  TBili  0.3    /  DBili  x      /  AST  26     /  ALT  41     /  AlkPhos  110    24 Feb 2017 06:25      INR:     Radiology:    Physical Exam:    Abdomen: soft and non-tender - incisional are aclean and dry - skin clips to be removed on PO day 10

## 2017-02-24 NOTE — PROGRESS NOTE ADULT - SUBJECTIVE AND OBJECTIVE BOX
Patient seen and examined bedside with family present  No new complaints offered.   + incisional Abdominal pain, Denies nausea and vomiting. Tolerating diet.   flatus/BM.       T(F): 98.2, Max: 99.4 (02-24 @ 06:15)  HR: 70 (70 - 77)  BP: 118/56 (118/56 - 150/80)  RR: 16 (16 - 17)  SpO2: 96% (96% - 100%)  Wt(kg): --  CAPILLARY BLOOD GLUCOSE  364 (24 Feb 2017 10:53)  257 (24 Feb 2017 07:38)  215 (23 Feb 2017 21:56)  249 (23 Feb 2017 18:36)      PHYSICAL EXAM:  General: NAD, WDWN.   Neuro:  Alert & oriented x 3  HEENT: NCAT, EOMI, conjunctiva clear  CV: +S1+S2 regular rate and rhythm  Lung: clear to ausculation bilaterally, respirations nonlabored, good inspiratory effort  Abdomen: soft,  non tender, no distention, + bowel sounds  Extremities: no pedal edema or calf tenderness noted   : No CVA or SP tenderness    LABS:                        10.9   13.4  )-----------( 377      ( 24 Feb 2017 06:25 )             30.5     24 Feb 2017 06:25    141    |  104    |  5      ----------------------------<  224    4.0     |  27     |  0.54     Ca    7.7        24 Feb 2017 06:25  Phos  1.8       24 Feb 2017 06:25  Mg     1.8       24 Feb 2017 06:25    TPro  5.6    /  Alb  2.3    /  TBili  0.3    /  DBili  x      /  AST  26     /  ALT  41     /  AlkPhos  110    24 Feb 2017 06:25      I&O's Detail  I & Os for 24h ending 24 Feb 2017 07:00  =============================================  IN:    Oral Fluid: 720 ml    Solution: 300 ml    Solution: 255 ml    Solution: 50 ml    Total IN: 1325 ml  ---------------------------------------------  OUT:    Total OUT: 0 ml  ---------------------------------------------  Total NET: 1325 ml    I & Os for current day (as of 24 Feb 2017 14:04)  =============================================  IN:    Oral Fluid: 960 ml    Total IN: 960 ml  ---------------------------------------------  OUT:    Total OUT: 0 ml  ---------------------------------------------  Total NET: 960 ml        Impression: 82y Male admitted with Gangrenous Cholecystitis, s/p [place] convertedd to open Cholecystectomy  PMH Glaucoma  Dementia  DM (diabetes mellitus)  HTN (hypertension)      Plan:    -continue VTE prophylaxis with SQ heparin and SCDs  -Increase activity with PT, OOB, Ambulate  -Patient instructed on and encouraged incentive spirometry use  -Surgically stable for Discharge to home vs rehab  -will discuss with surgical attending for Recommendations

## 2017-02-24 NOTE — PROGRESS NOTE ADULT - SUBJECTIVE AND OBJECTIVE BOX
INTERVAL HPI/OVERNIGHT EVENTS:    was  unable  to be  transferred  2/23/17    REVIEW OF SYSTEMS:  CONSTITUTIONAL:  no  complaints    NECK: No pain or stiffness  RESPIRATORY: No SOB   CARDIOVASCULAR: No chest pain, palpitations, dizziness,   GASTROINTESTINAL: No abdominal pain. No nausea, vomiting,   NEUROLOGICAL: No headaches, no  blurry  vision no  dizziness  SKIN: No itching,   MUSCULOSKELETAL: No pain    MEDICATION:  enoxaparin Injectable 40milliGRAM(s) SubCutaneous every 24 hours  dextrose 5%. 1000milliLiter(s) IV Continuous <Continuous>  dextrose Gel 1Dose(s) Oral once PRN  dextrose 50% Injectable 12.5Gram(s) IV Push once  glucagon  Injectable 1milliGRAM(s) IntraMuscular once PRN  cefTRIAXone   IVPB 2Gram(s) IV Intermittent every 24 hours  metroNIDAZOLE  IVPB  IV Intermittent   metroNIDAZOLE  IVPB 500milliGRAM(s) IV Intermittent every 8 hours  pantoprazole  Injectable 40milliGRAM(s) IV Push daily  fentaNYL    Injectable 25MICROGram(s) IV Push every 4 hours PRN  insulin lispro (HumaLOG) corrective regimen sliding scale  SubCutaneous three times a day before meals  insulin lispro (HumaLOG) corrective regimen sliding scale  SubCutaneous at bedtime  amLODIPine   Tablet 5milliGRAM(s) Oral daily  dorzolamide 2%/timolol 0.5% Ophthalmic Solution 1Drop(s) Both EYES two times a day  metoprolol 25milliGRAM(s) Oral two times a day    Vital Signs Last 24 Hrs  T(C): 37.4, Max: 37.4 (02-23 @ 23:56)  T(F): 99.4, Max: 99.4 (02-24 @ 06:15)  HR: 71 (71 - 77)  BP: 139/61 (110/43 - 150/80)  BP(mean): --  RR: 16 (16 - 17)  SpO2: 100% (97% - 100%)    PHYSICAL EXAM:  GENERAL: NAD, well-groomed, well-developed  EYES:  conjunctiva and sclera clear  ENMT:  Moist mucous membranes,   NECK: Supple, No JVD, Normal thyroid  NERVOUS SYSTEM:  somnolent  arouseable no  focal  deficits;   CHEST/LUNG: Clear    HEART: Regular rate and rhythm; No murmurs, rubs, or gallops  ABDOMEN: Soft, Nontender, Nondistended; Bowel sounds present  EXTREMITIES:  no  edema no  tenderness  SKIN: No rashes   LABS:                        10.9   13.4  )-----------( 377      ( 24 Feb 2017 06:25 )             30.5     24 Feb 2017 06:25    141    |  104    |  5      ----------------------------<  224    4.0     |  27     |  0.54     Ca    7.7        24 Feb 2017 06:25  Phos  1.8       24 Feb 2017 06:25  Mg     1.8       24 Feb 2017 06:25    TPro  5.6    /  Alb  2.3    /  TBili  0.3    /  DBili  x      /  AST  26     /  ALT  41     /  AlkPhos  110    24 Feb 2017 06:25        CAPILLARY BLOOD GLUCOSE  257 (24 Feb 2017 07:38)  215 (23 Feb 2017 21:56)  249 (23 Feb 2017 18:36)  306 (23 Feb 2017 11:10)      RADIOLOGY & ADDITIONAL TESTS:    Imaging reports  Personally Reviewed:  [ x] YES  [ ] NO    Consultant(s) Notes Reviewed:  [x ] YES  [ ] NO    Care Discussed with Consultants/Other Providers [x ] YES  [ ] NO   Problem: Sepsis, due to unspecified organism.  Plan: ceftriaxone  flagyl.     Problem/Plan - 3:  ·  Problem: Type 2 diabetes mellitus with hyperglycemia, without long-term current use of insulin.  Plan: insulin  coverage.     Problem/Plan - 4:  ·  Problem: Dementia.  Plan: discussed  with pt's  daughter  at  bedside patient  is  somnolent  most  of  times  at  home and  He's  presently  at  his  normal  baseline.continue  off  aricept  and  namenda    Problem/Plan - 5:  ·  Problem: Hypokalemia.  resolved    Problem/Plan - 6:  Problem: Anemia. Plan: monitor further  w/u  and treatment  as per  clinical  course.    Problem/Plan - 7:  ·  Problem: Hypophosphatemia. improving  with  diet  awaiting  for  insurance  issues  to  be  sorted  out for  transfer to rehab

## 2017-02-24 NOTE — PROGRESS NOTE ADULT - SUBJECTIVE AND OBJECTIVE BOX
Patient is a 82y old  Male who presents with a chief complaint of Decreased PO intake and fever as per patient's H and P. (17 Feb 2017 03:52)    HPI: 81 y/o male with h/o dementia ans DM presented to ED  with daughter c/o decreased po intake over last 24 hours and fever. Patient ED workup revealed cholecystitis and elevated lactate, underwent laparoscopic -> converted to open cholecystectomy by Dr. Nunez 2/17, intubated in ICU afterward. Now stable enough for med/surg floor.    Sill lying in bed, awake, more alert - in NAD. More Responsive to voice, cooperates with daughter and therapist for transfers bed - chair.  Daughter feeding him breakfast, but feeds himself at home.     REVIEW OF SYSTEMS - Unobtainable    PAST MEDICAL & SURGICAL HISTORY  Glaucoma  Dementia  DM (diabetes mellitus)  HTN (hypertension)  R hip ORIF 2013    SOCHX: Lives with his daughter, has an aide 24/7. Was able to transfer with min A, ambulate with CG, VC at home PTA      ALLERGIES  No Known Allergies    MEDICATIONS reviewed    PHYSICAL EXAM Lying comfortably in bed, still nonverbal.  Constitutional - NAD, appears comfortable  Abdomen - DPD over sound, binder  Extremities - Functional range of motion grossly   Neurologic -                    Cognitive - Improving     Speech cannot evaluate     Lanuage  cannot evaluate     Motor - Moving 4 extremities upon daughter's direction.     Sensory -cannot evaluate     Psychiatric - cannot evaluate    IMAGING  - No recent studies    FUNCTIONAL HISTORY - Lives at home with daughter, [facility] 24/7 - dtr is HCP.      CURRENT FUNCTIONAL STATUS - OTx note appreciated - Getting out of bed with OTx - OTx Rec is short term rehab    IMPRESSION: Dementia, s/p open choley for gangrenous cholecystitis.    Family was awaiting OTx recommendation.  I spoke with daughter at bedside and daughter who is HCP by phone - discussed OTx recommendation - now considering STR when stable, and appreciate the need for him to participate in program.  If he is not able to participate and progress in a short term rehab program ,they appreciate they will need to take him home with [facility] 24/7, but likely increased services overall.    PLAN: Will follow.

## 2017-03-01 DIAGNOSIS — K83.0 CHOLANGITIS: ICD-10-CM

## 2017-03-01 DIAGNOSIS — E83.39 OTHER DISORDERS OF PHOSPHORUS METABOLISM: ICD-10-CM

## 2017-03-01 DIAGNOSIS — E87.6 HYPOKALEMIA: ICD-10-CM

## 2017-03-01 DIAGNOSIS — F03.90 UNSPECIFIED DEMENTIA, UNSPECIFIED SEVERITY, WITHOUT BEHAVIORAL DISTURBANCE, PSYCHOTIC DISTURBANCE, MOOD DISTURBANCE, AND ANXIETY: ICD-10-CM

## 2017-03-01 DIAGNOSIS — E87.0 HYPEROSMOLALITY AND HYPERNATREMIA: ICD-10-CM

## 2017-03-01 DIAGNOSIS — H40.9 UNSPECIFIED GLAUCOMA: ICD-10-CM

## 2017-03-01 DIAGNOSIS — I10 ESSENTIAL (PRIMARY) HYPERTENSION: ICD-10-CM

## 2017-03-01 DIAGNOSIS — K80.00 CALCULUS OF GALLBLADDER WITH ACUTE CHOLECYSTITIS WITHOUT OBSTRUCTION: ICD-10-CM

## 2017-03-01 DIAGNOSIS — E86.0 DEHYDRATION: ICD-10-CM

## 2017-03-01 DIAGNOSIS — G92 TOXIC ENCEPHALOPATHY: ICD-10-CM

## 2017-03-01 DIAGNOSIS — E11.65 TYPE 2 DIABETES MELLITUS WITH HYPERGLYCEMIA: ICD-10-CM

## 2017-03-01 DIAGNOSIS — K81.0 ACUTE CHOLECYSTITIS: ICD-10-CM

## 2017-03-01 DIAGNOSIS — D64.9 ANEMIA, UNSPECIFIED: ICD-10-CM

## 2017-03-01 DIAGNOSIS — A41.9 SEPSIS, UNSPECIFIED ORGANISM: ICD-10-CM

## 2017-03-28 PROBLEM — H40.9 UNSPECIFIED GLAUCOMA: Chronic | Status: ACTIVE | Noted: 2017-02-16

## 2017-03-28 PROBLEM — F03.90 UNSPECIFIED DEMENTIA, UNSPECIFIED SEVERITY, WITHOUT BEHAVIORAL DISTURBANCE, PSYCHOTIC DISTURBANCE, MOOD DISTURBANCE, AND ANXIETY: Chronic | Status: ACTIVE | Noted: 2017-02-16

## 2017-03-28 PROBLEM — I10 ESSENTIAL (PRIMARY) HYPERTENSION: Chronic | Status: ACTIVE | Noted: 2017-02-16

## 2017-03-28 PROBLEM — E11.9 TYPE 2 DIABETES MELLITUS WITHOUT COMPLICATIONS: Chronic | Status: ACTIVE | Noted: 2017-02-16

## 2017-04-07 ENCOUNTER — APPOINTMENT (OUTPATIENT)
Dept: HOME HEALTH SERVICES | Facility: HOME HEALTH | Age: 82
End: 2017-04-07

## 2018-04-12 ENCOUNTER — INPATIENT (INPATIENT)
Facility: HOSPITAL | Age: 83
LOS: 5 days | Discharge: HOME | End: 2018-04-18
Attending: HOSPITALIST | Admitting: HOSPITALIST
Payer: MEDICARE

## 2018-04-12 VITALS
DIASTOLIC BLOOD PRESSURE: 70 MMHG | WEIGHT: 120.15 LBS | SYSTOLIC BLOOD PRESSURE: 143 MMHG | RESPIRATION RATE: 16 BRPM | HEART RATE: 60 BPM | OXYGEN SATURATION: 99 % | TEMPERATURE: 98 F

## 2018-04-12 DIAGNOSIS — H40.2230 CHRONIC ANGLE-CLOSURE GLAUCOMA, BILATERAL, STAGE UNSPECIFIED: ICD-10-CM

## 2018-04-12 DIAGNOSIS — R73.9 HYPERGLYCEMIA, UNSPECIFIED: ICD-10-CM

## 2018-04-12 DIAGNOSIS — E87.0 HYPEROSMOLALITY AND HYPERNATREMIA: ICD-10-CM

## 2018-04-12 DIAGNOSIS — Z96.7 PRESENCE OF OTHER BONE AND TENDON IMPLANTS: Chronic | ICD-10-CM

## 2018-04-12 DIAGNOSIS — Z87.442 PERSONAL HISTORY OF URINARY CALCULI: Chronic | ICD-10-CM

## 2018-04-12 DIAGNOSIS — G93.41 METABOLIC ENCEPHALOPATHY: ICD-10-CM

## 2018-04-12 DIAGNOSIS — I10 ESSENTIAL (PRIMARY) HYPERTENSION: ICD-10-CM

## 2018-04-12 LAB
ALBUMIN SERPL ELPH-MCNC: 3.4 G/DL — SIGNIFICANT CHANGE UP (ref 3.3–5)
ALP SERPL-CCNC: 89 U/L — SIGNIFICANT CHANGE UP (ref 40–120)
ALT FLD-CCNC: 42 U/L — SIGNIFICANT CHANGE UP (ref 12–78)
ANION GAP SERPL CALC-SCNC: 4 MMOL/L — LOW (ref 5–17)
ANION GAP SERPL CALC-SCNC: 6 MMOL/L — SIGNIFICANT CHANGE UP (ref 5–17)
ANION GAP SERPL CALC-SCNC: 6 MMOL/L — SIGNIFICANT CHANGE UP (ref 5–17)
APPEARANCE UR: CLEAR — SIGNIFICANT CHANGE UP
APTT BLD: 25.5 SEC — LOW (ref 27.5–37.4)
AST SERPL-CCNC: 26 U/L — SIGNIFICANT CHANGE UP (ref 15–37)
BACTERIA # UR AUTO: ABNORMAL
BASOPHILS # BLD AUTO: 0.06 K/UL — SIGNIFICANT CHANGE UP (ref 0–0.2)
BASOPHILS NFR BLD AUTO: 0.3 % — SIGNIFICANT CHANGE UP (ref 0–2)
BILIRUB SERPL-MCNC: 0.5 MG/DL — SIGNIFICANT CHANGE UP (ref 0.2–1.2)
BILIRUB UR-MCNC: NEGATIVE — SIGNIFICANT CHANGE UP
BUN SERPL-MCNC: 15 MG/DL — SIGNIFICANT CHANGE UP (ref 7–23)
BUN SERPL-MCNC: 24 MG/DL — HIGH (ref 7–23)
BUN SERPL-MCNC: 27 MG/DL — HIGH (ref 7–23)
CALCIUM SERPL-MCNC: 8.4 MG/DL — LOW (ref 8.5–10.1)
CALCIUM SERPL-MCNC: 8.6 MG/DL — SIGNIFICANT CHANGE UP (ref 8.5–10.1)
CALCIUM SERPL-MCNC: 9.1 MG/DL — SIGNIFICANT CHANGE UP (ref 8.5–10.1)
CHLORIDE SERPL-SCNC: 125 MMOL/L — HIGH (ref 96–108)
CHLORIDE SERPL-SCNC: 128 MMOL/L — HIGH (ref 96–108)
CHLORIDE SERPL-SCNC: 128 MMOL/L — HIGH (ref 96–108)
CK MB CFR SERPL CALC: 2 NG/ML — SIGNIFICANT CHANGE UP (ref 0.5–3.6)
CO2 SERPL-SCNC: 28 MMOL/L — SIGNIFICANT CHANGE UP (ref 22–31)
CO2 SERPL-SCNC: 29 MMOL/L — SIGNIFICANT CHANGE UP (ref 22–31)
CO2 SERPL-SCNC: 30 MMOL/L — SIGNIFICANT CHANGE UP (ref 22–31)
COLOR SPEC: YELLOW — SIGNIFICANT CHANGE UP
CREAT SERPL-MCNC: 0.64 MG/DL — SIGNIFICANT CHANGE UP (ref 0.5–1.3)
CREAT SERPL-MCNC: 0.92 MG/DL — SIGNIFICANT CHANGE UP (ref 0.5–1.3)
CREAT SERPL-MCNC: 0.98 MG/DL — SIGNIFICANT CHANGE UP (ref 0.5–1.3)
DIFF PNL FLD: ABNORMAL
EOSINOPHIL # BLD AUTO: 0.01 K/UL — SIGNIFICANT CHANGE UP (ref 0–0.5)
EOSINOPHIL NFR BLD AUTO: 0.1 % — SIGNIFICANT CHANGE UP (ref 0–6)
EPI CELLS # UR: SIGNIFICANT CHANGE UP
GLUCOSE BLDC GLUCOMTR-MCNC: 149 MG/DL — HIGH (ref 70–99)
GLUCOSE BLDC GLUCOMTR-MCNC: 173 MG/DL — HIGH (ref 70–99)
GLUCOSE BLDC GLUCOMTR-MCNC: 279 MG/DL — HIGH (ref 70–99)
GLUCOSE BLDC GLUCOMTR-MCNC: 302 MG/DL — HIGH (ref 70–99)
GLUCOSE SERPL-MCNC: 197 MG/DL — HIGH (ref 70–99)
GLUCOSE SERPL-MCNC: 315 MG/DL — HIGH (ref 70–99)
GLUCOSE SERPL-MCNC: 440 MG/DL — HIGH (ref 70–99)
GLUCOSE UR QL: 1000 MG/DL
HCT VFR BLD CALC: 44.2 % — SIGNIFICANT CHANGE UP (ref 39–50)
HGB BLD-MCNC: 13.8 G/DL — SIGNIFICANT CHANGE UP (ref 13–17)
IMM GRANULOCYTES NFR BLD AUTO: 0.5 % — SIGNIFICANT CHANGE UP (ref 0–1.5)
INR BLD: 1.13 RATIO — SIGNIFICANT CHANGE UP (ref 0.88–1.16)
KETONES UR-MCNC: NEGATIVE — SIGNIFICANT CHANGE UP
LACTATE SERPL-SCNC: 1.7 MMOL/L — SIGNIFICANT CHANGE UP (ref 0.7–2)
LACTATE SERPL-SCNC: 2.3 MMOL/L — HIGH (ref 0.7–2)
LEUKOCYTE ESTERASE UR-ACNC: ABNORMAL
LYMPHOCYTES # BLD AUTO: 12.2 % — LOW (ref 13–44)
LYMPHOCYTES # BLD AUTO: 2.32 K/UL — SIGNIFICANT CHANGE UP (ref 1–3.3)
MAGNESIUM SERPL-MCNC: 2.4 MG/DL — SIGNIFICANT CHANGE UP (ref 1.6–2.6)
MCHC RBC-ENTMCNC: 31.1 PG — SIGNIFICANT CHANGE UP (ref 27–34)
MCHC RBC-ENTMCNC: 31.2 GM/DL — LOW (ref 32–36)
MCV RBC AUTO: 99.5 FL — SIGNIFICANT CHANGE UP (ref 80–100)
MONOCYTES # BLD AUTO: 0.87 K/UL — SIGNIFICANT CHANGE UP (ref 0–0.9)
MONOCYTES NFR BLD AUTO: 4.6 % — SIGNIFICANT CHANGE UP (ref 2–14)
NEUTROPHILS # BLD AUTO: 15.65 K/UL — HIGH (ref 1.8–7.4)
NEUTROPHILS NFR BLD AUTO: 82.3 % — HIGH (ref 43–77)
NITRITE UR-MCNC: NEGATIVE — SIGNIFICANT CHANGE UP
NRBC # BLD: 0 /100 WBCS — SIGNIFICANT CHANGE UP (ref 0–0)
PH UR: 6 — SIGNIFICANT CHANGE UP (ref 5–8)
PLATELET # BLD AUTO: 250 K/UL — SIGNIFICANT CHANGE UP (ref 150–400)
POTASSIUM SERPL-MCNC: 3.3 MMOL/L — LOW (ref 3.5–5.3)
POTASSIUM SERPL-MCNC: 3.3 MMOL/L — LOW (ref 3.5–5.3)
POTASSIUM SERPL-MCNC: 3.8 MMOL/L — SIGNIFICANT CHANGE UP (ref 3.5–5.3)
POTASSIUM SERPL-SCNC: 3.3 MMOL/L — LOW (ref 3.5–5.3)
POTASSIUM SERPL-SCNC: 3.3 MMOL/L — LOW (ref 3.5–5.3)
POTASSIUM SERPL-SCNC: 3.8 MMOL/L — SIGNIFICANT CHANGE UP (ref 3.5–5.3)
PROT SERPL-MCNC: 7 GM/DL — SIGNIFICANT CHANGE UP (ref 6–8.3)
PROT UR-MCNC: 30 MG/DL
PROTHROM AB SERPL-ACNC: 12.4 SEC — SIGNIFICANT CHANGE UP (ref 9.8–12.7)
RBC # BLD: 4.44 M/UL — SIGNIFICANT CHANGE UP (ref 4.2–5.8)
RBC # FLD: 13.5 % — SIGNIFICANT CHANGE UP (ref 10.3–14.5)
RBC CASTS # UR COMP ASSIST: ABNORMAL /HPF (ref 0–4)
SODIUM SERPL-SCNC: 157 MMOL/L — HIGH (ref 135–145)
SODIUM SERPL-SCNC: 163 MMOL/L — CRITICAL HIGH (ref 135–145)
SODIUM SERPL-SCNC: 164 MMOL/L — CRITICAL HIGH (ref 135–145)
SP GR SPEC: 1.01 — SIGNIFICANT CHANGE UP (ref 1.01–1.02)
TROPONIN I SERPL-MCNC: 0.02 NG/ML — SIGNIFICANT CHANGE UP (ref 0.01–0.04)
UROBILINOGEN FLD QL: NEGATIVE MG/DL — SIGNIFICANT CHANGE UP
WBC # BLD: 19.01 K/UL — HIGH (ref 3.8–10.5)
WBC # FLD AUTO: 19.01 K/UL — HIGH (ref 3.8–10.5)
WBC UR QL: SIGNIFICANT CHANGE UP

## 2018-04-12 PROCEDURE — 70450 CT HEAD/BRAIN W/O DYE: CPT | Mod: 26

## 2018-04-12 PROCEDURE — 99291 CRITICAL CARE FIRST HOUR: CPT

## 2018-04-12 PROCEDURE — 71045 X-RAY EXAM CHEST 1 VIEW: CPT | Mod: 26

## 2018-04-12 PROCEDURE — 99223 1ST HOSP IP/OBS HIGH 75: CPT

## 2018-04-12 RX ORDER — SODIUM CHLORIDE 9 MG/ML
2000 INJECTION, SOLUTION INTRAVENOUS ONCE
Qty: 0 | Refills: 0 | Status: COMPLETED | OUTPATIENT
Start: 2018-04-12 | End: 2018-04-12

## 2018-04-12 RX ORDER — SODIUM CHLORIDE 9 MG/ML
1000 INJECTION, SOLUTION INTRAVENOUS
Qty: 0 | Refills: 0 | Status: DISCONTINUED | OUTPATIENT
Start: 2018-04-12 | End: 2018-04-12

## 2018-04-12 RX ORDER — AMLODIPINE BESYLATE 2.5 MG/1
10 TABLET ORAL DAILY
Qty: 0 | Refills: 0 | Status: DISCONTINUED | OUTPATIENT
Start: 2018-04-12 | End: 2018-04-18

## 2018-04-12 RX ORDER — INSULIN LISPRO 100/ML
VIAL (ML) SUBCUTANEOUS
Qty: 0 | Refills: 0 | Status: DISCONTINUED | OUTPATIENT
Start: 2018-04-12 | End: 2018-04-18

## 2018-04-12 RX ORDER — DEXTROSE 50 % IN WATER 50 %
12.5 SYRINGE (ML) INTRAVENOUS ONCE
Qty: 0 | Refills: 0 | Status: DISCONTINUED | OUTPATIENT
Start: 2018-04-12 | End: 2018-04-18

## 2018-04-12 RX ORDER — ACETAMINOPHEN 500 MG
650 TABLET ORAL EVERY 6 HOURS
Qty: 0 | Refills: 0 | Status: DISCONTINUED | OUTPATIENT
Start: 2018-04-12 | End: 2018-04-18

## 2018-04-12 RX ORDER — GLUCAGON INJECTION, SOLUTION 0.5 MG/.1ML
1 INJECTION, SOLUTION SUBCUTANEOUS ONCE
Qty: 0 | Refills: 0 | Status: DISCONTINUED | OUTPATIENT
Start: 2018-04-12 | End: 2018-04-18

## 2018-04-12 RX ORDER — ENOXAPARIN SODIUM 100 MG/ML
40 INJECTION SUBCUTANEOUS EVERY 24 HOURS
Qty: 0 | Refills: 0 | Status: DISCONTINUED | OUTPATIENT
Start: 2018-04-12 | End: 2018-04-18

## 2018-04-12 RX ORDER — DEXTROSE 50 % IN WATER 50 %
25 SYRINGE (ML) INTRAVENOUS ONCE
Qty: 0 | Refills: 0 | Status: DISCONTINUED | OUTPATIENT
Start: 2018-04-12 | End: 2018-04-18

## 2018-04-12 RX ORDER — INSULIN LISPRO 100/ML
VIAL (ML) SUBCUTANEOUS AT BEDTIME
Qty: 0 | Refills: 0 | Status: DISCONTINUED | OUTPATIENT
Start: 2018-04-12 | End: 2018-04-12

## 2018-04-12 RX ORDER — INSULIN LISPRO 100/ML
VIAL (ML) SUBCUTANEOUS AT BEDTIME
Qty: 0 | Refills: 0 | Status: DISCONTINUED | OUTPATIENT
Start: 2018-04-12 | End: 2018-04-18

## 2018-04-12 RX ORDER — DEXTROSE 50 % IN WATER 50 %
1 SYRINGE (ML) INTRAVENOUS ONCE
Qty: 0 | Refills: 0 | Status: DISCONTINUED | OUTPATIENT
Start: 2018-04-12 | End: 2018-04-18

## 2018-04-12 RX ORDER — PIPERACILLIN AND TAZOBACTAM 4; .5 G/20ML; G/20ML
3.38 INJECTION, POWDER, LYOPHILIZED, FOR SOLUTION INTRAVENOUS EVERY 8 HOURS
Qty: 0 | Refills: 0 | Status: DISCONTINUED | OUTPATIENT
Start: 2018-04-12 | End: 2018-04-18

## 2018-04-12 RX ORDER — INSULIN LISPRO 100/ML
VIAL (ML) SUBCUTANEOUS
Qty: 0 | Refills: 0 | Status: DISCONTINUED | OUTPATIENT
Start: 2018-04-12 | End: 2018-04-12

## 2018-04-12 RX ORDER — SODIUM CHLORIDE 9 MG/ML
1000 INJECTION, SOLUTION INTRAVENOUS
Qty: 0 | Refills: 0 | Status: DISCONTINUED | OUTPATIENT
Start: 2018-04-12 | End: 2018-04-18

## 2018-04-12 RX ORDER — DORZOLAMIDE HYDROCHLORIDE TIMOLOL MALEATE 20; 5 MG/ML; MG/ML
1 SOLUTION/ DROPS OPHTHALMIC
Qty: 0 | Refills: 0 | Status: DISCONTINUED | OUTPATIENT
Start: 2018-04-12 | End: 2018-04-18

## 2018-04-12 RX ORDER — INSULIN HUMAN 100 [IU]/ML
12 INJECTION, SOLUTION SUBCUTANEOUS ONCE
Qty: 0 | Refills: 0 | Status: COMPLETED | OUTPATIENT
Start: 2018-04-12 | End: 2018-04-12

## 2018-04-12 RX ORDER — PIPERACILLIN AND TAZOBACTAM 4; .5 G/20ML; G/20ML
3.38 INJECTION, POWDER, LYOPHILIZED, FOR SOLUTION INTRAVENOUS ONCE
Qty: 0 | Refills: 0 | Status: COMPLETED | OUTPATIENT
Start: 2018-04-12 | End: 2018-04-12

## 2018-04-12 RX ORDER — DEXTROSE MONOHYDRATE, SODIUM CHLORIDE, AND POTASSIUM CHLORIDE 50; .745; 4.5 G/1000ML; G/1000ML; G/1000ML
1000 INJECTION, SOLUTION INTRAVENOUS
Qty: 0 | Refills: 0 | Status: DISCONTINUED | OUTPATIENT
Start: 2018-04-12 | End: 2018-04-15

## 2018-04-12 RX ADMIN — PIPERACILLIN AND TAZOBACTAM 100 GRAM(S): 4; .5 INJECTION, POWDER, LYOPHILIZED, FOR SOLUTION INTRAVENOUS at 14:45

## 2018-04-12 RX ADMIN — PIPERACILLIN AND TAZOBACTAM 25 GRAM(S): 4; .5 INJECTION, POWDER, LYOPHILIZED, FOR SOLUTION INTRAVENOUS at 22:28

## 2018-04-12 RX ADMIN — ENOXAPARIN SODIUM 40 MILLIGRAM(S): 100 INJECTION SUBCUTANEOUS at 19:31

## 2018-04-12 RX ADMIN — SODIUM CHLORIDE 250 MILLILITER(S): 9 INJECTION, SOLUTION INTRAVENOUS at 15:31

## 2018-04-12 RX ADMIN — INSULIN HUMAN 12 UNIT(S): 100 INJECTION, SOLUTION SUBCUTANEOUS at 13:48

## 2018-04-12 RX ADMIN — SODIUM CHLORIDE 2000 MILLILITER(S): 9 INJECTION, SOLUTION INTRAVENOUS at 13:15

## 2018-04-12 RX ADMIN — DORZOLAMIDE HYDROCHLORIDE TIMOLOL MALEATE 1 DROP(S): 20; 5 SOLUTION/ DROPS OPHTHALMIC at 18:39

## 2018-04-12 RX ADMIN — DEXTROSE MONOHYDRATE, SODIUM CHLORIDE, AND POTASSIUM CHLORIDE 150 MILLILITER(S): 50; .745; 4.5 INJECTION, SOLUTION INTRAVENOUS at 18:20

## 2018-04-12 RX ADMIN — Medication 6: at 18:01

## 2018-04-12 NOTE — H&P ADULT - HISTORY OF PRESENT ILLNESS
83m hx dm on insulin and dementia pw decreased responsiveness. patients baseline is verbal with a few words to express his desires, but not oriented to time or situation. over the past 3 days, patient has been less responsive than normal, not opening eyes at all. he has not seemed to have any complaints over the past 3 days, but this is hard to assess. his appetite has been suppressed. 83m hx dm on insulin and dementia pw decreased responsiveness. patients baseline is verbal with a few words to express his desires, but not oriented to time or situation. over the past 3 days, patient has been less responsive than normal, not opening eyes at all. he has not seemed to have any complaints over the past 3 days, but this is hard to assess. his appetite has been suppressed. and he has been bedbound - no coughing noted - in the emergency room he initially had a glucose of 600

## 2018-04-12 NOTE — ED ADULT TRIAGE NOTE - CHIEF COMPLAINT QUOTE
as per EMS patient was found by family in the morning confuse responsive to pain stimuli only as per EMS

## 2018-04-12 NOTE — ED PROVIDER NOTE - MEDICAL DECISION MAKING DETAILS
patient pw hyperglycemia, likely due to infection. there is no evidence of ketosis at this time. received 1L NS from ems, we will give at least 2 liters here and insulin. CT scan pending. patient pw hyperglycemia, likely due to infection. there is no evidence of ketosis at this time. received 1L NS from ems, we will give at least 2 liters here and insulin. CT scan pending. ct reassuring. labs notable for hypernatremia, most likely related to dehydration. will give 1/2 NS after the LR boluses. admit. I read ekg as sinus rhythm at 92 with fusion complexes.

## 2018-04-12 NOTE — ED PROVIDER NOTE - CARE PLAN
Principal Discharge DX:	Hyperglycemia Principal Discharge DX:	Hyperglycemia  Secondary Diagnosis:	Hypernatremia

## 2018-04-12 NOTE — ED ADULT NURSE NOTE - CAS EDN DISCHARGE ASSESSMENT
Patient baseline mental status/Patient drowsy, easily arousable, per daughter Geraldine patient has been drowsy at home, patient mostly non-verbal per daughter/Drowsy

## 2018-04-12 NOTE — ED PROVIDER NOTE - OBJECTIVE STATEMENT
Pertinent PMH/PSH/FHx/SHx and Review of Systems contained within:  83m hx dm on insulin and dementia pw decreased responsiveness. patients baseline is verbal with a few words to express his desires, but not oriented to time or situation. over the past 3 days, patient has been less responsive than normal, not opening eyes at all. he has not seemed to have any complaints over the past 3 days, but this is hard to assess. his appetite has been suppressed. Pertinent PMH/PSH/FHx/SHx and Review of Systems contained within:  83m hx dm on insulin and dementia pw decreased responsiveness. patients baseline is verbal with a few words to express his desires, but not oriented to time or situation. over the past 3 days, patient has been less responsive than normal, not opening eyes at all. he has not seemed to have any complaints over the past 3 days, but this is hard to assess. his appetite has been suppressed.  Fh and Sh not otherwise contributory  ROS otherwise negative

## 2018-04-12 NOTE — H&P ADULT - ASSESSMENT
83m with history of diabetes and dementia with metabolic encephalopathy from  hyperosmolar state	  IMPROVE VTE Individual Risk Assessment          RISK                                                          Points    [  ] Previous VTE                                                3    [  ] Thrombophilia                                             2    [  ] Lower limb paralysis                                    2        (unable to hold up >15 seconds)      [  ] Current Cancer                                             2         (within 6 months)    [  x] Immobilization > 24 hrs                              1    [  ] ICU/CCU stay > 24 hours                            1    [ x ] Age > 60                                                    1    IMPROVE VTE Score __2_______

## 2018-04-12 NOTE — H&P ADULT - NSHPLABSRESULTS_GEN_ALL_CORE
13.8   19.01 )-----------( 250      ( 12 Apr 2018 13:07 )             44.2   04-12    163<HH>  |  128<H>  |  24<H>  ----------------------------<  315<H>  3.3<L>   |  29  |  0.92    Ca    8.6      12 Apr 2018 14:11  Mg     2.4     04-12    TPro  7.0  /  Alb  3.4  /  TBili  0.5  /  DBili  x   /  AST  26  /  ALT  42  /  AlkPhos  89  04-12  < from: CT Head No Cont (04.12.18 @ 13:37) >    1)  chronic ischemic changes with mild to moderate volume loss. No acute   abnormality noted.  2)  grossly clear sinuses and mastoids.    < from: Xray Chest 1 View AP/PA. (04.12.18 @ 14:51) >    Clear  lungs.  No acute airspace disease suggested.

## 2018-04-12 NOTE — ED PROVIDER NOTE - PHYSICAL EXAMINATION
Gen: decreased responsiveness  Head: NC, AT   Eyes: not opening eyes   ENT: normal hearing, patent oropharynx without erythema/exudate, uvula midline  Neck: supple, no tenderness, Trachea midline  Pulm: Bilateral BS, normal resp effort, no wheeze/stridor/retractions  CV: RRR, no M/R/G, 2+ radial and dp pulses bl, no edema  Abd: soft, NT/ND, +BS, no hepatosplenomegaly  Mskel: extremities x4 with normal ROM and no joint effusions. no ctl spine ttp.   Skin: no rash, no bruising   Neuro: moving all extremities

## 2018-04-13 LAB
ANION GAP SERPL CALC-SCNC: 6 MMOL/L — SIGNIFICANT CHANGE UP (ref 5–17)
B-OH-BUTYR SERPL-SCNC: 0.1 MMOL/L — SIGNIFICANT CHANGE UP
BUN SERPL-MCNC: 13 MG/DL — SIGNIFICANT CHANGE UP (ref 7–23)
CALCIUM SERPL-MCNC: 8.1 MG/DL — LOW (ref 8.5–10.1)
CHLORIDE SERPL-SCNC: 117 MMOL/L — HIGH (ref 96–108)
CO2 SERPL-SCNC: 28 MMOL/L — SIGNIFICANT CHANGE UP (ref 22–31)
CREAT SERPL-MCNC: 0.53 MG/DL — SIGNIFICANT CHANGE UP (ref 0.5–1.3)
CULTURE RESULTS: SIGNIFICANT CHANGE UP
GLUCOSE BLDC GLUCOMTR-MCNC: 106 MG/DL — HIGH (ref 70–99)
GLUCOSE BLDC GLUCOMTR-MCNC: 133 MG/DL — HIGH (ref 70–99)
GLUCOSE BLDC GLUCOMTR-MCNC: 141 MG/DL — HIGH (ref 70–99)
GLUCOSE BLDC GLUCOMTR-MCNC: 153 MG/DL — HIGH (ref 70–99)
GLUCOSE SERPL-MCNC: 158 MG/DL — HIGH (ref 70–99)
HBA1C BLD-MCNC: 9 % — HIGH (ref 4–5.6)
HCT VFR BLD CALC: 40.3 % — SIGNIFICANT CHANGE UP (ref 39–50)
HGB BLD-MCNC: 12.7 G/DL — LOW (ref 13–17)
MCHC RBC-ENTMCNC: 30.8 PG — SIGNIFICANT CHANGE UP (ref 27–34)
MCHC RBC-ENTMCNC: 31.5 GM/DL — LOW (ref 32–36)
MCV RBC AUTO: 97.6 FL — SIGNIFICANT CHANGE UP (ref 80–100)
NRBC # BLD: 0 /100 WBCS — SIGNIFICANT CHANGE UP (ref 0–0)
PLATELET # BLD AUTO: 187 K/UL — SIGNIFICANT CHANGE UP (ref 150–400)
POTASSIUM SERPL-MCNC: 3.3 MMOL/L — LOW (ref 3.5–5.3)
POTASSIUM SERPL-SCNC: 3.3 MMOL/L — LOW (ref 3.5–5.3)
RBC # BLD: 4.13 M/UL — LOW (ref 4.2–5.8)
RBC # FLD: 13 % — SIGNIFICANT CHANGE UP (ref 10.3–14.5)
SODIUM SERPL-SCNC: 151 MMOL/L — HIGH (ref 135–145)
SODIUM UR-SCNC: 168 MMOL/L — SIGNIFICANT CHANGE UP
SPECIMEN SOURCE: SIGNIFICANT CHANGE UP
WBC # BLD: 16.06 K/UL — HIGH (ref 3.8–10.5)
WBC # FLD AUTO: 16.06 K/UL — HIGH (ref 3.8–10.5)

## 2018-04-13 PROCEDURE — 99233 SBSQ HOSP IP/OBS HIGH 50: CPT

## 2018-04-13 RX ADMIN — DEXTROSE MONOHYDRATE, SODIUM CHLORIDE, AND POTASSIUM CHLORIDE 150 MILLILITER(S): 50; .745; 4.5 INJECTION, SOLUTION INTRAVENOUS at 15:40

## 2018-04-13 RX ADMIN — DORZOLAMIDE HYDROCHLORIDE TIMOLOL MALEATE 1 DROP(S): 20; 5 SOLUTION/ DROPS OPHTHALMIC at 05:40

## 2018-04-13 RX ADMIN — DORZOLAMIDE HYDROCHLORIDE TIMOLOL MALEATE 1 DROP(S): 20; 5 SOLUTION/ DROPS OPHTHALMIC at 17:09

## 2018-04-13 RX ADMIN — PIPERACILLIN AND TAZOBACTAM 25 GRAM(S): 4; .5 INJECTION, POWDER, LYOPHILIZED, FOR SOLUTION INTRAVENOUS at 14:29

## 2018-04-13 RX ADMIN — DEXTROSE MONOHYDRATE, SODIUM CHLORIDE, AND POTASSIUM CHLORIDE 150 MILLILITER(S): 50; .745; 4.5 INJECTION, SOLUTION INTRAVENOUS at 21:54

## 2018-04-13 RX ADMIN — Medication 2: at 11:05

## 2018-04-13 RX ADMIN — PIPERACILLIN AND TAZOBACTAM 25 GRAM(S): 4; .5 INJECTION, POWDER, LYOPHILIZED, FOR SOLUTION INTRAVENOUS at 07:10

## 2018-04-13 RX ADMIN — PIPERACILLIN AND TAZOBACTAM 25 GRAM(S): 4; .5 INJECTION, POWDER, LYOPHILIZED, FOR SOLUTION INTRAVENOUS at 21:52

## 2018-04-13 RX ADMIN — DEXTROSE MONOHYDRATE, SODIUM CHLORIDE, AND POTASSIUM CHLORIDE 150 MILLILITER(S): 50; .745; 4.5 INJECTION, SOLUTION INTRAVENOUS at 04:05

## 2018-04-13 RX ADMIN — ENOXAPARIN SODIUM 40 MILLIGRAM(S): 100 INJECTION SUBCUTANEOUS at 21:52

## 2018-04-13 NOTE — DIETITIAN INITIAL EVALUATION ADULT. - PERTINENT LABORATORY DATA
04-13 Na151 mmol/L<H> Glu 158 mg/dL<H> K+ 3.3 mmol/L<L> Cr  0.53 mg/dL BUN 13 mg/dL 04-12 Alb 3.4 g/dL 04-13 [id number] 9.0 %<H>04-12 ALT 42 U/L AST 26 U/L Alkaline Phosphatase 89 U/L

## 2018-04-13 NOTE — DIETITIAN INITIAL EVALUATION ADULT. - OTHER INFO
Pt seen today due to HgbA1c > 7%. Unable to obtain usual diet/wt hx as there are no family at bedside. Pt is bedbound. NPO x 1 day

## 2018-04-13 NOTE — CHART NOTE - NSCHARTNOTEFT_GEN_A_CORE
Upon Nutritional Assessment by the Registered Dietitian your patient was determined to meet criteria / has evidence of the following diagnosis/diagnoses:          [ ]  Mild Protein Calorie Malnutrition        [ ]  Moderate Protein Calorie Malnutrition        [x ] Severe Protein Calorie Malnutrition        [ ] Unspecified Protein Calorie Malnutrition        [ ] Underweight / BMI <19        [ ] Morbid Obesity / BMI > 40      Findings as based on:  •  Comprehensive nutrition assessment and consultation  •  Calorie counts (nutrient intake analysis)  •  Food acceptance and intake status from observations by staff  •  Follow up  •  Patient education  •  Intervention secondary to interdisciplinary rounds  •   concerns      Treatment:    The following diet has been recommended: Advance diet as medically feasible -> CCHO w/ evening snack; Glucerna Shake 8 oz 3 times per day (660 danii, 30 gm pro) & 1 pkg no carb prosource = 15 g pro & 60 kcal      PROVIDER Section:     By signing this assessment you are acknowledging and agree with the diagnosis/diagnoses assigned by the Registered Dietitian    Comments:

## 2018-04-13 NOTE — PHYSICAL THERAPY INITIAL EVALUATION ADULT - MODALITIES TREATMENT COMMENTS
Right Buttock Stage II - broken blister with clean dermal exposure with peripheral macerated partial skin flap; minimal sanguinous, moist drainage. Left Lateral Hip Skin Tear: clean dermal exposure with partial skin flap, scant sanguinous discharge.

## 2018-04-13 NOTE — PROGRESS NOTE ADULT - PROBLEM SELECTOR PLAN 1
intravenous hydration   antibiotics broadspectrum intravenous hydration   antibiotics broadspectrum  check c/s

## 2018-04-13 NOTE — DIETITIAN INITIAL EVALUATION ADULT. - NS AS NUTRI INTERV MEDICAL AND FOOD SUPPLEMENTS
Commercial beverage/Recommend: Glucerna Shake 8 oz 3 times per day (660 danii, 30 gm pro) & 1 pkg no carb prosource daily = 15 g pro & 60 kcal

## 2018-04-13 NOTE — PHYSICAL THERAPY INITIAL EVALUATION ADULT - IMPAIRMENTS FOUND, PT EVAL
sensory integrity/joint integrity and mobility/poor safety awareness/ventilation and respiration/gas exchange/posture/gait, locomotion, and balance/integumentary integrity/muscle strength

## 2018-04-13 NOTE — PHYSICAL THERAPY INITIAL EVALUATION ADULT - PERTINENT HX OF CURRENT PROBLEM, REHAB EVAL
Patient brought to ED due to unresponsiveness. Patient found to have septic/metabolic encephalopathy.

## 2018-04-13 NOTE — DIETITIAN INITIAL EVALUATION ADULT. - PHYSICAL APPEARANCE
underweight/(for adults age > 65) BMI = 19.8, no edema noted underweight/(for adults age > 65) BMI = 19.8, no edema noted. Nutrition focused physical exam conducted ;   Subcutaneous fat loss: [moderate ] Orbital fat pads region, [moderate ]Buccal fat region, [severe ]Triceps region,  [severe ]Ribs region.  Muscle wasting: [moderate ]Temples region, [severe ]Clavicle region, [severe ]Shoulder region, [severe ]Scapula region, [unable ]Interosseous region,  [severe ]thigh region, [severe ]Calf region

## 2018-04-13 NOTE — PHYSICAL THERAPY INITIAL EVALUATION ADULT - SKIN INTEGRITY
PT wound care initial evaluation performed with all wounds documented in flowsheet 2 4.0 A&I/skin tear/pressure ulcer/blister

## 2018-04-13 NOTE — PHYSICAL THERAPY INITIAL EVALUATION ADULT - ADDITIONAL COMMENTS
Per patient, lives in apartment with 24/7 home health aide. Per daughters, has rolling walker, wheelchair and hospital bed at home. Has 5 stair steps to enter house but has a ramp by back entrance. Able to ambulate short distances prior to admission c assist.

## 2018-04-13 NOTE — DIETITIAN INITIAL EVALUATION ADULT. - NUTRITION INTERVENTION
Medical Food Supplements/Nutrition Education Meals and Snack/Nutrition Education/Medical Food Supplements

## 2018-04-13 NOTE — PHYSICAL THERAPY INITIAL EVALUATION ADULT - PLANNED THERAPY INTERVENTIONS, PT EVAL
gait training/neuromuscular re-education/postural re-education/stretching/transfer training/bed mobility training/strengthening/balance training/ROM

## 2018-04-13 NOTE — PHYSICAL THERAPY INITIAL EVALUATION ADULT - GENERAL OBSERVATIONS, REHAB EVAL
Patient supine in bed c stable vital signs. PT wound care initial evaluation performed with all wounds documented in flowsheet 2 4.0 A&I. Daughter Mayelin by bedside. Patient has dementia and currently minimally responsive.

## 2018-04-13 NOTE — PROGRESS NOTE ADULT - SUBJECTIVE AND OBJECTIVE BOX
CHIEF COMPLAINT/INTERVAL HISTORY:    Patient is a 83y old  Male who presents with a chief complaint of decreased responsiveness (2018 16:27)      HPI:  83m hx dm on insulin and dementia pw decreased responsiveness. patients baseline is verbal with a few words to express his desires, but not oriented to time or situation. over the past 3 days, patient has been less responsive than normal, not opening eyes at all. he has not seemed to have any complaints over the past 3 days, but this is hard to assess. his appetite has been suppressed. and he has been bedbound - no coughing noted - in the emergency room he initially had a glucose of 600 (2018 16:27)    Overnight issuesstill lethargic   SUBJECTIVE & OBJECTIVE: Pt seen and examined at bedside.   ROS:  lethargic -nonverbal   ICU Vital Signs Last 24 Hrs  T(C): 36.1 (2018 11:28), Max: 37.3 (2018 21:38)  T(F): 97 (2018 11:28), Max: 99.1 (2018 21:38)  HR: 58 (2018 11:28) (58 - 85)  BP: 120/70 (2018 11:28) (115/53 - 162/75)  BP(mean): --  ABP: --  ABP(mean): --  RR: 17 (2018 11:28) (14 - 17)  SpO2: 98% (2018 11:28) (96% - 99%)        MEDICATIONS  (STANDING):  amLODIPine   Tablet 10 milliGRAM(s) Oral daily  dextrose 5%. 1000 milliLiter(s) (50 mL/Hr) IV Continuous <Continuous>  dextrose 50% Injectable 12.5 Gram(s) IV Push once  dextrose 50% Injectable 25 Gram(s) IV Push once  dextrose 50% Injectable 25 Gram(s) IV Push once  dorzolamide 2%/timolol 0.5% Ophthalmic Solution 1 Drop(s) Both EYES two times a day  enoxaparin Injectable 40 milliGRAM(s) SubCutaneous every 24 hours  insulin lispro (HumaLOG) corrective regimen sliding scale   SubCutaneous three times a day before meals  insulin lispro (HumaLOG) corrective regimen sliding scale   SubCutaneous at bedtime  piperacillin/tazobactam IVPB. 3.375 Gram(s) IV Intermittent every 8 hours  sodium chloride 0.45% with potassium chloride 20 mEq/L 1000 milliLiter(s) (150 mL/Hr) IV Continuous <Continuous>    MEDICATIONS  (PRN):  acetaminophen  Suppository 650 milliGRAM(s) Rectal every 6 hours PRN For Temp greater than 38 C (100.4 F)  dextrose Gel 1 Dose(s) Oral once PRN Blood Glucose LESS THAN 70 milliGRAM(s)/deciliter  glucagon  Injectable 1 milliGRAM(s) IntraMuscular once PRN Glucose LESS THAN 70 milligrams/deciliter        PHYSICAL EXAM:    GENERAL: NAD, well-groomed, well-developed  HEAD:  Atraumatic, Normocephalic  EYES: EOMI, PERRLA, conjunctiva and sclera clear  ENMT: Moist mucous membranes  NECK: Supple, No JVD  NERVOUS SYSTEM:  Alert & Oriented X3, Motor Strength 5/5 B/L upper and lower extremities; DTRs 2+ intact and symmetric  CHEST/LUNG: Clear to auscultation bilaterally; No rales, rhonchi, wheezing, or rubs  HEART: Regular rate and rhythm; No murmurs, rubs, or gallops  ABDOMEN: Soft, Nontender, Nondistended; Bowel sounds present  EXTREMITIES:  2+ Peripheral Pulses, No clubbing, cyanosis, or edema    LABS:                        12.7   16.06 )-----------( 187      ( 2018 06:25 )             40.3     04-13    151<H>  |  117<H>  |  13  ----------------------------<  158<H>  3.3<L>   |  28  |  0.53    Ca    8.1<L>      2018 06:25  Mg     2.4     04-12    TPro  7.0  /  Alb  3.4  /  TBili  0.5  /  DBili  x   /  AST  26  /  ALT  42  /  AlkPhos  89  04-12    PT/INR - ( 2018 13:07 )   PT: 12.4 sec;   INR: 1.13 ratio         PTT - ( 2018 13:07 )  PTT:25.5 sec  Urinalysis Basic - ( 2018 13:14 )    Color: Yellow / Appearance: Clear / S.015 / pH: x  Gluc: x / Ketone: Negative  / Bili: Negative / Urobili: Negative mg/dL   Blood: x / Protein: 30 mg/dL / Nitrite: Negative   Leuk Esterase: Trace / RBC: 6-10 /HPF / WBC 3-5   Sq Epi: x / Non Sq Epi: Occasional / Bacteria: Moderate        CAPILLARY BLOOD GLUCOSE      POCT Blood Glucose.: 153 mg/dL (2018 11:02)  POCT Blood Glucose.: 141 mg/dL (2018 07:52)  POCT Blood Glucose.: 173 mg/dL (2018 23:03)  POCT Blood Glucose.: 149 mg/dL (2018 22:33)  POCT Blood Glucose.: 279 mg/dL (2018 17:58)  POCT Blood Glucose.: 302 mg/dL (2018 13:59)      RECENT CULTURES:      RADIOLOGY & ADDITIONAL TESTS:  Imaging Personally Reviewed:  [ ] YES      Consultant(s) Notes Reviewed:  [ ] YES     Care Discussed with [ ] Consultants [ ] Patient [ ] Family  [x ]    [x ]  Other; RN  HEALTH ISSUES - PROBLEM Dx:  Hyperglycemia: Hyperglycemia  Essential hypertension: Essential hypertension  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

## 2018-04-13 NOTE — DIETITIAN INITIAL EVALUATION ADULT. - NS AS NUTRI INTERV ED CONTENT
Survival information/Purpose of the nutrition education/will instruct family on high calorie high protein nutrition therapy & recipes & pressure ulcer nutrition therapy

## 2018-04-13 NOTE — DIETITIAN INITIAL EVALUATION ADULT. - NS AS NUTRI INTERV MEALS SNACK
Advance diet as medically feasibke -> CCHO w/ evening snack & Glucerna Shake 8 oz 3 times per day (660 danii, 30 gm pro) ; 1 pkg no carb prosource = 15 g pro & 60 kcal/General/healthful diet

## 2018-04-14 DIAGNOSIS — S31.819S: ICD-10-CM

## 2018-04-14 DIAGNOSIS — S71.002S: ICD-10-CM

## 2018-04-14 LAB
ANION GAP SERPL CALC-SCNC: 7 MMOL/L — SIGNIFICANT CHANGE UP (ref 5–17)
BUN SERPL-MCNC: 12 MG/DL — SIGNIFICANT CHANGE UP (ref 7–23)
CALCIUM SERPL-MCNC: 8 MG/DL — LOW (ref 8.5–10.1)
CHLORIDE SERPL-SCNC: 111 MMOL/L — HIGH (ref 96–108)
CO2 SERPL-SCNC: 26 MMOL/L — SIGNIFICANT CHANGE UP (ref 22–31)
CREAT SERPL-MCNC: 0.45 MG/DL — LOW (ref 0.5–1.3)
GLUCOSE BLDC GLUCOMTR-MCNC: 109 MG/DL — HIGH (ref 70–99)
GLUCOSE BLDC GLUCOMTR-MCNC: 168 MG/DL — HIGH (ref 70–99)
GLUCOSE BLDC GLUCOMTR-MCNC: 268 MG/DL — HIGH (ref 70–99)
GLUCOSE BLDC GLUCOMTR-MCNC: 91 MG/DL — SIGNIFICANT CHANGE UP (ref 70–99)
GLUCOSE SERPL-MCNC: 100 MG/DL — HIGH (ref 70–99)
HCT VFR BLD CALC: 38.1 % — LOW (ref 39–50)
HGB BLD-MCNC: 12.6 G/DL — LOW (ref 13–17)
MCHC RBC-ENTMCNC: 31 PG — SIGNIFICANT CHANGE UP (ref 27–34)
MCHC RBC-ENTMCNC: 33.1 GM/DL — SIGNIFICANT CHANGE UP (ref 32–36)
MCV RBC AUTO: 93.8 FL — SIGNIFICANT CHANGE UP (ref 80–100)
NRBC # BLD: 0 /100 WBCS — SIGNIFICANT CHANGE UP (ref 0–0)
PLATELET # BLD AUTO: 205 K/UL — SIGNIFICANT CHANGE UP (ref 150–400)
POTASSIUM SERPL-MCNC: 3.8 MMOL/L — SIGNIFICANT CHANGE UP (ref 3.5–5.3)
POTASSIUM SERPL-SCNC: 3.8 MMOL/L — SIGNIFICANT CHANGE UP (ref 3.5–5.3)
RBC # BLD: 4.06 M/UL — LOW (ref 4.2–5.8)
RBC # FLD: 12.2 % — SIGNIFICANT CHANGE UP (ref 10.3–14.5)
SODIUM SERPL-SCNC: 144 MMOL/L — SIGNIFICANT CHANGE UP (ref 135–145)
WBC # BLD: 11.1 K/UL — HIGH (ref 3.8–10.5)
WBC # FLD AUTO: 11.1 K/UL — HIGH (ref 3.8–10.5)

## 2018-04-14 PROCEDURE — 99233 SBSQ HOSP IP/OBS HIGH 50: CPT

## 2018-04-14 RX ADMIN — DEXTROSE MONOHYDRATE, SODIUM CHLORIDE, AND POTASSIUM CHLORIDE 150 MILLILITER(S): 50; .745; 4.5 INJECTION, SOLUTION INTRAVENOUS at 05:15

## 2018-04-14 RX ADMIN — DORZOLAMIDE HYDROCHLORIDE TIMOLOL MALEATE 1 DROP(S): 20; 5 SOLUTION/ DROPS OPHTHALMIC at 18:04

## 2018-04-14 RX ADMIN — PIPERACILLIN AND TAZOBACTAM 25 GRAM(S): 4; .5 INJECTION, POWDER, LYOPHILIZED, FOR SOLUTION INTRAVENOUS at 05:16

## 2018-04-14 RX ADMIN — PIPERACILLIN AND TAZOBACTAM 25 GRAM(S): 4; .5 INJECTION, POWDER, LYOPHILIZED, FOR SOLUTION INTRAVENOUS at 14:22

## 2018-04-14 RX ADMIN — Medication 6: at 16:43

## 2018-04-14 RX ADMIN — DORZOLAMIDE HYDROCHLORIDE TIMOLOL MALEATE 1 DROP(S): 20; 5 SOLUTION/ DROPS OPHTHALMIC at 05:16

## 2018-04-14 RX ADMIN — ENOXAPARIN SODIUM 40 MILLIGRAM(S): 100 INJECTION SUBCUTANEOUS at 20:13

## 2018-04-14 RX ADMIN — PIPERACILLIN AND TAZOBACTAM 25 GRAM(S): 4; .5 INJECTION, POWDER, LYOPHILIZED, FOR SOLUTION INTRAVENOUS at 21:43

## 2018-04-14 RX ADMIN — DEXTROSE MONOHYDRATE, SODIUM CHLORIDE, AND POTASSIUM CHLORIDE 150 MILLILITER(S): 50; .745; 4.5 INJECTION, SOLUTION INTRAVENOUS at 12:23

## 2018-04-14 NOTE — PROGRESS NOTE ADULT - PROBLEM SELECTOR PLAN 1
intravenous hydration   antibiotics broad spectrum  check c/s intravenous hydration   continue antibiotics  leukocytosis improving from 16 to 11.1  CBC in AM  check c/s

## 2018-04-14 NOTE — SWALLOW BEDSIDE ASSESSMENT ADULT - SWALLOW EVAL: RECOMMENDED FEEDING/EATING TECHNIQUES
position upright (90 degrees)/maintain upright posture during/after eating for 30 mins/oral hygiene/allow for swallow between intakes/check mouth frequently for oral residue/pocketing/alternate food with liquid/small sips/bites

## 2018-04-14 NOTE — SWALLOW BEDSIDE ASSESSMENT ADULT - SLP GENERAL OBSERVATIONS
Pt was seen at bedside alert and vocal/verbal but not responsive to specific questions; poor orientation; poor ability to follow commands and answer simple questions; Pt did accept trials willingly during exam.

## 2018-04-14 NOTE — PROGRESS NOTE ADULT - SUBJECTIVE AND OBJECTIVE BOX
CHIEF COMPLAINT/INTERVAL HISTORY:    Patient is a 83 year old  Male who presents with a chief complaint of decreased responsiveness (12 Apr 2018 16:27)      HPI:  83m hx dm on insulin and dementia presented with decreased responsiveness. Patient's baseline is verbal with a few words to express his desires, but not oriented to time or situation. over the past 3 days, patient has been less responsive than normal, not opening eyes at all. he has not seemed to have any complaints over the past 3 days, but this is hard to assess. his appetite has been suppressed. and he has been bedbound - no coughing noted - in the emergency room he initially had a glucose of 600 (12 Apr 2018 16:27)        SUBJECTIVE & OBJECTIVE:  Patient seen and examined at bedside.     ROS: lethargic -nonverbal   Vital Signs Last 24 Hrs  T(C): 36.1 (14 Apr 2018 05:00), Max: 36.2 (13 Apr 2018 23:21)  T(F): 97 (14 Apr 2018 05:00), Max: 97.2 (13 Apr 2018 23:21)  HR: 62 (14 Apr 2018 05:00) (58 - 63)  BP: 141/72 (14 Apr 2018 05:00) (120/70 - 141/72)  RR: 18 (14 Apr 2018 05:00) (16 - 18)  SpO2: 99% (14 Apr 2018 05:00) (95% - 99%)          MEDICATIONS  (STANDING):  amLODIPine   Tablet 10 milliGRAM(s) Oral daily  dextrose 5%. 1000 milliLiter(s) (50 mL/Hr) IV Continuous <Continuous>  dextrose 50% Injectable 12.5 Gram(s) IV Push once  dextrose 50% Injectable 25 Gram(s) IV Push once  dextrose 50% Injectable 25 Gram(s) IV Push once  dorzolamide 2%/timolol 0.5% Ophthalmic Solution 1 Drop(s) Both EYES two times a day  enoxaparin Injectable 40 milliGRAM(s) SubCutaneous every 24 hours  insulin lispro (HumaLOG) corrective regimen sliding scale   SubCutaneous three times a day before meals  insulin lispro (HumaLOG) corrective regimen sliding scale   SubCutaneous at bedtime  piperacillin/tazobactam IVPB. 3.375 Gram(s) IV Intermittent every 8 hours  sodium chloride 0.45% with potassium chloride 20 mEq/L 1000 milliLiter(s) (150 mL/Hr) IV Continuous <Continuous>    MEDICATIONS  (PRN):  acetaminophen  Suppository 650 milliGRAM(s) Rectal every 6 hours PRN For Temp greater than 38 C (100.4 F)  dextrose Gel 1 Dose(s) Oral once PRN Blood Glucose LESS THAN 70 milliGRAM(s)/deciliter  glucagon  Injectable 1 milliGRAM(s) IntraMuscular once PRN Glucose LESS THAN 70 milligrams/deciliter        PHYSICAL EXAM:    GENERAL: NAD, well-groomed, well-developed  HEAD:  Atraumatic, Normocephalic  EYES: EOMI, PERRLA, conjunctiva and sclera clear  ENMT: Moist mucous membranes  NECK: Supple, No JVD  NERVOUS SYSTEM:  Alert & Oriented X3, Motor Strength 5/5 B/L upper and lower extremities; DTRs 2+ intact and symmetric  CHEST/LUNG: Clear to auscultation bilaterally; No rales, rhonchi, wheezing, or rubs  HEART: Regular rate and rhythm; No murmurs, rubs, or gallops  ABDOMEN: Soft, Nontender, Nondistended; Bowel sounds present  EXTREMITIES:  2+ Peripheral Pulses, No clubbing, cyanosis, or edema    LABS:                        12.6   11.10 )-----------( 205      ( 14 Apr 2018 06:54 )             38.1   04-14    144  |  111<H>  |  12  ----------------------------<  100<H>  3.8   |  26  |  0.45<L>    Ca    8.0<L>      14 Apr 2018 06:54  Mg     2.4     04-12    TPro  7.0  /  Alb  3.4  /  TBili  0.5  /  DBili  x   /  AST  26  /  ALT  42  /  AlkPhos  89  04-12          CAPILLARY BLOOD GLUCOSE      POCT Blood Glucose.: 153 mg/dL (13 Apr 2018 11:02)  POCT Blood Glucose.: 141 mg/dL (13 Apr 2018 07:52)  POCT Blood Glucose.: 173 mg/dL (12 Apr 2018 23:03)  POCT Blood Glucose.: 149 mg/dL (12 Apr 2018 22:33)  POCT Blood Glucose.: 279 mg/dL (12 Apr 2018 17:58)  POCT Blood Glucose.: 302 mg/dL (12 Apr 2018 13:59)      RECENT CULTURES:      RADIOLOGY & ADDITIONAL TESTS:  Imaging Personally Reviewed:  [ ] YES      Consultant(s) Notes Reviewed:  [ ] YES     Care Discussed with [ ] Consultants [ ] Patient [ ] Family  [x ]    [x ]  Other; RN  HEALTH ISSUES - PROBLEM Dx:  Hyperglycemia: Hyperglycemia  Essential hypertension: Essential hypertension  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 CHIEF COMPLAINT/INTERVAL HISTORY:    Patient is a 83 year old  Male who presents with a chief complaint of decreased responsiveness (12 Apr 2018 16:27)      HPI:  83m hx dm on insulin and dementia presented with decreased responsiveness. Patient's baseline is verbal with a few words to express his desires, but not oriented to time or situation. over the past 3 days, patient has been less responsive than normal, not opening eyes at all. he has not seemed to have any complaints over the past 3 days, but this is hard to assess. his appetite has been suppressed. and he has been bedbound - no coughing noted - in the emergency room he initially had a glucose of 600 (12 Apr 2018 16:27)        SUBJECTIVE & OBJECTIVE:  Patient seen and examined at bedside.     ROS: Speaks Haitian, denies any complaints    Vital Signs Last 24 Hrs  T(C): 36.1 (14 Apr 2018 05:00), Max: 36.2 (13 Apr 2018 23:21)  T(F): 97 (14 Apr 2018 05:00), Max: 97.2 (13 Apr 2018 23:21)  HR: 62 (14 Apr 2018 05:00) (58 - 63)  BP: 141/72 (14 Apr 2018 05:00) (120/70 - 141/72)  RR: 18 (14 Apr 2018 05:00) (16 - 18)  SpO2: 99% (14 Apr 2018 05:00) (95% - 99%)          MEDICATIONS  (STANDING):  amLODIPine   Tablet 10 milliGRAM(s) Oral daily  dextrose 5%. 1000 milliLiter(s) (50 mL/Hr) IV Continuous <Continuous>  dextrose 50% Injectable 12.5 Gram(s) IV Push once  dextrose 50% Injectable 25 Gram(s) IV Push once  dextrose 50% Injectable 25 Gram(s) IV Push once  dorzolamide 2%/timolol 0.5% Ophthalmic Solution 1 Drop(s) Both EYES two times a day  enoxaparin Injectable 40 milliGRAM(s) SubCutaneous every 24 hours  insulin lispro (HumaLOG) corrective regimen sliding scale   SubCutaneous three times a day before meals  insulin lispro (HumaLOG) corrective regimen sliding scale   SubCutaneous at bedtime  piperacillin/tazobactam IVPB. 3.375 Gram(s) IV Intermittent every 8 hours  sodium chloride 0.45% with potassium chloride 20 mEq/L 1000 milliLiter(s) (150 mL/Hr) IV Continuous <Continuous>    MEDICATIONS  (PRN):  acetaminophen  Suppository 650 milliGRAM(s) Rectal every 6 hours PRN For Temp greater than 38 C (100.4 F)  dextrose Gel 1 Dose(s) Oral once PRN Blood Glucose LESS THAN 70 milliGRAM(s)/deciliter  glucagon  Injectable 1 milliGRAM(s) IntraMuscular once PRN Glucose LESS THAN 70 milligrams/deciliter        PHYSICAL EXAM:    GENERAL: NAD, well-groomed, well-developed  HEAD:  Atraumatic, Normocephalic  EYES: EOMI, PERRLA, conjunctiva and sclera clear  ENMT: Moist mucous membranes  NECK: Supple, No JVD  NERVOUS SYSTEM:  Alert, speech difficult to understand, speaking Haitian  CHEST/LUNG: Clear to auscultation bilaterally; No rales, rhonchi, wheezing, or rubs  HEART: Regular rate and rhythm; No murmurs, rubs, or gallops  ABDOMEN: Soft, Nontender, Nondistended; Bowel sounds present  EXTREMITIES:  2+ Peripheral Pulses, No clubbing, cyanosis, or edema  Skin: Stage 2 right buttock, Left hip wound    LABS:                        12.6   11.10 )-----------( 205      ( 14 Apr 2018 06:54 )             38.1   04-14    144  |  111<H>  |  12  ----------------------------<  100<H>  3.8   |  26  |  0.45<L>    Ca    8.0<L>      14 Apr 2018 06:54  Mg     2.4     04-12    TPro  7.0  /  Alb  3.4  /  TBili  0.5  /  DBili  x   /  AST  26  /  ALT  42  /  AlkPhos  89  04-12          CAPILLARY BLOOD GLUCOSE      POCT Blood Glucose.: 153 mg/dL (13 Apr 2018 11:02)  POCT Blood Glucose.: 141 mg/dL (13 Apr 2018 07:52)  POCT Blood Glucose.: 173 mg/dL (12 Apr 2018 23:03)  POCT Blood Glucose.: 149 mg/dL (12 Apr 2018 22:33)  POCT Blood Glucose.: 279 mg/dL (12 Apr 2018 17:58)  POCT Blood Glucose.: 302 mg/dL (12 Apr 2018 13:59)      RECENT CULTURES:      RADIOLOGY & ADDITIONAL TESTS:  Imaging Personally Reviewed:  [ ] YES      Consultant(s) Notes Reviewed:  [ ] YES     Care Discussed with [ ] Consultants [ ] Patient [ ] Family  [x ]    [x ]  Other; RN  HEALTH ISSUES - PROBLEM Dx:  Hyperglycemia: Hyperglycemia  Essential hypertension: Essential hypertension  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

## 2018-04-14 NOTE — SWALLOW BEDSIDE ASSESSMENT ADULT - SWALLOW EVAL: DIAGNOSIS
Pt is an 83 yr old male with encephalopathy and hx dementia; swallow function with reduced efficiency sec to dementia feeding behaviors and reduced awareness of eating/swallowing context; at this time there is functional swallow for modified consistency diet; there are no overt s/s aspriation evident.

## 2018-04-14 NOTE — PROGRESS NOTE ADULT - ASSESSMENT
83 year old male with history of diabetes and dementia with metabolic encephalopathy from  hyperosmolar state	  IMPROVE VTE Individual Risk Assessment          RISK                                                          Points    [  ] Previous VTE                                                3    [  ] Thrombophilia                                             2    [  ] Lower limb paralysis                                    2        (unable to hold up >15 seconds)      [  ] Current Cancer                                             2         (within 6 months)    [  x] Immobilization > 24 hrs                              1    [  ] ICU/CCU stay > 24 hours                            1    [ x ] Age > 60                                                    1    IMPROVE VTE Score __2_______

## 2018-04-14 NOTE — SWALLOW BEDSIDE ASSESSMENT ADULT - NS SPL SWALLOW CLINIC TRIAL FT
Pt with dementia feeding behaviors; he spit out water consistently but willingly accepted and swallowed full portion of juice given.

## 2018-04-14 NOTE — SWALLOW BEDSIDE ASSESSMENT ADULT - COMMENTS
Hx Dementia; DM; glaucoma; Evaluation conducted in Telugu and Pt with verbal responses however with noted confusion and confabulations. inconsistent awareness of food in mouth and needed verbal cues

## 2018-04-15 LAB
ANION GAP SERPL CALC-SCNC: 7 MMOL/L — SIGNIFICANT CHANGE UP (ref 5–17)
BUN SERPL-MCNC: 10 MG/DL — SIGNIFICANT CHANGE UP (ref 7–23)
CALCIUM SERPL-MCNC: 8.3 MG/DL — LOW (ref 8.5–10.1)
CHLORIDE SERPL-SCNC: 108 MMOL/L — SIGNIFICANT CHANGE UP (ref 96–108)
CO2 SERPL-SCNC: 26 MMOL/L — SIGNIFICANT CHANGE UP (ref 22–31)
CREAT SERPL-MCNC: 0.6 MG/DL — SIGNIFICANT CHANGE UP (ref 0.5–1.3)
GLUCOSE BLDC GLUCOMTR-MCNC: 141 MG/DL — HIGH (ref 70–99)
GLUCOSE BLDC GLUCOMTR-MCNC: 148 MG/DL — HIGH (ref 70–99)
GLUCOSE BLDC GLUCOMTR-MCNC: 203 MG/DL — HIGH (ref 70–99)
GLUCOSE BLDC GLUCOMTR-MCNC: 213 MG/DL — HIGH (ref 70–99)
GLUCOSE SERPL-MCNC: 183 MG/DL — HIGH (ref 70–99)
HCT VFR BLD CALC: 41.3 % — SIGNIFICANT CHANGE UP (ref 39–50)
HGB BLD-MCNC: 13.8 G/DL — SIGNIFICANT CHANGE UP (ref 13–17)
MCHC RBC-ENTMCNC: 30.8 PG — SIGNIFICANT CHANGE UP (ref 27–34)
MCHC RBC-ENTMCNC: 33.4 GM/DL — SIGNIFICANT CHANGE UP (ref 32–36)
MCV RBC AUTO: 92.2 FL — SIGNIFICANT CHANGE UP (ref 80–100)
NRBC # BLD: 0 /100 WBCS — SIGNIFICANT CHANGE UP (ref 0–0)
PLATELET # BLD AUTO: 200 K/UL — SIGNIFICANT CHANGE UP (ref 150–400)
POTASSIUM SERPL-MCNC: 4.4 MMOL/L — SIGNIFICANT CHANGE UP (ref 3.5–5.3)
POTASSIUM SERPL-SCNC: 4.4 MMOL/L — SIGNIFICANT CHANGE UP (ref 3.5–5.3)
RBC # BLD: 4.48 M/UL — SIGNIFICANT CHANGE UP (ref 4.2–5.8)
RBC # FLD: 12.2 % — SIGNIFICANT CHANGE UP (ref 10.3–14.5)
SODIUM SERPL-SCNC: 141 MMOL/L — SIGNIFICANT CHANGE UP (ref 135–145)
WBC # BLD: 8.95 K/UL — SIGNIFICANT CHANGE UP (ref 3.8–10.5)
WBC # FLD AUTO: 8.95 K/UL — SIGNIFICANT CHANGE UP (ref 3.8–10.5)

## 2018-04-15 PROCEDURE — 99232 SBSQ HOSP IP/OBS MODERATE 35: CPT

## 2018-04-15 RX ORDER — SODIUM CHLORIDE 9 MG/ML
1000 INJECTION, SOLUTION INTRAVENOUS
Qty: 0 | Refills: 0 | Status: DISCONTINUED | OUTPATIENT
Start: 2018-04-15 | End: 2018-04-17

## 2018-04-15 RX ADMIN — DEXTROSE MONOHYDRATE, SODIUM CHLORIDE, AND POTASSIUM CHLORIDE 150 MILLILITER(S): 50; .745; 4.5 INJECTION, SOLUTION INTRAVENOUS at 06:24

## 2018-04-15 RX ADMIN — Medication 4: at 12:18

## 2018-04-15 RX ADMIN — AMLODIPINE BESYLATE 10 MILLIGRAM(S): 2.5 TABLET ORAL at 06:24

## 2018-04-15 RX ADMIN — PIPERACILLIN AND TAZOBACTAM 25 GRAM(S): 4; .5 INJECTION, POWDER, LYOPHILIZED, FOR SOLUTION INTRAVENOUS at 06:23

## 2018-04-15 RX ADMIN — ENOXAPARIN SODIUM 40 MILLIGRAM(S): 100 INJECTION SUBCUTANEOUS at 21:33

## 2018-04-15 RX ADMIN — SODIUM CHLORIDE 100 MILLILITER(S): 9 INJECTION, SOLUTION INTRAVENOUS at 12:18

## 2018-04-15 RX ADMIN — DORZOLAMIDE HYDROCHLORIDE TIMOLOL MALEATE 1 DROP(S): 20; 5 SOLUTION/ DROPS OPHTHALMIC at 06:24

## 2018-04-15 RX ADMIN — Medication 4: at 16:58

## 2018-04-15 RX ADMIN — DORZOLAMIDE HYDROCHLORIDE TIMOLOL MALEATE 1 DROP(S): 20; 5 SOLUTION/ DROPS OPHTHALMIC at 18:24

## 2018-04-15 RX ADMIN — PIPERACILLIN AND TAZOBACTAM 25 GRAM(S): 4; .5 INJECTION, POWDER, LYOPHILIZED, FOR SOLUTION INTRAVENOUS at 13:30

## 2018-04-15 RX ADMIN — PIPERACILLIN AND TAZOBACTAM 25 GRAM(S): 4; .5 INJECTION, POWDER, LYOPHILIZED, FOR SOLUTION INTRAVENOUS at 21:32

## 2018-04-15 NOTE — PROGRESS NOTE ADULT - ASSESSMENT
83 year old male with history of diabetes and dementia with metabolic encephalopathy from  hyperosmolar state	  IMPROVE VTE Individual Risk Assessment 83 year old male with history of diabetes and dementia with metabolic encephalopathy from  hyperosmolar state

## 2018-04-15 NOTE — PROGRESS NOTE ADULT - SUBJECTIVE AND OBJECTIVE BOX
Patient is a 83y old  Male who presents with a chief complaint of decreased responsiveness (12 Apr 2018 16:27)      INTERVAL HPI/OVERNIGHT EVENTS:  pt   seen  at  bed side   MEDICATIONS  (STANDING):  amLODIPine   Tablet 10 milliGRAM(s) Oral daily  dextrose 5%. 1000 milliLiter(s) (50 mL/Hr) IV Continuous <Continuous>  dextrose 50% Injectable 12.5 Gram(s) IV Push once  dextrose 50% Injectable 25 Gram(s) IV Push once  dextrose 50% Injectable 25 Gram(s) IV Push once  dorzolamide 2%/timolol 0.5% Ophthalmic Solution 1 Drop(s) Both EYES two times a day  enoxaparin Injectable 40 milliGRAM(s) SubCutaneous every 24 hours  insulin lispro (HumaLOG) corrective regimen sliding scale   SubCutaneous three times a day before meals  insulin lispro (HumaLOG) corrective regimen sliding scale   SubCutaneous at bedtime  piperacillin/tazobactam IVPB. 3.375 Gram(s) IV Intermittent every 8 hours  sodium chloride 0.45%. 1000 milliLiter(s) (100 mL/Hr) IV Continuous <Continuous>    MEDICATIONS  (PRN):  acetaminophen  Suppository 650 milliGRAM(s) Rectal every 6 hours PRN For Temp greater than 38 C (100.4 F)  dextrose Gel 1 Dose(s) Oral once PRN Blood Glucose LESS THAN 70 milliGRAM(s)/deciliter  glucagon  Injectable 1 milliGRAM(s) IntraMuscular once PRN Glucose LESS THAN 70 milligrams/deciliter      Allergies    No Known Allergies    Intolerances          Vital Signs Last 24 Hrs  T(C): 35.8 (15 Apr 2018 05:25), Max: 36.6 (14 Apr 2018 14:58)  T(F): 96.5 (15 Apr 2018 05:25), Max: 97.8 (14 Apr 2018 14:58)  HR: 65 (15 Apr 2018 05:25) (59 - 72)  BP: 113/90 (15 Apr 2018 05:25) (113/90 - 149/69)  BP(mean): --  RR: 18 (15 Apr 2018 05:25) (16 - 18)  SpO2: 95% (15 Apr 2018 05:25) (95% - 99%)    PHYSICAL EXAM:  pt  is  alert  & awake   Heart -  S1, S2 + R/R/R  Lung- B/L  air  entry   Abd-  soft , BS+   Ext- No edema   LABS:                        13.8   8.95  )-----------( 200      ( 15 Apr 2018 06:11 )             41.3     04-15    141  |  108  |  10  ----------------------------<  183<H>  4.4   |  26  |  0.60    Ca    8.3<L>      15 Apr 2018 06:11          CAPILLARY BLOOD GLUCOSE      POCT Blood Glucose.: 203 mg/dL (15 Apr 2018 11:54)  POCT Blood Glucose.: 148 mg/dL (15 Apr 2018 08:11)  POCT Blood Glucose.: 168 mg/dL (14 Apr 2018 21:41)  POCT Blood Glucose.: 268 mg/dL (14 Apr 2018 16:40)    Lipid panel:           TSH     RADIOLOGY & ADDITIONAL TESTS:    Imaging Personally Reviewed:  [ ] YES  [ ] NO    Consultant(s) Notes Reviewed:  [ ] YES  [ ] NO    Care Discussed with Consultants/Other Providers [ ] YES  [ ] NO Patient is a 83y old  Male who presents with a chief complaint of decreased responsiveness (12 Apr 2018 16:27)      INTERVAL HPI/OVERNIGHT EVENTS:  pt   seen  at  bed side , case  D/W  Daughter  at  bed side   MEDICATIONS  (STANDING):  amLODIPine   Tablet 10 milliGRAM(s) Oral daily  dextrose 5%. 1000 milliLiter(s) (50 mL/Hr) IV Continuous <Continuous>  dextrose 50% Injectable 12.5 Gram(s) IV Push once  dextrose 50% Injectable 25 Gram(s) IV Push once  dextrose 50% Injectable 25 Gram(s) IV Push once  dorzolamide 2%/timolol 0.5% Ophthalmic Solution 1 Drop(s) Both EYES two times a day  enoxaparin Injectable 40 milliGRAM(s) SubCutaneous every 24 hours  insulin lispro (HumaLOG) corrective regimen sliding scale   SubCutaneous three times a day before meals  insulin lispro (HumaLOG) corrective regimen sliding scale   SubCutaneous at bedtime  piperacillin/tazobactam IVPB. 3.375 Gram(s) IV Intermittent every 8 hours  sodium chloride 0.45%. 1000 milliLiter(s) (100 mL/Hr) IV Continuous <Continuous>    MEDICATIONS  (PRN):  acetaminophen  Suppository 650 milliGRAM(s) Rectal every 6 hours PRN For Temp greater than 38 C (100.4 F)  dextrose Gel 1 Dose(s) Oral once PRN Blood Glucose LESS THAN 70 milliGRAM(s)/deciliter  glucagon  Injectable 1 milliGRAM(s) IntraMuscular once PRN Glucose LESS THAN 70 milligrams/deciliter      Allergies    No Known Allergies    Intolerances          Vital Signs Last 24 Hrs  T(C): 35.8 (15 Apr 2018 05:25), Max: 36.6 (14 Apr 2018 14:58)  T(F): 96.5 (15 Apr 2018 05:25), Max: 97.8 (14 Apr 2018 14:58)  HR: 65 (15 Apr 2018 05:25) (59 - 72)  BP: 113/90 (15 Apr 2018 05:25) (113/90 - 149/69)  BP(mean): --  RR: 18 (15 Apr 2018 05:25) (16 - 18)  SpO2: 95% (15 Apr 2018 05:25) (95% - 99%)    PHYSICAL EXAM:  pt  is  alert  & awake   Heart -  S1, S2 + R/R/R  Lung- B/L  air  entry   Abd-  soft , BS+   Ext- No edema   Skin -  stage  2  RT  Buttock  &  Lt  Hip  wound   LABS:                        13.8   8.95  )-----------( 200      ( 15 Apr 2018 06:11 )             41.3     04-15    141  |  108  |  10  ----------------------------<  183<H>  4.4   |  26  |  0.60    Ca    8.3<L>      15 Apr 2018 06:11          CAPILLARY BLOOD GLUCOSE      POCT Blood Glucose.: 203 mg/dL (15 Apr 2018 11:54)  POCT Blood Glucose.: 148 mg/dL (15 Apr 2018 08:11)  POCT Blood Glucose.: 168 mg/dL (14 Apr 2018 21:41)  POCT Blood Glucose.: 268 mg/dL (14 Apr 2018 16:40)    Lipid panel:           TSH     RADIOLOGY & ADDITIONAL TESTS:    Imaging Personally Reviewed:  [ ] YES  [ ] NO    Consultant(s) Notes Reviewed:  [ ] YES  [ ] NO    Care Discussed with Consultants/Other Providers [ ] YES  [ ] NO

## 2018-04-16 LAB
GLUCOSE BLDC GLUCOMTR-MCNC: 180 MG/DL — HIGH (ref 70–99)
GLUCOSE BLDC GLUCOMTR-MCNC: 234 MG/DL — HIGH (ref 70–99)
GLUCOSE BLDC GLUCOMTR-MCNC: 239 MG/DL — HIGH (ref 70–99)

## 2018-04-16 PROCEDURE — 99232 SBSQ HOSP IP/OBS MODERATE 35: CPT

## 2018-04-16 RX ADMIN — Medication 4: at 17:28

## 2018-04-16 RX ADMIN — AMLODIPINE BESYLATE 10 MILLIGRAM(S): 2.5 TABLET ORAL at 05:23

## 2018-04-16 RX ADMIN — PIPERACILLIN AND TAZOBACTAM 25 GRAM(S): 4; .5 INJECTION, POWDER, LYOPHILIZED, FOR SOLUTION INTRAVENOUS at 05:23

## 2018-04-16 RX ADMIN — Medication 4: at 12:17

## 2018-04-16 RX ADMIN — PIPERACILLIN AND TAZOBACTAM 25 GRAM(S): 4; .5 INJECTION, POWDER, LYOPHILIZED, FOR SOLUTION INTRAVENOUS at 14:00

## 2018-04-16 RX ADMIN — ENOXAPARIN SODIUM 40 MILLIGRAM(S): 100 INJECTION SUBCUTANEOUS at 21:56

## 2018-04-16 RX ADMIN — Medication 2: at 09:09

## 2018-04-16 RX ADMIN — PIPERACILLIN AND TAZOBACTAM 25 GRAM(S): 4; .5 INJECTION, POWDER, LYOPHILIZED, FOR SOLUTION INTRAVENOUS at 21:56

## 2018-04-16 RX ADMIN — DORZOLAMIDE HYDROCHLORIDE TIMOLOL MALEATE 1 DROP(S): 20; 5 SOLUTION/ DROPS OPHTHALMIC at 05:23

## 2018-04-16 RX ADMIN — DORZOLAMIDE HYDROCHLORIDE TIMOLOL MALEATE 1 DROP(S): 20; 5 SOLUTION/ DROPS OPHTHALMIC at 17:30

## 2018-04-16 NOTE — PROGRESS NOTE ADULT - SUBJECTIVE AND OBJECTIVE BOX
Patient is a 83y old  Male who presents with a chief complaint of decreased responsiveness (12 Apr 2018 16:27)      INTERVAL HPI/OVERNIGHT EVENTS:  pt  seen  at  bed side  MEDICATIONS  (STANDING):  amLODIPine   Tablet 10 milliGRAM(s) Oral daily  dextrose 5%. 1000 milliLiter(s) (50 mL/Hr) IV Continuous <Continuous>  dextrose 50% Injectable 12.5 Gram(s) IV Push once  dextrose 50% Injectable 25 Gram(s) IV Push once  dextrose 50% Injectable 25 Gram(s) IV Push once  dorzolamide 2%/timolol 0.5% Ophthalmic Solution 1 Drop(s) Both EYES two times a day  enoxaparin Injectable 40 milliGRAM(s) SubCutaneous every 24 hours  insulin lispro (HumaLOG) corrective regimen sliding scale   SubCutaneous three times a day before meals  insulin lispro (HumaLOG) corrective regimen sliding scale   SubCutaneous at bedtime  piperacillin/tazobactam IVPB. 3.375 Gram(s) IV Intermittent every 8 hours  sodium chloride 0.45%. 1000 milliLiter(s) (100 mL/Hr) IV Continuous <Continuous>    MEDICATIONS  (PRN):  acetaminophen  Suppository 650 milliGRAM(s) Rectal every 6 hours PRN For Temp greater than 38 C (100.4 F)  dextrose Gel 1 Dose(s) Oral once PRN Blood Glucose LESS THAN 70 milliGRAM(s)/deciliter  glucagon  Injectable 1 milliGRAM(s) IntraMuscular once PRN Glucose LESS THAN 70 milligrams/deciliter      Allergies    No Known Allergies    Intolerances          Vital Signs Last 24 Hrs  T(C): 36.7 (16 Apr 2018 12:55), Max: 36.7 (16 Apr 2018 12:55)  T(F): 98 (16 Apr 2018 12:55), Max: 98 (16 Apr 2018 12:55)  HR: 77 (16 Apr 2018 12:55) (62 - 79)  BP: 162/87 (16 Apr 2018 12:55) (119/78 - 162/87)  BP(mean): --  RR: 18 (16 Apr 2018 12:55) (16 - 18)  SpO2: 98% (16 Apr 2018 12:55) (97% - 99%)    PHYSICAL EXAM:  pt  is  alert  &  awake    Heart -   S1 , S2  + R/R/R  Lung-  B/L  air  entry   Abd-  soft, BS+   EXT- No  edema  Skin - stage 2  RT Buttock  & LT  Hip  Wound   LABS:                        13.8   8.95  )-----------( 200      ( 15 Apr 2018 06:11 )             41.3     04-15    141  |  108  |  10  ----------------------------<  183<H>  4.4   |  26  |  0.60    Ca    8.3<L>      15 Apr 2018 06:11          CAPILLARY BLOOD GLUCOSE      POCT Blood Glucose.: 234 mg/dL (16 Apr 2018 11:59)  POCT Blood Glucose.: 180 mg/dL (16 Apr 2018 08:33)  POCT Blood Glucose.: 141 mg/dL (15 Apr 2018 23:25)  POCT Blood Glucose.: 213 mg/dL (15 Apr 2018 16:47)    Lipid panel:           TSH     RADIOLOGY & ADDITIONAL TESTS:    Imaging Personally Reviewed:  [ ] YES  [ ] NO    Consultant(s) Notes Reviewed:  [ ] YES  [ ] NO    Care Discussed with Consultants/Other Providers [ ] YES  [ ] NO

## 2018-04-16 NOTE — PROGRESS NOTE ADULT - ASSESSMENT
83 year old male with history of diabetes and dementia with metabolic encephalopathy from  hyperosmolar state

## 2018-04-17 DIAGNOSIS — R65.10 SYSTEMIC INFLAMMATORY RESPONSE SYNDROME (SIRS) OF NON-INFECTIOUS ORIGIN WITHOUT ACUTE ORGAN DYSFUNCTION: ICD-10-CM

## 2018-04-17 DIAGNOSIS — E87.0 HYPEROSMOLALITY AND HYPERNATREMIA: ICD-10-CM

## 2018-04-17 LAB
CULTURE RESULTS: SIGNIFICANT CHANGE UP
CULTURE RESULTS: SIGNIFICANT CHANGE UP
GLUCOSE BLDC GLUCOMTR-MCNC: 219 MG/DL — HIGH (ref 70–99)
GLUCOSE BLDC GLUCOMTR-MCNC: 234 MG/DL — HIGH (ref 70–99)
GLUCOSE BLDC GLUCOMTR-MCNC: 272 MG/DL — HIGH (ref 70–99)
GLUCOSE BLDC GLUCOMTR-MCNC: 285 MG/DL — HIGH (ref 70–99)
SPECIMEN SOURCE: SIGNIFICANT CHANGE UP
SPECIMEN SOURCE: SIGNIFICANT CHANGE UP

## 2018-04-17 PROCEDURE — 99233 SBSQ HOSP IP/OBS HIGH 50: CPT

## 2018-04-17 RX ORDER — INSULIN GLARGINE 100 [IU]/ML
5 INJECTION, SOLUTION SUBCUTANEOUS AT BEDTIME
Qty: 0 | Refills: 0 | Status: DISCONTINUED | OUTPATIENT
Start: 2018-04-17 | End: 2018-04-18

## 2018-04-17 RX ADMIN — INSULIN GLARGINE 5 UNIT(S): 100 INJECTION, SOLUTION SUBCUTANEOUS at 23:26

## 2018-04-17 RX ADMIN — Medication 6: at 12:35

## 2018-04-17 RX ADMIN — Medication 4: at 17:08

## 2018-04-17 RX ADMIN — AMLODIPINE BESYLATE 10 MILLIGRAM(S): 2.5 TABLET ORAL at 06:37

## 2018-04-17 RX ADMIN — PIPERACILLIN AND TAZOBACTAM 25 GRAM(S): 4; .5 INJECTION, POWDER, LYOPHILIZED, FOR SOLUTION INTRAVENOUS at 14:42

## 2018-04-17 RX ADMIN — PIPERACILLIN AND TAZOBACTAM 25 GRAM(S): 4; .5 INJECTION, POWDER, LYOPHILIZED, FOR SOLUTION INTRAVENOUS at 06:37

## 2018-04-17 RX ADMIN — DORZOLAMIDE HYDROCHLORIDE TIMOLOL MALEATE 1 DROP(S): 20; 5 SOLUTION/ DROPS OPHTHALMIC at 06:37

## 2018-04-17 RX ADMIN — PIPERACILLIN AND TAZOBACTAM 25 GRAM(S): 4; .5 INJECTION, POWDER, LYOPHILIZED, FOR SOLUTION INTRAVENOUS at 22:53

## 2018-04-17 RX ADMIN — ENOXAPARIN SODIUM 40 MILLIGRAM(S): 100 INJECTION SUBCUTANEOUS at 22:54

## 2018-04-17 RX ADMIN — Medication 4: at 08:37

## 2018-04-17 RX ADMIN — DORZOLAMIDE HYDROCHLORIDE TIMOLOL MALEATE 1 DROP(S): 20; 5 SOLUTION/ DROPS OPHTHALMIC at 17:09

## 2018-04-17 NOTE — PROGRESS NOTE ADULT - PROBLEM SELECTOR PROBLEM 4
Chronic primary angle-closure glaucoma of both eyes, unspecified glaucoma stage Metabolic encephalopathy

## 2018-04-17 NOTE — PROGRESS NOTE ADULT - SUBJECTIVE AND OBJECTIVE BOX
Patient is a 83y old  Male who presents with metabolic enceph likely due to hyperosmolar state        SUBJECTIVE / OVERNIGHT EVENTS: improved; per staff pt ate approx 90% of breakfast      MEDICATIONS  (STANDING):  amLODIPine   Tablet 10 milliGRAM(s) Oral daily  dextrose 5%. 1000 milliLiter(s) (50 mL/Hr) IV Continuous <Continuous>  dextrose 50% Injectable 12.5 Gram(s) IV Push once  dextrose 50% Injectable 25 Gram(s) IV Push once  dextrose 50% Injectable 25 Gram(s) IV Push once  dorzolamide 2%/timolol 0.5% Ophthalmic Solution 1 Drop(s) Both EYES two times a day  enoxaparin Injectable 40 milliGRAM(s) SubCutaneous every 24 hours  insulin lispro (HumaLOG) corrective regimen sliding scale   SubCutaneous three times a day before meals  insulin lispro (HumaLOG) corrective regimen sliding scale   SubCutaneous at bedtime  piperacillin/tazobactam IVPB. 3.375 Gram(s) IV Intermittent every 8 hours  sodium chloride 0.45%. 1000 milliLiter(s) (100 mL/Hr) IV Continuous <Continuous>    MEDICATIONS  (PRN):  acetaminophen  Suppository 650 milliGRAM(s) Rectal every 6 hours PRN For Temp greater than 38 C (100.4 F)  dextrose Gel 1 Dose(s) Oral once PRN Blood Glucose LESS THAN 70 milliGRAM(s)/deciliter  glucagon  Injectable 1 milliGRAM(s) IntraMuscular once PRN Glucose LESS THAN 70 milligrams/deciliter      Vital Signs Last 24 Hrs  T(F): 98.2 (17 Apr 2018 05:56), Max: 98.8 (16 Apr 2018 23:41)  HR: 68 (17 Apr 2018 05:56) (65 - 77)  BP: 137/75 (17 Apr 2018 05:56) (129/68 - 162/87)  RR: 17 (17 Apr 2018 05:56) (14 - 18)  SpO2: 98% (17 Apr 2018 05:56) (98% - 99%)    CAPILLARY BLOOD GLUCOSE  POCT Blood Glucose.: 234 mg/dL (17 Apr 2018 08:18)  POCT Blood Glucose.: 239 mg/dL (16 Apr 2018 17:14)  POCT Blood Glucose.: 234 mg/dL (16 Apr 2018 11:59)            PHYSICAL EXAM  GENERAL: NAD, well-developed  HEAD:  Atraumatic, Normocephalic  EYES: eyes closed  CHEST/LUNG: b/l AE  HEART: Regular rate and rhythm  ABDOMEN: Soft, Nontender, Nondistended; Bowel sounds present  EXTREMITIES:   No clubbing, cyanosis, or edema  PSYCH: confused      LABS: Patient is a 83y old  Male who presents with metabolic enceph likely due to hyperosmolar state with likely SIRS        SUBJECTIVE / OVERNIGHT EVENTS: improved; per staff pt ate approx 90% of breakfast      MEDICATIONS  (STANDING):  amLODIPine   Tablet 10 milliGRAM(s) Oral daily  dextrose 5%. 1000 milliLiter(s) (50 mL/Hr) IV Continuous <Continuous>  dextrose 50% Injectable 12.5 Gram(s) IV Push once  dextrose 50% Injectable 25 Gram(s) IV Push once  dextrose 50% Injectable 25 Gram(s) IV Push once  dorzolamide 2%/timolol 0.5% Ophthalmic Solution 1 Drop(s) Both EYES two times a day  enoxaparin Injectable 40 milliGRAM(s) SubCutaneous every 24 hours  insulin lispro (HumaLOG) corrective regimen sliding scale   SubCutaneous three times a day before meals  insulin lispro (HumaLOG) corrective regimen sliding scale   SubCutaneous at bedtime  piperacillin/tazobactam IVPB. 3.375 Gram(s) IV Intermittent every 8 hours  sodium chloride 0.45%. 1000 milliLiter(s) (100 mL/Hr) IV Continuous <Continuous>    MEDICATIONS  (PRN):  acetaminophen  Suppository 650 milliGRAM(s) Rectal every 6 hours PRN For Temp greater than 38 C (100.4 F)  dextrose Gel 1 Dose(s) Oral once PRN Blood Glucose LESS THAN 70 milliGRAM(s)/deciliter  glucagon  Injectable 1 milliGRAM(s) IntraMuscular once PRN Glucose LESS THAN 70 milligrams/deciliter      Vital Signs Last 24 Hrs  T(F): 98.2 (17 Apr 2018 05:56), Max: 98.8 (16 Apr 2018 23:41)  HR: 68 (17 Apr 2018 05:56) (65 - 77)  BP: 137/75 (17 Apr 2018 05:56) (129/68 - 162/87)  RR: 17 (17 Apr 2018 05:56) (14 - 18)  SpO2: 98% (17 Apr 2018 05:56) (98% - 99%)    CAPILLARY BLOOD GLUCOSE  POCT Blood Glucose.: 234 mg/dL (17 Apr 2018 08:18)  POCT Blood Glucose.: 239 mg/dL (16 Apr 2018 17:14)  POCT Blood Glucose.: 234 mg/dL (16 Apr 2018 11:59)            PHYSICAL EXAM  GENERAL: NAD, well-developed  HEAD:  Atraumatic, Normocephalic  EYES: eyes closed  CHEST/LUNG: b/l AE  HEART: Regular rate and rhythm  ABDOMEN: Soft, Nontender, Nondistended; Bowel sounds present  EXTREMITIES:   No clubbing, cyanosis, or edema  PSYCH: confused      LABS:

## 2018-04-17 NOTE — PROGRESS NOTE ADULT - PROBLEM SELECTOR PLAN 8
wound precautions  continue daily wound care

## 2018-04-17 NOTE — CHART NOTE - NSCHARTNOTEFT_GEN_A_CORE
Pt is confused, Ukrainian speaking, c good oral intake for breakfast when seen.   Pt adm c hyperglycemia, hypernatremia.    PMH include HTN, DM (was on Levemir PTA as per home medication list).    Pt lived alone c 24 hr HHA PTA.   04-14, swallow evaluation recommended Dysphagia 2 Mechanical Soft - Thin Liquids.    Factors impacting intake: [ ] none [ ] nausea  [ ] vomiting [ ] diarrhea [ ] constipation  [ ]chewing problems [ x] swallowing issues  [ x] other: AMS, self feeding difficulty     Diet Prescription: Diet, Dysphagia 2 Mechanical Soft-Thin Liquids (04-14-18 @ 12:56)    Intake: consumed >75% for breakfast today, 50% documented for other meals during adm    Current Weight: Weight: 04-15, 57.1 kg, 04-12, 54.5 kg, c wt. gain of 2.6 kg  % Weight Change: + 4.55%  No edema noted, ? wt. accuracy    Pertinent Medications: MEDICATIONS  (STANDING):  amLODIPine   Tablet 10 milliGRAM(s) Oral daily  dextrose 5%. 1000 milliLiter(s) (50 mL/Hr) IV Continuous <Continuous>  dextrose 50% Injectable 12.5 Gram(s) IV Push once  dextrose 50% Injectable 25 Gram(s) IV Push once  dextrose 50% Injectable 25 Gram(s) IV Push once  dorzolamide 2%/timolol 0.5% Ophthalmic Solution 1 Drop(s) Both EYES two times a day  enoxaparin Injectable 40 milliGRAM(s) SubCutaneous every 24 hours  insulin lispro (HumaLOG) corrective regimen sliding scale   SubCutaneous three times a day before meals  insulin lispro (HumaLOG) corrective regimen sliding scale   SubCutaneous at bedtime  piperacillin/tazobactam IVPB. 3.375 Gram(s) IV Intermittent every 8 hours  sodium chloride 0.45%. 1000 milliLiter(s) (100 mL/Hr) IV Continuous <Continuous>    MEDICATIONS  (PRN):  acetaminophen  Suppository 650 milliGRAM(s) Rectal every 6 hours PRN For Temp greater than 38 C (100.4 F)  dextrose Gel 1 Dose(s) Oral once PRN Blood Glucose LESS THAN 70 milliGRAM(s)/deciliter  glucagon  Injectable 1 milliGRAM(s) IntraMuscular once PRN Glucose LESS THAN 70 milligrams/deciliter    Pertinent Labs: 04-15 Na141 mmol/L Glu 183 mg/dL<H> K+ 4.4 mmol/L Cr  0.60 mg/dL BUN 10 mg/dL 04-12 Alb 3.4 g/dL 04-13 [id number] 9.0 %<H>     CAPILLARY BLOOD GLUCOSE      POCT Blood Glucose.: 234 mg/dL (17 Apr 2018 08:18)  POCT Blood Glucose.: 239 mg/dL (16 Apr 2018 17:14)  POCT Blood Glucose.: 234 mg/dL (16 Apr 2018 11:59)    Skin: c stage 2 pressure ulcer of buttocks & skin tear of hip     Estimated Needs:   [ ] no change since previous assessment  [x ] recalculated for calories: 1172-2147 calories(30-35 Kcal/ kg IBW 61.8 kg, due to pressure ulcer)    Previous Nutrition Diagnosis:   [ ] Inadequate Energy Intake [ ]Inadequate Oral Intake [ ] Excessive Energy Intake   [ ] Underweight [ ] Increased Nutrient Needs [ ] Overweight/Obesity   [ ] Altered GI Function [ ] Unintended Weight Loss [ ] Food & Nutrition Related Knowledge Deficit [ x] Malnutrition in context of chronic illness     Nutrition Diagnosis is [x ] ongoing  [ ] resolved [ ] not applicable     New Nutrition Diagnosis:   [ ] Inadequate Energy Intake [ ]Inadequate Oral Intake [ ] Excessive Energy Intake   [ ] Underweight [x ] Increased Nutrient Needs, macro and micronutrients  [ ] Overweight/Obesity   [ ] Altered GI Function [ ] Unintended Weight Loss [ ] Food & Nutrition Related Knowledge Deficit [ ] Malnutrition     Related to: increased demand for nutrients     As evidenced by: pressure ulcer stage 2    Interventions:   Recommend  [x ] Change Diet To: Dysphagia 2 Mechanical Soft - Thin Liquids , consistent carbohydrate c evening snack , Low sodium   [ x] Nutrition Supplement: Recommend add  Glucerna Shake 2 x daily= 440 calories, 20 grams protein daily, No Carb Prosource 1 pkg daily=60 calories, 15 grams protein per pkg   [ ] Nutrition Support  [ x] Other: add multivitamin c minerals daily    Monitoring and Evaluation:   [x ] PO intake [ x ] Tolerance to diet prescription [ x ] weights [ x ] labs, glucose [ x ] follow up per protocol  [ x] other: consider resume basal bolus insulin( insulin glargine glargine/ Levemir) regimen for improved glycemic control Pt is confused, Polish speaking, c good oral intake for breakfast when seen.   Pt adm c hyperglycemia, hypernatremia.    PMH include HTN, DM (was on Levemir PTA as per home medication list).    Pt lived alone c 24 hr HHA PTA.   04-14, swallow evaluation recommended Dysphagia 2 Mechanical Soft - Thin Liquids.    Factors impacting intake: [ ] none [ ] nausea  [ ] vomiting [ ] diarrhea [ ] constipation  [ ]chewing problems [ x] swallowing issues  [ x] other: AMS, self feeding difficulty     Diet Prescription: Diet, Dysphagia 2 Mechanical Soft-Thin Liquids (04-14-18 @ 12:56)    Intake: consumed >75% for breakfast today, 50% documented for other meals during adm, nutrition goals of consistent oral intake >75% of meals not being met     Current Weight: Weight: 04-15, 57.1 kg, 04-12, 54.5 kg, c wt. gain of 2.6 kg  % Weight Change: + 4.55%  No edema noted, ? wt. accuracy    Pertinent Medications: MEDICATIONS  (STANDING):  amLODIPine   Tablet 10 milliGRAM(s) Oral daily  dextrose 5%. 1000 milliLiter(s) (50 mL/Hr) IV Continuous <Continuous>  dextrose 50% Injectable 12.5 Gram(s) IV Push once  dextrose 50% Injectable 25 Gram(s) IV Push once  dextrose 50% Injectable 25 Gram(s) IV Push once  dorzolamide 2%/timolol 0.5% Ophthalmic Solution 1 Drop(s) Both EYES two times a day  enoxaparin Injectable 40 milliGRAM(s) SubCutaneous every 24 hours  insulin lispro (HumaLOG) corrective regimen sliding scale   SubCutaneous three times a day before meals  insulin lispro (HumaLOG) corrective regimen sliding scale   SubCutaneous at bedtime  piperacillin/tazobactam IVPB. 3.375 Gram(s) IV Intermittent every 8 hours  sodium chloride 0.45%. 1000 milliLiter(s) (100 mL/Hr) IV Continuous <Continuous>    MEDICATIONS  (PRN):  acetaminophen  Suppository 650 milliGRAM(s) Rectal every 6 hours PRN For Temp greater than 38 C (100.4 F)  dextrose Gel 1 Dose(s) Oral once PRN Blood Glucose LESS THAN 70 milliGRAM(s)/deciliter  glucagon  Injectable 1 milliGRAM(s) IntraMuscular once PRN Glucose LESS THAN 70 milligrams/deciliter    Pertinent Labs: 04-15 Na141 mmol/L Glu 183 mg/dL<H> K+ 4.4 mmol/L Cr  0.60 mg/dL BUN 10 mg/dL 04-12 Alb 3.4 g/dL 04-13 [id number] 9.0 %<H>     CAPILLARY BLOOD GLUCOSE      POCT Blood Glucose.: 234 mg/dL (17 Apr 2018 08:18)  POCT Blood Glucose.: 239 mg/dL (16 Apr 2018 17:14)  POCT Blood Glucose.: 234 mg/dL (16 Apr 2018 11:59)    Skin: c stage 2 pressure ulcer of buttocks & skin tear of hip     Estimated Needs:   [ ] no change since previous assessment  [x ] recalculated for calories: 1221-6278 calories(30-35 Kcal/ kg IBW 61.8 kg, due to pressure ulcer)    Previous Nutrition Diagnosis:   [ ] Inadequate Energy Intake [ ]Inadequate Oral Intake [ ] Excessive Energy Intake   [ ] Underweight [ ] Increased Nutrient Needs [ ] Overweight/Obesity   [ ] Altered GI Function [ ] Unintended Weight Loss [ ] Food & Nutrition Related Knowledge Deficit [ x] Malnutrition in context of chronic illness     Nutrition Diagnosis is [x ] ongoing  [ ] resolved [ ] not applicable     New Nutrition Diagnosis:   [ ] Inadequate Energy Intake [ ]Inadequate Oral Intake [ ] Excessive Energy Intake   [ ] Underweight [x ] Increased Nutrient Needs, macro and micronutrients  [ ] Overweight/Obesity   [ ] Altered GI Function [ ] Unintended Weight Loss [ ] Food & Nutrition Related Knowledge Deficit [ ] Malnutrition     Related to: increased demand for nutrients     As evidenced by: pressure ulcer stage 2    Interventions:   Recommend  [x ] Change Diet To: Dysphagia 2 Mechanical Soft - Thin Liquids , consistent carbohydrate c evening snack , Low sodium   [ x] Nutrition Supplement: Recommend add  Glucerna Shake 2 x daily= 440 calories, 20 grams protein daily, No Carb Prosource 1 pkg daily=60 calories, 15 grams protein per pkg   [ ] Nutrition Support  [ x] Other: add multivitamin c minerals daily    Goal: pt consumes > 75% of meals & supplement to meet increased demand for nutrients     Monitoring and Evaluation:   [x ] PO intake [ x ] Tolerance to diet prescription [ x ] weights [ x ] labs, glucose [ x ] follow up per protocol  [ x] other: consider resume basal bolus insulin( insulin glargine / Levemir) regimen for improved glycemic control

## 2018-04-17 NOTE — PROGRESS NOTE ADULT - PROBLEM SELECTOR PLAN 6
NISS  resume lantus; takes 24 at home, will start with 10 NISS  resume lantus; takes 24U  at home, will start with 5 cont norvasc

## 2018-04-17 NOTE — PROGRESS NOTE ADULT - PROBLEM SELECTOR PROBLEM 5
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

## 2018-04-17 NOTE — PROGRESS NOTE ADULT - PROBLEM SELECTOR PLAN 3
Resolved cx neg  +ucx likely skin kota, not infection  unclear infectious source  will give 5 days abx, but suspect SIRS as a result of severe dehydration and hyperosmolarity

## 2018-04-17 NOTE — PROGRESS NOTE ADULT - PROBLEM/PLAN-2
DISPLAY PLAN FREE TEXT
(2) assistive person

## 2018-04-17 NOTE — PROGRESS NOTE ADULT - PROBLEM SELECTOR PLAN 7
wound precautions  continue daily wound care NISS  resume lantus; takes 24U  at home, will start with 5

## 2018-04-18 ENCOUNTER — TRANSCRIPTION ENCOUNTER (OUTPATIENT)
Age: 83
End: 2018-04-18

## 2018-04-18 VITALS
OXYGEN SATURATION: 98 % | TEMPERATURE: 98 F | HEART RATE: 69 BPM | DIASTOLIC BLOOD PRESSURE: 77 MMHG | RESPIRATION RATE: 16 BRPM | SYSTOLIC BLOOD PRESSURE: 139 MMHG

## 2018-04-18 LAB
ANION GAP SERPL CALC-SCNC: 6 MMOL/L — SIGNIFICANT CHANGE UP (ref 5–17)
BUN SERPL-MCNC: 12 MG/DL — SIGNIFICANT CHANGE UP (ref 7–23)
CALCIUM SERPL-MCNC: 8.8 MG/DL — SIGNIFICANT CHANGE UP (ref 8.5–10.1)
CHLORIDE SERPL-SCNC: 106 MMOL/L — SIGNIFICANT CHANGE UP (ref 96–108)
CO2 SERPL-SCNC: 28 MMOL/L — SIGNIFICANT CHANGE UP (ref 22–31)
CREAT SERPL-MCNC: 0.61 MG/DL — SIGNIFICANT CHANGE UP (ref 0.5–1.3)
GLUCOSE BLDC GLUCOMTR-MCNC: 271 MG/DL — HIGH (ref 70–99)
GLUCOSE BLDC GLUCOMTR-MCNC: 317 MG/DL — HIGH (ref 70–99)
GLUCOSE SERPL-MCNC: 291 MG/DL — HIGH (ref 70–99)
HCT VFR BLD CALC: 39.9 % — SIGNIFICANT CHANGE UP (ref 39–50)
HGB BLD-MCNC: 13.4 G/DL — SIGNIFICANT CHANGE UP (ref 13–17)
MCHC RBC-ENTMCNC: 30.7 PG — SIGNIFICANT CHANGE UP (ref 27–34)
MCHC RBC-ENTMCNC: 33.6 GM/DL — SIGNIFICANT CHANGE UP (ref 32–36)
MCV RBC AUTO: 91.5 FL — SIGNIFICANT CHANGE UP (ref 80–100)
NRBC # BLD: 0 /100 WBCS — SIGNIFICANT CHANGE UP (ref 0–0)
PLATELET # BLD AUTO: 261 K/UL — SIGNIFICANT CHANGE UP (ref 150–400)
POTASSIUM SERPL-MCNC: 4.1 MMOL/L — SIGNIFICANT CHANGE UP (ref 3.5–5.3)
POTASSIUM SERPL-SCNC: 4.1 MMOL/L — SIGNIFICANT CHANGE UP (ref 3.5–5.3)
RBC # BLD: 4.36 M/UL — SIGNIFICANT CHANGE UP (ref 4.2–5.8)
RBC # FLD: 12.5 % — SIGNIFICANT CHANGE UP (ref 10.3–14.5)
SODIUM SERPL-SCNC: 140 MMOL/L — SIGNIFICANT CHANGE UP (ref 135–145)
WBC # BLD: 13.1 K/UL — HIGH (ref 3.8–10.5)
WBC # FLD AUTO: 13.1 K/UL — HIGH (ref 3.8–10.5)

## 2018-04-18 PROCEDURE — 99239 HOSP IP/OBS DSCHRG MGMT >30: CPT

## 2018-04-18 RX ADMIN — Medication 8: at 08:40

## 2018-04-18 RX ADMIN — DORZOLAMIDE HYDROCHLORIDE TIMOLOL MALEATE 1 DROP(S): 20; 5 SOLUTION/ DROPS OPHTHALMIC at 06:18

## 2018-04-18 RX ADMIN — AMLODIPINE BESYLATE 10 MILLIGRAM(S): 2.5 TABLET ORAL at 06:18

## 2018-04-18 RX ADMIN — Medication 6: at 11:57

## 2018-04-18 RX ADMIN — PIPERACILLIN AND TAZOBACTAM 25 GRAM(S): 4; .5 INJECTION, POWDER, LYOPHILIZED, FOR SOLUTION INTRAVENOUS at 06:17

## 2018-04-18 NOTE — DISCHARGE NOTE ADULT - PATIENT PORTAL LINK FT
You can access the [facility], offered by [facility], by registering with the following website: [email or web address]

## 2018-04-18 NOTE — DISCHARGE NOTE ADULT - PLAN OF CARE
resolved keep well hydrated take insulin as prescribed  follow up with primary care doctor within 1 week back to baseline

## 2018-04-18 NOTE — DISCHARGE NOTE ADULT - CARE PLAN
Principal Discharge DX:	Hyperosmolar syndrome  Goal:	resolved  Assessment and plan of treatment:	keep well hydrated  Secondary Diagnosis:	Hyperglycemia  Assessment and plan of treatment:	take insulin as prescribed  follow up with primary care doctor within 1 week  Secondary Diagnosis:	SIRS (systemic inflammatory response syndrome)  Assessment and plan of treatment:	resolved  Secondary Diagnosis:	Metabolic encephalopathy  Assessment and plan of treatment:	back to baseline

## 2018-04-18 NOTE — DISCHARGE NOTE ADULT - MEDICATION SUMMARY - MEDICATIONS TO TAKE
I will START or STAY ON the medications listed below when I get home from the hospital:    Levemir  -- 24 unit(s) subcutaneous once a day (at bedtime)  -- Indication: For Diabeted    amLODIPine 10 mg oral tablet  -- 1 tab(s) by mouth once a day  -- Indication: For HTN (hypertension)    Cosopt 2.23%-0.68% ophthalmic solution  -- 1 drop(s) to each affected eye 2 times a day  -- Indication: For Glaucoma    Augmentin 500 mg-125 mg oral tablet  -- 1 tab(s) by mouth every 8 hours for 2 days  -- Indication: For infection

## 2018-04-18 NOTE — DISCHARGE NOTE ADULT - HOSPITAL COURSE
BIB EMS  less responsive for 2 days  poor PO at home  found with hyperosmolar synd, SIRS, no clear infection source found  to complete 7 days abx      back to baseline  daughter agrees for pt to return home BIB EMS  less responsive for 2 days  poor PO at home  found with hyperosmolar synd, SIRS, no clear infection source found  to complete 7 days abx  labs WNL, mild leukocytosis, non toxic, completing abx at home    back to baseline  daughter agrees for pt to return home

## 2018-04-20 DIAGNOSIS — Z74.01 BED CONFINEMENT STATUS: ICD-10-CM

## 2018-04-20 DIAGNOSIS — F03.90 UNSPECIFIED DEMENTIA WITHOUT BEHAVIORAL DISTURBANCE: ICD-10-CM

## 2018-04-20 DIAGNOSIS — E11.65 TYPE 2 DIABETES MELLITUS WITH HYPERGLYCEMIA: ICD-10-CM

## 2018-04-20 DIAGNOSIS — H40.2230 CHRONIC ANGLE-CLOSURE GLAUCOMA, BILATERAL, STAGE UNSPECIFIED: ICD-10-CM

## 2018-04-20 DIAGNOSIS — S71.002S: ICD-10-CM

## 2018-04-20 DIAGNOSIS — E86.0 DEHYDRATION: ICD-10-CM

## 2018-04-20 DIAGNOSIS — E11.00 TYPE 2 DIABETES MELLITUS WITH HYPEROSMOLARITY WITHOUT NONKETOTIC HYPERGLYCEMIC-HYPEROSMOLAR COMA (NKHHC): ICD-10-CM

## 2018-04-20 DIAGNOSIS — Z87.442 PERSONAL HISTORY OF URINARY CALCULI: ICD-10-CM

## 2018-04-20 DIAGNOSIS — I10 ESSENTIAL (PRIMARY) HYPERTENSION: ICD-10-CM

## 2018-04-20 DIAGNOSIS — Z79.4 LONG TERM (CURRENT) USE OF INSULIN: ICD-10-CM

## 2018-04-20 DIAGNOSIS — G93.41 METABOLIC ENCEPHALOPATHY: ICD-10-CM

## 2018-04-20 DIAGNOSIS — R65.11 SYSTEMIC INFLAMMATORY RESPONSE SYNDROME (SIRS) OF NON-INFECTIOUS ORIGIN WITH ACUTE ORGAN DYSFUNCTION: ICD-10-CM

## 2018-04-20 DIAGNOSIS — L89.312 PRESSURE ULCER OF RIGHT BUTTOCK, STAGE 2: ICD-10-CM

## 2018-04-20 DIAGNOSIS — E43 UNSPECIFIED SEVERE PROTEIN-CALORIE MALNUTRITION: ICD-10-CM

## 2018-11-26 ENCOUNTER — INPATIENT (INPATIENT)
Facility: HOSPITAL | Age: 83
LOS: 2 days | Discharge: HOME | End: 2018-11-29
Attending: INTERNAL MEDICINE | Admitting: INTERNAL MEDICINE
Payer: MEDICARE

## 2018-11-26 VITALS
WEIGHT: 130.07 LBS | SYSTOLIC BLOOD PRESSURE: 149 MMHG | TEMPERATURE: 99 F | DIASTOLIC BLOOD PRESSURE: 76 MMHG | OXYGEN SATURATION: 100 % | HEART RATE: 74 BPM | RESPIRATION RATE: 22 BRPM

## 2018-11-26 DIAGNOSIS — E86.0 DEHYDRATION: ICD-10-CM

## 2018-11-26 DIAGNOSIS — E11.22 TYPE 2 DIABETES MELLITUS WITH DIABETIC CHRONIC KIDNEY DISEASE: ICD-10-CM

## 2018-11-26 DIAGNOSIS — N30.00 ACUTE CYSTITIS WITHOUT HEMATURIA: ICD-10-CM

## 2018-11-26 DIAGNOSIS — G93.41 METABOLIC ENCEPHALOPATHY: ICD-10-CM

## 2018-11-26 DIAGNOSIS — A41.9 SEPSIS, UNSPECIFIED ORGANISM: ICD-10-CM

## 2018-11-26 DIAGNOSIS — R53.2 FUNCTIONAL QUADRIPLEGIA: ICD-10-CM

## 2018-11-26 DIAGNOSIS — Z96.7 PRESENCE OF OTHER BONE AND TENDON IMPLANTS: Chronic | ICD-10-CM

## 2018-11-26 DIAGNOSIS — F03.90 UNSPECIFIED DEMENTIA WITHOUT BEHAVIORAL DISTURBANCE: ICD-10-CM

## 2018-11-26 DIAGNOSIS — E87.0 HYPEROSMOLALITY AND HYPERNATREMIA: ICD-10-CM

## 2018-11-26 DIAGNOSIS — Z87.442 PERSONAL HISTORY OF URINARY CALCULI: Chronic | ICD-10-CM

## 2018-11-26 LAB
ALBUMIN SERPL ELPH-MCNC: 3.2 G/DL — LOW (ref 3.3–5)
ALP SERPL-CCNC: 95 U/L — SIGNIFICANT CHANGE UP (ref 40–120)
ALT FLD-CCNC: 24 U/L — SIGNIFICANT CHANGE UP (ref 12–78)
ANION GAP SERPL CALC-SCNC: 6 MMOL/L — SIGNIFICANT CHANGE UP (ref 5–17)
APPEARANCE UR: CLEAR — SIGNIFICANT CHANGE UP
APTT BLD: 26.8 SEC — LOW (ref 28.5–37)
AST SERPL-CCNC: 17 U/L — SIGNIFICANT CHANGE UP (ref 15–37)
BASOPHILS # BLD AUTO: 0.05 K/UL — SIGNIFICANT CHANGE UP (ref 0–0.2)
BASOPHILS NFR BLD AUTO: 0.3 % — SIGNIFICANT CHANGE UP (ref 0–2)
BILIRUB SERPL-MCNC: 0.4 MG/DL — SIGNIFICANT CHANGE UP (ref 0.2–1.2)
BILIRUB UR-MCNC: NEGATIVE — SIGNIFICANT CHANGE UP
BUN SERPL-MCNC: 20 MG/DL — SIGNIFICANT CHANGE UP (ref 7–23)
CALCIUM SERPL-MCNC: 8.6 MG/DL — SIGNIFICANT CHANGE UP (ref 8.5–10.1)
CHLORIDE SERPL-SCNC: 117 MMOL/L — HIGH (ref 96–108)
CK MB BLD-MCNC: <1 % — SIGNIFICANT CHANGE UP (ref 0–3.5)
CK MB CFR SERPL CALC: <1 NG/ML — SIGNIFICANT CHANGE UP (ref 0.5–3.6)
CK SERPL-CCNC: 100 U/L — SIGNIFICANT CHANGE UP (ref 26–308)
CO2 SERPL-SCNC: 30 MMOL/L — SIGNIFICANT CHANGE UP (ref 22–31)
COLOR SPEC: YELLOW — SIGNIFICANT CHANGE UP
CREAT SERPL-MCNC: 1 MG/DL — SIGNIFICANT CHANGE UP (ref 0.5–1.3)
DIFF PNL FLD: ABNORMAL
EOSINOPHIL # BLD AUTO: 0.1 K/UL — SIGNIFICANT CHANGE UP (ref 0–0.5)
EOSINOPHIL NFR BLD AUTO: 0.7 % — SIGNIFICANT CHANGE UP (ref 0–6)
EPI CELLS # UR: SIGNIFICANT CHANGE UP
FLUAV SPEC QL CULT: NEGATIVE — SIGNIFICANT CHANGE UP
FLUBV AG SPEC QL IA: NEGATIVE — SIGNIFICANT CHANGE UP
GLUCOSE BLDC GLUCOMTR-MCNC: 243 MG/DL — HIGH (ref 70–99)
GLUCOSE BLDC GLUCOMTR-MCNC: 255 MG/DL — HIGH (ref 70–99)
GLUCOSE BLDC GLUCOMTR-MCNC: 306 MG/DL — HIGH (ref 70–99)
GLUCOSE SERPL-MCNC: 315 MG/DL — HIGH (ref 70–99)
GLUCOSE UR QL: 1000 MG/DL
HCT VFR BLD CALC: 40.7 % — SIGNIFICANT CHANGE UP (ref 39–50)
HGB BLD-MCNC: 13 G/DL — SIGNIFICANT CHANGE UP (ref 13–17)
IMM GRANULOCYTES NFR BLD AUTO: 0.3 % — SIGNIFICANT CHANGE UP (ref 0–1.5)
INR BLD: 1.17 RATIO — HIGH (ref 0.88–1.16)
KETONES UR-MCNC: NEGATIVE — SIGNIFICANT CHANGE UP
LACTATE SERPL-SCNC: 1.7 MMOL/L — SIGNIFICANT CHANGE UP (ref 0.7–2)
LEUKOCYTE ESTERASE UR-ACNC: ABNORMAL
LYMPHOCYTES # BLD AUTO: 1.91 K/UL — SIGNIFICANT CHANGE UP (ref 1–3.3)
LYMPHOCYTES # BLD AUTO: 13.2 % — SIGNIFICANT CHANGE UP (ref 13–44)
MCHC RBC-ENTMCNC: 30.7 PG — SIGNIFICANT CHANGE UP (ref 27–34)
MCHC RBC-ENTMCNC: 31.9 GM/DL — LOW (ref 32–36)
MCV RBC AUTO: 96.2 FL — SIGNIFICANT CHANGE UP (ref 80–100)
MONOCYTES # BLD AUTO: 0.6 K/UL — SIGNIFICANT CHANGE UP (ref 0–0.9)
MONOCYTES NFR BLD AUTO: 4.1 % — SIGNIFICANT CHANGE UP (ref 2–14)
NEUTROPHILS # BLD AUTO: 11.77 K/UL — HIGH (ref 1.8–7.4)
NEUTROPHILS NFR BLD AUTO: 81.4 % — HIGH (ref 43–77)
NITRITE UR-MCNC: NEGATIVE — SIGNIFICANT CHANGE UP
NRBC # BLD: 0 /100 WBCS — SIGNIFICANT CHANGE UP (ref 0–0)
PH UR: 7 — SIGNIFICANT CHANGE UP (ref 5–8)
PLATELET # BLD AUTO: 254 K/UL — SIGNIFICANT CHANGE UP (ref 150–400)
POTASSIUM SERPL-MCNC: 4 MMOL/L — SIGNIFICANT CHANGE UP (ref 3.5–5.3)
POTASSIUM SERPL-SCNC: 4 MMOL/L — SIGNIFICANT CHANGE UP (ref 3.5–5.3)
PROT SERPL-MCNC: 7.3 GM/DL — SIGNIFICANT CHANGE UP (ref 6–8.3)
PROT UR-MCNC: NEGATIVE MG/DL — SIGNIFICANT CHANGE UP
PROTHROM AB SERPL-ACNC: 13.2 SEC — HIGH (ref 10–12.9)
RBC # BLD: 4.23 M/UL — SIGNIFICANT CHANGE UP (ref 4.2–5.8)
RBC # FLD: 13.1 % — SIGNIFICANT CHANGE UP (ref 10.3–14.5)
RBC CASTS # UR COMP ASSIST: SIGNIFICANT CHANGE UP /HPF (ref 0–4)
SODIUM SERPL-SCNC: 153 MMOL/L — HIGH (ref 135–145)
SP GR SPEC: 1 — LOW (ref 1.01–1.02)
TROPONIN I SERPL-MCNC: <.015 NG/ML — SIGNIFICANT CHANGE UP (ref 0.01–0.04)
UROBILINOGEN FLD QL: NEGATIVE MG/DL — SIGNIFICANT CHANGE UP
WBC # BLD: 14.48 K/UL — HIGH (ref 3.8–10.5)
WBC # FLD AUTO: 14.48 K/UL — HIGH (ref 3.8–10.5)
WBC UR QL: ABNORMAL

## 2018-11-26 PROCEDURE — 70450 CT HEAD/BRAIN W/O DYE: CPT | Mod: 26

## 2018-11-26 PROCEDURE — 99285 EMERGENCY DEPT VISIT HI MDM: CPT

## 2018-11-26 PROCEDURE — 99223 1ST HOSP IP/OBS HIGH 75: CPT

## 2018-11-26 PROCEDURE — 71045 X-RAY EXAM CHEST 1 VIEW: CPT | Mod: 26

## 2018-11-26 PROCEDURE — 93010 ELECTROCARDIOGRAM REPORT: CPT

## 2018-11-26 RX ORDER — ACETAMINOPHEN 500 MG
650 TABLET ORAL EVERY 6 HOURS
Qty: 0 | Refills: 0 | Status: DISCONTINUED | OUTPATIENT
Start: 2018-11-26 | End: 2018-11-29

## 2018-11-26 RX ORDER — SODIUM CHLORIDE 9 MG/ML
1000 INJECTION, SOLUTION INTRAVENOUS
Qty: 0 | Refills: 0 | Status: DISCONTINUED | OUTPATIENT
Start: 2018-11-26 | End: 2018-11-29

## 2018-11-26 RX ORDER — ACETAMINOPHEN 500 MG
650 TABLET ORAL ONCE
Qty: 0 | Refills: 0 | Status: COMPLETED | OUTPATIENT
Start: 2018-11-26 | End: 2018-11-26

## 2018-11-26 RX ORDER — DEXTROSE 50 % IN WATER 50 %
25 SYRINGE (ML) INTRAVENOUS ONCE
Qty: 0 | Refills: 0 | Status: DISCONTINUED | OUTPATIENT
Start: 2018-11-26 | End: 2018-11-29

## 2018-11-26 RX ORDER — GLUCAGON INJECTION, SOLUTION 0.5 MG/.1ML
1 INJECTION, SOLUTION SUBCUTANEOUS ONCE
Qty: 0 | Refills: 0 | Status: DISCONTINUED | OUTPATIENT
Start: 2018-11-26 | End: 2018-11-29

## 2018-11-26 RX ORDER — CEFTRIAXONE 500 MG/1
1 INJECTION, POWDER, FOR SOLUTION INTRAMUSCULAR; INTRAVENOUS EVERY 24 HOURS
Qty: 0 | Refills: 0 | Status: DISCONTINUED | OUTPATIENT
Start: 2018-11-26 | End: 2018-11-29

## 2018-11-26 RX ORDER — PANTOPRAZOLE SODIUM 20 MG/1
40 TABLET, DELAYED RELEASE ORAL DAILY
Qty: 0 | Refills: 0 | Status: DISCONTINUED | OUTPATIENT
Start: 2018-11-26 | End: 2018-11-29

## 2018-11-26 RX ORDER — INSULIN LISPRO 100/ML
VIAL (ML) SUBCUTANEOUS AT BEDTIME
Qty: 0 | Refills: 0 | Status: DISCONTINUED | OUTPATIENT
Start: 2018-11-26 | End: 2018-11-29

## 2018-11-26 RX ORDER — INSULIN LISPRO 100/ML
VIAL (ML) SUBCUTANEOUS
Qty: 0 | Refills: 0 | Status: DISCONTINUED | OUTPATIENT
Start: 2018-11-26 | End: 2018-11-29

## 2018-11-26 RX ORDER — DEXTROSE 50 % IN WATER 50 %
15 SYRINGE (ML) INTRAVENOUS ONCE
Qty: 0 | Refills: 0 | Status: DISCONTINUED | OUTPATIENT
Start: 2018-11-26 | End: 2018-11-29

## 2018-11-26 RX ORDER — METOPROLOL TARTRATE 50 MG
5 TABLET ORAL EVERY 6 HOURS
Qty: 0 | Refills: 0 | Status: DISCONTINUED | OUTPATIENT
Start: 2018-11-26 | End: 2018-11-29

## 2018-11-26 RX ORDER — HEPARIN SODIUM 5000 [USP'U]/ML
5000 INJECTION INTRAVENOUS; SUBCUTANEOUS EVERY 12 HOURS
Qty: 0 | Refills: 0 | Status: DISCONTINUED | OUTPATIENT
Start: 2018-11-26 | End: 2018-11-29

## 2018-11-26 RX ORDER — DORZOLAMIDE HYDROCHLORIDE TIMOLOL MALEATE 20; 5 MG/ML; MG/ML
1 SOLUTION/ DROPS OPHTHALMIC
Qty: 0 | Refills: 0 | Status: DISCONTINUED | OUTPATIENT
Start: 2018-11-26 | End: 2018-11-29

## 2018-11-26 RX ORDER — DEXTROSE 50 % IN WATER 50 %
12.5 SYRINGE (ML) INTRAVENOUS ONCE
Qty: 0 | Refills: 0 | Status: DISCONTINUED | OUTPATIENT
Start: 2018-11-26 | End: 2018-11-29

## 2018-11-26 RX ORDER — INFLUENZA VIRUS VACCINE 15; 15; 15; 15 UG/.5ML; UG/.5ML; UG/.5ML; UG/.5ML
0.5 SUSPENSION INTRAMUSCULAR ONCE
Qty: 0 | Refills: 0 | Status: DISCONTINUED | OUTPATIENT
Start: 2018-11-26 | End: 2018-11-29

## 2018-11-26 RX ORDER — CEFTRIAXONE 500 MG/1
2 INJECTION, POWDER, FOR SOLUTION INTRAMUSCULAR; INTRAVENOUS ONCE
Qty: 0 | Refills: 0 | Status: COMPLETED | OUTPATIENT
Start: 2018-11-26 | End: 2018-11-26

## 2018-11-26 RX ORDER — SODIUM CHLORIDE 9 MG/ML
2000 INJECTION INTRAMUSCULAR; INTRAVENOUS; SUBCUTANEOUS ONCE
Qty: 0 | Refills: 0 | Status: COMPLETED | OUTPATIENT
Start: 2018-11-26 | End: 2018-11-26

## 2018-11-26 RX ADMIN — Medication 5 MILLIGRAM(S): at 23:32

## 2018-11-26 RX ADMIN — Medication 2: at 16:05

## 2018-11-26 RX ADMIN — SODIUM CHLORIDE 100 MILLILITER(S): 9 INJECTION, SOLUTION INTRAVENOUS at 16:05

## 2018-11-26 RX ADMIN — CEFTRIAXONE 100 GRAM(S): 500 INJECTION, POWDER, FOR SOLUTION INTRAMUSCULAR; INTRAVENOUS at 11:18

## 2018-11-26 RX ADMIN — Medication 5 MILLIGRAM(S): at 17:05

## 2018-11-26 RX ADMIN — Medication 650 MILLIGRAM(S): at 16:07

## 2018-11-26 RX ADMIN — Medication 650 MILLIGRAM(S): at 15:01

## 2018-11-26 RX ADMIN — PANTOPRAZOLE SODIUM 40 MILLIGRAM(S): 20 TABLET, DELAYED RELEASE ORAL at 17:05

## 2018-11-26 RX ADMIN — DORZOLAMIDE HYDROCHLORIDE TIMOLOL MALEATE 1 DROP(S): 20; 5 SOLUTION/ DROPS OPHTHALMIC at 18:34

## 2018-11-26 RX ADMIN — SODIUM CHLORIDE 2000 MILLILITER(S): 9 INJECTION INTRAMUSCULAR; INTRAVENOUS; SUBCUTANEOUS at 10:45

## 2018-11-26 RX ADMIN — HEPARIN SODIUM 5000 UNIT(S): 5000 INJECTION INTRAVENOUS; SUBCUTANEOUS at 17:05

## 2018-11-26 NOTE — H&P ADULT - NSHPPHYSICALEXAM_GEN_ALL_CORE
GENERAL: NAD well-developed  HEAD:  Atraumatic, Normocephalic  EYES: EOMI, PERRLA, conjunctiva and sclera clear  ENMT: No tonsillar erythema, exudates, or enlargement; Moist mucous membranes, Good dentition, No lesions  NECK: Supple, No JVD, Normal thyroid  NERVOUS SYSTEM:  Alert & Oriented X3, Good concentration; Motor Strength 5/5 B/L upper and lower extremities; DTRs 2+ intact and symmetric  CHEST/LUNG: Clear to percussion bilaterally; No rales, rhonchi, wheezing, or rubs  HEART: Regular rate and rhythm; No murmurs, rubs, or gallops  ABDOMEN: Soft, Nontender, Nondistended; Bowel sounds present  EXTREMITIES:  2+ Peripheral Pulses, No clubbing, cyanosis, or edema  LYMPH: No lymphadenopathy   SKIN: No rashes or lesions GENERAL: elderly and lethargic   HEAD:  Atraumatic, Normocephalic  EYES: EOMI, PERRLA, conjunctiva and sclera clear  ENMT: No tonsillar erythema, exudates, or enlargement; Moist mucous membranes, Good dentition, No lesions  NECK: Supple, No JVD, Normal thyroid  NERVOUS SYSTEM:  lethargic  CHEST/LUNG: Clear to percussion bilaterally; No rales, rhonchi, wheezing, or rubs  HEART: Regular rate and rhythm; No murmurs, rubs, or gallops  ABDOMEN: Soft,mild tender superpubic area , Nondistended; Bowel sounds present  EXTREMITIES:  2+ Peripheral Pulses, No clubbing, cyanosis, or edema  LYMPH: No lymphadenopathy   SKIN: No rashes or lesions

## 2018-11-26 NOTE — ED PROVIDER NOTE - OBJECTIVE STATEMENT
84yo male with pmh DM, HTN, advanced dementia (ambulates, minimal words at baseline, AOx0) presents with dec appetite, nonverbal and not ambulatory with fever.  FS 300s.

## 2018-11-26 NOTE — ED PROVIDER NOTE - PHYSICAL EXAMINATION
Gen: Alert, eyes closed, warm  Head: NC, AT, PERRL, EOMI, normal lids/conjunctiva   ENT: Bilateral TM WNL, normal hearing, patent oropharynx without erythema/exudate, uvula midline  Neck: supple, no tenderness/meningismus/JVD   Pulm: Bilateral clear BS, normal resp effort, no wheeze/stridor/retractions  CV: RRR, no M/R/G, +dist pulses   Abd: soft, NT/ND, +BS, no guarding/rebound tenderness  Mskel: no edema/erythema/cyanosis   Skin: no rash   Neuro: moans, moves all limbs spon

## 2018-11-26 NOTE — H&P ADULT - HISTORY OF PRESENT ILLNESS
82yo male with pmh DM, HTN, advanced dementia (ambulates, minimal words at baseline, AOx0) presents with dec appetite, nonverbal and not ambulatory with fever.  FS 300s.

## 2018-11-26 NOTE — ED ADULT TRIAGE NOTE - CHIEF COMPLAINT QUOTE
ems states, " pt lethargic, and called in for altered mental status, daughter admits hx dementia but febrile yesterday and not eating " Geraldine daughter of patient called in .

## 2018-11-26 NOTE — H&P ADULT - PSH
History of nephrolithiasis    Status post open reduction with internal fixation of fracture  right femur

## 2018-11-26 NOTE — H&P ADULT - NSHPLABSRESULTS_GEN_ALL_CORE
13.0   14.48 )-----------( 254      ( 26 Nov 2018 11:02 )             40.7   11-26    153<H>  |  117<H>  |  20  ----------------------------<  315<H>  4.0   |  30  |  1.00    Ca    8.6      26 Nov 2018 11:02    TPro  7.3  /  Alb  3.2<L>  /  TBili  0.4  /  DBili  x   /  AST  17  /  ALT  24  /  AlkPhos  95  11-26  < from: Xray Chest 1 View AP/PA. (11.26.18 @ 12:38) >    IMPRESSION: Question dehydration.  Urine Microscopic-Add On (NC) (11.26.18 @ 13:23)    Red Blood Cell - Urine: 0-2 /HPF    White Blood Cell - Urine: 26-50: WBC with clumps.    Epithelial Cells: Few

## 2018-11-26 NOTE — H&P ADULT - ASSESSMENT
83f with dementia with decreased apetitie with urinary tract infection requiring at least two days in the hospital     IMPROVE VTE Individual Risk Assessment    RISK                                                          Points  [] Previous VTE                                           3  [] Thrombophilia                                        2  [] Lower limb paralysis                              2   [] Current Cancer                                       2   x[] Immobilization > 24 hrs                        1  [] ICU/CCU stay > 24 hours                       1  [x] Age > 60                                                   1    IMPROVE VTE Score: 2

## 2018-11-26 NOTE — CHART NOTE - NSCHARTNOTEFT_GEN_A_CORE
Seen and examined this AM.    84 y.o. S/P lap to open cholecystectomy for gangrenous cholecystitis on 2/17/17. Stable post-weaning but remains somnolent. This may be his "normal" status with advanced dementia.    Abd is soft, NT/ND. Original dressing in place and to be removed today.    Leukocytosis persists but all LFTs and bilirubin are normal.    Needs PO4 and K supplementation and nutritional support.    Continued Medical follow-up appreciated. Simvastatin 20 mg    Last med check 7/16/18    Upcoming visit 7/18/19    Lab work up to date.    Refills provided.

## 2018-11-26 NOTE — ED PROVIDER NOTE - CARE PLAN
Principal Discharge DX:	UTI (urinary tract infection)  Secondary Diagnosis:	Fever  Secondary Diagnosis:	Dehydration

## 2018-11-27 LAB
-  COAGULASE NEGATIVE STAPHYLOCOCCUS: SIGNIFICANT CHANGE UP
ANION GAP SERPL CALC-SCNC: 7 MMOL/L — SIGNIFICANT CHANGE UP (ref 5–17)
BUN SERPL-MCNC: 10 MG/DL — SIGNIFICANT CHANGE UP (ref 7–23)
CALCIUM SERPL-MCNC: 8.3 MG/DL — LOW (ref 8.5–10.1)
CHLORIDE SERPL-SCNC: 112 MMOL/L — HIGH (ref 96–108)
CO2 SERPL-SCNC: 28 MMOL/L — SIGNIFICANT CHANGE UP (ref 22–31)
CREAT SERPL-MCNC: 0.63 MG/DL — SIGNIFICANT CHANGE UP (ref 0.5–1.3)
CULTURE RESULTS: NO GROWTH — SIGNIFICANT CHANGE UP
GLUCOSE BLDC GLUCOMTR-MCNC: 179 MG/DL — HIGH (ref 70–99)
GLUCOSE BLDC GLUCOMTR-MCNC: 246 MG/DL — HIGH (ref 70–99)
GLUCOSE BLDC GLUCOMTR-MCNC: 271 MG/DL — HIGH (ref 70–99)
GLUCOSE BLDC GLUCOMTR-MCNC: 306 MG/DL — HIGH (ref 70–99)
GLUCOSE BLDC GLUCOMTR-MCNC: 76 MG/DL — SIGNIFICANT CHANGE UP (ref 70–99)
GLUCOSE SERPL-MCNC: 284 MG/DL — HIGH (ref 70–99)
GRAM STN FLD: SIGNIFICANT CHANGE UP
HBA1C BLD-MCNC: 10.1 % — HIGH (ref 4–5.6)
HCT VFR BLD CALC: 42.3 % — SIGNIFICANT CHANGE UP (ref 39–50)
HGB BLD-MCNC: 13.7 G/DL — SIGNIFICANT CHANGE UP (ref 13–17)
MCHC RBC-ENTMCNC: 30.9 PG — SIGNIFICANT CHANGE UP (ref 27–34)
MCHC RBC-ENTMCNC: 32.4 GM/DL — SIGNIFICANT CHANGE UP (ref 32–36)
MCV RBC AUTO: 95.3 FL — SIGNIFICANT CHANGE UP (ref 80–100)
METHOD TYPE: SIGNIFICANT CHANGE UP
NRBC # BLD: 0 /100 WBCS — SIGNIFICANT CHANGE UP (ref 0–0)
PLATELET # BLD AUTO: 221 K/UL — SIGNIFICANT CHANGE UP (ref 150–400)
POTASSIUM SERPL-MCNC: 3.7 MMOL/L — SIGNIFICANT CHANGE UP (ref 3.5–5.3)
POTASSIUM SERPL-SCNC: 3.7 MMOL/L — SIGNIFICANT CHANGE UP (ref 3.5–5.3)
RBC # BLD: 4.44 M/UL — SIGNIFICANT CHANGE UP (ref 4.2–5.8)
RBC # FLD: 12.7 % — SIGNIFICANT CHANGE UP (ref 10.3–14.5)
SODIUM SERPL-SCNC: 147 MMOL/L — HIGH (ref 135–145)
SPECIMEN SOURCE: SIGNIFICANT CHANGE UP
SPECIMEN SOURCE: SIGNIFICANT CHANGE UP
WBC # BLD: 14.52 K/UL — HIGH (ref 3.8–10.5)
WBC # FLD AUTO: 14.52 K/UL — HIGH (ref 3.8–10.5)

## 2018-11-27 PROCEDURE — 99233 SBSQ HOSP IP/OBS HIGH 50: CPT

## 2018-11-27 RX ORDER — VANCOMYCIN HCL 1 G
VIAL (EA) INTRAVENOUS
Qty: 0 | Refills: 0 | Status: DISCONTINUED | OUTPATIENT
Start: 2018-11-27 | End: 2018-11-27

## 2018-11-27 RX ORDER — VANCOMYCIN HCL 1 G
VIAL (EA) INTRAVENOUS
Qty: 0 | Refills: 0 | Status: DISCONTINUED | OUTPATIENT
Start: 2018-11-27 | End: 2018-11-28

## 2018-11-27 RX ORDER — VANCOMYCIN HCL 1 G
1000 VIAL (EA) INTRAVENOUS EVERY 12 HOURS
Qty: 0 | Refills: 0 | Status: DISCONTINUED | OUTPATIENT
Start: 2018-11-28 | End: 2018-11-28

## 2018-11-27 RX ORDER — VANCOMYCIN HCL 1 G
1000 VIAL (EA) INTRAVENOUS ONCE
Qty: 0 | Refills: 0 | Status: COMPLETED | OUTPATIENT
Start: 2018-11-27 | End: 2018-11-27

## 2018-11-27 RX ADMIN — HEPARIN SODIUM 5000 UNIT(S): 5000 INJECTION INTRAVENOUS; SUBCUTANEOUS at 17:07

## 2018-11-27 RX ADMIN — HEPARIN SODIUM 5000 UNIT(S): 5000 INJECTION INTRAVENOUS; SUBCUTANEOUS at 05:45

## 2018-11-27 RX ADMIN — DORZOLAMIDE HYDROCHLORIDE TIMOLOL MALEATE 1 DROP(S): 20; 5 SOLUTION/ DROPS OPHTHALMIC at 17:08

## 2018-11-27 RX ADMIN — PANTOPRAZOLE SODIUM 40 MILLIGRAM(S): 20 TABLET, DELAYED RELEASE ORAL at 11:04

## 2018-11-27 RX ADMIN — Medication 1: at 11:04

## 2018-11-27 RX ADMIN — Medication 250 MILLIGRAM(S): at 20:43

## 2018-11-27 RX ADMIN — DORZOLAMIDE HYDROCHLORIDE TIMOLOL MALEATE 1 DROP(S): 20; 5 SOLUTION/ DROPS OPHTHALMIC at 06:21

## 2018-11-27 RX ADMIN — SODIUM CHLORIDE 100 MILLILITER(S): 9 INJECTION, SOLUTION INTRAVENOUS at 05:44

## 2018-11-27 RX ADMIN — Medication 3: at 07:08

## 2018-11-27 RX ADMIN — Medication 5 MILLIGRAM(S): at 05:45

## 2018-11-27 RX ADMIN — CEFTRIAXONE 100 GRAM(S): 500 INJECTION, POWDER, FOR SOLUTION INTRAMUSCULAR; INTRAVENOUS at 09:36

## 2018-11-27 NOTE — PROGRESS NOTE ADULT - ASSESSMENT
83f with dementia with decreased apetitie with urinary tract infection requiring at least two days in the hospital

## 2018-11-27 NOTE — SWALLOW BEDSIDE ASSESSMENT ADULT - SLP GENERAL OBSERVATIONS
Pt was seen at bedside alert but poorly interactive sec dementia; he was minimally verbal with unintelligible output and unable to follow directions. Pt required max verbal cues during feeding but did accept willingly.

## 2018-11-27 NOTE — PROGRESS NOTE ADULT - SUBJECTIVE AND OBJECTIVE BOX
Patient is a 83y old  Male who presents with a chief complaint of decreased apetite (2018 14:26)      OVERNIGHT EVENTS:      REVIEW OF SYSTEMS: denies chest pain/SOB, diaphoresis, no F/C, cough, dizziness, headache, blurry vision, nausea, vomiting, abdominal pain. Rest unremarkable     MEDICATIONS  (STANDING):  cefTRIAXone   IVPB 1 Gram(s) IV Intermittent every 24 hours  dextrose 5% + sodium chloride 0.45%. 1000 milliLiter(s) (100 mL/Hr) IV Continuous <Continuous>  dextrose 5%. 1000 milliLiter(s) (50 mL/Hr) IV Continuous <Continuous>  dextrose 50% Injectable 12.5 Gram(s) IV Push once  dextrose 50% Injectable 25 Gram(s) IV Push once  dextrose 50% Injectable 25 Gram(s) IV Push once  dorzolamide 2%/timolol 0.5% Ophthalmic Solution 1 Drop(s) Both EYES two times a day  heparin  Injectable 5000 Unit(s) SubCutaneous every 12 hours  influenza   Vaccine 0.5 milliLiter(s) IntraMuscular once  insulin lispro (HumaLOG) corrective regimen sliding scale   SubCutaneous three times a day before meals  insulin lispro (HumaLOG) corrective regimen sliding scale   SubCutaneous at bedtime  metoprolol tartrate Injectable 5 milliGRAM(s) IV Push every 6 hours  pantoprazole  Injectable 40 milliGRAM(s) IV Push daily    MEDICATIONS  (PRN):  acetaminophen    Suspension .. 650 milliGRAM(s) Oral every 6 hours PRN Temp greater or equal to 38C (100.4F)  dextrose 40% Gel 15 Gram(s) Oral once PRN Blood Glucose LESS THAN 70 milliGRAM(s)/deciliter  glucagon  Injectable 1 milliGRAM(s) IntraMuscular once PRN Glucose LESS THAN 70 milligrams/deciliter      Allergies    No Known Allergies    Intolerances        SUBJECTIVE: in bed in NAD, no acute events overnight     T(F): 97.2 (18 @ 11:25), Max: 99.1 (18 @ 14:58)  HR: 63 (18 @ 11:25) (57 - 74)  BP: 123/50 (18 @ 11:25) (123/50 - 153/90)  RR: 16 (18 @ 11:25) (15 - 20)  SpO2: 97% (11-27-18 @ 11:25) (95% - 99%)  Wt(kg): --    PHYSICAL EXAM:  GENERAL: NAD, well-groomed, well-developed  HEAD:  Atraumatic, Normocephalic  EYES: EOMI, PERRLA, conjunctiva and sclera clear  ENMT: No tonsillar erythema, exudates, or enlargement; Moist mucous membranes, Good dentition, No lesions  NECK: Supple, No JVD, Normal thyroid  CHEST/LUNG: Clear to  auscultation bilaterally; No rales, rhonchi, wheezing, or rubs  bilaterally  HEART: Regular rate and rhythm; No murmurs, rubs, or gallops  ABDOMEN: Soft, Nontender, Nondistended; Bowel sounds present  EXTREMITIES:  2+ Peripheral Pulses, No clubbing, cyanosis, or edema BL LE  LYMPH: No lymphadenopathy noted  SKIN: No rashes or lesions  NERVOUS SYSTEM:  Alert & Oriented X3, Good concentration; Motor Strength 5/5 B/L upper and lower extremities;   DTRs 2+ intact and symmetric, sensation intact BL    LABS:                        13.7   14.52 )-----------( 221      ( 2018 06:24 )             42.3     11-27    147<H>  |  112<H>  |  10  ----------------------------<  284<H>  3.7   |  28  |  0.63    Ca    8.3<L>      2018 06:24    TPro  7.3  /  Alb  3.2<L>  /  TBili  0.4  /  DBili  x   /  AST  17  /  ALT  24  /  AlkPhos  95  11-26    PT/INR - ( 2018 11:02 )   PT: 13.2 sec;   INR: 1.17 ratio         PTT - ( 2018 11:02 )  PTT:26.8 sec  Urinalysis Basic - ( 2018 13:23 )    Color: Yellow / Appearance: Clear / S.005 / pH: x  Gluc: x / Ketone: Negative  / Bili: Negative / Urobili: Negative mg/dL   Blood: x / Protein: Negative mg/dL / Nitrite: Negative   Leuk Esterase: Moderate / RBC: 0-2 /HPF / WBC 26-50   Sq Epi: x / Non Sq Epi: Few / Bacteria: x      Cultures;   CAPILLARY BLOOD GLUCOSE      POCT Blood Glucose.: 179 mg/dL (2018 10:57)  POCT Blood Glucose.: 306 mg/dL (2018 07:46)  POCT Blood Glucose.: 271 mg/dL (2018 06:47)  POCT Blood Glucose.: 255 mg/dL (2018 22:25)  POCT Blood Glucose.: 243 mg/dL (2018 15:52)    Lipid panel:     CARDIAC MARKERS ( 2018 11:02 )  <.015 ng/mL / x     / 100 U/L / x     / <1.0 ng/mL        Culture - Blood (collected 2018 15:30)  Source: .Blood Blood-Peripheral  Gram Stain (prelim) (2018 13:34):    Growth in aerobic bottle: Gram Positive Cocci in Clusters  Preliminary Report (2018 13:34):    Growth in aerobic bottle: Gram Positive Cocci in Clusters    "Due to technical problems, Proteus sp. will Not be reported as part of    the BCID panel until further notice"    ***Blood Panel PCR results on this specimen are available    approximately 3 hours after the Gram stain result.***    Gram stain, PCR, and/or culture results may not always    correspond due to difference in methodologies.    ************************************************************    This PCR assay was performed using [employer].    The following targets are tested for: Enterococcus,    vancomycin resistant enterococci, Listeria monocytogenes,    coagulase negative staphylococci, S. aureus,    methicillin resistant S. aureus, Streptococcus agalactiae    (Group B), S. pneumoniae, S.pyogenes (Group A),    Acinetobacter baumannii, Enterobacter cloacae, E. coli,    Klebsiella oxytoca, K. pneumoniae, Proteus sp.,    Serratia marcescens, Haemophilus influenzae,    Neisseria meningitidis, Pseudomonas aeruginosa, Candida    albicans, C. glabrata, C krusei, C parapsilosis,    C. tropicalis and the KPC resistance gene.      RADIOLOGY & ADDITIONAL TESTS:      Imaging Personally Reviewed:  [ x] YES      Consultant(s) Notes Reviewed:  [x ] YES     Care Discussed with [x ] Consultants [X ] Patient [x ] Family  [x ]    [x ]  Other; RN Patient is a 83y old  Male who presents with a chief complaint of decreased apetite (2018 14:26)      OVERNIGHT EVENTS: none      REVIEW OF SYSTEMS: denies chest pain/SOB, diaphoresis, no F/C, cough, dizziness, headache, blurry vision, nausea, vomiting, abdominal pain. Rest unremarkable     MEDICATIONS  (STANDING):  cefTRIAXone   IVPB 1 Gram(s) IV Intermittent every 24 hours  dextrose 5% + sodium chloride 0.45%. 1000 milliLiter(s) (100 mL/Hr) IV Continuous <Continuous>  dextrose 5%. 1000 milliLiter(s) (50 mL/Hr) IV Continuous <Continuous>  dextrose 50% Injectable 12.5 Gram(s) IV Push once  dextrose 50% Injectable 25 Gram(s) IV Push once  dextrose 50% Injectable 25 Gram(s) IV Push once  dorzolamide 2%/timolol 0.5% Ophthalmic Solution 1 Drop(s) Both EYES two times a day  heparin  Injectable 5000 Unit(s) SubCutaneous every 12 hours  influenza   Vaccine 0.5 milliLiter(s) IntraMuscular once  insulin lispro (HumaLOG) corrective regimen sliding scale   SubCutaneous three times a day before meals  insulin lispro (HumaLOG) corrective regimen sliding scale   SubCutaneous at bedtime  metoprolol tartrate Injectable 5 milliGRAM(s) IV Push every 6 hours  pantoprazole  Injectable 40 milliGRAM(s) IV Push daily    MEDICATIONS  (PRN):  acetaminophen    Suspension .. 650 milliGRAM(s) Oral every 6 hours PRN Temp greater or equal to 38C (100.4F)  dextrose 40% Gel 15 Gram(s) Oral once PRN Blood Glucose LESS THAN 70 milliGRAM(s)/deciliter  glucagon  Injectable 1 milliGRAM(s) IntraMuscular once PRN Glucose LESS THAN 70 milligrams/deciliter      Allergies    No Known Allergies    Intolerances        SUBJECTIVE: in bed in NAD, no acute events overnight     T(F): 97.2 (18 @ 11:25), Max: 99.1 (18 @ 14:58)  HR: 63 (18 @ 11:25) (57 - 74)  BP: 123/50 (18 @ 11:25) (123/50 - 153/90)  RR: 16 (18 @ 11:25) (15 - 20)  SpO2: 97% (11-27-18 @ 11:25) (95% - 99%)  Wt(kg): --    PHYSICAL EXAM:  GENERAL: NAD, well-groomed, well-developed  HEAD:  Atraumatic, Normocephalic  EYES: EOMI, PERRLA, conjunctiva and sclera clear  ENMT: No tonsillar erythema, exudates, or enlargement; Moist mucous membranes, Good dentition, No lesions  NECK: Supple, No JVD, Normal thyroid  CHEST/LUNG: Clear to  auscultation bilaterally; No rales, rhonchi, wheezing, or rubs  bilaterally  HEART: Regular rate and rhythm; No murmurs, rubs, or gallops  ABDOMEN: Soft, Nontender, Nondistended; Bowel sounds present  EXTREMITIES:  2+ Peripheral Pulses, No clubbing, cyanosis, or edema BL LE    SKIN: No rashes or lesions, right foot bandaged  NERVOUS SYSTEM:  demented and does not follow command   DTRs 2+ intact and symmetric, sensation intact BL    LABS:                        13.7   14.52 )-----------( 221      ( 2018 06:24 )             42.3     11-27    147<H>  |  112<H>  |  10  ----------------------------<  284<H>  3.7   |  28  |  0.63    Ca    8.3<L>      2018 06:24    TPro  7.3  /  Alb  3.2<L>  /  TBili  0.4  /  DBili  x   /  AST  17  /  ALT  24  /  AlkPhos  95  11-26    PT/INR - ( 2018 11:02 )   PT: 13.2 sec;   INR: 1.17 ratio         PTT - ( 2018 11:02 )  PTT:26.8 sec  Urinalysis Basic - ( 2018 13:23 )    Color: Yellow / Appearance: Clear / S.005 / pH: x  Gluc: x / Ketone: Negative  / Bili: Negative / Urobili: Negative mg/dL   Blood: x / Protein: Negative mg/dL / Nitrite: Negative   Leuk Esterase: Moderate / RBC: 0-2 /HPF / WBC 26-50   Sq Epi: x / Non Sq Epi: Few / Bacteria: x      Cultures;   CAPILLARY BLOOD GLUCOSE      POCT Blood Glucose.: 179 mg/dL (2018 10:57)  POCT Blood Glucose.: 306 mg/dL (2018 07:46)  POCT Blood Glucose.: 271 mg/dL (2018 06:47)  POCT Blood Glucose.: 255 mg/dL (2018 22:25)  POCT Blood Glucose.: 243 mg/dL (2018 15:52)    Lipid panel:     CARDIAC MARKERS ( 2018 11:02 )  <.015 ng/mL / x     / 100 U/L / x     / <1.0 ng/mL        Culture - Blood (collected 2018 15:30)  Source: .Blood Blood-Peripheral  Gram Stain (prelim) (2018 13:34):    Growth in aerobic bottle: Gram Positive Cocci in Clusters  Preliminary Report (2018 13:34):    Growth in aerobic bottle: Gram Positive Cocci in Clusters    "Due to technical problems, Proteus sp. will Not be reported as part of    the BCID panel until further notice"    ***Blood Panel PCR results on this specimen are available    approximately 3 hours after the Gram stain result.***    Gram stain, PCR, and/or culture results may not always    correspond due to difference in methodologies.    ************************************************************    This PCR assay was performed using [employer].    The following targets are tested for: Enterococcus,    vancomycin resistant enterococci, Listeria monocytogenes,    coagulase negative staphylococci, S. aureus,    methicillin resistant S. aureus, Streptococcus agalactiae    (Group B), S. pneumoniae, S.pyogenes (Group A),    Acinetobacter baumannii, Enterobacter cloacae, E. coli,    Klebsiella oxytoca, K. pneumoniae, Proteus sp.,    Serratia marcescens, Haemophilus influenzae,    Neisseria meningitidis, Pseudomonas aeruginosa, Candida    albicans, C. glabrata, C krusei, C parapsilosis,    C. tropicalis and the KPC resistance gene.      RADIOLOGY & ADDITIONAL TESTS:      Imaging Personally Reviewed:  [ x] YES      Consultant(s) Notes Reviewed:  [x ] YES     Care Discussed with [x ] Consultants [X ] Patient [x ] Family  [x ]    [x ]  Other; RN

## 2018-11-27 NOTE — CONSULT NOTE ADULT - ASSESSMENT
83 year old M with hyperkeratotic lesion right heel  - Pt seen and evaluated  - No open lesions, or clinical signs infection  - foot is unlikely source of fevers  - Recommend Z-float boots at all times  - dress foot with betadine and dsd  - Podiatry to sign off,  Please reconsult as needed  - d/w attending

## 2018-11-27 NOTE — CONSULT NOTE ADULT - SUBJECTIVE AND OBJECTIVE BOX
Podiatry pager #: [phone]    Patient is a 83y old  Male who presents with a chief complaint of decreased appetite (27 Nov 2018 14:00)      HPI:  84yo male with pmh DM, HTN, advanced dementia (ambulates, minimal words at baseline, AOx0) presents with dec appetite, nonverbal and not ambulatory with fever.  FS 300s. (26 Nov 2018 14:26)      PAST MEDICAL & SURGICAL HISTORY:  Glaucoma  Dementia  DM (diabetes mellitus)  HTN (hypertension)  History of nephrolithiasis  Status post open reduction with internal fixation of fracture: right femur      MEDICATIONS  (STANDING):  cefTRIAXone   IVPB 1 Gram(s) IV Intermittent every 24 hours  dextrose 5% + sodium chloride 0.45%. 1000 milliLiter(s) (100 mL/Hr) IV Continuous <Continuous>  dextrose 5%. 1000 milliLiter(s) (50 mL/Hr) IV Continuous <Continuous>  dextrose 50% Injectable 12.5 Gram(s) IV Push once  dextrose 50% Injectable 25 Gram(s) IV Push once  dextrose 50% Injectable 25 Gram(s) IV Push once  dorzolamide 2%/timolol 0.5% Ophthalmic Solution 1 Drop(s) Both EYES two times a day  heparin  Injectable 5000 Unit(s) SubCutaneous every 12 hours  influenza   Vaccine 0.5 milliLiter(s) IntraMuscular once  insulin lispro (HumaLOG) corrective regimen sliding scale   SubCutaneous three times a day before meals  insulin lispro (HumaLOG) corrective regimen sliding scale   SubCutaneous at bedtime  metoprolol tartrate Injectable 5 milliGRAM(s) IV Push every 6 hours  pantoprazole  Injectable 40 milliGRAM(s) IV Push daily    MEDICATIONS  (PRN):  acetaminophen    Suspension .. 650 milliGRAM(s) Oral every 6 hours PRN Temp greater or equal to 38C (100.4F)  dextrose 40% Gel 15 Gram(s) Oral once PRN Blood Glucose LESS THAN 70 milliGRAM(s)/deciliter  glucagon  Injectable 1 milliGRAM(s) IntraMuscular once PRN Glucose LESS THAN 70 milligrams/deciliter      Allergies    No Known Allergies    Intolerances        VITALS:    Vital Signs Last 24 Hrs  T(C): 36.2 (27 Nov 2018 11:25), Max: 37.2 (26 Nov 2018 15:40)  T(F): 97.2 (27 Nov 2018 11:25), Max: 99 (26 Nov 2018 15:40)  HR: 63 (27 Nov 2018 11:25) (57 - 74)  BP: 123/50 (27 Nov 2018 11:25) (123/50 - 153/90)  BP(mean): --  RR: 16 (27 Nov 2018 11:25) (15 - 16)  SpO2: 97% (27 Nov 2018 11:25) (97% - 99%)    LABS:                          13.7   14.52 )-----------( 221      ( 27 Nov 2018 06:24 )             42.3       11-27    147<H>  |  112<H>  |  10  ----------------------------<  284<H>  3.7   |  28  |  0.63    Ca    8.3<L>      27 Nov 2018 06:24    TPro  7.3  /  Alb  3.2<L>  /  TBili  0.4  /  DBili  x   /  AST  17  /  ALT  24  /  AlkPhos  95  11-26      CAPILLARY BLOOD GLUCOSE      POCT Blood Glucose.: 179 mg/dL (27 Nov 2018 10:57)  POCT Blood Glucose.: 306 mg/dL (27 Nov 2018 07:46)  POCT Blood Glucose.: 271 mg/dL (27 Nov 2018 06:47)  POCT Blood Glucose.: 255 mg/dL (26 Nov 2018 22:25)  POCT Blood Glucose.: 243 mg/dL (26 Nov 2018 15:52)      PT/INR - ( 26 Nov 2018 11:02 )   PT: 13.2 sec;   INR: 1.17 ratio         PTT - ( 26 Nov 2018 11:02 )  PTT:26.8 sec    LOWER EXTREMITY PHYSICAL EXAM:    Vasular: DP/PT 0/4, B/L, CFT <3 seconds B/L, Temperature gradient warm to cool  B/L.   Neuro: unable to obtain  Musculoskeletal/Ortho: pt is contracted   Skin: hyperkeratotic lesion on right heel, no open lesions, no clinical signs of infection.

## 2018-11-27 NOTE — SWALLOW BEDSIDE ASSESSMENT ADULT - SWALLOW EVAL: DIAGNOSIS
Pt is an 83 yr old male with advanced dementia with new sepsis and dehydration; Pt presents with oropharyngeal dysphagia with poor cognition and reduced feeding behaviors; Pt does appear to tolerate puree and thin liquid consistencies with safety and efficiency.

## 2018-11-28 DIAGNOSIS — E83.39 OTHER DISORDERS OF PHOSPHORUS METABOLISM: ICD-10-CM

## 2018-11-28 LAB
ANION GAP SERPL CALC-SCNC: 8 MMOL/L — SIGNIFICANT CHANGE UP (ref 5–17)
BUN SERPL-MCNC: 10 MG/DL — SIGNIFICANT CHANGE UP (ref 7–23)
CALCIUM SERPL-MCNC: 7.6 MG/DL — LOW (ref 8.5–10.1)
CHLORIDE SERPL-SCNC: 105 MMOL/L — SIGNIFICANT CHANGE UP (ref 96–108)
CO2 SERPL-SCNC: 27 MMOL/L — SIGNIFICANT CHANGE UP (ref 22–31)
CREAT SERPL-MCNC: 0.9 MG/DL — SIGNIFICANT CHANGE UP (ref 0.5–1.3)
CULTURE RESULTS: SIGNIFICANT CHANGE UP
GLUCOSE BLDC GLUCOMTR-MCNC: 328 MG/DL — HIGH (ref 70–99)
GLUCOSE BLDC GLUCOMTR-MCNC: 331 MG/DL — HIGH (ref 70–99)
GLUCOSE BLDC GLUCOMTR-MCNC: 352 MG/DL — HIGH (ref 70–99)
GLUCOSE BLDC GLUCOMTR-MCNC: 364 MG/DL — HIGH (ref 70–99)
GLUCOSE SERPL-MCNC: 319 MG/DL — HIGH (ref 70–99)
GRAM STN FLD: SIGNIFICANT CHANGE UP
HCT VFR BLD CALC: 39.4 % — SIGNIFICANT CHANGE UP (ref 39–50)
HGB BLD-MCNC: 13.2 G/DL — SIGNIFICANT CHANGE UP (ref 13–17)
MAGNESIUM SERPL-MCNC: 2 MG/DL — SIGNIFICANT CHANGE UP (ref 1.6–2.6)
MCHC RBC-ENTMCNC: 30.7 PG — SIGNIFICANT CHANGE UP (ref 27–34)
MCHC RBC-ENTMCNC: 33.5 GM/DL — SIGNIFICANT CHANGE UP (ref 32–36)
MCV RBC AUTO: 91.6 FL — SIGNIFICANT CHANGE UP (ref 80–100)
NRBC # BLD: 0 /100 WBCS — SIGNIFICANT CHANGE UP (ref 0–0)
ORGANISM # SPEC MICROSCOPIC CNT: SIGNIFICANT CHANGE UP
ORGANISM # SPEC MICROSCOPIC CNT: SIGNIFICANT CHANGE UP
PHOSPHATE SERPL-MCNC: 2 MG/DL — LOW (ref 2.5–4.5)
PLATELET # BLD AUTO: 209 K/UL — SIGNIFICANT CHANGE UP (ref 150–400)
POTASSIUM SERPL-MCNC: 3.7 MMOL/L — SIGNIFICANT CHANGE UP (ref 3.5–5.3)
POTASSIUM SERPL-SCNC: 3.7 MMOL/L — SIGNIFICANT CHANGE UP (ref 3.5–5.3)
RBC # BLD: 4.3 M/UL — SIGNIFICANT CHANGE UP (ref 4.2–5.8)
RBC # FLD: 12.4 % — SIGNIFICANT CHANGE UP (ref 10.3–14.5)
SODIUM SERPL-SCNC: 140 MMOL/L — SIGNIFICANT CHANGE UP (ref 135–145)
SPECIMEN SOURCE: SIGNIFICANT CHANGE UP
WBC # BLD: 10.76 K/UL — HIGH (ref 3.8–10.5)
WBC # FLD AUTO: 10.76 K/UL — HIGH (ref 3.8–10.5)

## 2018-11-28 PROCEDURE — 99233 SBSQ HOSP IP/OBS HIGH 50: CPT

## 2018-11-28 RX ORDER — POTASSIUM PHOSPHATE, MONOBASIC POTASSIUM PHOSPHATE, DIBASIC 236; 224 MG/ML; MG/ML
15 INJECTION, SOLUTION INTRAVENOUS ONCE
Qty: 0 | Refills: 0 | Status: COMPLETED | OUTPATIENT
Start: 2018-11-28 | End: 2018-11-28

## 2018-11-28 RX ADMIN — CEFTRIAXONE 100 GRAM(S): 500 INJECTION, POWDER, FOR SOLUTION INTRAMUSCULAR; INTRAVENOUS at 11:55

## 2018-11-28 RX ADMIN — HEPARIN SODIUM 5000 UNIT(S): 5000 INJECTION INTRAVENOUS; SUBCUTANEOUS at 05:30

## 2018-11-28 RX ADMIN — PANTOPRAZOLE SODIUM 40 MILLIGRAM(S): 20 TABLET, DELAYED RELEASE ORAL at 11:42

## 2018-11-28 RX ADMIN — Medication 2: at 21:22

## 2018-11-28 RX ADMIN — POTASSIUM PHOSPHATE, MONOBASIC POTASSIUM PHOSPHATE, DIBASIC 63.75 MILLIMOLE(S): 236; 224 INJECTION, SOLUTION INTRAVENOUS at 16:57

## 2018-11-28 RX ADMIN — HEPARIN SODIUM 5000 UNIT(S): 5000 INJECTION INTRAVENOUS; SUBCUTANEOUS at 17:06

## 2018-11-28 RX ADMIN — Medication 5 MILLIGRAM(S): at 17:06

## 2018-11-28 RX ADMIN — Medication 5 MILLIGRAM(S): at 01:11

## 2018-11-28 RX ADMIN — DORZOLAMIDE HYDROCHLORIDE TIMOLOL MALEATE 1 DROP(S): 20; 5 SOLUTION/ DROPS OPHTHALMIC at 06:50

## 2018-11-28 RX ADMIN — Medication 5 MILLIGRAM(S): at 05:30

## 2018-11-28 RX ADMIN — Medication 4: at 08:05

## 2018-11-28 RX ADMIN — Medication 250 MILLIGRAM(S): at 06:50

## 2018-11-28 RX ADMIN — SODIUM CHLORIDE 100 MILLILITER(S): 9 INJECTION, SOLUTION INTRAVENOUS at 17:18

## 2018-11-28 RX ADMIN — Medication 5: at 11:41

## 2018-11-28 RX ADMIN — Medication 5: at 17:04

## 2018-11-28 RX ADMIN — SODIUM CHLORIDE 100 MILLILITER(S): 9 INJECTION, SOLUTION INTRAVENOUS at 05:29

## 2018-11-28 RX ADMIN — DORZOLAMIDE HYDROCHLORIDE TIMOLOL MALEATE 1 DROP(S): 20; 5 SOLUTION/ DROPS OPHTHALMIC at 20:36

## 2018-11-28 NOTE — PROGRESS NOTE ADULT - PROBLEM SELECTOR PLAN 2
intravenous hydration  npo   speech eval pending intravenous hydration   speech eval completed recommendations noted

## 2018-11-28 NOTE — DIETITIAN INITIAL EVALUATION ADULT. - SOURCE
family/significant other/pt's daughter Geraldine via phone which was offered by private [place] [employer] speaking aide was @ bedside, Chart review

## 2018-11-28 NOTE — DIETITIAN INITIAL EVALUATION ADULT. - PHYSICAL APPEARANCE
BMI=20.4(11/26 for weight of 59 kg)/other (specify) BMI=26.3( for reported height of 4'11" and adm wt. of 59 kg)/other (specify)

## 2018-11-28 NOTE — DIETITIAN INITIAL EVALUATION ADULT. - PERTINENT MEDS FT
MEDICATIONS  (STANDING):  cefTRIAXone   IVPB 1 Gram(s) IV Intermittent every 24 hours  dextrose 5% + sodium chloride 0.45%. 1000 milliLiter(s) (100 mL/Hr) IV Continuous <Continuous>  dextrose 5%. 1000 milliLiter(s) (50 mL/Hr) IV Continuous <Continuous>  dextrose 50% Injectable 12.5 Gram(s) IV Push once  dextrose 50% Injectable 25 Gram(s) IV Push once  dextrose 50% Injectable 25 Gram(s) IV Push once  dorzolamide 2%/timolol 0.5% Ophthalmic Solution 1 Drop(s) Both EYES two times a day  heparin  Injectable 5000 Unit(s) SubCutaneous every 12 hours  influenza   Vaccine 0.5 milliLiter(s) IntraMuscular once  insulin lispro (HumaLOG) corrective regimen sliding scale   SubCutaneous three times a day before meals  insulin lispro (HumaLOG) corrective regimen sliding scale   SubCutaneous at bedtime  metoprolol tartrate Injectable 5 milliGRAM(s) IV Push every 6 hours  pantoprazole  Injectable 40 milliGRAM(s) IV Push daily  vancomycin  IVPB 1000 milliGRAM(s) IV Intermittent every 12 hours  vancomycin  IVPB        MEDICATIONS  (PRN):  acetaminophen    Suspension .. 650 milliGRAM(s) Oral every 6 hours PRN Temp greater or equal to 38C (100.4F)  dextrose 40% Gel 15 Gram(s) Oral once PRN Blood Glucose LESS THAN 70 milliGRAM(s)/deciliter  glucagon  Injectable 1 milliGRAM(s) IntraMuscular once PRN Glucose LESS THAN 70 milligrams/deciliter

## 2018-11-28 NOTE — DIETITIAN INITIAL EVALUATION ADULT. - OTHER INFO
Pt. was seen for RN consult for pressure ulcer stage 2 or greater .  As per daughter, pt c good appetite, consumed 3 meals and snacks between meals.  Daughter stated that pt was on Levemir 26 units @ home and that fingersticks were being done on and off as she feels that pt does not need to be stuck c needles regularly.  Pt c good oral intake, consumed 100% of lunch when seen.  Daughter was unaware of HbA1/GC% PTA, stated that it was checked last month by MD.  Daughter informed of current HbA1/GC% and blood glucose goals.  Diet education information left at bedside as per daughter's request.

## 2018-11-28 NOTE — DIETITIAN INITIAL EVALUATION ADULT. - ENERGY NEEDS
Height (cm): 170.18 (11-27)  Weight (kg): 59 (11-26)  BMI (kg/m2): 20.4 (11-27)  IBW: 67.1 kg     % IBW: 87%          UBW:              %UBW  wt. decrease of of 2.5 kg since adm noted, ? wt. Height (cm): 170.18 (11-27)  Weight (kg): 59 (11-26)  BMI (kg/m2): 20.4 (11-27)  IBW: 47.1 kg   % IBW:  125%          UBW: 57.1 kg              %UBW: 103%  wt. decrease of of 2.5 kg since adm noted, ? wt. accuracy.

## 2018-11-28 NOTE — PROGRESS NOTE ADULT - PROBLEM SELECTOR PLAN 8
POA follow up ID of blood cultures .   was showing GPC clusters on 11/27/18 now coagulase negative so will stop antibx POA follow up ID of blood cultures . prsumed due to uti   blood cx  was showing GPC clusters on 11/27/18  but now coagulase negative so will stop Vancomycin and continue with antibx only for uti.

## 2018-11-28 NOTE — DIETITIAN INITIAL EVALUATION ADULT. - FACTORS AFF FOOD INTAKE
change in mental status/difficulty feeding self/difficulty swallowing/11/27, swallow evaluation recommended Dysphagia 1 Pureed- Thin Liquids

## 2018-11-28 NOTE — DIETITIAN INITIAL EVALUATION ADULT. - NS AS NUTRI INTERV MEALS SNACK
Texture-modified diet/Carbohydrate - modified diet/Mineral - modified diet/Recommend Dysphagia 1 Pureed- Thin Liquids , consistent carbohydrate c evening snack , Low sodium

## 2018-11-28 NOTE — DIETITIAN INITIAL EVALUATION ADULT. - PERTINENT LABORATORY DATA
1128 POCT blood Glucose range 331-364 mg/dL<H> 11-28 Na140 mmol/L Glu 319 mg/dL<H> K+ 3.7 mmol/L Cr  0.90 mg/dL BUN 10 mg/dL 11-28 Phos 2.0 mg/dL<L> 11-26 Alb 3.2 g/dL<L> 11-27 [id number] 10.1 %<H>11-26 ALT 24 U/L AST 17 U/L Alkaline Phosphatase 95 U/L 11-28  Hgb 13.2 g/dL Hct 39.4 %

## 2018-11-28 NOTE — DIETITIAN INITIAL EVALUATION ADULT. - NS AS NUTRI INTERV ED CONTENT
Educate daughter on blood glucose goals and meal planning c plate method/Purpose of the nutrition education

## 2018-11-28 NOTE — PROGRESS NOTE ADULT - SUBJECTIVE AND OBJECTIVE BOX
Patient is a 83y old  Male who presents with a chief complaint of decreased apetite (26 Nov 2018 14:26)      OVERNIGHT EVENTS: none    MEDICATIONS  (STANDING):  cefTRIAXone   IVPB 1 Gram(s) IV Intermittent every 24 hours  dextrose 5% + sodium chloride 0.45%. 1000 milliLiter(s) (100 mL/Hr) IV Continuous <Continuous>  dextrose 5%. 1000 milliLiter(s) (50 mL/Hr) IV Continuous <Continuous>  dextrose 50% Injectable 12.5 Gram(s) IV Push once  dextrose 50% Injectable 25 Gram(s) IV Push once  dextrose 50% Injectable 25 Gram(s) IV Push once  dorzolamide 2%/timolol 0.5% Ophthalmic Solution 1 Drop(s) Both EYES two times a day  heparin  Injectable 5000 Unit(s) SubCutaneous every 12 hours  influenza   Vaccine 0.5 milliLiter(s) IntraMuscular once  insulin lispro (HumaLOG) corrective regimen sliding scale   SubCutaneous three times a day before meals  insulin lispro (HumaLOG) corrective regimen sliding scale   SubCutaneous at bedtime  metoprolol tartrate Injectable 5 milliGRAM(s) IV Push every 6 hours  pantoprazole  Injectable 40 milliGRAM(s) IV Push daily  potassium phosphate IVPB 15 milliMole(s) IV Intermittent once  vancomycin  IVPB 1000 milliGRAM(s) IV Intermittent every 12 hours  vancomycin  IVPB        MEDICATIONS  (PRN):  acetaminophen    Suspension .. 650 milliGRAM(s) Oral every 6 hours PRN Temp greater or equal to 38C (100.4F)  dextrose 40% Gel 15 Gram(s) Oral once PRN Blood Glucose LESS THAN 70 milliGRAM(s)/deciliter  glucagon  Injectable 1 milliGRAM(s) IntraMuscular once PRN Glucose LESS THAN 70 milligrams/deciliter    Allergies    No Known Allergies    Intolerances        SUBJECTIVE: in bed in NAD, no acute events overnight     Vital Signs Last 24 Hrs  T(C): 36.4 (28 Nov 2018 11:47), Max: 36.6 (27 Nov 2018 17:15)  T(F): 97.5 (28 Nov 2018 11:47), Max: 97.9 (27 Nov 2018 17:15)  HR: 59 (28 Nov 2018 11:56) (59 - 76)  BP: 140/70 (28 Nov 2018 11:47) (126/58 - 145/82)  BP(mean): --  RR: 17 (28 Nov 2018 11:47) (16 - 17)  SpO2: 100% (28 Nov 2018 11:47) (96% - 100%)    PHYSICAL EXAM:  GENERAL: NAD, well-groomed, well-developed  HEAD:  Atraumatic, Normocephalic  EYES: EOMI, PERRLA, conjunctiva and sclera clear  ENMT: No tonsillar erythema, exudates, or enlargement; Moist mucous membranes, Good dentition, No lesions  NECK: Supple, No JVD, Normal thyroid  CHEST/LUNG: Clear to  auscultation bilaterally; No rales, rhonchi, wheezing, or rubs  bilaterally  HEART: Regular rate and rhythm; No murmurs, rubs, or gallops  ABDOMEN: Soft, Nontender, Nondistended; Bowel sounds present  EXTREMITIES:  2+ Peripheral Pulses, No clubbing, cyanosis, or edema BL LE    SKIN: No rashes or lesions, right foot bandaged  NERVOUS SYSTEM:  demented and does not follow command   DTRs 2+ intact and symmetric, sensation intact BL    LABS:                                   13.2   10.76 )-----------( 209      ( 28 Nov 2018 07:10 )             39.4   11-28    140  |  105  |  10  ----------------------------<  319<H>  3.7   |  27  |  0.90    Ca    7.6<L>      28 Nov 2018 07:10  Phos  2.0     11-28  Mg     2.0     11-28      Cultures;       CAPILLARY BLOOD GLUCOSE      POCT Blood Glucose.: 364 mg/dL (28 Nov 2018 11:18)  POCT Blood Glucose.: 331 mg/dL (28 Nov 2018 07:35)  POCT Blood Glucose.: 246 mg/dL (27 Nov 2018 21:41)  POCT Blood Glucose.: 76 mg/dL (27 Nov 2018 15:40)    Lipid panel:     CARDIAC MARKERS ( 26 Nov 2018 11:02 )  <.015 ng/mL / x     / 100 U/L / x     / <1.0 ng/mL        Culture - Blood (collected 26 Nov 2018 15:30)  Source: .Blood Blood-Peripheral  Gram Stain (prelim) (27 Nov 2018 13:34):    Growth in aerobic bottle: Gram Positive Cocci in Clusters  Preliminary Report (27 Nov 2018 13:34):    Growth in aerobic bottle: Gram Positive Cocci in Clusters    "Due to technical problems, Proteus sp. will Not be reported as part of    the BCID panel until further notice"    ***Blood Panel PCR results on this specimen are available    approximately 3 hours after the Gram stain result.***    Gram stain, PCR, and/or culture results may not always    correspond due to difference in methodologies.    ************************************************************    This PCR assay was performed using [employer].    The following targets are tested for: Enterococcus,    vancomycin resistant enterococci, Listeria monocytogenes,    coagulase negative staphylococci, S. aureus,    methicillin resistant S. aureus, Streptococcus agalactiae    (Group B), S. pneumoniae, S.pyogenes (Group A),    Acinetobacter baumannii, Enterobacter cloacae, E. coli,    Klebsiella oxytoca, K. pneumoniae, Proteus sp.,    Serratia marcescens, Haemophilus influenzae,    Neisseria meningitidis, Pseudomonas aeruginosa, Candida    albicans, C. glabrata, C krusei, C parapsilosis,    C. tropicalis and the KPC resistance gene.      RADIOLOGY & ADDITIONAL TESTS:      Imaging Personally Reviewed:  [ x] YES      Consultant(s) Notes Reviewed:  [x ] YES     Care Discussed with [x ] Consultants [X ] Patient [x ] Family  [x ]    [x ]  Other; RN

## 2018-11-28 NOTE — DIETITIAN INITIAL EVALUATION ADULT. - SIGNS/SYMPTOMS
daughter unclear of blood glucose goals & CHO controlled diet daughter unclear of blood glucose goals & CHO controlled diet, HbA1/GC=10.1 %

## 2018-11-29 ENCOUNTER — TRANSCRIPTION ENCOUNTER (OUTPATIENT)
Age: 83
End: 2018-11-29

## 2018-11-29 VITALS
RESPIRATION RATE: 18 BRPM | SYSTOLIC BLOOD PRESSURE: 119 MMHG | DIASTOLIC BLOOD PRESSURE: 83 MMHG | OXYGEN SATURATION: 97 % | TEMPERATURE: 98 F | HEART RATE: 80 BPM

## 2018-11-29 LAB
ANION GAP SERPL CALC-SCNC: 8 MMOL/L — SIGNIFICANT CHANGE UP (ref 5–17)
BUN SERPL-MCNC: 12 MG/DL — SIGNIFICANT CHANGE UP (ref 7–23)
CALCIUM SERPL-MCNC: 8 MG/DL — LOW (ref 8.5–10.1)
CHLORIDE SERPL-SCNC: 104 MMOL/L — SIGNIFICANT CHANGE UP (ref 96–108)
CO2 SERPL-SCNC: 26 MMOL/L — SIGNIFICANT CHANGE UP (ref 22–31)
CREAT SERPL-MCNC: 0.83 MG/DL — SIGNIFICANT CHANGE UP (ref 0.5–1.3)
GLUCOSE BLDC GLUCOMTR-MCNC: 290 MG/DL — HIGH (ref 70–99)
GLUCOSE BLDC GLUCOMTR-MCNC: 354 MG/DL — HIGH (ref 70–99)
GLUCOSE BLDC GLUCOMTR-MCNC: 373 MG/DL — HIGH (ref 70–99)
GLUCOSE BLDC GLUCOMTR-MCNC: 384 MG/DL — HIGH (ref 70–99)
GLUCOSE BLDC GLUCOMTR-MCNC: 400 MG/DL — HIGH (ref 70–99)
GLUCOSE BLDC GLUCOMTR-MCNC: 420 MG/DL — HIGH (ref 70–99)
GLUCOSE SERPL-MCNC: 356 MG/DL — HIGH (ref 70–99)
HCT VFR BLD CALC: 40.4 % — SIGNIFICANT CHANGE UP (ref 39–50)
HGB BLD-MCNC: 13.7 G/DL — SIGNIFICANT CHANGE UP (ref 13–17)
MAGNESIUM SERPL-MCNC: 2.3 MG/DL — SIGNIFICANT CHANGE UP (ref 1.6–2.6)
MCHC RBC-ENTMCNC: 30.7 PG — SIGNIFICANT CHANGE UP (ref 27–34)
MCHC RBC-ENTMCNC: 33.9 GM/DL — SIGNIFICANT CHANGE UP (ref 32–36)
MCV RBC AUTO: 90.6 FL — SIGNIFICANT CHANGE UP (ref 80–100)
NRBC # BLD: 0 /100 WBCS — SIGNIFICANT CHANGE UP (ref 0–0)
PHOSPHATE SERPL-MCNC: 2.5 MG/DL — SIGNIFICANT CHANGE UP (ref 2.5–4.5)
PLATELET # BLD AUTO: 199 K/UL — SIGNIFICANT CHANGE UP (ref 150–400)
POTASSIUM SERPL-MCNC: 3.6 MMOL/L — SIGNIFICANT CHANGE UP (ref 3.5–5.3)
POTASSIUM SERPL-SCNC: 3.6 MMOL/L — SIGNIFICANT CHANGE UP (ref 3.5–5.3)
RBC # BLD: 4.46 M/UL — SIGNIFICANT CHANGE UP (ref 4.2–5.8)
RBC # FLD: 12.3 % — SIGNIFICANT CHANGE UP (ref 10.3–14.5)
SODIUM SERPL-SCNC: 138 MMOL/L — SIGNIFICANT CHANGE UP (ref 135–145)
WBC # BLD: 11.16 K/UL — HIGH (ref 3.8–10.5)
WBC # FLD AUTO: 11.16 K/UL — HIGH (ref 3.8–10.5)

## 2018-11-29 PROCEDURE — 99239 HOSP IP/OBS DSCHRG MGMT >30: CPT

## 2018-11-29 RX ORDER — INSULIN DETEMIR 100/ML (3)
25 INSULIN PEN (ML) SUBCUTANEOUS
Qty: 1 | Refills: 0 | OUTPATIENT
Start: 2018-11-29 | End: 2018-12-28

## 2018-11-29 RX ORDER — AMLODIPINE BESYLATE 2.5 MG/1
1 TABLET ORAL
Qty: 0 | Refills: 0 | COMMUNITY

## 2018-11-29 RX ORDER — INSULIN DETEMIR 100/ML (3)
24 INSULIN PEN (ML) SUBCUTANEOUS
Qty: 0 | Refills: 0 | COMMUNITY

## 2018-11-29 RX ORDER — AMLODIPINE BESYLATE 2.5 MG/1
1 TABLET ORAL
Qty: 30 | Refills: 0 | OUTPATIENT
Start: 2018-11-29 | End: 2018-12-28

## 2018-11-29 RX ORDER — METOPROLOL TARTRATE 50 MG
1 TABLET ORAL
Qty: 60 | Refills: 0 | OUTPATIENT
Start: 2018-11-29 | End: 2018-12-28

## 2018-11-29 RX ORDER — INSULIN GLARGINE 100 [IU]/ML
5 INJECTION, SOLUTION SUBCUTANEOUS
Qty: 0 | Refills: 0 | COMMUNITY

## 2018-11-29 RX ORDER — CEFPODOXIME PROXETIL 100 MG
1 TABLET ORAL
Qty: 8 | Refills: 0 | OUTPATIENT
Start: 2018-11-29 | End: 2018-12-02

## 2018-11-29 RX ADMIN — DORZOLAMIDE HYDROCHLORIDE TIMOLOL MALEATE 1 DROP(S): 20; 5 SOLUTION/ DROPS OPHTHALMIC at 05:45

## 2018-11-29 RX ADMIN — Medication 5 MILLIGRAM(S): at 17:41

## 2018-11-29 RX ADMIN — CEFTRIAXONE 100 GRAM(S): 500 INJECTION, POWDER, FOR SOLUTION INTRAMUSCULAR; INTRAVENOUS at 11:54

## 2018-11-29 RX ADMIN — SODIUM CHLORIDE 100 MILLILITER(S): 9 INJECTION, SOLUTION INTRAVENOUS at 03:30

## 2018-11-29 RX ADMIN — Medication 3: at 16:59

## 2018-11-29 RX ADMIN — HEPARIN SODIUM 5000 UNIT(S): 5000 INJECTION INTRAVENOUS; SUBCUTANEOUS at 17:41

## 2018-11-29 RX ADMIN — Medication 5 MILLIGRAM(S): at 00:07

## 2018-11-29 RX ADMIN — PANTOPRAZOLE SODIUM 40 MILLIGRAM(S): 20 TABLET, DELAYED RELEASE ORAL at 11:55

## 2018-11-29 RX ADMIN — Medication 5: at 11:54

## 2018-11-29 RX ADMIN — Medication 5 MILLIGRAM(S): at 12:02

## 2018-11-29 RX ADMIN — HEPARIN SODIUM 5000 UNIT(S): 5000 INJECTION INTRAVENOUS; SUBCUTANEOUS at 05:40

## 2018-11-29 RX ADMIN — Medication 5 MILLIGRAM(S): at 05:39

## 2018-11-29 RX ADMIN — Medication 5: at 07:47

## 2018-11-29 NOTE — DISCHARGE NOTE ADULT - MEDICATION SUMMARY - MEDICATIONS TO TAKE
I will START or STAY ON the medications listed below when I get home from the hospital:    enoxaparin  -- 40 milligram(s) subcutaneous once a day  -- Indication: For Dvt prophylaxis    insulin lispro 100 units/mL subcutaneous solution  --  subcutaneous 3 times a day (before meals) ; 2 Unit(s) if Glucose 151 - 200  4 Unit(s) if Glucose 201 - 250  6 Unit(s) if Glucose 251 - 300  8 Unit(s) if Glucose 301 - 350  10 Unit(s) if Glucose 351 - 400  12 Unit(s) if Glucose Greater Than 400  -- Indication: For Dm    Levemir  -- 24 unit(s) subcutaneous once a day (at bedtime)  -- Indication: For Dm    metoprolol tartrate 25 mg oral tablet  -- 1 tab(s) by mouth 2 times a day  -- Indication: For Htn    amLODIPine 10 mg oral tablet  -- 1 tab(s) by mouth once a day  -- Indication: For Htn    cefpodoxime 200 mg oral tablet  -- 1 tab(s) by mouth 2 times a day   -- Finish all this medication unless otherwise directed by prescriber.  Take with food or milk.    -- Indication: For UTI (urinary tract infection)    Cosopt 2.23%-0.68% ophthalmic solution  -- 1 drop(s) to each affected eye 2 times a day  -- Indication: For eyes    pantoprazole 40 mg oral granule, enteric coated  -- 1 each by mouth once a day  -- Indication: For gi I will START or STAY ON the medications listed below when I get home from the hospital:    Levemir FlexTouch 100 units/mL subcutaneous solution  -- 25 unit(s) subcutaneous once a day (at bedtime)   -- Check with your doctor before becoming pregnant.  Do not drink alcoholic beverages when taking this medication.  Keep in refrigerator.  Do not freeze.    -- Indication: For Dm    metoprolol tartrate 25 mg oral tablet  -- 1 tab(s) by mouth 2 times a day  -- Indication: For Htn    amLODIPine 10 mg oral tablet  -- 1 tab(s) by mouth once a day  -- Indication: For Htn    cefpodoxime 200 mg oral tablet  -- 1 tab(s) by mouth 2 times a day   -- Finish all this medication unless otherwise directed by prescriber.  Take with food or milk.    -- Indication: For UTI (urinary tract infection)    Cosopt 2.23%-0.68% ophthalmic solution  -- 1 drop(s) to each affected eye 2 times a day  -- Indication: For eyes    pantoprazole 40 mg oral granule, enteric coated  -- 1 each by mouth once a day  -- Indication: For gi

## 2018-11-29 NOTE — DISCHARGE NOTE ADULT - MEDICATION SUMMARY - MEDICATIONS TO STOP TAKING
I will STOP taking the medications listed below when I get home from the hospital:    cefTRIAXone  -- 2 gram(s) intravenous once a day for 5 days    metroNIDAZOLE 500 mg/100 mL intravenous solution  -- 500 milligram(s) intravenous 3 times a day for  5  days    dorzolamide-timolol 2%-0.5% preservative-free ophthalmic solution  -- 1 drop(s) to each affected eye 2 times a day    Lantus 100 units/mL subcutaneous solution  -- 5 unit(s) subcutaneous once a day    Augmentin 500 mg-125 mg oral tablet  -- 1 tab(s) by mouth every 8 hours for 2 days I will STOP taking the medications listed below when I get home from the hospital:    enoxaparin  -- 40 milligram(s) subcutaneous once a day    insulin lispro 100 units/mL subcutaneous solution  --  subcutaneous 3 times a day (before meals) ; 2 Unit(s) if Glucose 151 - 200  4 Unit(s) if Glucose 201 - 250  6 Unit(s) if Glucose 251 - 300  8 Unit(s) if Glucose 301 - 350  10 Unit(s) if Glucose 351 - 400  12 Unit(s) if Glucose Greater Than 400    cefTRIAXone  -- 2 gram(s) intravenous once a day for 5 days    metroNIDAZOLE 500 mg/100 mL intravenous solution  -- 500 milligram(s) intravenous 3 times a day for  5  days    dorzolamide-timolol 2%-0.5% preservative-free ophthalmic solution  -- 1 drop(s) to each affected eye 2 times a day    Lantus 100 units/mL subcutaneous solution  -- 5 unit(s) subcutaneous once a day    Augmentin 500 mg-125 mg oral tablet  -- 1 tab(s) by mouth every 8 hours for 2 days

## 2018-11-29 NOTE — DISCHARGE NOTE ADULT - PATIENT PORTAL LINK FT
You can access the [facility] Patient Portal, offered by [facility], by registering with the following website: [email or web address]

## 2018-11-29 NOTE — DISCHARGE NOTE ADULT - PROVIDER TOKENS
TOKEN:'[id number]',FREE:[LAST:[follow up with primary care doctor],PHONE:[(   )    -],FAX:[(   )    -]]

## 2018-11-29 NOTE — DISCHARGE NOTE ADULT - SECONDARY DIAGNOSIS.
Dementia without behavioral disturbance, unspecified dementia type Functional quadriplegia Glaucoma, unspecified glaucoma type, unspecified laterality Type 2 diabetes mellitus with chronic kidney disease, without long-term current use of insulin, unspecified CKD stage Sepsis, due to unspecified organism Hypernatremia

## 2018-11-29 NOTE — DISCHARGE NOTE ADULT - MEDICATION SUMMARY - MEDICATIONS TO CHANGE
I will SWITCH the dose or number of times a day I take the medications listed below when I get home from the hospital:  None I will SWITCH the dose or number of times a day I take the medications listed below when I get home from the hospital:    Levemir  -- 24 unit(s) subcutaneous once a day (at bedtime)

## 2018-11-29 NOTE — DISCHARGE NOTE ADULT - CARE PROVIDER_API CALL
Mireille Sweeney ([email or web address]), Podiatric Medicine and Surgery  [address] [place] [address]  Phone: [phone]  Fax: [phone]    follow up with primary care doctor,   Phone: (   )    -  Fax: (   )    -

## 2018-11-29 NOTE — DISCHARGE NOTE ADULT - CARE PLAN
Principal Discharge DX:	Acute cystitis without hematuria  Goal:	complete antibx  Assessment and plan of treatment:	complete antibx  Secondary Diagnosis:	Dementia without behavioral disturbance, unspecified dementia type  Secondary Diagnosis:	Functional quadriplegia  Secondary Diagnosis:	Glaucoma, unspecified glaucoma type, unspecified laterality  Goal:	continue with eye drops  Secondary Diagnosis:	Type 2 diabetes mellitus with chronic kidney disease, without long-term current use of insulin, unspecified CKD stage  Goal:	continue with insulin Principal Discharge DX:	Acute cystitis without hematuria  Goal:	complete antibx  Assessment and plan of treatment:	complete antibx  Secondary Diagnosis:	Dementia without behavioral disturbance, unspecified dementia type  Secondary Diagnosis:	Functional quadriplegia  Secondary Diagnosis:	Glaucoma, unspecified glaucoma type, unspecified laterality  Goal:	continue with eye drops  Secondary Diagnosis:	Type 2 diabetes mellitus with chronic kidney disease, without long-term current use of insulin, unspecified CKD stage  Goal:	continue with insulin  Secondary Diagnosis:	Sepsis, due to unspecified organism  Goal:	resolved  Secondary Diagnosis:	Hypernatremia  Goal:	resolved

## 2018-11-29 NOTE — DISCHARGE NOTE ADULT - ADDITIONAL INSTRUCTIONS
hyperkeratotic lesion right heel.- Recommend Z-float boots at all times,  dress foot with betadine and dsd

## 2018-11-29 NOTE — DISCHARGE NOTE ADULT - INSTRUCTIONS
DYSPHAGIA 1 PUREE with THIN LIQUIDS for swallow safety and efficiency  allow for swallow between intakes; alternate food with liquid; check mouth frequently for oral residue/pocketing; crush medication (when feasible); maintain upright posture during/after eating for 30 mins; no straws; oral hygiene; position upright (90 degrees); small sips/bites

## 2018-11-29 NOTE — DISCHARGE NOTE ADULT - HOSPITAL COURSE
Assessment and Plan:     83f with dementia with decreased appetite with urinary tract infection requiring at least two days in the hospital        Problem/Plan - 1:  ·  Problem: Acute cystitis without hematuria.  Plan: antibiotics  urine culture ngtd .      Problem/Plan - 2:  ·  Problem: Septic encephalopathy.  Plan: intravenous hydration   speech eval completed recommendations noted.      Problem/Plan - 3:  ·  Problem: Dehydration.  Plan: intravenous hydration.      Problem/Plan - 4:  ·  Problem: Dementia without behavioral disturbance, unspecified dementia type.  Plan: nursing care.      Problem/Plan - 5:  ·  Problem: Type 2 diabetes mellitus with stage 3 chronic kidney disease, with long-term current use of insulin.  Plan: continue with lantus and sliding scale as was doing at home [id number] at 10.1    Problem/Plan - 6:  Problem: Hypernatremia. Plan: resolved.     Problem/Plan - 7:  ·  Problem: Functional quadriplegia.  Plan: physical therapy eval noted totally dependent,  daughter declined [facility] but  requested hospital bed and this was honored        Problem/Plan - 8:  ·  Problem: Sepsis, due to unspecified organism.  Plan: POA followed up ID of blood cultures  and grew out coag negative staph from one bottle.   therefore  presumed due to uti  but urine culture ngtd ( however ua was c/w pyuria)     Problem/Plan - 9:  ·  Problem: Hypophosphatemia.  Plan:  replaced.     Problem 10 hyperkeratotic lesion right heel,  Pt seen and evaluated by podiatry - No open lesions, or clinical signs infection  -- Recommend Z-float boots at all times, - dress foot with betadine and dsd  -     TIME SPENT COMPLETING DISCHARGE AND COORDINATING CARE WITH NURSING,  AND CASE MANAGEMENT APPROX 40MINUTES   updated daughter via phone . Assessment and Plan:     83f with dementia with decreased appetite with urinary tract infection requiring at least two days in the hospital        Problem/Plan - 1:  ·  Problem: Acute cystitis without hematuria.  Plan: antibiotics  urine culture ngtd .      Problem/Plan - 2:  ·  Problem: Septic encephalopathy.  Plan: was treated with  intravenous hydration   speech eval completed recommendations noted.      Problem/Plan - 3:  ·  Problem: Dehydration.  Plan: intravenous hydration.      Problem/Plan - 4:  ·  Problem: Dementia without behavioral disturbance, unspecified dementia type.  Plan: nursing care.      Problem/Plan - 5:  ·  Problem: Type 2 diabetes mellitus with stage 3 chronic kidney disease, with long-term current use of insulin.  Plan: continue with lantus and sliding scale as was doing at home hgba1c at 10.1    Problem/Plan - 6:  Problem: Hypernatremia. Plan: resolved.     Problem/Plan - 7:  ·  Problem: Functional quadriplegia.  Plan: physical therapy eval noted totally dependent,  daughter declined [facility] but  requested hospital bed and this was honored        Problem/Plan - 8:  ·  Problem: Sepsis, due to unspecified organism.  Plan: POA followed up ID of blood cultures  and grew out coag negative staph from one bottle.   therefore  presumed due to uti  even though urine culture ngtd ( however ua was c/w pyuria)  will complete antibx by mouth     Problem/Plan - 9:  ·  Problem: Hypophosphatemia.  Plan:  replaced.     Problem 10 hyperkeratotic lesion right heel,  Pt seen and evaluated by podiatry - No open lesions, or clinical signs infection  -- Recommend Z-float boots at all times, - dress foot with betadine and dsd  -     TIME SPENT COMPLETING DISCHARGE AND COORDINATING CARE WITH NURSING,  AND CASE MANAGEMENT APPROX 40MINUTES   updated daughter via phone .

## 2018-12-01 ENCOUNTER — OUTPATIENT (OUTPATIENT)
Dept: OUTPATIENT SERVICES | Facility: HOSPITAL | Age: 83
LOS: 1 days | End: 2018-12-01
Payer: MEDICARE

## 2018-12-01 DIAGNOSIS — Z87.442 PERSONAL HISTORY OF URINARY CALCULI: Chronic | ICD-10-CM

## 2018-12-01 DIAGNOSIS — Z96.7 PRESENCE OF OTHER BONE AND TENDON IMPLANTS: Chronic | ICD-10-CM

## 2018-12-01 LAB
CULTURE RESULTS: SIGNIFICANT CHANGE UP
SPECIMEN SOURCE: SIGNIFICANT CHANGE UP

## 2018-12-01 PROCEDURE — G9001: CPT

## 2018-12-03 DIAGNOSIS — Z28.21 IMMUNIZATION NOT CARRIED OUT BECAUSE OF PATIENT REFUSAL: ICD-10-CM

## 2018-12-03 DIAGNOSIS — H40.9 UNSPECIFIED GLAUCOMA: ICD-10-CM

## 2018-12-03 DIAGNOSIS — N30.00 ACUTE CYSTITIS WITHOUT HEMATURIA: ICD-10-CM

## 2018-12-03 DIAGNOSIS — R13.10 DYSPHAGIA, UNSPECIFIED: ICD-10-CM

## 2018-12-03 DIAGNOSIS — N18.3 CHRONIC KIDNEY DISEASE, STAGE 3 (MODERATE): ICD-10-CM

## 2018-12-03 DIAGNOSIS — R53.2 FUNCTIONAL QUADRIPLEGIA: ICD-10-CM

## 2018-12-03 DIAGNOSIS — R63.0 ANOREXIA: ICD-10-CM

## 2018-12-03 DIAGNOSIS — L85.8 OTHER SPECIFIED EPIDERMAL THICKENING: ICD-10-CM

## 2018-12-03 DIAGNOSIS — E83.39 OTHER DISORDERS OF PHOSPHORUS METABOLISM: ICD-10-CM

## 2018-12-03 DIAGNOSIS — L89.322 PRESSURE ULCER OF LEFT BUTTOCK, STAGE 2: ICD-10-CM

## 2018-12-03 DIAGNOSIS — Z79.2 LONG TERM (CURRENT) USE OF ANTIBIOTICS: ICD-10-CM

## 2018-12-03 DIAGNOSIS — A41.89 OTHER SPECIFIED SEPSIS: ICD-10-CM

## 2018-12-03 DIAGNOSIS — G93.41 METABOLIC ENCEPHALOPATHY: ICD-10-CM

## 2018-12-03 DIAGNOSIS — E87.0 HYPEROSMOLALITY AND HYPERNATREMIA: ICD-10-CM

## 2018-12-03 DIAGNOSIS — I12.9 HYPERTENSIVE CHRONIC KIDNEY DISEASE WITH STAGE 1 THROUGH STAGE 4 CHRONIC KIDNEY DISEASE, OR UNSPECIFIED CHRONIC KIDNEY DISEASE: ICD-10-CM

## 2018-12-03 DIAGNOSIS — E11.22 TYPE 2 DIABETES MELLITUS WITH DIABETIC CHRONIC KIDNEY DISEASE: ICD-10-CM

## 2018-12-03 DIAGNOSIS — F03.90 UNSPECIFIED DEMENTIA WITHOUT BEHAVIORAL DISTURBANCE: ICD-10-CM

## 2018-12-03 DIAGNOSIS — E86.0 DEHYDRATION: ICD-10-CM

## 2018-12-04 ENCOUNTER — EMERGENCY (EMERGENCY)
Facility: HOSPITAL | Age: 83
LOS: 0 days | Discharge: ANOTHER HOSPITAL | End: 2018-12-04
Attending: EMERGENCY MEDICINE
Payer: MEDICARE

## 2018-12-04 ENCOUNTER — INPATIENT (INPATIENT)
Facility: HOSPITAL | Age: 83
LOS: 13 days | Discharge: HOME HEALTH | End: 2018-12-18
Attending: INTERNAL MEDICINE | Admitting: INTERNAL MEDICINE
Payer: MEDICARE

## 2018-12-04 VITALS
SYSTOLIC BLOOD PRESSURE: 135 MMHG | TEMPERATURE: 98 F | HEART RATE: 91 BPM | RESPIRATION RATE: 18 BRPM | DIASTOLIC BLOOD PRESSURE: 61 MMHG | OXYGEN SATURATION: 98 %

## 2018-12-04 VITALS
OXYGEN SATURATION: 96 % | SYSTOLIC BLOOD PRESSURE: 127 MMHG | RESPIRATION RATE: 19 BRPM | DIASTOLIC BLOOD PRESSURE: 58 MMHG | HEART RATE: 95 BPM | TEMPERATURE: 98 F

## 2018-12-04 VITALS
DIASTOLIC BLOOD PRESSURE: 77 MMHG | RESPIRATION RATE: 16 BRPM | HEART RATE: 103 BPM | WEIGHT: 160.06 LBS | TEMPERATURE: 98 F | OXYGEN SATURATION: 99 % | SYSTOLIC BLOOD PRESSURE: 137 MMHG

## 2018-12-04 DIAGNOSIS — Z90.49 ACQUIRED ABSENCE OF OTHER SPECIFIED PARTS OF DIGESTIVE TRACT: Chronic | ICD-10-CM

## 2018-12-04 DIAGNOSIS — Z96.7 PRESENCE OF OTHER BONE AND TENDON IMPLANTS: Chronic | ICD-10-CM

## 2018-12-04 DIAGNOSIS — Z90.49 ACQUIRED ABSENCE OF OTHER SPECIFIED PARTS OF DIGESTIVE TRACT: ICD-10-CM

## 2018-12-04 DIAGNOSIS — N20.0 CALCULUS OF KIDNEY: ICD-10-CM

## 2018-12-04 DIAGNOSIS — H40.9 UNSPECIFIED GLAUCOMA: ICD-10-CM

## 2018-12-04 DIAGNOSIS — Z87.442 PERSONAL HISTORY OF URINARY CALCULI: Chronic | ICD-10-CM

## 2018-12-04 DIAGNOSIS — K92.0 HEMATEMESIS: ICD-10-CM

## 2018-12-04 DIAGNOSIS — F03.90 UNSPECIFIED DEMENTIA WITHOUT BEHAVIORAL DISTURBANCE: ICD-10-CM

## 2018-12-04 DIAGNOSIS — A41.9 SEPSIS, UNSPECIFIED ORGANISM: ICD-10-CM

## 2018-12-04 DIAGNOSIS — E11.9 TYPE 2 DIABETES MELLITUS WITHOUT COMPLICATIONS: ICD-10-CM

## 2018-12-04 DIAGNOSIS — I10 ESSENTIAL (PRIMARY) HYPERTENSION: ICD-10-CM

## 2018-12-04 DIAGNOSIS — K63.1 PERFORATION OF INTESTINE (NONTRAUMATIC): ICD-10-CM

## 2018-12-04 LAB
ALBUMIN SERPL ELPH-MCNC: 2.6 G/DL — LOW (ref 3.3–5)
ALBUMIN SERPL ELPH-MCNC: 3.1 G/DL — LOW (ref 3.3–5)
ALP SERPL-CCNC: 130 U/L — HIGH (ref 40–120)
ALP SERPL-CCNC: 78 U/L — SIGNIFICANT CHANGE UP (ref 40–120)
ALT FLD-CCNC: 28 U/L — SIGNIFICANT CHANGE UP (ref 4–41)
ALT FLD-CCNC: 51 U/L — SIGNIFICANT CHANGE UP (ref 12–78)
AMYLASE P1 CFR SERPL: 61 U/L — SIGNIFICANT CHANGE UP (ref 25–115)
ANION GAP SERPL CALC-SCNC: 13 MMOL/L — SIGNIFICANT CHANGE UP (ref 5–17)
APPEARANCE UR: CLEAR — SIGNIFICANT CHANGE UP
APTT BLD: 29.8 SEC — SIGNIFICANT CHANGE UP (ref 28.5–37)
AST SERPL-CCNC: 19 U/L — SIGNIFICANT CHANGE UP (ref 4–40)
AST SERPL-CCNC: 42 U/L — HIGH (ref 15–37)
BASE EXCESS BLDV CALC-SCNC: 1.1 MMOL/L — SIGNIFICANT CHANGE UP
BASOPHILS # BLD AUTO: 0 K/UL — SIGNIFICANT CHANGE UP (ref 0–0.2)
BASOPHILS NFR BLD AUTO: 0 % — SIGNIFICANT CHANGE UP (ref 0–2)
BILIRUB SERPL-MCNC: 0.5 MG/DL — SIGNIFICANT CHANGE UP (ref 0.2–1.2)
BILIRUB SERPL-MCNC: 0.7 MG/DL — SIGNIFICANT CHANGE UP (ref 0.2–1.2)
BILIRUB UR-MCNC: NEGATIVE — SIGNIFICANT CHANGE UP
BLD GP AB SCN SERPL QL: NEGATIVE — SIGNIFICANT CHANGE UP
BLD GP AB SCN SERPL QL: SIGNIFICANT CHANGE UP
BUN SERPL-MCNC: 11 MG/DL — SIGNIFICANT CHANGE UP (ref 7–23)
BUN SERPL-MCNC: 14 MG/DL — SIGNIFICANT CHANGE UP (ref 7–23)
CALCIUM SERPL-MCNC: 7.9 MG/DL — LOW (ref 8.4–10.5)
CALCIUM SERPL-MCNC: 9.1 MG/DL — SIGNIFICANT CHANGE UP (ref 8.5–10.1)
CHLORIDE SERPL-SCNC: 101 MMOL/L — SIGNIFICANT CHANGE UP (ref 96–108)
CHLORIDE SERPL-SCNC: 108 MMOL/L — HIGH (ref 98–107)
CO2 SERPL-SCNC: 24 MMOL/L — SIGNIFICANT CHANGE UP (ref 22–31)
CO2 SERPL-SCNC: 24 MMOL/L — SIGNIFICANT CHANGE UP (ref 22–31)
COLOR SPEC: YELLOW — SIGNIFICANT CHANGE UP
CREAT SERPL-MCNC: 0.65 MG/DL — SIGNIFICANT CHANGE UP (ref 0.5–1.3)
CREAT SERPL-MCNC: 1.05 MG/DL — SIGNIFICANT CHANGE UP (ref 0.5–1.3)
DIFF PNL FLD: ABNORMAL
EOSINOPHIL # BLD AUTO: 0 K/UL — SIGNIFICANT CHANGE UP (ref 0–0.5)
EOSINOPHIL NFR BLD AUTO: 0 % — SIGNIFICANT CHANGE UP (ref 0–6)
EPI CELLS # UR: SIGNIFICANT CHANGE UP
GAS PNL BLDV: 137 MMOL/L — SIGNIFICANT CHANGE UP (ref 136–146)
GLUCOSE BLDC GLUCOMTR-MCNC: 390 MG/DL — HIGH (ref 70–99)
GLUCOSE BLDV-MCNC: 333 — HIGH (ref 70–99)
GLUCOSE SERPL-MCNC: 331 MG/DL — HIGH (ref 70–99)
GLUCOSE SERPL-MCNC: 438 MG/DL — HIGH (ref 70–99)
GLUCOSE UR QL: 1000 MG/DL
HCO3 BLDV-SCNC: 25 MMOL/L — SIGNIFICANT CHANGE UP (ref 20–27)
HCT VFR BLD CALC: 31.3 % — LOW (ref 39–50)
HCT VFR BLD CALC: 44.1 % — SIGNIFICANT CHANGE UP (ref 39–50)
HCT VFR BLDV CALC: 32.9 % — LOW (ref 39–51)
HGB BLD-MCNC: 10.4 G/DL — LOW (ref 13–17)
HGB BLD-MCNC: 14.7 G/DL — SIGNIFICANT CHANGE UP (ref 13–17)
HGB BLDV-MCNC: 10.7 G/DL — LOW (ref 13–17)
INR BLD: 1.12 RATIO — SIGNIFICANT CHANGE UP (ref 0.88–1.16)
INR BLD: 1.14 — SIGNIFICANT CHANGE UP (ref 0.88–1.17)
KETONES UR-MCNC: ABNORMAL
LACTATE BLDV-MCNC: 2 MMOL/L — SIGNIFICANT CHANGE UP (ref 0.5–2)
LACTATE SERPL-SCNC: 3.5 MMOL/L — HIGH (ref 0.5–2)
LACTATE SERPL-SCNC: 3.6 MMOL/L — HIGH (ref 0.7–2)
LEUKOCYTE ESTERASE UR-ACNC: ABNORMAL
LIDOCAIN IGE QN: 192 U/L — SIGNIFICANT CHANGE UP (ref 73–393)
LYMPHOCYTES # BLD AUTO: 10 % — LOW (ref 13–44)
LYMPHOCYTES # BLD AUTO: 2.62 K/UL — SIGNIFICANT CHANGE UP (ref 1–3.3)
MAGNESIUM SERPL-MCNC: 1.7 MG/DL — SIGNIFICANT CHANGE UP (ref 1.6–2.6)
MANUAL SMEAR VERIFICATION: SIGNIFICANT CHANGE UP
MCHC RBC-ENTMCNC: 30.8 PG — SIGNIFICANT CHANGE UP (ref 27–34)
MCHC RBC-ENTMCNC: 31.2 PG — SIGNIFICANT CHANGE UP (ref 27–34)
MCHC RBC-ENTMCNC: 33.2 % — SIGNIFICANT CHANGE UP (ref 32–36)
MCHC RBC-ENTMCNC: 33.3 GM/DL — SIGNIFICANT CHANGE UP (ref 32–36)
MCV RBC AUTO: 92.5 FL — SIGNIFICANT CHANGE UP (ref 80–100)
MCV RBC AUTO: 94 FL — SIGNIFICANT CHANGE UP (ref 80–100)
MONOCYTES # BLD AUTO: 1.31 K/UL — HIGH (ref 0–0.9)
MONOCYTES NFR BLD AUTO: 5 % — SIGNIFICANT CHANGE UP (ref 2–14)
NEUTROPHILS # BLD AUTO: 22.28 K/UL — HIGH (ref 1.8–7.4)
NEUTROPHILS NFR BLD AUTO: 85 % — HIGH (ref 43–77)
NITRITE UR-MCNC: NEGATIVE — SIGNIFICANT CHANGE UP
NRBC # BLD: 0 /100 — SIGNIFICANT CHANGE UP (ref 0–0)
NRBC # BLD: SIGNIFICANT CHANGE UP /100 WBCS (ref 0–0)
NRBC # FLD: 0 — SIGNIFICANT CHANGE UP
OB PNL STL: NEGATIVE — SIGNIFICANT CHANGE UP
PCO2 BLDV: 45 MMHG — SIGNIFICANT CHANGE UP (ref 41–51)
PH BLDV: 7.38 PH — SIGNIFICANT CHANGE UP (ref 7.32–7.43)
PH UR: 5 — SIGNIFICANT CHANGE UP (ref 5–8)
PHOSPHATE SERPL-MCNC: 2 MG/DL — LOW (ref 2.5–4.5)
PLAT MORPH BLD: NORMAL — SIGNIFICANT CHANGE UP
PLATELET # BLD AUTO: 223 K/UL — SIGNIFICANT CHANGE UP (ref 150–400)
PLATELET # BLD AUTO: 318 K/UL — SIGNIFICANT CHANGE UP (ref 150–400)
PMV BLD: 11.6 FL — SIGNIFICANT CHANGE UP (ref 7–13)
PO2 BLDV: 41 MMHG — HIGH (ref 35–40)
POTASSIUM BLDV-SCNC: 4.2 MMOL/L — SIGNIFICANT CHANGE UP (ref 3.4–4.5)
POTASSIUM SERPL-MCNC: 4 MMOL/L — SIGNIFICANT CHANGE UP (ref 3.5–5.3)
POTASSIUM SERPL-MCNC: 4.3 MMOL/L — SIGNIFICANT CHANGE UP (ref 3.5–5.3)
POTASSIUM SERPL-SCNC: 4 MMOL/L — SIGNIFICANT CHANGE UP (ref 3.5–5.3)
POTASSIUM SERPL-SCNC: 4.3 MMOL/L — SIGNIFICANT CHANGE UP (ref 3.5–5.3)
PROT SERPL-MCNC: 5 G/DL — LOW (ref 6–8.3)
PROT SERPL-MCNC: 7.7 GM/DL — SIGNIFICANT CHANGE UP (ref 6–8.3)
PROT UR-MCNC: NEGATIVE MG/DL — SIGNIFICANT CHANGE UP
PROTHROM AB SERPL-ACNC: 12.6 SEC — SIGNIFICANT CHANGE UP (ref 10–12.9)
PROTHROM AB SERPL-ACNC: 12.7 SEC — SIGNIFICANT CHANGE UP (ref 9.8–13.1)
RBC # BLD: 3.33 M/UL — LOW (ref 4.2–5.8)
RBC # BLD: 4.77 M/UL — SIGNIFICANT CHANGE UP (ref 4.2–5.8)
RBC # FLD: 13 % — SIGNIFICANT CHANGE UP (ref 10.3–14.5)
RBC # FLD: 13.1 % — SIGNIFICANT CHANGE UP (ref 10.3–14.5)
RBC BLD AUTO: NORMAL — SIGNIFICANT CHANGE UP
RBC CASTS # UR COMP ASSIST: ABNORMAL /HPF (ref 0–4)
RH IG SCN BLD-IMP: POSITIVE — SIGNIFICANT CHANGE UP
RH IG SCN BLD-IMP: POSITIVE — SIGNIFICANT CHANGE UP
SAO2 % BLDV: 72.1 % — SIGNIFICANT CHANGE UP (ref 60–85)
SODIUM SERPL-SCNC: 138 MMOL/L — SIGNIFICANT CHANGE UP (ref 135–145)
SODIUM SERPL-SCNC: 140 MMOL/L — SIGNIFICANT CHANGE UP (ref 135–145)
SP GR SPEC: 1.01 — SIGNIFICANT CHANGE UP (ref 1.01–1.02)
UROBILINOGEN FLD QL: NEGATIVE MG/DL — SIGNIFICANT CHANGE UP
WBC # BLD: 21.06 K/UL — HIGH (ref 3.8–10.5)
WBC # BLD: 26.21 K/UL — HIGH (ref 3.8–10.5)
WBC # FLD AUTO: 21.06 K/UL — HIGH (ref 3.8–10.5)
WBC # FLD AUTO: 26.21 K/UL — HIGH (ref 3.8–10.5)
WBC UR QL: ABNORMAL

## 2018-12-04 PROCEDURE — 99222 1ST HOSP IP/OBS MODERATE 55: CPT | Mod: GC

## 2018-12-04 PROCEDURE — 93010 ELECTROCARDIOGRAM REPORT: CPT

## 2018-12-04 PROCEDURE — 99221 1ST HOSP IP/OBS SF/LOW 40: CPT

## 2018-12-04 PROCEDURE — 99285 EMERGENCY DEPT VISIT HI MDM: CPT | Mod: 25

## 2018-12-04 PROCEDURE — 71045 X-RAY EXAM CHEST 1 VIEW: CPT | Mod: 26,77

## 2018-12-04 PROCEDURE — 99223 1ST HOSP IP/OBS HIGH 75: CPT | Mod: 57

## 2018-12-04 PROCEDURE — 74177 CT ABD & PELVIS W/CONTRAST: CPT | Mod: 26

## 2018-12-04 PROCEDURE — 71045 X-RAY EXAM CHEST 1 VIEW: CPT | Mod: 26

## 2018-12-04 RX ORDER — INFLUENZA VIRUS VACCINE 15; 15; 15; 15 UG/.5ML; UG/.5ML; UG/.5ML; UG/.5ML
0.5 SUSPENSION INTRAMUSCULAR ONCE
Qty: 0 | Refills: 0 | Status: DISCONTINUED | OUTPATIENT
Start: 2018-12-04 | End: 2018-12-18

## 2018-12-04 RX ORDER — ACETAMINOPHEN 500 MG
1000 TABLET ORAL ONCE
Qty: 0 | Refills: 0 | Status: COMPLETED | OUTPATIENT
Start: 2018-12-04 | End: 2018-12-04

## 2018-12-04 RX ORDER — PANTOPRAZOLE SODIUM 20 MG/1
1 TABLET, DELAYED RELEASE ORAL
Qty: 0 | Refills: 0 | COMMUNITY

## 2018-12-04 RX ORDER — MORPHINE SULFATE 50 MG/1
2 CAPSULE, EXTENDED RELEASE ORAL ONCE
Qty: 0 | Refills: 0 | Status: DISCONTINUED | OUTPATIENT
Start: 2018-12-04 | End: 2018-12-04

## 2018-12-04 RX ORDER — ENOXAPARIN SODIUM 100 MG/ML
40 INJECTION SUBCUTANEOUS DAILY
Qty: 0 | Refills: 0 | Status: DISCONTINUED | OUTPATIENT
Start: 2018-12-04 | End: 2018-12-18

## 2018-12-04 RX ORDER — SODIUM CHLORIDE 9 MG/ML
1000 INJECTION INTRAMUSCULAR; INTRAVENOUS; SUBCUTANEOUS ONCE
Qty: 0 | Refills: 0 | Status: COMPLETED | OUTPATIENT
Start: 2018-12-04 | End: 2018-12-04

## 2018-12-04 RX ORDER — ONDANSETRON 8 MG/1
4 TABLET, FILM COATED ORAL ONCE
Qty: 0 | Refills: 0 | Status: COMPLETED | OUTPATIENT
Start: 2018-12-04 | End: 2018-12-04

## 2018-12-04 RX ORDER — INSULIN LISPRO 100/ML
VIAL (ML) SUBCUTANEOUS EVERY 6 HOURS
Qty: 0 | Refills: 0 | Status: DISCONTINUED | OUTPATIENT
Start: 2018-12-04 | End: 2018-12-10

## 2018-12-04 RX ORDER — PANTOPRAZOLE SODIUM 20 MG/1
80 TABLET, DELAYED RELEASE ORAL ONCE
Qty: 0 | Refills: 0 | Status: COMPLETED | OUTPATIENT
Start: 2018-12-04 | End: 2018-12-04

## 2018-12-04 RX ORDER — PIPERACILLIN AND TAZOBACTAM 4; .5 G/20ML; G/20ML
3.38 INJECTION, POWDER, LYOPHILIZED, FOR SOLUTION INTRAVENOUS ONCE
Qty: 0 | Refills: 0 | Status: COMPLETED | OUTPATIENT
Start: 2018-12-04 | End: 2018-12-04

## 2018-12-04 RX ORDER — INSULIN HUMAN 100 [IU]/ML
4 INJECTION, SOLUTION SUBCUTANEOUS ONCE
Qty: 0 | Refills: 0 | Status: COMPLETED | OUTPATIENT
Start: 2018-12-04 | End: 2018-12-04

## 2018-12-04 RX ORDER — PANTOPRAZOLE SODIUM 20 MG/1
8 TABLET, DELAYED RELEASE ORAL
Qty: 80 | Refills: 0 | Status: DISCONTINUED | OUTPATIENT
Start: 2018-12-04 | End: 2018-12-04

## 2018-12-04 RX ORDER — SODIUM CHLORIDE 9 MG/ML
1000 INJECTION, SOLUTION INTRAVENOUS ONCE
Qty: 0 | Refills: 0 | Status: COMPLETED | OUTPATIENT
Start: 2018-12-04 | End: 2018-12-04

## 2018-12-04 RX ORDER — PANTOPRAZOLE SODIUM 20 MG/1
40 TABLET, DELAYED RELEASE ORAL EVERY 12 HOURS
Qty: 0 | Refills: 0 | Status: DISCONTINUED | OUTPATIENT
Start: 2018-12-04 | End: 2018-12-18

## 2018-12-04 RX ORDER — PIPERACILLIN AND TAZOBACTAM 4; .5 G/20ML; G/20ML
3.38 INJECTION, POWDER, LYOPHILIZED, FOR SOLUTION INTRAVENOUS EVERY 8 HOURS
Qty: 0 | Refills: 0 | Status: DISCONTINUED | OUTPATIENT
Start: 2018-12-04 | End: 2018-12-04

## 2018-12-04 RX ORDER — SODIUM CHLORIDE 9 MG/ML
1000 INJECTION, SOLUTION INTRAVENOUS
Qty: 0 | Refills: 0 | Status: DISCONTINUED | OUTPATIENT
Start: 2018-12-04 | End: 2018-12-05

## 2018-12-04 RX ORDER — PANTOPRAZOLE SODIUM 20 MG/1
40 TABLET, DELAYED RELEASE ORAL DAILY
Qty: 0 | Refills: 0 | Status: DISCONTINUED | OUTPATIENT
Start: 2018-12-04 | End: 2018-12-04

## 2018-12-04 RX ORDER — METOPROLOL TARTRATE 50 MG
5 TABLET ORAL EVERY 6 HOURS
Qty: 0 | Refills: 0 | Status: DISCONTINUED | OUTPATIENT
Start: 2018-12-04 | End: 2018-12-16

## 2018-12-04 RX ADMIN — SODIUM CHLORIDE 125 MILLILITER(S): 9 INJECTION, SOLUTION INTRAVENOUS at 23:47

## 2018-12-04 RX ADMIN — INSULIN HUMAN 4 UNIT(S): 100 INJECTION, SOLUTION SUBCUTANEOUS at 10:29

## 2018-12-04 RX ADMIN — Medication 400 MILLIGRAM(S): at 17:16

## 2018-12-04 RX ADMIN — SODIUM CHLORIDE 125 MILLILITER(S): 9 INJECTION, SOLUTION INTRAVENOUS at 13:58

## 2018-12-04 RX ADMIN — SODIUM CHLORIDE 1000 MILLILITER(S): 9 INJECTION INTRAMUSCULAR; INTRAVENOUS; SUBCUTANEOUS at 10:30

## 2018-12-04 RX ADMIN — SODIUM CHLORIDE 1000 MILLILITER(S): 9 INJECTION, SOLUTION INTRAVENOUS at 19:00

## 2018-12-04 RX ADMIN — PANTOPRAZOLE SODIUM 40 MILLIGRAM(S): 20 TABLET, DELAYED RELEASE ORAL at 19:00

## 2018-12-04 RX ADMIN — MORPHINE SULFATE 2 MILLIGRAM(S): 50 CAPSULE, EXTENDED RELEASE ORAL at 11:42

## 2018-12-04 RX ADMIN — PIPERACILLIN AND TAZOBACTAM 3.38 GRAM(S): 4; .5 INJECTION, POWDER, LYOPHILIZED, FOR SOLUTION INTRAVENOUS at 11:06

## 2018-12-04 RX ADMIN — SODIUM CHLORIDE 1000 MILLILITER(S): 9 INJECTION INTRAMUSCULAR; INTRAVENOUS; SUBCUTANEOUS at 09:06

## 2018-12-04 RX ADMIN — Medication 5 MILLIGRAM(S): at 19:00

## 2018-12-04 RX ADMIN — Medication 6: at 23:47

## 2018-12-04 RX ADMIN — ONDANSETRON 4 MILLIGRAM(S): 8 TABLET, FILM COATED ORAL at 10:32

## 2018-12-04 RX ADMIN — Medication 5 MILLIGRAM(S): at 23:47

## 2018-12-04 RX ADMIN — SODIUM CHLORIDE 1000 MILLILITER(S): 9 INJECTION INTRAMUSCULAR; INTRAVENOUS; SUBCUTANEOUS at 11:49

## 2018-12-04 RX ADMIN — PIPERACILLIN AND TAZOBACTAM 200 GRAM(S): 4; .5 INJECTION, POWDER, LYOPHILIZED, FOR SOLUTION INTRAVENOUS at 19:00

## 2018-12-04 RX ADMIN — PANTOPRAZOLE SODIUM 10 MG/HR: 20 TABLET, DELAYED RELEASE ORAL at 08:02

## 2018-12-04 RX ADMIN — Medication 6: at 19:55

## 2018-12-04 RX ADMIN — SODIUM CHLORIDE 1000 MILLILITER(S): 9 INJECTION, SOLUTION INTRAVENOUS at 16:35

## 2018-12-04 RX ADMIN — Medication 1000 MILLIGRAM(S): at 17:30

## 2018-12-04 RX ADMIN — PIPERACILLIN AND TAZOBACTAM 200 GRAM(S): 4; .5 INJECTION, POWDER, LYOPHILIZED, FOR SOLUTION INTRAVENOUS at 10:29

## 2018-12-04 RX ADMIN — SODIUM CHLORIDE 1000 MILLILITER(S): 9 INJECTION INTRAMUSCULAR; INTRAVENOUS; SUBCUTANEOUS at 08:04

## 2018-12-04 RX ADMIN — PANTOPRAZOLE SODIUM 80 MILLIGRAM(S): 20 TABLET, DELAYED RELEASE ORAL at 08:03

## 2018-12-04 NOTE — ED PROVIDER NOTE - ABDOMINAL EXAM
soft/tender.../old obliq/DISTENDED old oblique surgical scar in right upper quadrant/tender.../DISTENDED/soft

## 2018-12-04 NOTE — ED PROVIDER NOTE - CONSTITUTIONAL, MLM
normal... Well appearing, well nourished, awake, alert,  no apparent distress. Well appearing, well nourished, awake, alert,  no apparent distress. No nasal flaring no shoulders retractions not holding his chest/abdomen no active vomiting

## 2018-12-04 NOTE — H&P ADULT - HISTORY OF PRESENT ILLNESS
83M PMH DM, Dementia (AAOx0-1 at baseline), DM, HTN, Open cholecystectomy (2017), open appendectomy (remote), presented as transfer from OSH for pneumatosis. Patient is mostly nonverbal and history was obtained from daughter at bedside. Pt's daughter states that pt began to have hematemesis this morning, prompting presentation to OSH. CT abd/pelvis obtained there which showed gastric pneumatosis, with gas tracking up the esophageal wall. Patient transferred to [facility] for further management.     Upon arrival to [facility] ED, AVSS. Afebrile. WBC 21.06. Lactate 3.5 -->2 after IVF resuscitation. NGT placed which immediately drained 300cc murky gray fluid. Nonbloody.

## 2018-12-04 NOTE — ED PROVIDER NOTE - PROGRESS NOTE DETAILS
Dr. Smith surgeon on call is paged thru the service. Dr. Smith surgeon on call is notified and sts he is not available to come until this afternoon and request pt to be transferred to [facility]. Transfer center is notified. Dr. Apple surgeon and Dr. Ludwig ramos attending from [facility] accepts pt. Dr. Apple surgeon and Dr. Munroe ed attending from [facility] accepts pt. Pt's daughter is at bed side and being explained and agrees pt to transferred. Dr. Apple surgeon and Dr. Munroe ed attending from [facility] accepts pt. Pt's daughter is at bed side and being explained and agrees pt to transferred, and she wants every thing to be done on pt.

## 2018-12-04 NOTE — ED PROVIDER NOTE - MEDICAL DECISION MAKING DETAILS
84 y/o male transferred from [place] for potential gastric perforation- surgery consult, thoracic consult, admit

## 2018-12-04 NOTE — H&P ADULT - ASSESSMENT
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  - Extensive conversation had with Pt's daughter - who understands that any operative intervention would be quite extensive, likely involving gastrectomy. Patients daughter opting at this time to make pt DNR, but requesting full medical management  - NPO/NGT/IVF  - Serial abdominal exams  - IV abx: Zosyn  - PPI  - Monitor I/Os with indwelling parrish catheter  - Discussed with attending Dr. Zechariah Matson PGY2  B team surgery  [id number]

## 2018-12-04 NOTE — ED ADULT NURSE NOTE - NSIMPLEMENTINTERV_GEN_ALL_ED
Implemented All Universal Safety Interventions:  [place] to call system. Call bell, personal items and telephone within reach. Instruct patient to call for assistance. Room bathroom lighting operational. Non-slip footwear when patient is off stretcher. Physically safe environment: no spills, clutter or unnecessary equipment. Stretcher in lowest position, wheels locked, appropriate side rails in place.

## 2018-12-04 NOTE — H&P ADULT - NSHPLABSRESULTS_GEN_ALL_CORE
CT Abd/pelvis (12/4/2018)  Extensive gastric pneumatosis with air tracking along the distal   esophagus and mediastinum. Findings raise concern for ischemic process.    Trace left pleural effusion and associated atelectasis.    Complete Blood Count (12.04.18 @ 18:55)    WBC Count: 21.06 K/uL    RBC Count: 3.33 M/uL    Hemoglobin: 10.4 g/dL    Hematocrit: 31.3 %    Mean Cell Volume: 94.0 fL    Mean Cell Hemoglobin: 31.2 pg    Mean Cell Hemoglobin Conc: 33.2 %    Red Cell Distrib Width: 13.1 %    Platelet Count - Automated: 223 K/uL    MPV: 11.6 fl    Nucleated RBC #: 0    Blood Gas Venous - Lactate (12.04.18 @ 18:55)    Blood Gas Venous - Lactate: 2.0: Please note updated reference range. mmol/L    Comprehensive Metabolic, Mg + Phosphorus (12.04.18 @ 18:55)    eGFR if : 105 mL/min    eGFR if Non : 91: The units for eGFR are ml/min/1.73m2 (normalized body  surface area). The eGFR is calculated from a serum  creatinine using the CKD-EPI equation. Other variables  required for calculation are race, age and sex. Among  patients with chronic kidney disease (CKD), the eGFR is  useful in determining the stage of disease according to  KDOQI CKD classification. All eGFR results are reported  numerically with the following interpretation.    GFR  (ml/min/1.73 m2)          W/KIDNEY DAMAGE    W/O KIDNEY DMG  ==========================================================  >= 90.......................Stage 1..............Normal  60-89.......................Stage 2...........Decreased GFR  30-59.......................Stage 3..............Stage 3  15-29.......................Stage 4..............Stage 4  < 15........................Stage 5..............Stage 5    Each stage of CKD assumes that the associated GFR level  has been in effect for at least 3 months. Determination of  stages one and two (with eGFR > 59ml/min/m2) requires  estimation of kidney damage for at least 3 months as  defined by structural or functional abnormalities.    Limitations: All estimates of GFR will be less accurate  for patients at extremes of muscle mass (including but  not limited to frail elderly, critically ill, or cancer  patients), those with unusual diets, and those with  conditions associated with reduced secretion or  extrarenal elimination of creatinine. The eGFR equation  is not recommended for use in patients with unstable  creatinine levels. mL/min    Phosphorus Level, Serum: 2.0 mg/dL    Sodium, Serum: 140 mmol/L    Potassium, Serum: 4.3 mmol/L    Chloride, Serum: 108 mmol/L    Carbon Dioxide, Serum: 24 mmol/L    Blood Urea Nitrogen, Serum: 11 mg/dL    Creatinine, Serum: 0.65 mg/dL    Glucose, Serum: 331 mg/dL    Calcium, Total Serum: 7.9 mg/dL    Protein Total, Serum: 5.0 g/dL    Albumin, Serum: 2.6 g/dL    Bilirubin Total, Serum: 0.5 mg/dL    Alkaline Phosphatase, Serum: 78 u/L    Aspartate Aminotransferase (AST/SGOT): 19 u/L    Alanine Aminotransferase (ALT/SGPT): 28 u/L    Magnesium, Serum: 1.7 mg/dL

## 2018-12-04 NOTE — ED PROVIDER NOTE - CARE PLAN
Principal Discharge DX:	Perforated bowel Principal Discharge DX:	Pneumatosis intestinalis  Secondary Diagnosis:	Pleural effusion

## 2018-12-04 NOTE — ED PROVIDER NOTE - ATTENDING CONTRIBUTION TO CARE
83M transferred from [facility] for abnormal CT  -history limited d/t dementia, family reports he had hematemesis this AM so he went to the other hospital  -ct performed there reveals pneumatosis, so he was transferred here for evaluation by general surgery and thoracic surgery  -labs already performed there and abx and ivf bolus already given, will repeat lactate given concern for bowel ishemia  -appreciate consults from general and thoracic surgery, we agree pt will need adm to surgical service

## 2018-12-04 NOTE — ED ADULT NURSE NOTE - NSIMPLEMENTINTERV_GEN_ALL_ED
Implemented All Fall with Harm Risk Interventions:  [place] to call system. Call bell, personal items and telephone within reach. Instruct patient to call for assistance. Room bathroom lighting operational. Non-slip footwear when patient is off stretcher. Physically safe environment: no spills, clutter or unnecessary equipment. Stretcher in lowest position, wheels locked, appropriate side rails in place. Provide visual cue, wrist band, yellow gown, etc. Monitor gait and stability. Monitor for mental status changes and reorient to person, place, and time. Review medications for side effects contributing to fall risk. Reinforce activity limits and safety measures with patient and family. Provide visual clues: red socks.

## 2018-12-04 NOTE — ED CLERICAL - CLERICAL COMMENTS
Non-verbal demented pt sent for perforated gut. wbc 26, pt given zosyn, protonix & insulin for glucose 400+. advanced directives unknown

## 2018-12-04 NOTE — ED PROVIDER NOTE - CRITICAL CARE PROVIDED
direct patient care (not related to procedure)/interpretation of diagnostic studies/documentation/consult w/ pt's family directly relating to pts condition/consultation with other physicians

## 2018-12-04 NOTE — ED ADULT NURSE NOTE - OBJECTIVE STATEMENT
Pt transfer from [facility] for r/o perforated bowel, pt is baseline non-verbal, daughter at bedside, breathing even and unlabored, no signs of pain noted, vs as noted, pt has ivf infusing, will continue to monitor.

## 2018-12-04 NOTE — ED ADULT TRIAGE NOTE - CHIEF COMPLAINT QUOTE
pt transferred from [facility] for evaluation of perforated bowel.  pt arrives receiving protonix gtt at 10cc/hr and NS at 900cc/hr

## 2018-12-04 NOTE — H&P ADULT - NSHPPHYSICALEXAM_GEN_ALL_CORE
Gen: Laying in bed, in NAD. AAOx1  HEENT: Normocephalic, atraumatic  Resp: Non-labored  CV: RRR  Abd: Soft, nondistended. Well healed RUQ and RLQ scars. TTP over epigastric area. NGT in place  Ext: No C/C/E

## 2018-12-04 NOTE — ED ADULT NURSE NOTE - CHIEF COMPLAINT QUOTE
pt transferred from [facility] for evaluation of perforated bowel.  pt arrives receiving protonix gtt at 10cc/hr and NS at 900cc/hr

## 2018-12-04 NOTE — ED ADULT NURSE NOTE - OBJECTIVE STATEMENT
Pt. was here last week for UTI and discharged. Pt fell on his left shoulder on Sunday evening. This morning, as per aide pt. started vomiting black. Pt last bowel movement was on Sunday. Pt HX of dementia. Pt taking antibiotics for UTI and right heel ulcer.

## 2018-12-04 NOTE — ED ADULT NURSE REASSESSMENT NOTE - NS ED NURSE REASSESS COMMENT FT1
received report from FELICIA Bill, pt resting comfortably in bed in no apparent distress, no signs of distress or pain at this time, pt attached to wall suction NG tube 600ML brown fluid in canister, [facility] will continue to monitor

## 2018-12-04 NOTE — H&P ADULT - ATTENDING COMMENTS
I have reviewed the history, personally examined the patient in the [facility] ED 12/4/18, reviewed pertinent labs and imaging (CT scan images reviewed with [facility] radiology attending), and discussed the care with the consult resident.    nonconversive, not opening eyes, but in NAD  NGT draining red tinged bile  abd distended and tender (involuntary guarding with light palpation), well healed surgical scars  no peripheral edema    The active issues are:  1. advanced dementia requiring 24-hr care at home  2. poorly controlled DM (elevated hgb a1c and hyperglycemia now)  3. 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 Chandra-his middle daughter and HCP about her father's GOC.  Given poor functional status at baseline, she did not feel that father would agree to high risk surgery and prefers rather to optimize medical management.  Ultimately, she voiced that if her father failed to improve with nonop management, then she would want additional information about comfort care.  She also stated that her father would not want cardiac resuscitation in the event his heart were to stop beating.    Admit to surgery, NPO, NGT, IVF, glycemic control, antibiotics, PPI, pain control, DNR.    The Acute Care Surgery (B Team) Attending Group Practice:  Dr. Clint Mccall, Dr. Olaf Apple, Dr. Harjinder Vogel, Dr. Norma Strong, Dr. Pelon An    urgent issues - spectra [phone] or [phone]  nonurgent issues - [phone]  patient appointments or afterhours - [phone]

## 2018-12-04 NOTE — ED PROVIDER NOTE - OBJECTIVE STATEMENT
84 y/o male pmh dm, dementia, bedbound transferred from [place] for gastric pneumatosis. Pt is unable to provide hx due to dementia. As per daughter, pt had witnessed coffee ground emesis this morning by aid. EMS was called and pt was brought to [place] ER where CT preformed revealed gastric pneumatosis tracking to the esophagus. Pt is at baseline mental status as per daughter, eyes are closed but will respond to commands. Surgery is aware of the patient.

## 2018-12-04 NOTE — ED PROVIDER NOTE - PSH
History of cholecystectomy    History of nephrolithiasis    Status post open reduction with internal fixation of fracture  right femur

## 2018-12-04 NOTE — ED PROVIDER NOTE - OBJECTIVE STATEMENT
83 years old male by ems with aide witnessed a large coffee ground vomiting this morning. Pt has a hx of dementia unable to give hx. Aide sts pt is in his normal mental status.

## 2018-12-04 NOTE — CONSULT NOTE ADULT - SUBJECTIVE AND OBJECTIVE BOX
HPI:83 M w/PMhx dementia w/ aphasia with recent hospitalization with GNR UTI sepsis presents to [facility] with episode of hematemesis. CT CHEST/ABD at outside hospital showed gastric pneumotosis w/ air tracking to distal esophagus, trace Lt Pleural effusion, fecal impaction      PAST MEDICAL & SURGICAL HISTORY:  Dementia  DM (diabetes mellitus)      REVIEW OF SYSTEMS: Pt unable to participate, aphasia    General:No Weight change/ Fatigue/ HA/Dizzy	    Skin/Breast: No Rashes/ Lesions/ Masses  	  Ophthalmologic: No Blurry vision/ Glaucoma/ Blindness  	  ENMT: No Hearing loss/ Drainage/ Lesions	    Respiratory and Thorax: No Cough/ Wheezing/ SOB/ Hemoptysis/ Sputum production  	  Cardiovascular: No Chest pain/ Palpitations/ Diaphoresis	    Gastrointestinal: No Nausea/ Vomiting/ Constipation/ Appetite Change	    Genitourinary: No Heamturia/ Dysuria/ Frequency change/ Impotence	    Musculoskeletal: No Pain/ Weakness/ Claudication	    Neurological: No Seizures/ TIA/CVA/ Parastesias	    Psychiatric: No Dementia/ Depression/ SI/HI	    Hematology/Lymphatics: No hx of bleeding/ Edema	    Endocrine:	No Hyperglycemia/ Hypoglycemia    Allergic/Immunologic:	 No Anaphylaxis/ Intolerance/ Recent illnesses    MEDICATIONS  (STANDING):  lactated ringers. 1000 milliLiter(s) (125 mL/Hr) IV Continuous <Continuous>    MEDICATIONS  (PRN):      Allergies    No Known Allergies    Intolerances        SOCIAL HISTORY:  Occupation:  Smoking Hx: denies  Etoh Hx: denies  IVDA Hx: denies    FAMILY HISTORY:    unless noted, no significant family hx with Mother, Father, Siblings    Vital Signs Last 24 Hrs  T(C): 36.8 (04 Dec 2018 12:16), Max: 36.8 (04 Dec 2018 12:16)  T(F): 98.2 (04 Dec 2018 12:16), Max: 98.2 (04 Dec 2018 12:16)  HR: 91 (04 Dec 2018 12:16) (91 - 91)  BP: 135/61 (04 Dec 2018 12:16) (135/61 - 135/61)  BP(mean): --  RR: 18 (04 Dec 2018 12:16) (18 - 18)  SpO2: 98% (04 Dec 2018 12:16) (98% - 98%)    General: mild distress  Neurology: unarousable, aphasic, grimaces to pain  Eyes: Scleras clear  ENT:grossly patent pharynx, no stridor  Neck: Neck supple, trachea midline, No JVD,   Respiratory: CTA B/L, No wheezing, rales, rhonchi  CV: RRR, S1S2, no murmurs, rubs or gallops  Abdominal: TTP on all quadrants, slightly rigid  Extremities: No edema, + peripheral pulses  Skin: No Rashes, Hematoma, Ecchymosis  Lymphatic: No Neck, axilla, groin LAD  Psych: aphasic    LABS:                RADIOLOGY & ADDITIONAL STUDIES:    ASSESSMENT:   83yMalePAST MEDICAL & SURGICAL HISTORY:  Dementia  DM (diabetes mellitus)  HEALTH ISSUES - PROBLEM Dx:  83M Pmhx: dementia, aphasia presents with gastric pneumotosis    PLAN: No acute thoracic issues           Mediastinal air secondary to gastric pneumotosis           No esophageal perforation appreciated           Care as per General surgery           Thoracic available for intraop consultaion if necessary           Plan d/w Dr Pérez/Dr Apple HPI:83 M w/PMhx dementia w/ aphasia, GERD, insulin dependent DM2, HTN, Glaumoma, Nephrolithiasis, ORIF,  with recent hospitalization with GNR UTI sepsis presents to [facility] with episode of hematemesis. CT CHEST/ABD at outside hospital showed gastric pneumotosis w/ air tracking to distal esophagus, trace Lt Pleural effusion, fecal impaction      PAST MEDICAL & SURGICAL HISTORY:  Dementia  DM (diabetes mellitus)      REVIEW OF SYSTEMS: Pt unable to participate, aphasia    General:No Weight change/ Fatigue/ HA/Dizzy	    Skin/Breast: No Rashes/ Lesions/ Masses  	  Ophthalmologic: No Blurry vision/ Glaucoma/ Blindness  	  ENMT: No Hearing loss/ Drainage/ Lesions	    Respiratory and Thorax: No Cough/ Wheezing/ SOB/ Hemoptysis/ Sputum production  	  Cardiovascular: No Chest pain/ Palpitations/ Diaphoresis	    Gastrointestinal: No Nausea/ Vomiting/ Constipation/ Appetite Change	    Genitourinary: No Heamturia/ Dysuria/ Frequency change/ Impotence	    Musculoskeletal: No Pain/ Weakness/ Claudication	    Neurological: No Seizures/ TIA/CVA/ Parastesias	    Psychiatric: No Dementia/ Depression/ SI/HI	    Hematology/Lymphatics: No hx of bleeding/ Edema	    Endocrine:	No Hyperglycemia/ Hypoglycemia    Allergic/Immunologic:	 No Anaphylaxis/ Intolerance/ Recent illnesses    MEDICATIONS  (STANDING):  lactated ringers. 1000 milliLiter(s) (125 mL/Hr) IV Continuous <Continuous>    MEDICATIONS  (PRN):      Allergies    No Known Allergies    Intolerances        SOCIAL HISTORY:  Occupation:  Smoking Hx: denies  Etoh Hx: denies  IVDA Hx: denies    FAMILY HISTORY:    unless noted, no significant family hx with Mother, Father, Siblings    Vital Signs Last 24 Hrs  T(C): 36.8 (04 Dec 2018 12:16), Max: 36.8 (04 Dec 2018 12:16)  T(F): 98.2 (04 Dec 2018 12:16), Max: 98.2 (04 Dec 2018 12:16)  HR: 91 (04 Dec 2018 12:16) (91 - 91)  BP: 135/61 (04 Dec 2018 12:16) (135/61 - 135/61)  BP(mean): --  RR: 18 (04 Dec 2018 12:16) (18 - 18)  SpO2: 98% (04 Dec 2018 12:16) (98% - 98%)    General: mild distress  Neurology: unarousable, aphasic, grimaces to pain  Eyes: Scleras clear  ENT:grossly patent pharynx, no stridor  Neck: Neck supple, trachea midline, No JVD,   Respiratory: CTA B/L, No wheezing, rales, rhonchi  CV: RRR, S1S2, no murmurs, rubs or gallops  Abdominal: TTP on all quadrants, slightly rigid  Extremities: No edema, + peripheral pulses  Skin: No Rashes, Hematoma, Ecchymosis  Lymphatic: No Neck, axilla, groin LAD  Psych: aphasic    LABS:                RADIOLOGY & ADDITIONAL STUDIES:    ASSESSMENT:   83yMalePAST MEDICAL & SURGICAL HISTORY:  Dementia  DM (diabetes mellitus)  HEALTH ISSUES - PROBLEM Dx:  83M Pmhx: dementia, aphasia presents with gastric pneumotosis    PLAN: No acute thoracic issues           Mediastinal air secondary to gastric pneumotosis           No esophageal perforation appreciated           Care as per General surgery           Thoracic available for intraop consultaion if necessary           Plan d/w Dr Pérez/Dr Apple

## 2018-12-05 DIAGNOSIS — R53.2 FUNCTIONAL QUADRIPLEGIA: ICD-10-CM

## 2018-12-05 DIAGNOSIS — K63.89 OTHER SPECIFIED DISEASES OF INTESTINE: ICD-10-CM

## 2018-12-05 DIAGNOSIS — Z51.5 ENCOUNTER FOR PALLIATIVE CARE: ICD-10-CM

## 2018-12-05 DIAGNOSIS — F03.90 UNSPECIFIED DEMENTIA WITHOUT BEHAVIORAL DISTURBANCE: ICD-10-CM

## 2018-12-05 LAB
BLD GP AB SCN SERPL QL: NEGATIVE — SIGNIFICANT CHANGE UP
BUN SERPL-MCNC: 7 MG/DL — SIGNIFICANT CHANGE UP (ref 7–23)
CALCIUM SERPL-MCNC: 8.2 MG/DL — LOW (ref 8.4–10.5)
CHLORIDE SERPL-SCNC: 110 MMOL/L — HIGH (ref 98–107)
CO2 SERPL-SCNC: 26 MMOL/L — SIGNIFICANT CHANGE UP (ref 22–31)
CREAT SERPL-MCNC: 0.57 MG/DL — SIGNIFICANT CHANGE UP (ref 0.5–1.3)
CULTURE RESULTS: NO GROWTH — SIGNIFICANT CHANGE UP
GLUCOSE SERPL-MCNC: 133 MG/DL — HIGH (ref 70–99)
HBA1C BLD-MCNC: 9.5 % — HIGH (ref 4–5.6)
HCT VFR BLD CALC: 32 % — LOW (ref 39–50)
HGB BLD-MCNC: 10.7 G/DL — LOW (ref 13–17)
INR BLD: 1.15 — SIGNIFICANT CHANGE UP (ref 0.88–1.17)
LACTATE SERPL-SCNC: 1.6 MMOL/L — SIGNIFICANT CHANGE UP (ref 0.5–2)
MAGNESIUM SERPL-MCNC: 1.7 MG/DL — SIGNIFICANT CHANGE UP (ref 1.6–2.6)
MCHC RBC-ENTMCNC: 31.9 PG — SIGNIFICANT CHANGE UP (ref 27–34)
MCHC RBC-ENTMCNC: 33.4 % — SIGNIFICANT CHANGE UP (ref 32–36)
MCV RBC AUTO: 95.5 FL — SIGNIFICANT CHANGE UP (ref 80–100)
NRBC # FLD: 0 — SIGNIFICANT CHANGE UP
PHOSPHATE SERPL-MCNC: 1.5 MG/DL — LOW (ref 2.5–4.5)
PLATELET # BLD AUTO: 215 K/UL — SIGNIFICANT CHANGE UP (ref 150–400)
PMV BLD: 12.1 FL — SIGNIFICANT CHANGE UP (ref 7–13)
POTASSIUM SERPL-MCNC: 3.6 MMOL/L — SIGNIFICANT CHANGE UP (ref 3.5–5.3)
POTASSIUM SERPL-SCNC: 3.6 MMOL/L — SIGNIFICANT CHANGE UP (ref 3.5–5.3)
PROTHROM AB SERPL-ACNC: 13.2 SEC — HIGH (ref 9.8–13.1)
RBC # BLD: 3.35 M/UL — LOW (ref 4.2–5.8)
RBC # FLD: 13.2 % — SIGNIFICANT CHANGE UP (ref 10.3–14.5)
RH IG SCN BLD-IMP: POSITIVE — SIGNIFICANT CHANGE UP
SODIUM SERPL-SCNC: 144 MMOL/L — SIGNIFICANT CHANGE UP (ref 135–145)
SPECIMEN SOURCE: SIGNIFICANT CHANGE UP
WBC # BLD: 18.04 K/UL — HIGH (ref 3.8–10.5)
WBC # FLD AUTO: 18.04 K/UL — HIGH (ref 3.8–10.5)

## 2018-12-05 PROCEDURE — 71045 X-RAY EXAM CHEST 1 VIEW: CPT | Mod: 26

## 2018-12-05 PROCEDURE — 99223 1ST HOSP IP/OBS HIGH 75: CPT

## 2018-12-05 RX ORDER — GLUCAGON INJECTION, SOLUTION 0.5 MG/.1ML
1 INJECTION, SOLUTION SUBCUTANEOUS ONCE
Qty: 0 | Refills: 0 | Status: DISCONTINUED | OUTPATIENT
Start: 2018-12-05 | End: 2018-12-10

## 2018-12-05 RX ORDER — POTASSIUM CHLORIDE 20 MEQ
10 PACKET (EA) ORAL
Qty: 0 | Refills: 0 | Status: COMPLETED | OUTPATIENT
Start: 2018-12-05 | End: 2018-12-05

## 2018-12-05 RX ORDER — SODIUM CHLORIDE 9 MG/ML
1000 INJECTION, SOLUTION INTRAVENOUS
Qty: 0 | Refills: 0 | Status: DISCONTINUED | OUTPATIENT
Start: 2018-12-05 | End: 2018-12-10

## 2018-12-05 RX ORDER — DEXTROSE 50 % IN WATER 50 %
25 SYRINGE (ML) INTRAVENOUS ONCE
Qty: 0 | Refills: 0 | Status: DISCONTINUED | OUTPATIENT
Start: 2018-12-05 | End: 2018-12-10

## 2018-12-05 RX ORDER — PIPERACILLIN AND TAZOBACTAM 4; .5 G/20ML; G/20ML
3.38 INJECTION, POWDER, LYOPHILIZED, FOR SOLUTION INTRAVENOUS EVERY 8 HOURS
Qty: 0 | Refills: 0 | Status: DISCONTINUED | OUTPATIENT
Start: 2018-12-05 | End: 2018-12-13

## 2018-12-05 RX ORDER — MAGNESIUM SULFATE 500 MG/ML
2 VIAL (ML) INJECTION ONCE
Qty: 0 | Refills: 0 | Status: COMPLETED | OUTPATIENT
Start: 2018-12-05 | End: 2018-12-05

## 2018-12-05 RX ORDER — POTASSIUM PHOSPHATE, MONOBASIC POTASSIUM PHOSPHATE, DIBASIC 236; 224 MG/ML; MG/ML
15 INJECTION, SOLUTION INTRAVENOUS ONCE
Qty: 0 | Refills: 0 | Status: COMPLETED | OUTPATIENT
Start: 2018-12-05 | End: 2018-12-05

## 2018-12-05 RX ORDER — DEXTROSE 50 % IN WATER 50 %
12.5 SYRINGE (ML) INTRAVENOUS ONCE
Qty: 0 | Refills: 0 | Status: DISCONTINUED | OUTPATIENT
Start: 2018-12-05 | End: 2018-12-10

## 2018-12-05 RX ORDER — ACETAMINOPHEN 500 MG
1000 TABLET ORAL ONCE
Qty: 0 | Refills: 0 | Status: COMPLETED | OUTPATIENT
Start: 2018-12-05 | End: 2018-12-05

## 2018-12-05 RX ORDER — DEXTROSE 50 % IN WATER 50 %
15 SYRINGE (ML) INTRAVENOUS ONCE
Qty: 0 | Refills: 0 | Status: DISCONTINUED | OUTPATIENT
Start: 2018-12-05 | End: 2018-12-10

## 2018-12-05 RX ORDER — DEXTROSE MONOHYDRATE, SODIUM CHLORIDE, AND POTASSIUM CHLORIDE 50; .745; 4.5 G/1000ML; G/1000ML; G/1000ML
1000 INJECTION, SOLUTION INTRAVENOUS
Qty: 0 | Refills: 0 | Status: DISCONTINUED | OUTPATIENT
Start: 2018-12-05 | End: 2018-12-15

## 2018-12-05 RX ADMIN — Medication 50 GRAM(S): at 16:02

## 2018-12-05 RX ADMIN — PIPERACILLIN AND TAZOBACTAM 25 GRAM(S): 4; .5 INJECTION, POWDER, LYOPHILIZED, FOR SOLUTION INTRAVENOUS at 14:34

## 2018-12-05 RX ADMIN — Medication 4: at 12:49

## 2018-12-05 RX ADMIN — Medication 100 MILLIEQUIVALENT(S): at 11:32

## 2018-12-05 RX ADMIN — PIPERACILLIN AND TAZOBACTAM 25 GRAM(S): 4; .5 INJECTION, POWDER, LYOPHILIZED, FOR SOLUTION INTRAVENOUS at 22:49

## 2018-12-05 RX ADMIN — Medication 5 MILLIGRAM(S): at 18:16

## 2018-12-05 RX ADMIN — PANTOPRAZOLE SODIUM 40 MILLIGRAM(S): 20 TABLET, DELAYED RELEASE ORAL at 06:09

## 2018-12-05 RX ADMIN — Medication 5 MILLIGRAM(S): at 23:59

## 2018-12-05 RX ADMIN — Medication 4: at 18:16

## 2018-12-05 RX ADMIN — Medication 100 MILLIEQUIVALENT(S): at 12:48

## 2018-12-05 RX ADMIN — Medication 5 MILLIGRAM(S): at 12:48

## 2018-12-05 RX ADMIN — Medication 5 MILLIGRAM(S): at 06:08

## 2018-12-05 RX ADMIN — Medication 4: at 23:58

## 2018-12-05 RX ADMIN — Medication 400 MILLIGRAM(S): at 19:13

## 2018-12-05 RX ADMIN — ENOXAPARIN SODIUM 40 MILLIGRAM(S): 100 INJECTION SUBCUTANEOUS at 12:48

## 2018-12-05 RX ADMIN — PANTOPRAZOLE SODIUM 40 MILLIGRAM(S): 20 TABLET, DELAYED RELEASE ORAL at 18:16

## 2018-12-05 RX ADMIN — DEXTROSE MONOHYDRATE, SODIUM CHLORIDE, AND POTASSIUM CHLORIDE 125 MILLILITER(S): 50; .745; 4.5 INJECTION, SOLUTION INTRAVENOUS at 22:50

## 2018-12-05 RX ADMIN — Medication 100 MILLIEQUIVALENT(S): at 14:33

## 2018-12-05 RX ADMIN — POTASSIUM PHOSPHATE, MONOBASIC POTASSIUM PHOSPHATE, DIBASIC 62.5 MILLIMOLE(S): 236; 224 INJECTION, SOLUTION INTRAVENOUS at 18:15

## 2018-12-05 NOTE — PROGRESS NOTE ADULT - SUBJECTIVE AND OBJECTIVE BOX
Surgery Progress Note      Subjective: Patient seen and examined. No acute events overnight.     T(C): 37 (12-04-18 @ 22:56), Max: 37 (12-04-18 @ 22:56)  HR: 75 (12-05-18 @ 00:00) (75 - 91)  BP: 118/81 (12-05-18 @ 00:00) (114/56 - 155/66)  RR: 16 (12-04-18 @ 22:56) (16 - 18)  SpO2: 96% (12-04-18 @ 22:56) (96% - 100%)      12-04-18 @ 07:01  -  12-05-18 @ 01:56  --------------------------------------------------------  IN: 0 mL / OUT: 1250 mL / NET: -1250 mL        Physical Exam:   General: NAD, NGT in place  Abdomen: Soft, nondistended. Well healed RUQ and RLQ scars. TTP over epigastric area.   : parrish in situ    Labs:      Medications:     dextrose 40% Gel 15 Gram(s) Oral once PRN  dextrose 5%. 1000 milliLiter(s) IV Continuous <Continuous>  dextrose 50% Injectable 12.5 Gram(s) IV Push once  dextrose 50% Injectable 25 Gram(s) IV Push once  dextrose 50% Injectable 25 Gram(s) IV Push once  enoxaparin Injectable 40 milliGRAM(s) SubCutaneous daily  glucagon  Injectable 1 milliGRAM(s) IntraMuscular once PRN  influenza   Vaccine 0.5 milliLiter(s) IntraMuscular once  insulin lispro (HumaLOG) corrective regimen sliding scale   SubCutaneous every 6 hours  lactated ringers. 1000 milliLiter(s) IV Continuous <Continuous>  metoprolol tartrate Injectable 5 milliGRAM(s) IV Push every 6 hours  pantoprazole  Injectable 40 milliGRAM(s) IV Push every 12 hours      Radiographs: No new imaging Surgery Progress Note    Subjective:   Yesterday patient placed on enhanced supervisionn as he was pulling at tubes. [place] went down to 2.0 from 3.5.   Patient seen and examined. No acute events overnight.     T(C): 37 (12-04-18 @ 22:56), Max: 37 (12-04-18 @ 22:56)  HR: 75 (12-05-18 @ 00:00) (75 - 91)  BP: 118/81 (12-05-18 @ 00:00) (114/56 - 155/66)  RR: 16 (12-04-18 @ 22:56) (16 - 18)  SpO2: 96% (12-04-18 @ 22:56) (96% - 100%)      12-04-18 @ 07:01  -  12-05-18 @ 01:56  --------------------------------------------------------  IN: 0 mL / OUT: 1250 mL / NET: -1250 mL        Physical Exam:   General: NAD, NGT in place  Abdomen: Soft, nondistended. Well healed RUQ and RLQ scars. TTP in LLQ.   : parrish in situ    Labs:      Medications:     dextrose 40% Gel 15 Gram(s) Oral once PRN  dextrose 5%. 1000 milliLiter(s) IV Continuous <Continuous>  dextrose 50% Injectable 12.5 Gram(s) IV Push once  dextrose 50% Injectable 25 Gram(s) IV Push once  dextrose 50% Injectable 25 Gram(s) IV Push once  enoxaparin Injectable 40 milliGRAM(s) SubCutaneous daily  glucagon  Injectable 1 milliGRAM(s) IntraMuscular once PRN  influenza   Vaccine 0.5 milliLiter(s) IntraMuscular once  insulin lispro (HumaLOG) corrective regimen sliding scale   SubCutaneous every 6 hours  lactated ringers. 1000 milliLiter(s) IV Continuous <Continuous>  metoprolol tartrate Injectable 5 milliGRAM(s) IV Push every 6 hours  pantoprazole  Injectable 40 milliGRAM(s) IV Push every 12 hours      Radiographs: No new imaging

## 2018-12-05 NOTE — CONSULT NOTE ADULT - PROBLEM SELECTOR RECOMMENDATION 9
No plans for surgery at this time given likely large extent of surgery and patient's overall medical condition.  Patient DNR (NOT DNI as per primary team).  NGT to suction, on Zosyn/protonix.  Continue management as per surgery team.  Family wishes to continue full medical management at this time.

## 2018-12-05 NOTE — PROVIDER CONTACT NOTE (OTHER) - SITUATION
pt parrish indication inappropriate for floor. Ordered for critically ill monitoring, pt admitted to general surgical floor. pt also confused at baseline, daughter expresses concern of pulling out NGT

## 2018-12-05 NOTE — ADVANCED PRACTICE NURSE CONSULT - RECOMMEDATIONS
Patient currently NPO.   Consider Nutrition consult and serum Pre-Albumin with next blood draws.     Topical Recommendation:  Left nare Nasogastric Tube- Cleanse nare with NS. Pat dry. Apply Liquid barrier film to periwound skin. Apply Stat-lock NGT reddy over center of nare, not touch any skin circumferentially. Change every three days or prn if soiled or compromised.     Bilateral buttocks- Cleanse wound and periwound skin with NS. Pat dry. Apply Liquid barrier film to periwound skin. Cover with silicone foam with border (protect from friction and sheer), Change daily. After completion of wound care, apply Critic-aid clear to remaining exposed buttocks, perineum, and scrotum once a shift and prn with episodes of incontinence.    Continue low air loss bed therapy, continue heel elevation with Z-flex fluidized positioning boots, continue to turn & reposition q2h with Z-rory fluidized positioning device, soft pillow between bony prominences, continue moisture/incontinence management as recommended above & single breathable pad, continue measures to decrease friction/shear/pressure.     Findings and plan discussed with primary team.    Please contact Wound Care Service Line if we can be of further assistance (ext [phone]).

## 2018-12-05 NOTE — PROVIDER CONTACT NOTE (OTHER) - ACTION/TREATMENT ORDERED:
Christina order changed to comfort care at this time. Will follow up with am team regarding appropriate indication. Enhanced supervision ordered.  Cont to monitor pt.

## 2018-12-05 NOTE — PROGRESS NOTE ADULT - ASSESSMENT
83M PMH DM, Dementia (AAOx0-1 at baseline), DM, HTN, Open cholecystectomy (2017), open appendectomy (remote), presented as transfer from OSH for pneumatosis    - Thoracic surgery reccs appreciated - imaging reviewed in conjunction with Radiology and thoracic surgery - Esophageal pneumatosis looks to be extension of gastric pneumatosis. 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  - Extensive conversation had with Pt's daughter - who understands that any operative intervention would be quite extensive, likely involving gastrectomy. Patients daughter opting at this time to make pt DNR, but requesting full medical management  - NPO/NGT/IVF  - Serial abdominal exams  - IV abx: Zosyn  - PPI  - Monitor I/Os with indwelling parrish catheter 83M PMH DM, Dementia (AAOx0-1 at baseline), DM, HTN, Open cholecystectomy (2017), open appendectomy (remote), presented as transfer from OSH for pneumatosis    -f/u AM lactate  - Thoracic surgery recs appreciated - imaging reviewed in conjunction with Radiology and thoracic surgery - Esophageal pneumatosis looks to be extension of gastric pneumatosis. 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  - Extensive conversation had with Pt's daughter - who understands that any operative intervention would be quite extensive, likely involving gastrectomy. Patients daughter opting at this time to make pt DNR, but requesting full medical management  - NPO/NGT/IVF  - Serial abdominal exams  - IV abx: Zosyn  - PPI  - Monitor I/Os with indwelling parrish catheter

## 2018-12-05 NOTE — CONSULT NOTE ADULT - SUBJECTIVE AND OBJECTIVE BOX
HPI: Obtained from H&P   83M PMH DM, Dementia (AAOx0-1 at baseline), DM, HTN, Open cholecystectomy (2017), open appendectomy (remote), presented as transfer from OSH for pneumatosis. Patient is mostly nonverbal and history was obtained from daughter at bedside. Pt's daughter states that pt began to have hematemesis this morning, prompting presentation to OSH. CT abd/pelvis obtained there which showed gastric pneumatosis, with gas tracking up the esophageal wall. Patient transferred to [facility] for further management.     Patient currently laying in bed in no acute distress      PERTINENT PM/SXH:   Dementia  DM (diabetes mellitus)  Glaucoma  DM (diabetes mellitus)  HTN (hypertension)  Dementia  Glaucoma  Dementia  DM (diabetes mellitus)  HTN (hypertension)    History of cholecystectomy  History of appendectomy  History of cholecystectomy  History of nephrolithiasis  No significant past surgical history  Status post open reduction with internal fixation of fracture  History of hip surgery    FAMILY HISTORY:  No pertinent family history in first degree relatives  No pertinent family history in first degree relatives      SOCIAL HISTORY:   Significant other/partner: Yes [ ]  No [x ] Children:  Yes [x ]  No [ ] Latter day/Spirituality:  Taoism   Substance hx: Tobacco hx:     Alcohol hx: Unable to assess   Home Opioid hx:  Yes [ ] No [x ]    Living Situation: [x ]Home - with 24hr HHA    ADVANCE DIRECTIVES:    DNR  Yes      [x ] Health Care Proxy(s)  [ ] Surrogate(s)  [ ] Guardian           Name(s): Phone Number(s): Geraldine Chandra [phone]    BASELINE (I)ADL(s) (prior to admission):  [place]: [ ]Total  [ ] Moderate [x ]Dependent    Allergies    No Known Allergies    Intolerances    MEDICATIONS  (STANDING):  dextrose 5% + sodium chloride 0.45% with potassium chloride 20 mEq/L 1000 milliLiter(s) (125 mL/Hr) IV Continuous <Continuous>  dextrose 5%. 1000 milliLiter(s) (50 mL/Hr) IV Continuous <Continuous>  dextrose 50% Injectable 12.5 Gram(s) IV Push once  dextrose 50% Injectable 25 Gram(s) IV Push once  dextrose 50% Injectable 25 Gram(s) IV Push once  enoxaparin Injectable 40 milliGRAM(s) SubCutaneous daily  influenza   Vaccine 0.5 milliLiter(s) IntraMuscular once  insulin lispro (HumaLOG) corrective regimen sliding scale   SubCutaneous every 6 hours  magnesium sulfate  IVPB 2 Gram(s) IV Intermittent once  metoprolol tartrate Injectable 5 milliGRAM(s) IV Push every 6 hours  pantoprazole  Injectable 40 milliGRAM(s) IV Push every 12 hours  piperacillin/tazobactam IVPB. 3.375 Gram(s) IV Intermittent every 8 hours  potassium phosphate IVPB 15 milliMole(s) IV Intermittent once    MEDICATIONS  (PRN):  dextrose 40% Gel 15 Gram(s) Oral once PRN Blood Glucose LESS THAN 70 milliGRAM(s)/deciLiter  glucagon  Injectable 1 milliGRAM(s) IntraMuscular once PRN Glucose <70 milliGRAM(s)/deciLiter    PRESENT SYMPTOMS: [x ]Unable to obtain due to poor mentation   Source if other than patient:  [ ]Family   [ ]Team     Pain (Impact on QOL):    Location -   Severity -        Minimal acceptable level (0-10 scale):  Quality:   Onset:   Duration:                 Aggravating factors -  Relieving factors -  Radiation -    PAIN AD Score: 1    [email or web address] (press ctrl +  left click to view)    Dyspnea:  Yes [ ] No [ ] - [ ]Mild [ ]Moderate [ ]Severe  Anxiety:    Yes [ ] No [ ] - [ ]Mild [ ]Moderate [ ]Severe  Fatigue:    Yes [ ] No [ ] - [ ]Mild [ ]Moderate [ ]Severe  Nausea:    Yes [ ] No [ ] - [ ]Mild [ ]Moderate [ ]Severe                         Loss of appetite: Yes [ ] No [ ] - [ ]Mild [ ]Moderate [ ]Severe             Constipation:  Yes [ ] No [ ] - [ ]Mild [ ]Moderate [ ]Severe    Other Symptoms:  [ ]All other review of systems negative     Karnofsky Performance Score/Palliative Performance Status Version 2:    20%    http://palliative.info/resource_material/[email or web address].pdf    PHYSICAL EXAM:  Vital Signs Last 24 Hrs  T(C): 37.4 (05 Dec 2018 14:02), Max: 37.4 (05 Dec 2018 14:02)  T(F): 99.4 (05 Dec 2018 14:02), Max: 99.4 (05 Dec 2018 14:02)  HR: 71 (05 Dec 2018 14:02) (70 - 79)  BP: 139/59 (05 Dec 2018 14:02) (114/56 - 155/66)  BP(mean): --  RR: 16 (05 Dec 2018 14:02) (16 - 16)  SpO2: 100% (05 Dec 2018 14:02) (95% - 100%) I&O's Summary    04 Dec 2018 07:01  -  05 Dec 2018 07:00  --------------------------------------------------------  IN: 1000 mL / OUT: 1800 mL / NET: -800 mL    05 Dec 2018 07:01  -  05 Dec 2018 14:35  --------------------------------------------------------  IN: 500 mL / OUT: 240 mL / NET: 260 mL    GENERAL:  Lethargic - when stimulated, patient moving his arms - not following commands   PULMONARY:   [x ]Clear - anteriorly   [ ]Rhonchi        [ ]Right [ ]Left [ ]Bilateral  [ ]Crackles        [ ]Right [ ]Left [ ]Bilateral  [ ]Wheezing     [ ]Right [ ]Left [ ]Bilateral  CARDIOVASCULAR:    [x ]Regular [ ]Irregular [ ]Tachy  [ ]Car [ ]Murmur [ ]Other  GASTROINTESTINAL:  [x ]Soft  [x ]Distended   [x ]+BS - hypoactive [x ]Non tender [ ]Tender  [ ]PEG [ x]OGT/ NGT -  Last BM: 18    GENITOURINARY:  [ ]Normal [ ] Incontinent   [ ]Oliguria/Anuria   [ x]Christina  MUSCULOSKELETAL:   [ ]Normal   [ ]Weakness  [x ]Bed/Wheelchair bound [ ]Edema  NEUROLOGIC:   [ ]No focal deficits  [x ] Cognitive impairment  [ ] Dysphagia [ ]Dysarthria [ ] Paresis [ ]Other   SKIN:   [ ]Normal   [x ]Pressure ulcer(s) Stage II to left and right buttock as per wound care     LABS:                        10.7   18.04 )-----------( 215      ( 05 Dec 2018 06:45 )             32.0   12-05    144  |  110<H>  |  7   ----------------------------<  133<H>  3.6   |  26  |  0.57    Ca    8.2<L>      05 Dec 2018 06:45  Phos  1.5       Mg     1.7         TPro  5.0<L>  /  Alb  2.6<L>  /  TBili  0.5  /  DBili  x   /  AST  19  /  ALT  28  /  AlkPhos  78  12  PT/INR - ( 05 Dec 2018 06:45 )   PT: 13.2 SEC;   INR: 1.15          PTT - ( 04 Dec 2018 07:53 )  PTT:29.8 sec    Urinalysis Basic - ( 04 Dec 2018 09:05 )    Color: Yellow / Appearance: Clear / S.015 / pH: x  Gluc: x / Ketone: Small  / Bili: Negative / Urobili: Negative mg/dL   Blood: x / Protein: Negative mg/dL / Nitrite: Negative   Leuk Esterase: Small / RBC: 3-5 /HPF / WBC 6-10   Sq Epi: x / Non Sq Epi: Occasional / Bacteria: x      RADIOLOGY & ADDITIONAL STUDIES: Reviewed  CT Abdomen  IMPRESSION:     Extensive gastric pneumatosis with air tracking along the distal   esophagus and mediastinum. Findings raise concern for ischemic process.    Trace left pleural effusion and associated atelectasis.    PROTEIN CALORIE MALNUTRITION PRESENT: [ ] Yes [ ] No  [ ] PPSV2 < or = to 30% [ ] significant weight loss  [ ] poor nutritional intake [ ] catabolic state [ ] anasarca     Albumin, Serum: 2.6 g/dL (18 @ 18:55)      REFERRALS:   [ ]Chaplaincy  [ ] Hospice  [ ]Child Life  [ ]Social Work  [ ]Case management [ ]Holistic Therapy   Goals of Care Discussion Document:

## 2018-12-05 NOTE — ADVANCED PRACTICE NURSE CONSULT - REASON FOR CONSULT
Patient seen on skin care rounds after wound care referral received for assessment of skin impairment and recommendations of topical management. Chart reviewed: Pt DNR, WBC 18.04k/uL, H/H 10.7/32.0, INR 1.15, Serum albumin 2.6g/dL, Serum total protein 5.0g/dL, HgbA1C 9.5%, POCT 135-390, Rodger 12, BMI 27.3kg/m2. Patient non-verbal, unable to follow commands. No family at bedside during time of assessment. Patient H/ODM, Dementia (AAOx0-1 at baseline), DM, HTN, Open cholecystectomy (2017), open appendectomy (remote), presented as transfer from OSH for pneumatosis. Patient is mostly nonverbal and history was obtained from daughter at bedside. Pt's daughter states that pt began to have hematemesis this morning, prompting presentation to OSH. CT abd/pelvis obtained there which showed gastric pneumatosis, with gas tracking up the esophageal wall. Patient transferred to [facility] for further management. Pt seen and followed by Thoracic surgery for mediastinal emphysema.

## 2018-12-05 NOTE — ADVANCED PRACTICE NURSE CONSULT - ASSESSMENT
General: Pt non-verbal, unable to follow commands, agitated turning repositioning (unintentionally grabbing at IV tubing, difficult to release ). Right nare NGT in place, peritubular skin intact; dark brown effluent with fecal odor. Pt bedbound, rigid and slightly contracted upper extremities. Hands in fist like position. Bilateral lower extremities rigid. Difficult to turn and position patient due to rigidity, but able to do so with two person assist and emotional support and encouragement. Incontinent of semi-formed stool (incontinent of stool during assessment, perineal care provided), indwelling parrish catheter in place. Skin warm, dry with increased moisture in intertriginous folds, adequate skin turgor, scattered areas of hyperpigmentation and hypopigmentation. Bilateral feet with thickened yellow fungal toenails, dry flaky plantar feet, stable callus noted on right medial heel. Blanchable erythema on bilateral heels.     Patient turned to left side:  Left buttock- difficult differential diagnosis wound with mixed etiology incontinence/moisture associated dermatitis, stage 2 pressure injury complicated by friction (as patient becomes agitated while turning and repositioning patient is trying to reposition self against assistance creating friction)- [id number], unable to visualize injury while patient is natural side lying position; irregular borders. 5% darkened dermis, 95% pink-moist exposed dermis. Scant serous drainage, no odor. Periwound skin with blanchable erythema extending to perineum, increased moisture within sacral fold; consistent with moisture and incontinence associated dermatitis. Goals of care: protect from friction, maintain controlled-moist environment to promote wound healing, protect periwound skin.    Right buttock- difficult differential diagnosis wound with mixed etiology incontinence/moisture associated dermatitis, stage 2 pressure injury complicated by friction (as patient becomes agitated while turning and repositioning patient is trying to reposition self against assistance creating friction)- [id number]- unable to visualize injury while patient is natural side lying position; irregular borders. 10% darkened dermis, 90% pink-moist exposed dermis. Scant serous drainage, no odor. Periwound skin with maceration along wound edges, blanchable erythema extending to perineum, increased moisture within sacral fold; consistent with moisture and incontinence associated dermatitis. Goals of care: protect from friction, maintain controlled-moist environment to promote wound healing, protect periwound skin.    No skin impairment noted over sacral spine at this time.

## 2018-12-05 NOTE — CONSULT NOTE ADULT - PROBLEM SELECTOR RECOMMENDATION 2
As per daughter Candace, patient has 24hr HHA at home and is able to feed himself and walk with a walker with assistance.  He is incontinent and minimally verbal at baseline.  Of note, patient with decubitus ulcers.  Patient likely with ~ FAST 7a.  Family aware that given patient's dementia and current medical condition, it is unlikely for patient to return to baseline from prior to hospitalization.  Continue supportive care. As per daughter Candace, patient has 24hr HHA at home and is able to feed himself and walk with a walker with assistance.  He is incontinent and minimally verbal at baseline.  Of note, patient with decubitus ulcers.  Patient likely with ~ FAST 7a at baseline.  Family aware that given patient's dementia and current medical condition, it is unlikely for patient to return to baseline from prior to hospitalization.  Continue supportive care.

## 2018-12-06 DIAGNOSIS — Z71.89 OTHER SPECIFIED COUNSELING: ICD-10-CM

## 2018-12-06 LAB
ALBUMIN SERPL ELPH-MCNC: 2.8 G/DL — LOW (ref 3.3–5)
ALP SERPL-CCNC: 83 U/L — SIGNIFICANT CHANGE UP (ref 40–120)
ALT FLD-CCNC: 24 U/L — SIGNIFICANT CHANGE UP (ref 4–41)
AST SERPL-CCNC: 27 U/L — SIGNIFICANT CHANGE UP (ref 4–40)
BILIRUB SERPL-MCNC: 0.7 MG/DL — SIGNIFICANT CHANGE UP (ref 0.2–1.2)
BUN SERPL-MCNC: 4 MG/DL — LOW (ref 7–23)
CALCIUM SERPL-MCNC: 8 MG/DL — LOW (ref 8.4–10.5)
CHLORIDE SERPL-SCNC: 101 MMOL/L — SIGNIFICANT CHANGE UP (ref 98–107)
CO2 SERPL-SCNC: 24 MMOL/L — SIGNIFICANT CHANGE UP (ref 22–31)
CREAT SERPL-MCNC: 0.57 MG/DL — SIGNIFICANT CHANGE UP (ref 0.5–1.3)
GLUCOSE SERPL-MCNC: 198 MG/DL — HIGH (ref 70–99)
HCT VFR BLD CALC: 28.2 % — LOW (ref 39–50)
HGB BLD-MCNC: 9 G/DL — LOW (ref 13–17)
LACTATE SERPL-SCNC: 1.6 MMOL/L — SIGNIFICANT CHANGE UP (ref 0.5–2)
MAGNESIUM SERPL-MCNC: 2.1 MG/DL — SIGNIFICANT CHANGE UP (ref 1.6–2.6)
MCHC RBC-ENTMCNC: 30.5 PG — SIGNIFICANT CHANGE UP (ref 27–34)
MCHC RBC-ENTMCNC: 31.9 % — LOW (ref 32–36)
MCV RBC AUTO: 95.6 FL — SIGNIFICANT CHANGE UP (ref 80–100)
NRBC # FLD: 0 — SIGNIFICANT CHANGE UP
PHOSPHATE SERPL-MCNC: 1.6 MG/DL — LOW (ref 2.5–4.5)
PHOSPHATE SERPL-MCNC: 11.5 MG/DL — HIGH (ref 2.5–4.5)
PLATELET # BLD AUTO: 187 K/UL — SIGNIFICANT CHANGE UP (ref 150–400)
PMV BLD: 11.5 FL — SIGNIFICANT CHANGE UP (ref 7–13)
POTASSIUM SERPL-MCNC: 3.9 MMOL/L — SIGNIFICANT CHANGE UP (ref 3.5–5.3)
POTASSIUM SERPL-SCNC: 3.9 MMOL/L — SIGNIFICANT CHANGE UP (ref 3.5–5.3)
PROT SERPL-MCNC: 5.8 G/DL — LOW (ref 6–8.3)
RBC # BLD: 2.95 M/UL — LOW (ref 4.2–5.8)
RBC # FLD: 12.8 % — SIGNIFICANT CHANGE UP (ref 10.3–14.5)
SODIUM SERPL-SCNC: 136 MMOL/L — SIGNIFICANT CHANGE UP (ref 135–145)
WBC # BLD: 9.59 K/UL — SIGNIFICANT CHANGE UP (ref 3.8–10.5)
WBC # FLD AUTO: 9.59 K/UL — SIGNIFICANT CHANGE UP (ref 3.8–10.5)

## 2018-12-06 PROCEDURE — 93306 TTE W/DOPPLER COMPLETE: CPT | Mod: 26

## 2018-12-06 RX ORDER — POTASSIUM PHOSPHATE, MONOBASIC POTASSIUM PHOSPHATE, DIBASIC 236; 224 MG/ML; MG/ML
15 INJECTION, SOLUTION INTRAVENOUS ONCE
Qty: 0 | Refills: 0 | Status: COMPLETED | OUTPATIENT
Start: 2018-12-06 | End: 2018-12-06

## 2018-12-06 RX ADMIN — PIPERACILLIN AND TAZOBACTAM 25 GRAM(S): 4; .5 INJECTION, POWDER, LYOPHILIZED, FOR SOLUTION INTRAVENOUS at 16:18

## 2018-12-06 RX ADMIN — PIPERACILLIN AND TAZOBACTAM 25 GRAM(S): 4; .5 INJECTION, POWDER, LYOPHILIZED, FOR SOLUTION INTRAVENOUS at 06:03

## 2018-12-06 RX ADMIN — PIPERACILLIN AND TAZOBACTAM 25 GRAM(S): 4; .5 INJECTION, POWDER, LYOPHILIZED, FOR SOLUTION INTRAVENOUS at 22:35

## 2018-12-06 RX ADMIN — ENOXAPARIN SODIUM 40 MILLIGRAM(S): 100 INJECTION SUBCUTANEOUS at 11:20

## 2018-12-06 RX ADMIN — Medication 6: at 13:19

## 2018-12-06 RX ADMIN — DEXTROSE MONOHYDRATE, SODIUM CHLORIDE, AND POTASSIUM CHLORIDE 125 MILLILITER(S): 50; .745; 4.5 INJECTION, SOLUTION INTRAVENOUS at 19:01

## 2018-12-06 RX ADMIN — PANTOPRAZOLE SODIUM 40 MILLIGRAM(S): 20 TABLET, DELAYED RELEASE ORAL at 06:04

## 2018-12-06 RX ADMIN — POTASSIUM PHOSPHATE, MONOBASIC POTASSIUM PHOSPHATE, DIBASIC 62.5 MILLIMOLE(S): 236; 224 INJECTION, SOLUTION INTRAVENOUS at 20:01

## 2018-12-06 RX ADMIN — Medication 4: at 18:58

## 2018-12-06 RX ADMIN — Medication 5 MILLIGRAM(S): at 06:04

## 2018-12-06 RX ADMIN — DEXTROSE MONOHYDRATE, SODIUM CHLORIDE, AND POTASSIUM CHLORIDE 125 MILLILITER(S): 50; .745; 4.5 INJECTION, SOLUTION INTRAVENOUS at 16:18

## 2018-12-06 RX ADMIN — Medication 5 MILLIGRAM(S): at 11:20

## 2018-12-06 RX ADMIN — Medication 2: at 06:03

## 2018-12-06 RX ADMIN — PANTOPRAZOLE SODIUM 40 MILLIGRAM(S): 20 TABLET, DELAYED RELEASE ORAL at 18:58

## 2018-12-06 RX ADMIN — Medication 5 MILLIGRAM(S): at 18:57

## 2018-12-06 NOTE — DIETITIAN INITIAL EVALUATION ADULT. - NS AS NUTRI INTERV MEALS SNACK
1. As patient demonstrates inability to consume adequate po intake at this time, consider nutrition GOC discussion 2. RDN remains available for further recommendations should route of nutrition be determined. 1. As patient demonstrates inability to consume adequate po intake at this time, consider nutrition GOC discussion 2. RDN remains available.

## 2018-12-06 NOTE — DIETITIAN INITIAL EVALUATION ADULT. - ORAL INTAKE PTA
Per daughter, patient with good appetite and po intake at baseline. However, patient endorsed hematemesis  2days ago and was admitted to hospital. Now remains NPO x 2 days.

## 2018-12-06 NOTE — DIETITIAN INITIAL EVALUATION ADULT. - NS AS NUTRI INTERV VITAMIN
Multivitamin/mineral/Multivitamin with minerals, and Vitamin C supplementation to promote wound healing.

## 2018-12-06 NOTE — PROGRESS NOTE ADULT - ASSESSMENT
83M PMH DM, Dementia (AAOx0-1 at baseline), DM, HTN, Open cholecystectomy (2017), open appendectomy (remote), presented as transfer from OSH for pneumatosis    -f/u AM lactate  - Thoracic surgery recs appreciated - imaging reviewed in conjunction with Radiology and thoracic surgery - Esophageal pneumatosis looks to be extension of gastric pneumatosis. 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  - Extensive conversation had with Pt's daughter - who understands that any operative intervention would be quite extensive, likely involving gastrectomy. Patients daughter opting at this time to make pt DNR, but requesting full medical management  - c/w NPO with IVF  -c/w NGT  - Serial abdominal exams  - IV abx: Zosyn  - PPI  - Monitor I/Os with indwelling parrish catheter

## 2018-12-06 NOTE — DIETITIAN INITIAL EVALUATION ADULT. - PERTINENT LABORATORY DATA
12-06 Na136 mmol/L Glu 198 mg/dL<H> K+ 3.9 mmol/L Cr  0.57 mg/dL BUN 4 mg/dL<L> 12-06 Phos 1.6 mg/dL<L> 12-06 Alb 2.8 g/dL<L> 12-05 [id number] 9.5 %<H>

## 2018-12-06 NOTE — PROGRESS NOTE ADULT - SUBJECTIVE AND OBJECTIVE BOX
Surgical Progress Note    Subjective:  Overnight pt had a T 100.9. Prior fever work-up was already sent so it was not repeated. Pt seen at bedside.    Objective:  Physical Exam  General: NAD, NGT in place  Abdomen: Soft, nondistended. Well healed RUQ and RLQ scars. TTP in LLQ.   : parrish in situ      T(C): 37.1 (12-06-18 @ 10:10), Max: 38.3 (12-05-18 @ 17:49)  HR: 71 (12-06-18 @ 11:19) (64 - 77)  BP: 146/62 (12-06-18 @ 11:19) (120/54 - 146/62)  RR: 18 (12-06-18 @ 10:10) (16 - 18)  SpO2: 97% (12-06-18 @ 10:10) (95% - 100%)    12-05-18 @ 07:01  -  12-06-18 @ 07:00  --------------------------------------------------------  IN: 3300 mL / OUT: 2730 mL / NET: 570 mL    12-06-18 @ 07:01  -  12-06-18 @ 11:40  --------------------------------------------------------  IN: 500 mL / OUT: 400 mL / NET: 100 mL      LABS                        9.0    9.59  )-----------( 187      ( 06 Dec 2018 07:00 )             28.2     12-06    136  |  101  |  4<L>  ----------------------------<  198<H>  3.9   |  24  |  0.57    Ca    8.0<L>      06 Dec 2018 07:00  Phos  1.6     12-06  Mg     2.1     12-06    TPro  5.8<L>  /  Alb  2.8<L>  /  TBili  0.7  /  DBili  x   /  AST  27  /  ALT  24  /  AlkPhos  83  12-06    PT/INR - ( 05 Dec 2018 06:45 )   PT: 13.2 SEC;   INR: 1.15

## 2018-12-06 NOTE — DIETITIAN INITIAL EVALUATION ADULT. - PERTINENT MEDS FT
MEDICATIONS  (STANDING):  dextrose 5% + sodium chloride 0.45% with potassium chloride 20 mEq/L 1000 milliLiter(s) (125 mL/Hr) IV Continuous <Continuous>  dextrose 5%. 1000 milliLiter(s) (50 mL/Hr) IV Continuous <Continuous>  dextrose 50% Injectable 12.5 Gram(s) IV Push once  dextrose 50% Injectable 25 Gram(s) IV Push once  dextrose 50% Injectable 25 Gram(s) IV Push once  enoxaparin Injectable 40 milliGRAM(s) SubCutaneous daily  influenza   Vaccine 0.5 milliLiter(s) IntraMuscular once  insulin lispro (HumaLOG) corrective regimen sliding scale   SubCutaneous every 6 hours  metoprolol tartrate Injectable 5 milliGRAM(s) IV Push every 6 hours  pantoprazole  Injectable 40 milliGRAM(s) IV Push every 12 hours  piperacillin/tazobactam IVPB. 3.375 Gram(s) IV Intermittent every 8 hours    MEDICATIONS  (PRN):  dextrose 40% Gel 15 Gram(s) Oral once PRN Blood Glucose LESS THAN 70 milliGRAM(s)/deciLiter  glucagon  Injectable 1 milliGRAM(s) IntraMuscular once PRN Glucose <70 milliGRAM(s)/deciLiter

## 2018-12-06 NOTE — DIETITIAN INITIAL EVALUATION ADULT. - OTHER INFO
Nutrition consult received for Pressure Ulcer stage 2 or greater. 82 y/o Male with dementia, aphasia, present with gastric pneumatosis extending to esophagus. Admitted with hematemesis. Patient now remains NPO at this time. Per daughter and previous RDN note 11/28/18-patient s/p swallow evaluation on 11/27-recommended Dysphagia 1-Pureed diet which he tolerated well, consuming 100%. 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 Palliative care following. Ongoing GOC discussion. No GI distress (nausea/vomiting/diarrhea/constipation) reported. NKFA.

## 2018-12-06 NOTE — DIETITIAN INITIAL EVALUATION ADULT. - ENERGY NEEDS
weight 135.3lbs (12/4/18) Ht 4'11" BMI 27.3kg/m2  IBW: 100lbs (+/-10%) %IBW: 135%  +1 penile shaft edema noted. Pressure with multiple pressure ulcers: left buttock stage III, right buttock stage II, and sacral spine stage II noted.

## 2018-12-06 NOTE — DIETITIAN INITIAL EVALUATION ADULT. - NS FNS WEIGHT CHANGE REASON
per daughter, patient usually weighs 126lbs. Patient weighed 59kg/129.8lbs (11/26) and now weighs 61.4kg/135.3lbs (12/4). Weight changes likely related to combination of weight discrepancy and fluid shift.

## 2018-12-07 LAB
APPEARANCE UR: CLEAR — SIGNIFICANT CHANGE UP
BACTERIA # UR AUTO: NEGATIVE — SIGNIFICANT CHANGE UP
BILIRUB UR-MCNC: NEGATIVE — SIGNIFICANT CHANGE UP
BLOOD UR QL VISUAL: SIGNIFICANT CHANGE UP
BUN SERPL-MCNC: 2 MG/DL — LOW (ref 7–23)
BUN SERPL-MCNC: 2 MG/DL — LOW (ref 7–23)
BUN SERPL-MCNC: 3 MG/DL — LOW (ref 7–23)
CALCIUM SERPL-MCNC: 7.6 MG/DL — LOW (ref 8.4–10.5)
CALCIUM SERPL-MCNC: 7.7 MG/DL — LOW (ref 8.4–10.5)
CALCIUM SERPL-MCNC: 7.9 MG/DL — LOW (ref 8.4–10.5)
CHLORIDE SERPL-SCNC: 101 MMOL/L — SIGNIFICANT CHANGE UP (ref 98–107)
CHLORIDE SERPL-SCNC: 102 MMOL/L — SIGNIFICANT CHANGE UP (ref 98–107)
CHLORIDE SERPL-SCNC: 103 MMOL/L — SIGNIFICANT CHANGE UP (ref 98–107)
CO2 SERPL-SCNC: 23 MMOL/L — SIGNIFICANT CHANGE UP (ref 22–31)
CO2 SERPL-SCNC: 23 MMOL/L — SIGNIFICANT CHANGE UP (ref 22–31)
CO2 SERPL-SCNC: 24 MMOL/L — SIGNIFICANT CHANGE UP (ref 22–31)
COLOR SPEC: SIGNIFICANT CHANGE UP
CREAT SERPL-MCNC: 0.52 MG/DL — SIGNIFICANT CHANGE UP (ref 0.5–1.3)
CREAT SERPL-MCNC: 0.56 MG/DL — SIGNIFICANT CHANGE UP (ref 0.5–1.3)
CREAT SERPL-MCNC: 0.57 MG/DL — SIGNIFICANT CHANGE UP (ref 0.5–1.3)
GLUCOSE SERPL-MCNC: 159 MG/DL — HIGH (ref 70–99)
GLUCOSE SERPL-MCNC: 236 MG/DL — HIGH (ref 70–99)
GLUCOSE SERPL-MCNC: 262 MG/DL — HIGH (ref 70–99)
GLUCOSE UR-MCNC: 200 — HIGH
HCT VFR BLD CALC: 31.7 % — LOW (ref 39–50)
HCT VFR BLD CALC: 36.5 % — LOW (ref 39–50)
HGB BLD-MCNC: 10.9 G/DL — LOW (ref 13–17)
HGB BLD-MCNC: 12.1 G/DL — LOW (ref 13–17)
HYALINE CASTS # UR AUTO: NEGATIVE — SIGNIFICANT CHANGE UP
KETONES UR-MCNC: NEGATIVE — SIGNIFICANT CHANGE UP
LEUKOCYTE ESTERASE UR-ACNC: NEGATIVE — SIGNIFICANT CHANGE UP
MAGNESIUM SERPL-MCNC: 1.8 MG/DL — SIGNIFICANT CHANGE UP (ref 1.6–2.6)
MAGNESIUM SERPL-MCNC: 1.9 MG/DL — SIGNIFICANT CHANGE UP (ref 1.6–2.6)
MAGNESIUM SERPL-MCNC: 2 MG/DL — SIGNIFICANT CHANGE UP (ref 1.6–2.6)
MCHC RBC-ENTMCNC: 31.1 PG — SIGNIFICANT CHANGE UP (ref 27–34)
MCHC RBC-ENTMCNC: 31.1 PG — SIGNIFICANT CHANGE UP (ref 27–34)
MCHC RBC-ENTMCNC: 33.2 % — SIGNIFICANT CHANGE UP (ref 32–36)
MCHC RBC-ENTMCNC: 34.4 % — SIGNIFICANT CHANGE UP (ref 32–36)
MCV RBC AUTO: 90.6 FL — SIGNIFICANT CHANGE UP (ref 80–100)
MCV RBC AUTO: 93.8 FL — SIGNIFICANT CHANGE UP (ref 80–100)
NITRITE UR-MCNC: NEGATIVE — SIGNIFICANT CHANGE UP
NRBC # FLD: 0 — SIGNIFICANT CHANGE UP
NRBC # FLD: 0 — SIGNIFICANT CHANGE UP
PH UR: 6.5 — SIGNIFICANT CHANGE UP (ref 5–8)
PHOSPHATE SERPL-MCNC: 1.9 MG/DL — LOW (ref 2.5–4.5)
PHOSPHATE SERPL-MCNC: 2.1 MG/DL — LOW (ref 2.5–4.5)
PHOSPHATE SERPL-MCNC: 2.4 MG/DL — LOW (ref 2.5–4.5)
PLATELET # BLD AUTO: 243 K/UL — SIGNIFICANT CHANGE UP (ref 150–400)
PLATELET # BLD AUTO: 255 K/UL — SIGNIFICANT CHANGE UP (ref 150–400)
PMV BLD: 11.4 FL — SIGNIFICANT CHANGE UP (ref 7–13)
PMV BLD: 11.6 FL — SIGNIFICANT CHANGE UP (ref 7–13)
POTASSIUM SERPL-MCNC: 3.6 MMOL/L — SIGNIFICANT CHANGE UP (ref 3.5–5.3)
POTASSIUM SERPL-MCNC: 4 MMOL/L — SIGNIFICANT CHANGE UP (ref 3.5–5.3)
POTASSIUM SERPL-MCNC: 4.4 MMOL/L — SIGNIFICANT CHANGE UP (ref 3.5–5.3)
POTASSIUM SERPL-SCNC: 3.6 MMOL/L — SIGNIFICANT CHANGE UP (ref 3.5–5.3)
POTASSIUM SERPL-SCNC: 4 MMOL/L — SIGNIFICANT CHANGE UP (ref 3.5–5.3)
POTASSIUM SERPL-SCNC: 4.4 MMOL/L — SIGNIFICANT CHANGE UP (ref 3.5–5.3)
PROT UR-MCNC: 10 — SIGNIFICANT CHANGE UP
RBC # BLD: 3.5 M/UL — LOW (ref 4.2–5.8)
RBC # BLD: 3.89 M/UL — LOW (ref 4.2–5.8)
RBC # FLD: 12.4 % — SIGNIFICANT CHANGE UP (ref 10.3–14.5)
RBC # FLD: 12.6 % — SIGNIFICANT CHANGE UP (ref 10.3–14.5)
RBC CASTS # UR COMP ASSIST: HIGH (ref 0–?)
SODIUM SERPL-SCNC: 133 MMOL/L — LOW (ref 135–145)
SODIUM SERPL-SCNC: 135 MMOL/L — SIGNIFICANT CHANGE UP (ref 135–145)
SODIUM SERPL-SCNC: 136 MMOL/L — SIGNIFICANT CHANGE UP (ref 135–145)
SP GR SPEC: 1.01 — SIGNIFICANT CHANGE UP (ref 1–1.04)
SQUAMOUS # UR AUTO: SIGNIFICANT CHANGE UP
UROBILINOGEN FLD QL: NORMAL — SIGNIFICANT CHANGE UP
WBC # BLD: 8.59 K/UL — SIGNIFICANT CHANGE UP (ref 3.8–10.5)
WBC # BLD: 9.22 K/UL — SIGNIFICANT CHANGE UP (ref 3.8–10.5)
WBC # FLD AUTO: 8.59 K/UL — SIGNIFICANT CHANGE UP (ref 3.8–10.5)
WBC # FLD AUTO: 9.22 K/UL — SIGNIFICANT CHANGE UP (ref 3.8–10.5)
WBC UR QL: SIGNIFICANT CHANGE UP (ref 0–?)

## 2018-12-07 PROCEDURE — 71045 X-RAY EXAM CHEST 1 VIEW: CPT | Mod: 26

## 2018-12-07 PROCEDURE — 74177 CT ABD & PELVIS W/CONTRAST: CPT | Mod: 26

## 2018-12-07 PROCEDURE — 99232 SBSQ HOSP IP/OBS MODERATE 35: CPT

## 2018-12-07 RX ORDER — POTASSIUM PHOSPHATE, MONOBASIC POTASSIUM PHOSPHATE, DIBASIC 236; 224 MG/ML; MG/ML
15 INJECTION, SOLUTION INTRAVENOUS ONCE
Qty: 0 | Refills: 0 | Status: COMPLETED | OUTPATIENT
Start: 2018-12-07 | End: 2018-12-08

## 2018-12-07 RX ORDER — POTASSIUM PHOSPHATE, MONOBASIC POTASSIUM PHOSPHATE, DIBASIC 236; 224 MG/ML; MG/ML
15 INJECTION, SOLUTION INTRAVENOUS ONCE
Qty: 0 | Refills: 0 | Status: COMPLETED | OUTPATIENT
Start: 2018-12-07 | End: 2018-12-07

## 2018-12-07 RX ORDER — INSULIN GLARGINE 100 [IU]/ML
5 INJECTION, SOLUTION SUBCUTANEOUS AT BEDTIME
Qty: 0 | Refills: 0 | Status: DISCONTINUED | OUTPATIENT
Start: 2018-12-08 | End: 2018-12-09

## 2018-12-07 RX ORDER — MAGNESIUM SULFATE 500 MG/ML
2 VIAL (ML) INJECTION ONCE
Qty: 0 | Refills: 0 | Status: COMPLETED | OUTPATIENT
Start: 2018-12-07 | End: 2018-12-07

## 2018-12-07 RX ORDER — INSULIN GLARGINE 100 [IU]/ML
5 INJECTION, SOLUTION SUBCUTANEOUS ONCE
Qty: 0 | Refills: 0 | Status: COMPLETED | OUTPATIENT
Start: 2018-12-07 | End: 2018-12-07

## 2018-12-07 RX ORDER — ACETAMINOPHEN 500 MG
1000 TABLET ORAL ONCE
Qty: 0 | Refills: 0 | Status: COMPLETED | OUTPATIENT
Start: 2018-12-07 | End: 2018-12-07

## 2018-12-07 RX ADMIN — PANTOPRAZOLE SODIUM 40 MILLIGRAM(S): 20 TABLET, DELAYED RELEASE ORAL at 17:29

## 2018-12-07 RX ADMIN — POTASSIUM PHOSPHATE, MONOBASIC POTASSIUM PHOSPHATE, DIBASIC 62.5 MILLIMOLE(S): 236; 224 INJECTION, SOLUTION INTRAVENOUS at 12:17

## 2018-12-07 RX ADMIN — Medication 5 MILLIGRAM(S): at 00:56

## 2018-12-07 RX ADMIN — ENOXAPARIN SODIUM 40 MILLIGRAM(S): 100 INJECTION SUBCUTANEOUS at 12:16

## 2018-12-07 RX ADMIN — Medication 5 MILLIGRAM(S): at 06:28

## 2018-12-07 RX ADMIN — PIPERACILLIN AND TAZOBACTAM 25 GRAM(S): 4; .5 INJECTION, POWDER, LYOPHILIZED, FOR SOLUTION INTRAVENOUS at 07:27

## 2018-12-07 RX ADMIN — PIPERACILLIN AND TAZOBACTAM 25 GRAM(S): 4; .5 INJECTION, POWDER, LYOPHILIZED, FOR SOLUTION INTRAVENOUS at 14:32

## 2018-12-07 RX ADMIN — Medication 50 GRAM(S): at 23:46

## 2018-12-07 RX ADMIN — Medication 5 MILLIGRAM(S): at 23:35

## 2018-12-07 RX ADMIN — Medication 1000 MILLIGRAM(S): at 20:55

## 2018-12-07 RX ADMIN — PANTOPRAZOLE SODIUM 40 MILLIGRAM(S): 20 TABLET, DELAYED RELEASE ORAL at 06:28

## 2018-12-07 RX ADMIN — Medication 4: at 06:28

## 2018-12-07 RX ADMIN — Medication 4: at 12:16

## 2018-12-07 RX ADMIN — Medication 5 MILLIGRAM(S): at 12:16

## 2018-12-07 RX ADMIN — PIPERACILLIN AND TAZOBACTAM 25 GRAM(S): 4; .5 INJECTION, POWDER, LYOPHILIZED, FOR SOLUTION INTRAVENOUS at 23:28

## 2018-12-07 RX ADMIN — Medication 6: at 17:44

## 2018-12-07 RX ADMIN — DEXTROSE MONOHYDRATE, SODIUM CHLORIDE, AND POTASSIUM CHLORIDE 125 MILLILITER(S): 50; .745; 4.5 INJECTION, SOLUTION INTRAVENOUS at 12:17

## 2018-12-07 RX ADMIN — INSULIN GLARGINE 5 UNIT(S): 100 INJECTION, SOLUTION SUBCUTANEOUS at 03:33

## 2018-12-07 RX ADMIN — Medication 400 MILLIGRAM(S): at 20:54

## 2018-12-07 RX ADMIN — Medication 4: at 00:55

## 2018-12-07 NOTE — PROGRESS NOTE ADULT - ASSESSMENT
83M PMH DM, Dementia (AAOx0-1 at baseline), DM, HTN, Open cholecystectomy (2017), open appendectomy (remote), presented as transfer from OSH for pneumatosis    - CT to re-evaluate pneumatosis  - f/u culture sensitivities   - zosyn  - pain control  - continue NGT

## 2018-12-07 NOTE — PROGRESS NOTE ADULT - SUBJECTIVE AND OBJECTIVE BOX
Surgery Progress Note      Subjective: Patient seen and examined. No acute events overnight. Afebrile    T(C): 37.3 (12-07-18 @ 09:47), Max: 37.8 (12-06-18 @ 14:47)  HR: 64 (12-07-18 @ 09:47) (62 - 81)  BP: 118/65 (12-07-18 @ 09:47) (114/67 - 152/64)  RR: 18 (12-07-18 @ 09:47) (18 - 18)  SpO2: 100% (12-07-18 @ 09:47) (97% - 100%)      12-06-18 @ 07:01  -  12-07-18 @ 07:00  --------------------------------------------------------  IN: 2700 mL / OUT: 3545 mL / NET: -845 mL    12-07-18 @ 07:01  -  12-07-18 @ 10:09  --------------------------------------------------------  IN: 500 mL / OUT: 500 mL / NET: 0 mL        Physical Exam:   General: NAD, NGT in palce  Abdomen: soft, not distended, mildly tender to palpation LLQ    Labs:                          12.1   8.59  )-----------( 243      ( 07 Dec 2018 06:45 )             36.5     12-07    135  |  102  |  3<L>  ----------------------------<  262<H>  4.4   |  23  |  0.57    Ca    7.9<L>      07 Dec 2018 06:45  Phos  2.1     12-07  Mg     2.0     12-07    TPro  5.8<L>  /  Alb  2.8<L>  /  TBili  0.7  /  DBili  x   /  AST  27  /  ALT  24  /  AlkPhos  83  12-06      Medications:     dextrose 40% Gel 15 Gram(s) Oral once PRN  dextrose 5% + sodium chloride 0.45% with potassium chloride 20 mEq/L 1000 milliLiter(s) IV Continuous <Continuous>  dextrose 5%. 1000 milliLiter(s) IV Continuous <Continuous>  dextrose 50% Injectable 12.5 Gram(s) IV Push once  dextrose 50% Injectable 25 Gram(s) IV Push once  dextrose 50% Injectable 25 Gram(s) IV Push once  enoxaparin Injectable 40 milliGRAM(s) SubCutaneous daily  glucagon  Injectable 1 milliGRAM(s) IntraMuscular once PRN  influenza   Vaccine 0.5 milliLiter(s) IntraMuscular once  insulin lispro (HumaLOG) corrective regimen sliding scale   SubCutaneous every 6 hours  metoprolol tartrate Injectable 5 milliGRAM(s) IV Push every 6 hours  pantoprazole  Injectable 40 milliGRAM(s) IV Push every 12 hours  piperacillin/tazobactam IVPB. 3.375 Gram(s) IV Intermittent every 8 hours      Radiographs: No new imaging

## 2018-12-08 LAB
ALBUMIN SERPL ELPH-MCNC: 2.8 G/DL — LOW (ref 3.3–5)
ALP SERPL-CCNC: 81 U/L — SIGNIFICANT CHANGE UP (ref 40–120)
ALT FLD-CCNC: 22 U/L — SIGNIFICANT CHANGE UP (ref 4–41)
AST SERPL-CCNC: 18 U/L — SIGNIFICANT CHANGE UP (ref 4–40)
BILIRUB SERPL-MCNC: 0.6 MG/DL — SIGNIFICANT CHANGE UP (ref 0.2–1.2)
BUN SERPL-MCNC: 3 MG/DL — LOW (ref 7–23)
CALCIUM SERPL-MCNC: 8.4 MG/DL — SIGNIFICANT CHANGE UP (ref 8.4–10.5)
CHLORIDE SERPL-SCNC: 101 MMOL/L — SIGNIFICANT CHANGE UP (ref 98–107)
CO2 SERPL-SCNC: 24 MMOL/L — SIGNIFICANT CHANGE UP (ref 22–31)
CREAT SERPL-MCNC: 0.61 MG/DL — SIGNIFICANT CHANGE UP (ref 0.5–1.3)
GLUCOSE SERPL-MCNC: 190 MG/DL — HIGH (ref 70–99)
HCT VFR BLD CALC: 38.7 % — LOW (ref 39–50)
HGB BLD-MCNC: 12.9 G/DL — LOW (ref 13–17)
MAGNESIUM SERPL-MCNC: 2.5 MG/DL — SIGNIFICANT CHANGE UP (ref 1.6–2.6)
MCHC RBC-ENTMCNC: 31.1 PG — SIGNIFICANT CHANGE UP (ref 27–34)
MCHC RBC-ENTMCNC: 33.3 % — SIGNIFICANT CHANGE UP (ref 32–36)
MCV RBC AUTO: 93.3 FL — SIGNIFICANT CHANGE UP (ref 80–100)
NRBC # FLD: 0 — SIGNIFICANT CHANGE UP
PHOSPHATE SERPL-MCNC: 3.5 MG/DL — SIGNIFICANT CHANGE UP (ref 2.5–4.5)
PLATELET # BLD AUTO: 298 K/UL — SIGNIFICANT CHANGE UP (ref 150–400)
PMV BLD: 11.4 FL — SIGNIFICANT CHANGE UP (ref 7–13)
POTASSIUM SERPL-MCNC: 4.4 MMOL/L — SIGNIFICANT CHANGE UP (ref 3.5–5.3)
POTASSIUM SERPL-SCNC: 4.4 MMOL/L — SIGNIFICANT CHANGE UP (ref 3.5–5.3)
PROT SERPL-MCNC: 6.1 G/DL — SIGNIFICANT CHANGE UP (ref 6–8.3)
RBC # BLD: 4.15 M/UL — LOW (ref 4.2–5.8)
RBC # FLD: 13 % — SIGNIFICANT CHANGE UP (ref 10.3–14.5)
SODIUM SERPL-SCNC: 135 MMOL/L — SIGNIFICANT CHANGE UP (ref 135–145)
SPECIMEN SOURCE: SIGNIFICANT CHANGE UP
SPECIMEN SOURCE: SIGNIFICANT CHANGE UP
WBC # BLD: 8.13 K/UL — SIGNIFICANT CHANGE UP (ref 3.8–10.5)
WBC # FLD AUTO: 8.13 K/UL — SIGNIFICANT CHANGE UP (ref 3.8–10.5)

## 2018-12-08 PROCEDURE — 99231 SBSQ HOSP IP/OBS SF/LOW 25: CPT | Mod: GC

## 2018-12-08 RX ADMIN — Medication 5 MILLIGRAM(S): at 23:43

## 2018-12-08 RX ADMIN — Medication 4: at 13:00

## 2018-12-08 RX ADMIN — Medication 2: at 07:46

## 2018-12-08 RX ADMIN — PIPERACILLIN AND TAZOBACTAM 25 GRAM(S): 4; .5 INJECTION, POWDER, LYOPHILIZED, FOR SOLUTION INTRAVENOUS at 13:41

## 2018-12-08 RX ADMIN — DEXTROSE MONOHYDRATE, SODIUM CHLORIDE, AND POTASSIUM CHLORIDE 125 MILLILITER(S): 50; .745; 4.5 INJECTION, SOLUTION INTRAVENOUS at 12:59

## 2018-12-08 RX ADMIN — Medication 2: at 18:29

## 2018-12-08 RX ADMIN — Medication 6: at 23:42

## 2018-12-08 RX ADMIN — Medication 5 MILLIGRAM(S): at 18:31

## 2018-12-08 RX ADMIN — PANTOPRAZOLE SODIUM 40 MILLIGRAM(S): 20 TABLET, DELAYED RELEASE ORAL at 18:31

## 2018-12-08 RX ADMIN — PIPERACILLIN AND TAZOBACTAM 25 GRAM(S): 4; .5 INJECTION, POWDER, LYOPHILIZED, FOR SOLUTION INTRAVENOUS at 05:25

## 2018-12-08 RX ADMIN — POTASSIUM PHOSPHATE, MONOBASIC POTASSIUM PHOSPHATE, DIBASIC 62.5 MILLIMOLE(S): 236; 224 INJECTION, SOLUTION INTRAVENOUS at 00:49

## 2018-12-08 RX ADMIN — PIPERACILLIN AND TAZOBACTAM 25 GRAM(S): 4; .5 INJECTION, POWDER, LYOPHILIZED, FOR SOLUTION INTRAVENOUS at 21:55

## 2018-12-08 RX ADMIN — Medication 5 MILLIGRAM(S): at 13:01

## 2018-12-08 RX ADMIN — Medication 5 MILLIGRAM(S): at 05:25

## 2018-12-08 RX ADMIN — DEXTROSE MONOHYDRATE, SODIUM CHLORIDE, AND POTASSIUM CHLORIDE 125 MILLILITER(S): 50; .745; 4.5 INJECTION, SOLUTION INTRAVENOUS at 18:30

## 2018-12-08 RX ADMIN — PANTOPRAZOLE SODIUM 40 MILLIGRAM(S): 20 TABLET, DELAYED RELEASE ORAL at 05:25

## 2018-12-08 RX ADMIN — INSULIN GLARGINE 5 UNIT(S): 100 INJECTION, SOLUTION SUBCUTANEOUS at 23:43

## 2018-12-08 RX ADMIN — ENOXAPARIN SODIUM 40 MILLIGRAM(S): 100 INJECTION SUBCUTANEOUS at 13:00

## 2018-12-08 NOTE — PROGRESS NOTE ADULT - ASSESSMENT
83M PMH DM, Dementia (AAOx0-1 at baseline), DM, HTN, Open cholecystectomy (2017), open appendectomy (remote), presented as transfer from OSH for pneumatosis    - f.u read for CT to re-evaluate pneumatosis  - f/u culture sensitivities   - zosyn  - pain control  - c/w NGT

## 2018-12-08 NOTE — PROGRESS NOTE ADULT - SUBJECTIVE AND OBJECTIVE BOX
Surgical Progress Note    Subjective:  Yesterday pt has CT abdomen done with PO and IV contrast.  No acute events overnight. Pt seen at bedside.    Objective:  Physical Exam  General: NAD, NGT in place  Abdomen: Soft/nt/nd  parrish in place        T(C): 36.7 (18 @ 09:32), Max: 38.5 (18 @ 17:35)  HR: 66 (18 @ 09:32) (64 - 73)  BP: 151/70 (18 @ 09:32) (108/59 - 151/70)  RR: 16 (18 @ 09:32) (14 - 16)  SpO2: 100% (18 @ 09:32) (100% - 100%)    18 @ 07:01  -  18 @ 07:00  --------------------------------------------------------  IN: 3250 mL / OUT: 3600 mL / NET: -350 mL    18 @ 07:01  -  18 @ 11:12  --------------------------------------------------------  IN: 0 mL / OUT: 1100 mL / NET: -1100 mL        LABS                        12.9   8.13  )-----------( 298      ( 08 Dec 2018 07:40 )             38.7         135  |  101  |  3<L>  ----------------------------<  190<H>  4.4   |  24  |  0.61    Ca    8.4      08 Dec 2018 08:24  Phos  3.5       Mg     2.5         TPro  6.1  /  Alb  2.8<L>  /  TBili  0.6  /  DBili  x   /  AST  18  /  ALT  22  /  AlkPhos  81  12-08      Urinalysis Basic - ( 07 Dec 2018 19:20 )    Color: LIGHT YELLOW / Appearance: CLEAR / S.014 / pH: 6.5  Gluc: 200 / Ketone: NEGATIVE  / Bili: NEGATIVE / Urobili: NORMAL   Blood: SMALL / Protein: 10 / Nitrite: NEGATIVE   Leuk Esterase: NEGATIVE / RBC: 11-25 / WBC 0-2   Sq Epi: OCC / Non Sq Epi: x / Bacteria: NEGATIVE

## 2018-12-09 LAB
BUN SERPL-MCNC: 3 MG/DL — LOW (ref 7–23)
CALCIUM SERPL-MCNC: 8.1 MG/DL — LOW (ref 8.4–10.5)
CHLORIDE SERPL-SCNC: 101 MMOL/L — SIGNIFICANT CHANGE UP (ref 98–107)
CO2 SERPL-SCNC: 23 MMOL/L — SIGNIFICANT CHANGE UP (ref 22–31)
CREAT SERPL-MCNC: 0.62 MG/DL — SIGNIFICANT CHANGE UP (ref 0.5–1.3)
CULTURE RESULTS: SIGNIFICANT CHANGE UP
CULTURE RESULTS: SIGNIFICANT CHANGE UP
GLUCOSE SERPL-MCNC: 293 MG/DL — HIGH (ref 70–99)
HCT VFR BLD CALC: 34.3 % — LOW (ref 39–50)
HGB BLD-MCNC: 11.4 G/DL — LOW (ref 13–17)
MAGNESIUM SERPL-MCNC: 2 MG/DL — SIGNIFICANT CHANGE UP (ref 1.6–2.6)
MCHC RBC-ENTMCNC: 30.6 PG — SIGNIFICANT CHANGE UP (ref 27–34)
MCHC RBC-ENTMCNC: 33.2 % — SIGNIFICANT CHANGE UP (ref 32–36)
MCV RBC AUTO: 92.2 FL — SIGNIFICANT CHANGE UP (ref 80–100)
NRBC # FLD: 0 — SIGNIFICANT CHANGE UP
PHOSPHATE SERPL-MCNC: 2.2 MG/DL — LOW (ref 2.5–4.5)
PLATELET # BLD AUTO: 258 K/UL — SIGNIFICANT CHANGE UP (ref 150–400)
PMV BLD: 11.8 FL — SIGNIFICANT CHANGE UP (ref 7–13)
POTASSIUM SERPL-MCNC: 4.6 MMOL/L — SIGNIFICANT CHANGE UP (ref 3.5–5.3)
POTASSIUM SERPL-SCNC: 4.6 MMOL/L — SIGNIFICANT CHANGE UP (ref 3.5–5.3)
RBC # BLD: 3.72 M/UL — LOW (ref 4.2–5.8)
RBC # FLD: 13 % — SIGNIFICANT CHANGE UP (ref 10.3–14.5)
SODIUM SERPL-SCNC: 134 MMOL/L — LOW (ref 135–145)
SPECIMEN SOURCE: SIGNIFICANT CHANGE UP
SPECIMEN SOURCE: SIGNIFICANT CHANGE UP
WBC # BLD: 9.43 K/UL — SIGNIFICANT CHANGE UP (ref 3.8–10.5)
WBC # FLD AUTO: 9.43 K/UL — SIGNIFICANT CHANGE UP (ref 3.8–10.5)

## 2018-12-09 PROCEDURE — 99231 SBSQ HOSP IP/OBS SF/LOW 25: CPT | Mod: GC

## 2018-12-09 RX ORDER — INSULIN GLARGINE 100 [IU]/ML
8 INJECTION, SOLUTION SUBCUTANEOUS AT BEDTIME
Qty: 0 | Refills: 0 | Status: DISCONTINUED | OUTPATIENT
Start: 2018-12-09 | End: 2018-12-12

## 2018-12-09 RX ADMIN — Medication 5 MILLIGRAM(S): at 23:53

## 2018-12-09 RX ADMIN — Medication 6: at 23:52

## 2018-12-09 RX ADMIN — PIPERACILLIN AND TAZOBACTAM 25 GRAM(S): 4; .5 INJECTION, POWDER, LYOPHILIZED, FOR SOLUTION INTRAVENOUS at 14:56

## 2018-12-09 RX ADMIN — Medication 5 MILLIGRAM(S): at 11:57

## 2018-12-09 RX ADMIN — PANTOPRAZOLE SODIUM 40 MILLIGRAM(S): 20 TABLET, DELAYED RELEASE ORAL at 18:34

## 2018-12-09 RX ADMIN — PIPERACILLIN AND TAZOBACTAM 25 GRAM(S): 4; .5 INJECTION, POWDER, LYOPHILIZED, FOR SOLUTION INTRAVENOUS at 05:32

## 2018-12-09 RX ADMIN — PANTOPRAZOLE SODIUM 40 MILLIGRAM(S): 20 TABLET, DELAYED RELEASE ORAL at 05:33

## 2018-12-09 RX ADMIN — Medication 4: at 12:00

## 2018-12-09 RX ADMIN — PIPERACILLIN AND TAZOBACTAM 25 GRAM(S): 4; .5 INJECTION, POWDER, LYOPHILIZED, FOR SOLUTION INTRAVENOUS at 23:52

## 2018-12-09 RX ADMIN — DEXTROSE MONOHYDRATE, SODIUM CHLORIDE, AND POTASSIUM CHLORIDE 75 MILLILITER(S): 50; .745; 4.5 INJECTION, SOLUTION INTRAVENOUS at 14:56

## 2018-12-09 RX ADMIN — INSULIN GLARGINE 8 UNIT(S): 100 INJECTION, SOLUTION SUBCUTANEOUS at 23:52

## 2018-12-09 RX ADMIN — Medication 5 MILLIGRAM(S): at 18:34

## 2018-12-09 RX ADMIN — Medication 6: at 06:11

## 2018-12-09 RX ADMIN — Medication 63.75 MILLIMOLE(S): at 18:34

## 2018-12-09 RX ADMIN — ENOXAPARIN SODIUM 40 MILLIGRAM(S): 100 INJECTION SUBCUTANEOUS at 11:57

## 2018-12-09 RX ADMIN — Medication 5 MILLIGRAM(S): at 05:33

## 2018-12-09 NOTE — PROGRESS NOTE ADULT - SUBJECTIVE AND OBJECTIVE BOX
Surgery Progress Note      Subjective: Patient seen and examined. Pulled out NGT overnight  CT with improved gastric pneumatosis      T(C): 36.3 (12-09-18 @ 10:54), Max: 38 (12-08-18 @ 18:30)  HR: 63 (12-09-18 @ 11:51) (62 - 125)  BP: 132/62 (12-09-18 @ 11:51) (106/64 - 154/82)  RR: 15 (12-09-18 @ 11:51) (15 - 17)  SpO2: 98% (12-09-18 @ 11:51) (95% - 100%)      12-08-18 @ 07:01  -  12-09-18 @ 07:00  --------------------------------------------------------  IN: 1100 mL / OUT: 2975 mL / NET: -1875 mL    12-09-18 @ 07:01  -  12-09-18 @ 13:32  --------------------------------------------------------  IN: 0 mL / OUT: 1000 mL / NET: -1000 mL        Physical Exam:   General: NAD  Abdomen: soft, NT, ND    Labs:                          11.4   9.43  )-----------( 258      ( 09 Dec 2018 06:20 )             34.3     12-09    134<L>  |  101  |  3<L>  ----------------------------<  293<H>  4.6   |  23  |  0.62    Ca    8.1<L>      09 Dec 2018 06:20  Phos  2.2     12-09  Mg     2.0     12-09    TPro  6.1  /  Alb  2.8<L>  /  TBili  0.6  /  DBili  x   /  AST  18  /  ALT  22  /  AlkPhos  81  12-08      Medications:     dextrose 40% Gel 15 Gram(s) Oral once PRN  dextrose 5% + sodium chloride 0.45% with potassium chloride 20 mEq/L 1000 milliLiter(s) IV Continuous <Continuous>  dextrose 5%. 1000 milliLiter(s) IV Continuous <Continuous>  dextrose 50% Injectable 12.5 Gram(s) IV Push once  dextrose 50% Injectable 25 Gram(s) IV Push once  dextrose 50% Injectable 25 Gram(s) IV Push once  enoxaparin Injectable 40 milliGRAM(s) SubCutaneous daily  glucagon  Injectable 1 milliGRAM(s) IntraMuscular once PRN  influenza   Vaccine 0.5 milliLiter(s) IntraMuscular once  insulin glargine Injectable (LANTUS) 8 Unit(s) SubCutaneous at bedtime  insulin lispro (HumaLOG) corrective regimen sliding scale   SubCutaneous every 6 hours  metoprolol tartrate Injectable 5 milliGRAM(s) IV Push every 6 hours  pantoprazole  Injectable 40 milliGRAM(s) IV Push every 12 hours  piperacillin/tazobactam IVPB. 3.375 Gram(s) IV Intermittent every 8 hours  sodium phosphate IVPB 15 milliMole(s) IV Intermittent once      Radiographs: No new imaging

## 2018-12-09 NOTE — PHYSICAL THERAPY INITIAL EVALUATION ADULT - RANGE OF MOTION EXAMINATION, REHAB EVAL
deficits as listed below/bilateral lower extremity ROM was WFL (within functional limits)/ex. b/l sh/: 0-90, b/l  knee/: -15 extension, b/l ankle df to neutral/bilateral upper extremity ROM was WFL (within functional limits)

## 2018-12-09 NOTE — PHYSICAL THERAPY INITIAL EVALUATION ADULT - ADDITIONAL COMMENTS
Unable to obtain from patient his prior level of functioning.  Pt. with hx. of dementia, with agitation earlier today

## 2018-12-09 NOTE — PHYSICAL THERAPY INITIAL EVALUATION ADULT - PERTINENT HX OF CURRENT PROBLEM, REHAB EVAL
admitted from OSH for management of gastric pneumatosis, + placement of NG Tube.  Pt. with hx. of dementia

## 2018-12-09 NOTE — PROGRESS NOTE ADULT - ASSESSMENT
83M PMH DM, Dementia (AAOx0-1 at baseline), DM, HTN, Open cholecystectomy (2017), open appendectomy (remote), presented as transfer from OSH for pneumatosis    -  speech and swallow evaluation  - attempt to advance diet to thickened liquids  - pain control  - discharge planning

## 2018-12-10 DIAGNOSIS — I10 ESSENTIAL (PRIMARY) HYPERTENSION: ICD-10-CM

## 2018-12-10 DIAGNOSIS — E11.9 TYPE 2 DIABETES MELLITUS WITHOUT COMPLICATIONS: ICD-10-CM

## 2018-12-10 LAB
BUN SERPL-MCNC: 2 MG/DL — LOW (ref 7–23)
CALCIUM SERPL-MCNC: 8.5 MG/DL — SIGNIFICANT CHANGE UP (ref 8.4–10.5)
CHLORIDE SERPL-SCNC: 103 MMOL/L — SIGNIFICANT CHANGE UP (ref 98–107)
CO2 SERPL-SCNC: 24 MMOL/L — SIGNIFICANT CHANGE UP (ref 22–31)
CREAT SERPL-MCNC: 0.62 MG/DL — SIGNIFICANT CHANGE UP (ref 0.5–1.3)
GLUCOSE BLDC GLUCOMTR-MCNC: 162 MG/DL — HIGH (ref 70–99)
GLUCOSE SERPL-MCNC: 224 MG/DL — HIGH (ref 70–99)
HCT VFR BLD CALC: 37.2 % — LOW (ref 39–50)
HGB BLD-MCNC: 11.9 G/DL — LOW (ref 13–17)
MAGNESIUM SERPL-MCNC: 1.9 MG/DL — SIGNIFICANT CHANGE UP (ref 1.6–2.6)
MCHC RBC-ENTMCNC: 30.4 PG — SIGNIFICANT CHANGE UP (ref 27–34)
MCHC RBC-ENTMCNC: 32 % — SIGNIFICANT CHANGE UP (ref 32–36)
MCV RBC AUTO: 95.1 FL — SIGNIFICANT CHANGE UP (ref 80–100)
NRBC # FLD: 0 — SIGNIFICANT CHANGE UP
PHOSPHATE SERPL-MCNC: 2.4 MG/DL — LOW (ref 2.5–4.5)
PLATELET # BLD AUTO: 329 K/UL — SIGNIFICANT CHANGE UP (ref 150–400)
PMV BLD: 10.5 FL — SIGNIFICANT CHANGE UP (ref 7–13)
POTASSIUM SERPL-MCNC: 4.1 MMOL/L — SIGNIFICANT CHANGE UP (ref 3.5–5.3)
POTASSIUM SERPL-SCNC: 4.1 MMOL/L — SIGNIFICANT CHANGE UP (ref 3.5–5.3)
RBC # BLD: 3.91 M/UL — LOW (ref 4.2–5.8)
RBC # FLD: 13 % — SIGNIFICANT CHANGE UP (ref 10.3–14.5)
SODIUM SERPL-SCNC: 139 MMOL/L — SIGNIFICANT CHANGE UP (ref 135–145)
WBC # BLD: 7.54 K/UL — SIGNIFICANT CHANGE UP (ref 3.8–10.5)
WBC # FLD AUTO: 7.54 K/UL — SIGNIFICANT CHANGE UP (ref 3.8–10.5)

## 2018-12-10 PROCEDURE — 99232 SBSQ HOSP IP/OBS MODERATE 35: CPT

## 2018-12-10 RX ORDER — DEXTROSE 50 % IN WATER 50 %
25 SYRINGE (ML) INTRAVENOUS ONCE
Qty: 0 | Refills: 0 | Status: DISCONTINUED | OUTPATIENT
Start: 2018-12-10 | End: 2018-12-18

## 2018-12-10 RX ORDER — DEXTROSE 50 % IN WATER 50 %
15 SYRINGE (ML) INTRAVENOUS ONCE
Qty: 0 | Refills: 0 | Status: DISCONTINUED | OUTPATIENT
Start: 2018-12-10 | End: 2018-12-18

## 2018-12-10 RX ORDER — SODIUM CHLORIDE 9 MG/ML
1000 INJECTION, SOLUTION INTRAVENOUS
Qty: 0 | Refills: 0 | Status: DISCONTINUED | OUTPATIENT
Start: 2018-12-10 | End: 2018-12-18

## 2018-12-10 RX ORDER — DEXTROSE 50 % IN WATER 50 %
12.5 SYRINGE (ML) INTRAVENOUS ONCE
Qty: 0 | Refills: 0 | Status: DISCONTINUED | OUTPATIENT
Start: 2018-12-10 | End: 2018-12-18

## 2018-12-10 RX ORDER — INSULIN LISPRO 100/ML
VIAL (ML) SUBCUTANEOUS AT BEDTIME
Qty: 0 | Refills: 0 | Status: DISCONTINUED | OUTPATIENT
Start: 2018-12-10 | End: 2018-12-18

## 2018-12-10 RX ORDER — NYSTATIN CREAM 100000 [USP'U]/G
1 CREAM TOPICAL
Qty: 0 | Refills: 0 | Status: DISCONTINUED | OUTPATIENT
Start: 2018-12-10 | End: 2018-12-18

## 2018-12-10 RX ORDER — GLUCAGON INJECTION, SOLUTION 0.5 MG/.1ML
1 INJECTION, SOLUTION SUBCUTANEOUS ONCE
Qty: 0 | Refills: 0 | Status: DISCONTINUED | OUTPATIENT
Start: 2018-12-10 | End: 2018-12-18

## 2018-12-10 RX ORDER — INSULIN LISPRO 100/ML
VIAL (ML) SUBCUTANEOUS
Qty: 0 | Refills: 0 | Status: DISCONTINUED | OUTPATIENT
Start: 2018-12-10 | End: 2018-12-18

## 2018-12-10 RX ADMIN — PANTOPRAZOLE SODIUM 40 MILLIGRAM(S): 20 TABLET, DELAYED RELEASE ORAL at 06:20

## 2018-12-10 RX ADMIN — PIPERACILLIN AND TAZOBACTAM 25 GRAM(S): 4; .5 INJECTION, POWDER, LYOPHILIZED, FOR SOLUTION INTRAVENOUS at 06:20

## 2018-12-10 RX ADMIN — Medication 5 MILLIGRAM(S): at 06:20

## 2018-12-10 RX ADMIN — ENOXAPARIN SODIUM 40 MILLIGRAM(S): 100 INJECTION SUBCUTANEOUS at 12:30

## 2018-12-10 RX ADMIN — PANTOPRAZOLE SODIUM 40 MILLIGRAM(S): 20 TABLET, DELAYED RELEASE ORAL at 17:39

## 2018-12-10 RX ADMIN — INSULIN GLARGINE 8 UNIT(S): 100 INJECTION, SOLUTION SUBCUTANEOUS at 21:44

## 2018-12-10 RX ADMIN — PIPERACILLIN AND TAZOBACTAM 25 GRAM(S): 4; .5 INJECTION, POWDER, LYOPHILIZED, FOR SOLUTION INTRAVENOUS at 13:40

## 2018-12-10 RX ADMIN — Medication 4: at 06:26

## 2018-12-10 RX ADMIN — Medication 2: at 12:30

## 2018-12-10 RX ADMIN — PIPERACILLIN AND TAZOBACTAM 25 GRAM(S): 4; .5 INJECTION, POWDER, LYOPHILIZED, FOR SOLUTION INTRAVENOUS at 21:44

## 2018-12-10 RX ADMIN — NYSTATIN CREAM 1 APPLICATION(S): 100000 CREAM TOPICAL at 06:20

## 2018-12-10 RX ADMIN — NYSTATIN CREAM 1 APPLICATION(S): 100000 CREAM TOPICAL at 17:38

## 2018-12-10 RX ADMIN — Medication 5 MILLIGRAM(S): at 17:53

## 2018-12-10 RX ADMIN — Medication 6: at 18:45

## 2018-12-10 NOTE — PROGRESS NOTE ADULT - ASSESSMENT
83M PMH DM, Dementia (AAOx0-1 at baseline), DM, HTN, Open cholecystectomy (2017), open appendectomy (remote), presented as transfer from OSH for pneumatosis    - speech and swallow evaluation  - attempted to advance diet to thickened liquids, but patient was too lethargic to take in fluids  - pain control  - discharge planning

## 2018-12-10 NOTE — PROGRESS NOTE ADULT - SUBJECTIVE AND OBJECTIVE BOX
SUBJECTIVE / OVERNIGHT EVENTS: pt confused , unable to obtain ROS from pt       MEDICATIONS  (STANDING):  dextrose 5% + sodium chloride 0.45% with potassium chloride 20 mEq/L 1000 milliLiter(s) (100 mL/Hr) IV Continuous <Continuous>  dextrose 5%. 1000 milliLiter(s) (50 mL/Hr) IV Continuous <Continuous>  dextrose 50% Injectable 12.5 Gram(s) IV Push once  dextrose 50% Injectable 25 Gram(s) IV Push once  dextrose 50% Injectable 25 Gram(s) IV Push once  enoxaparin Injectable 40 milliGRAM(s) SubCutaneous daily  influenza   Vaccine 0.5 milliLiter(s) IntraMuscular once  insulin glargine Injectable (LANTUS) 8 Unit(s) SubCutaneous at bedtime  insulin lispro (HumaLOG) corrective regimen sliding scale   SubCutaneous every 6 hours  metoprolol tartrate Injectable 5 milliGRAM(s) IV Push every 6 hours  nystatin Powder 1 Application(s) Topical two times a day  pantoprazole  Injectable 40 milliGRAM(s) IV Push every 12 hours  piperacillin/tazobactam IVPB. 3.375 Gram(s) IV Intermittent every 8 hours    MEDICATIONS  (PRN):  dextrose 40% Gel 15 Gram(s) Oral once PRN Blood Glucose LESS THAN 70 milliGRAM(s)/deciLiter  glucagon  Injectable 1 milliGRAM(s) IntraMuscular once PRN Glucose <70 milliGRAM(s)/deciLiter    Vital Signs Last 24 Hrs  T(C): 37.7 (10 Dec 2018 17:55), Max: 37.7 (10 Dec 2018 17:55)  T(F): 99.8 (10 Dec 2018 17:55), Max: 99.8 (10 Dec 2018 17:55)  HR: 67 (10 Dec 2018 17:55) (59 - 72)  BP: 130/59 (10 Dec 2018 17:55) (120/60 - 139/63)  BP(mean): --  RR: 17 (10 Dec 2018 17:55) (16 - 18)  SpO2: 100% (10 Dec 2018 17:55) (100% - 100%)    CAPILLARY BLOOD GLUCOSE      POCT Blood Glucose.: 252 mg/dL (10 Dec 2018 18:07)  POCT Blood Glucose.: 183 mg/dL (10 Dec 2018 12:08)  POCT Blood Glucose.: 235 mg/dL (10 Dec 2018 06:24)  POCT Blood Glucose.: 257 mg/dL (09 Dec 2018 23:05)    I&O's Summary    09 Dec 2018 07:01  -  10 Dec 2018 07:00  --------------------------------------------------------  IN: 2850 mL / OUT: 3760 mL / NET: -910 mL    10 Dec 2018 07:01  -  10 Dec 2018 21:12  --------------------------------------------------------  IN: 0 mL / OUT: 850 mL / NET: -850 mL    unable to obtain ros from pt - pt confused     PHYSICAL EXAM:  GENERAL: NAD  EYES: EOMI, PERRLA  NECK: Supple, No JVD  CHEST/LUNG: crepts at bases +  HEART:  S1 , S2 +  ABDOMEN: mild distension+, bs+, soft , tenderness+ on deep palpation  EXTREMITIES:  edema+  NEUROLOGY:alert awake confused   foely+      LABS:                        11.9   7.54  )-----------( 329      ( 10 Dec 2018 06:50 )             37.2     12-10    139  |  103  |  2<L>  ----------------------------<  224<H>  4.1   |  24  |  0.62    Ca    8.5      10 Dec 2018 06:50  Phos  2.4     12-10  Mg     1.9     12-10                RADIOLOGY & ADDITIONAL TESTS:    Imaging Personally Reviewed:    Consultant(s) Notes Reviewed:      Care Discussed with Consultants/Other Providers:

## 2018-12-10 NOTE — SWALLOW BEDSIDE ASSESSMENT ADULT - COMMENTS
MD orders received. Chart reviewed. Bedside swallow evaluation not performed due to patient lethargy. Patient briefly arousable to verbal/tactile stimuli though unable to sustain adequate level of alertness to safely administer PO trials at this time.    Recommendations:  1) Consider temporary non-oral means of nutrition/hydration/medication  2) Patient will not actively be followed by this service at this time; MD to re-consult this department should patient's level of alertness / ability to participate improve    Above discussed with primary RN and surgical B team (spectra #[id number]).

## 2018-12-10 NOTE — PROGRESS NOTE ADULT - SUBJECTIVE AND OBJECTIVE BOX
Surgery Progress Note      Subjective: Patient seen and examined. No acute events overnight.     T(C): 36.7 (12-09-18 @ 22:43), Max: 37.3 (12-09-18 @ 14:51)  HR: 65 (12-09-18 @ 22:43) (62 - 125)  BP: 129/64 (12-09-18 @ 22:43) (125/57 - 154/82)  RR: 16 (12-09-18 @ 22:43) (14 - 17)  SpO2: 100% (12-09-18 @ 22:43) (95% - 100%)      12-08-18 @ 07:01  -  12-09-18 @ 07:00  --------------------------------------------------------  IN: 1100 mL / OUT: 2975 mL / NET: -1875 mL    12-09-18 @ 07:01  -  12-10-18 @ 01:06  --------------------------------------------------------  IN: 2050 mL / OUT: 1960 mL / NET: 90 mL        Physical Exam:   General: NAD  Abdomen: soft, NT, ND    Labs:      Medications:     dextrose 40% Gel 15 Gram(s) Oral once PRN  dextrose 5% + sodium chloride 0.45% with potassium chloride 20 mEq/L 1000 milliLiter(s) IV Continuous <Continuous>  dextrose 5%. 1000 milliLiter(s) IV Continuous <Continuous>  dextrose 50% Injectable 12.5 Gram(s) IV Push once  dextrose 50% Injectable 25 Gram(s) IV Push once  dextrose 50% Injectable 25 Gram(s) IV Push once  enoxaparin Injectable 40 milliGRAM(s) SubCutaneous daily  glucagon  Injectable 1 milliGRAM(s) IntraMuscular once PRN  influenza   Vaccine 0.5 milliLiter(s) IntraMuscular once  insulin glargine Injectable (LANTUS) 8 Unit(s) SubCutaneous at bedtime  insulin lispro (HumaLOG) corrective regimen sliding scale   SubCutaneous every 6 hours  metoprolol tartrate Injectable 5 milliGRAM(s) IV Push every 6 hours  pantoprazole  Injectable 40 milliGRAM(s) IV Push every 12 hours  piperacillin/tazobactam IVPB. 3.375 Gram(s) IV Intermittent every 8 hours      Radiographs: No new imaging

## 2018-12-10 NOTE — PROGRESS NOTE ADULT - ASSESSMENT
83M PMH DM, Dementia (AAOx0-1 at baseline), DM, HTN, Open cholecystectomy (2017), open appendectomy (remote), presented as transfer from OSH for pneumatosis

## 2018-12-11 DIAGNOSIS — R13.10 DYSPHAGIA, UNSPECIFIED: ICD-10-CM

## 2018-12-11 LAB
GLUCOSE BLDC GLUCOMTR-MCNC: 185 MG/DL — HIGH (ref 70–99)
GLUCOSE BLDC GLUCOMTR-MCNC: 205 MG/DL — HIGH (ref 70–99)
GLUCOSE BLDC GLUCOMTR-MCNC: 221 MG/DL — HIGH (ref 70–99)
GLUCOSE BLDC GLUCOMTR-MCNC: 229 MG/DL — HIGH (ref 70–99)

## 2018-12-11 PROCEDURE — 99233 SBSQ HOSP IP/OBS HIGH 50: CPT

## 2018-12-11 RX ADMIN — ENOXAPARIN SODIUM 40 MILLIGRAM(S): 100 INJECTION SUBCUTANEOUS at 12:45

## 2018-12-11 RX ADMIN — PANTOPRAZOLE SODIUM 40 MILLIGRAM(S): 20 TABLET, DELAYED RELEASE ORAL at 05:52

## 2018-12-11 RX ADMIN — PIPERACILLIN AND TAZOBACTAM 25 GRAM(S): 4; .5 INJECTION, POWDER, LYOPHILIZED, FOR SOLUTION INTRAVENOUS at 05:52

## 2018-12-11 RX ADMIN — Medication 2: at 06:44

## 2018-12-11 RX ADMIN — Medication 5 MILLIGRAM(S): at 12:45

## 2018-12-11 RX ADMIN — PANTOPRAZOLE SODIUM 40 MILLIGRAM(S): 20 TABLET, DELAYED RELEASE ORAL at 17:46

## 2018-12-11 RX ADMIN — DEXTROSE MONOHYDRATE, SODIUM CHLORIDE, AND POTASSIUM CHLORIDE 100 MILLILITER(S): 50; .745; 4.5 INJECTION, SOLUTION INTRAVENOUS at 17:46

## 2018-12-11 RX ADMIN — DEXTROSE MONOHYDRATE, SODIUM CHLORIDE, AND POTASSIUM CHLORIDE 100 MILLILITER(S): 50; .745; 4.5 INJECTION, SOLUTION INTRAVENOUS at 05:51

## 2018-12-11 RX ADMIN — NYSTATIN CREAM 1 APPLICATION(S): 100000 CREAM TOPICAL at 05:51

## 2018-12-11 RX ADMIN — PIPERACILLIN AND TAZOBACTAM 25 GRAM(S): 4; .5 INJECTION, POWDER, LYOPHILIZED, FOR SOLUTION INTRAVENOUS at 21:49

## 2018-12-11 RX ADMIN — DEXTROSE MONOHYDRATE, SODIUM CHLORIDE, AND POTASSIUM CHLORIDE 100 MILLILITER(S): 50; .745; 4.5 INJECTION, SOLUTION INTRAVENOUS at 21:49

## 2018-12-11 RX ADMIN — PIPERACILLIN AND TAZOBACTAM 25 GRAM(S): 4; .5 INJECTION, POWDER, LYOPHILIZED, FOR SOLUTION INTRAVENOUS at 13:59

## 2018-12-11 RX ADMIN — DEXTROSE MONOHYDRATE, SODIUM CHLORIDE, AND POTASSIUM CHLORIDE 100 MILLILITER(S): 50; .745; 4.5 INJECTION, SOLUTION INTRAVENOUS at 09:42

## 2018-12-11 RX ADMIN — INSULIN GLARGINE 8 UNIT(S): 100 INJECTION, SOLUTION SUBCUTANEOUS at 21:49

## 2018-12-11 RX ADMIN — Medication 1: at 12:46

## 2018-12-11 RX ADMIN — Medication 5 MILLIGRAM(S): at 17:46

## 2018-12-11 RX ADMIN — Medication 2: at 17:46

## 2018-12-11 RX ADMIN — NYSTATIN CREAM 1 APPLICATION(S): 100000 CREAM TOPICAL at 17:46

## 2018-12-11 NOTE — PROGRESS NOTE ADULT - SUBJECTIVE AND OBJECTIVE BOX
SUBJECTIVE / OVERNIGHT EVENTS: pt confused , unable to obtain ROS from pt     MEDICATIONS  (STANDING):  dextrose 5% + sodium chloride 0.45% with potassium chloride 20 mEq/L 1000 milliLiter(s) (100 mL/Hr) IV Continuous <Continuous>  dextrose 5%. 1000 milliLiter(s) (50 mL/Hr) IV Continuous <Continuous>  dextrose 50% Injectable 12.5 Gram(s) IV Push once  dextrose 50% Injectable 25 Gram(s) IV Push once  dextrose 50% Injectable 25 Gram(s) IV Push once  enoxaparin Injectable 40 milliGRAM(s) SubCutaneous daily  influenza   Vaccine 0.5 milliLiter(s) IntraMuscular once  insulin glargine Injectable (LANTUS) 8 Unit(s) SubCutaneous at bedtime  insulin lispro (HumaLOG) corrective regimen sliding scale   SubCutaneous three times a day before meals  insulin lispro (HumaLOG) corrective regimen sliding scale   SubCutaneous at bedtime  metoprolol tartrate Injectable 5 milliGRAM(s) IV Push every 6 hours  nystatin Powder 1 Application(s) Topical two times a day  pantoprazole  Injectable 40 milliGRAM(s) IV Push every 12 hours  piperacillin/tazobactam IVPB. 3.375 Gram(s) IV Intermittent every 8 hours    MEDICATIONS  (PRN):  dextrose 40% Gel 15 Gram(s) Oral once PRN Blood Glucose LESS THAN 70 milliGRAM(s)/deciliter  glucagon  Injectable 1 milliGRAM(s) IntraMuscular once PRN Glucose LESS THAN 70 milligrams/deciliter    Vital Signs Last 24 Hrs  T(C): 36.6 (11 Dec 2018 12:35), Max: 37.7 (10 Dec 2018 17:55)  T(F): 97.8 (11 Dec 2018 12:35), Max: 99.8 (10 Dec 2018 17:55)  HR: 62 (11 Dec 2018 12:35) (57 - 67)  BP: 138/79 (11 Dec 2018 12:35) (130/59 - 155/57)  BP(mean): --  RR: 18 (11 Dec 2018 12:35) (16 - 18)  SpO2: 98% (11 Dec 2018 12:35) (98% - 100%)    unable to obtain ros from pt - pt confused     PHYSICAL EXAM:  GENERAL: NAD  EYES: EOMI, PERRLA  NECK: Supple, No JVD  CHEST/LUNG: crepts at bases +  HEART:  S1 , S2 +  ABDOMEN: mild distension+, bs+, soft , tenderness+ on deep palpation  EXTREMITIES:  edema+  NEUROLOGY:alert awake confused   foely+    LABS:  12-10    139  |  103  |  2<L>  ----------------------------<  224<H>  4.1   |  24  |  0.62    Ca    8.5      10 Dec 2018 06:50  Phos  2.4     12-10  Mg     1.9     12-10      Creatinine Trend: 0.62 <--, 0.62 <--, 0.61 <--, 0.56 <--, 0.52 <--, 0.57 <--, 0.57 <--, 0.57 <--, 0.65 <--                        11.9   7.54  )-----------( 329      ( 10 Dec 2018 06:50 )             37.2     Urine Studies:  Urinalysis Basic - ( 07 Dec 2018 19:20 )    Color: LIGHT YELLOW / Appearance: CLEAR / S.014 / pH: 6.5  Gluc: 200 / Ketone: NEGATIVE  / Bili: NEGATIVE / Urobili: NORMAL   Blood: SMALL / Protein: 10 / Nitrite: NEGATIVE   Leuk Esterase: NEGATIVE / RBC: 11-25 / WBC 0-2   Sq Epi: OCC / Non Sq Epi:  / Bacteria: NEGATIVE                  Consultant(s) Notes Reviewed:      Care Discussed with Consultants/Other Providers:

## 2018-12-11 NOTE — PROGRESS NOTE ADULT - ASSESSMENT
83M PMH DM, Dementia (AAOx0-1 at baseline), DM, HTN, Open cholecystectomy (2017), open appendectomy (remote), presented as transfer from OSH for pneumatosis.  Palliative consulted for goals of care.

## 2018-12-11 NOTE — PROGRESS NOTE ADULT - PROBLEM SELECTOR PLAN 5
Nutritional goals of care as above.  Intubation discussed as patient currently not DNI.  Geraldine will discuss options with her siblings.  Psychosocial support provided.

## 2018-12-11 NOTE — PROVIDER CONTACT NOTE (OTHER) - ASSESSMENT
Pt received at 0345 transferred to 7N from 8S A&Ox0 laying comfortably in bed with LUE noted to be edematous +3/pitting around PIV site. Pt received from 8S with IV fluids running as ordered (D5% + sodium chloride 0.45% + potassium chloride 20 mEq/L at 100cc/hr) with IVPB Zofran connected at Y site. LAMONTE Martin notified and at pt bedside for assessment. Infiltrated PIV removed and new PIV access obtained on opposite arm. LUE elevated and warm compress applied.

## 2018-12-11 NOTE — CONSULT NOTE ADULT - SUBJECTIVE AND OBJECTIVE BOX
Patient is a 83y Male     Patient is a 83y old  Male who presents with a chief complaint of Pneumatosis (10 Dec 2018 14:11)      HPI:  83M PMH DM, Dementia (AAOx0-1 at baseline), DM, HTN, Open cholecystectomy (2017), open appendectomy (remote), presented as transfer from OSH for pneumatosis. Patient is mostly nonverbal and history was obtained from daughter at bedside. Pt's daughter states that pt began to have hematemesis this morning, prompting presentation to OSH. CT abd/pelvis obtained there which showed gastric pneumatosis, with gas tracking up the esophageal wall. Patient transferred to [facility] for further management.     Upon arrival to [facility] ED, AVSS. Afebrile. WBC 21.06. Lactate 3.5 -->2 after IVF resuscitation. NGT placed which immediately drained 300cc murky gray fluid. Nonbloody. (04 Dec 2018 22:41)      PAST MEDICAL & SURGICAL HISTORY:  Dementia  DM (diabetes mellitus)  Glaucoma  Dementia  DM (diabetes mellitus)  HTN (hypertension)  History of cholecystectomy  History of appendectomy  History of cholecystectomy  History of nephrolithiasis  Status post open reduction with internal fixation of fracture: right femur      MEDICATIONS  (STANDING):  dextrose 5% + sodium chloride 0.45% with potassium chloride 20 mEq/L 1000 milliLiter(s) (100 mL/Hr) IV Continuous <Continuous>  dextrose 5%. 1000 milliLiter(s) (50 mL/Hr) IV Continuous <Continuous>  dextrose 50% Injectable 12.5 Gram(s) IV Push once  dextrose 50% Injectable 25 Gram(s) IV Push once  dextrose 50% Injectable 25 Gram(s) IV Push once  enoxaparin Injectable 40 milliGRAM(s) SubCutaneous daily  influenza   Vaccine 0.5 milliLiter(s) IntraMuscular once  insulin glargine Injectable (LANTUS) 8 Unit(s) SubCutaneous at bedtime  insulin lispro (HumaLOG) corrective regimen sliding scale   SubCutaneous three times a day before meals  insulin lispro (HumaLOG) corrective regimen sliding scale   SubCutaneous at bedtime  metoprolol tartrate Injectable 5 milliGRAM(s) IV Push every 6 hours  nystatin Powder 1 Application(s) Topical two times a day  pantoprazole  Injectable 40 milliGRAM(s) IV Push every 12 hours  piperacillin/tazobactam IVPB. 3.375 Gram(s) IV Intermittent every 8 hours      Allergies    No Known Allergies    Intolerances        SOCIAL HISTORY:  Denies ETOh,Smoking,     FAMILY HISTORY:  No pertinent family history in first degree relatives  No pertinent family history in first degree relatives      REVIEW OF SYSTEMS:    CONSTITUTIONAL: No weakness, fevers or chills  EYES/ENT: No visual changes;  No vertigo or throat pain   NECK: No pain or stiffness  RESPIRATORY: No cough, wheezing, hemoptysis; No shortness of breath  CARDIOVASCULAR: No chest pain or palpitations  GASTROINTESTINAL: No abdominal or epigastric pain. No nausea, vomiting, or hematemesis; No diarrhea or constipation. No melena or hematochezia.  GENITOURINARY: No dysuria, frequency or hematuria  NEUROLOGICAL: No numbness or weakness  SKIN: No itching, burning, rashes, or lesions   All other review of systems is negative unless indicated above.    VITAL:  T(C): , Max: 37.7 (12-10-18 @ 17:55)  T(F): , Max: 99.8 (12-10-18 @ 17:55)  HR: 57 (12-11-18 @ 05:47)  BP: 135/62 (12-11-18 @ 05:47)  BP(mean): --  RR: 16 (12-11-18 @ 05:47)  SpO2: 99% (12-11-18 @ 05:47)  Wt(kg): --    I and O's:    12-09 @ 07:01  -  12-10 @ 07:00  --------------------------------------------------------  IN: 2850 mL / OUT: 3760 mL / NET: -910 mL    12-10 @ 07:01  -  12-11 @ 07:00  --------------------------------------------------------  IN: 700 mL / OUT: 1750 mL / NET: -1050 mL          PHYSICAL EXAM:    Constitutional: NAD  HEENT: PERRLA,   Neck: No JVD  Respiratory: CTA B/L  Cardiovascular: S1 and S2  Gastrointestinal: BS+, soft, NT/ND  Extremities: No peripheral edema  Neurological: A/O x 3, no focal deficits  Psychiatric: Normal mood, normal affect  : No Christina  Skin: No rashes  Access: Not applicable  Back: No CVA tenderness    LABS:                        11.9   7.54  )-----------( 329      ( 10 Dec 2018 06:50 )             37.2     12-10    139  |  103  |  2<L>  ----------------------------<  224<H>  4.1   |  24  |  0.62    Ca    8.5      10 Dec 2018 06:50  Phos  2.4     12-10  Mg     1.9     12-10            RADIOLOGY & ADDITIONAL STUDIES:

## 2018-12-11 NOTE — PROVIDER CONTACT NOTE (OTHER) - ASSESSMENT
Pt A&Ox0 minimally verbal laying comfortably in bed. Pt with DNR order in place and MOLST form in chart, however not signed by MD at this time. ADS [place] aware.

## 2018-12-11 NOTE — PROGRESS NOTE ADULT - SUBJECTIVE AND OBJECTIVE BOX
INTERVAL HPI/OVERNIGHT EVENTS: No acute events     Code Status: DNR not DNI  Allergies    No Known Allergies    Intolerances    MEDICATIONS  (STANDING):  dextrose 5% + sodium chloride 0.45% with potassium chloride 20 mEq/L 1000 milliLiter(s) (100 mL/Hr) IV Continuous <Continuous>  dextrose 5%. 1000 milliLiter(s) (50 mL/Hr) IV Continuous <Continuous>  dextrose 50% Injectable 12.5 Gram(s) IV Push once  dextrose 50% Injectable 25 Gram(s) IV Push once  dextrose 50% Injectable 25 Gram(s) IV Push once  enoxaparin Injectable 40 milliGRAM(s) SubCutaneous daily  influenza   Vaccine 0.5 milliLiter(s) IntraMuscular once  insulin glargine Injectable (LANTUS) 8 Unit(s) SubCutaneous at bedtime  insulin lispro (HumaLOG) corrective regimen sliding scale   SubCutaneous three times a day before meals  insulin lispro (HumaLOG) corrective regimen sliding scale   SubCutaneous at bedtime  metoprolol tartrate Injectable 5 milliGRAM(s) IV Push every 6 hours  nystatin Powder 1 Application(s) Topical two times a day  pantoprazole  Injectable 40 milliGRAM(s) IV Push every 12 hours  piperacillin/tazobactam IVPB. 3.375 Gram(s) IV Intermittent every 8 hours    MEDICATIONS  (PRN):  dextrose 40% Gel 15 Gram(s) Oral once PRN Blood Glucose LESS THAN 70 milliGRAM(s)/deciliter  glucagon  Injectable 1 milliGRAM(s) IntraMuscular once PRN Glucose LESS THAN 70 milligrams/deciliter      PRESENT SYMPTOMS: [x ]Unable to obtain due to poor mentation   Source if other than patient:  [ ]Family   [ ]Team     Pain (Impact on QOL):    Location:  Severity:  Minimal acceptable level (0-10 scale):       Quality:       Onset:  Duration:  Aggravating factors:  Relieving Factors  Radiation:    Dyspnea:  Yes [ ] No [ ] - [ ]Mild [ ]Moderate [ ]Severe  Anxiety:    Yes [ ] No [ ] - [ ]Mild [ ]Moderate [ ]Severe  Fatigue:    Yes [ ] No [ ] - [ ]Mild [ ]Moderate [ ]Severe  Nausea:    Yes [ ] No [ ] - [ ]Mild [ ]Moderate [ ]Severe                         Loss of appetite: Yes [ ] No [ ] - [ ]Mild [ ]Moderate [ ]Severe             Constipation:  Yes [ ] No [ ] - [ ]Mild [ ]Moderate [ ]Severe    PAIN AD Score:	0  [email or web address]/[email or web address] (Ctrl + left click to view)    Other Symptoms:  [ ]All other review of systems negative     Karnofsky Performance Score/Palliative Performance Status Version 2:   20%    http://palliative.info/resource_material/[email or web address].pdf    PHYSICAL EXAM:  Vital Signs Last 24 Hrs  T(C): 36.6 (11 Dec 2018 12:35), Max: 37.7 (10 Dec 2018 17:55)  T(F): 97.8 (11 Dec 2018 12:35), Max: 99.8 (10 Dec 2018 17:55)  HR: 62 (11 Dec 2018 12:35) (57 - 67)  BP: 138/79 (11 Dec 2018 12:35) (130/59 - 155/57)  BP(mean): --  RR: 18 (11 Dec 2018 12:35) (16 - 18)  SpO2: 98% (11 Dec 2018 12:35) (98% - 100%) I&O's Summary    10 Dec 2018 07:01  -  11 Dec 2018 07:00  --------------------------------------------------------  IN: 700 mL / OUT: 1750 mL / NET: -1050 mL     GENERAL:  Lethargic, non-verbal, not following commands   PULMONARY:   [x ]Clear anteriorly   [ ]Rhonchi        [ ]Right [ ]Left [ ]Bilateral  [ ]Crackles        [ ]Right [ ]Left [ ]Bilateral  [ ]Wheezing     [ ]Right [ ]Left [ ]Bilateral  CARDIOVASCULAR:    [x ]Regular [ ]Irregular [ ]Tachy  [ ]Car [ ]Murmur [ ]Other  GASTROINTESTINAL:  [ x]Soft  [ ]Distended   [x ]+BS  [x ]Non tender [ ]Tender  [ ]PEG [ ]OGT/ NGT   Last BM: 12/11/18     GENITOURINARY:  [ ]Normal [x ] Incontinent   [ ]Oliguria/Anuria   [ ]Christina  MUSCULOSKELETAL:   [ ]Normal   [ x]Weakness  [x ]Bed/Wheelchair bound [ ]Edema  NEUROLOGIC:   [ ]No focal deficits  [x ] Cognitive impairment  [ ] Dysphagia [ ]Dysarthria [ ] Paresis [ ]Other   SKIN:   [ ]Normal   [x ]Pressure ulcer(s) - multiple pressure ulcers      CRITICAL CARE:  [ ] Shock Present  [ ]Septic [ ]Cardiogenic [ ]Neurologic [ ]Hypovolemic  [ ]  Vasopressors [ ]  Inotropes   [ ] Respiratory failure present  [ ] Acute  [ ] Chronic [ ] Hypoxic  [ ] Hypercarbic [ ] Other  [ ] Other organ failure     LABS:                        11.9   7.54  )-----------( 329      ( 10 Dec 2018 06:50 )             37.2   12-10    139  |  103  |  2<L>  ----------------------------<  224<H>  4.1   |  24  |  0.62    Ca    8.5      10 Dec 2018 06:50  Phos  2.4     12-10  Mg     1.9     12-10          RADIOLOGY & ADDITIONAL STUDIES:    Protein Calorie Malnutrition Present: [ ] yes [ ] no  [ ] PPSV2 < or = 30%  [ ] significant weight loss [ ] poor nutritional intake [ ] anasarca [ ] catabolic state Albumin, Serum: 2.8 g/dL (12-08-18 @ 08:24)      REFERRALS:   [ ]Chaplaincy  [ ] Hospice  [ ]Child Life  [ ]Social Work  [ ]Case management [ ]Holistic Therapy   Goals of Care Document:

## 2018-12-11 NOTE — PROVIDER CONTACT NOTE (OTHER) - RECOMMENDATIONS
As per ADS, remove infiltrated PIV, obtained new PIV access, elevated LUE and apply warm compress. No further interventions, continuing to monitor.

## 2018-12-11 NOTE — PROVIDER CONTACT NOTE (MEDICATION) - ASSESSMENT
Pt with HR 57 palpated, IVP Metoprolol due at this time Pt with /62, HR 57 bpm palpated, IVP Metoprolol due at this time, pt asymptomatic ADS [place] aware of pt HR.

## 2018-12-12 LAB
BACTERIA BLD CULT: SIGNIFICANT CHANGE UP
BACTERIA BLD CULT: SIGNIFICANT CHANGE UP
BUN SERPL-MCNC: < 2 MG/DL — LOW (ref 7–23)
CALCIUM SERPL-MCNC: 8.7 MG/DL — SIGNIFICANT CHANGE UP (ref 8.4–10.5)
CHLORIDE SERPL-SCNC: 103 MMOL/L — SIGNIFICANT CHANGE UP (ref 98–107)
CO2 SERPL-SCNC: 22 MMOL/L — SIGNIFICANT CHANGE UP (ref 22–31)
CREAT SERPL-MCNC: 0.7 MG/DL — SIGNIFICANT CHANGE UP (ref 0.5–1.3)
GLUCOSE BLDC GLUCOMTR-MCNC: 189 MG/DL — HIGH (ref 70–99)
GLUCOSE BLDC GLUCOMTR-MCNC: 213 MG/DL — HIGH (ref 70–99)
GLUCOSE BLDC GLUCOMTR-MCNC: 275 MG/DL — HIGH (ref 70–99)
GLUCOSE BLDC GLUCOMTR-MCNC: 301 MG/DL — HIGH (ref 70–99)
GLUCOSE SERPL-MCNC: 279 MG/DL — HIGH (ref 70–99)
HCT VFR BLD CALC: 36.5 % — LOW (ref 39–50)
HGB BLD-MCNC: 12.3 G/DL — LOW (ref 13–17)
MAGNESIUM SERPL-MCNC: 1.8 MG/DL — SIGNIFICANT CHANGE UP (ref 1.6–2.6)
MCHC RBC-ENTMCNC: 30.7 PG — SIGNIFICANT CHANGE UP (ref 27–34)
MCHC RBC-ENTMCNC: 33.7 % — SIGNIFICANT CHANGE UP (ref 32–36)
MCV RBC AUTO: 91 FL — SIGNIFICANT CHANGE UP (ref 80–100)
NRBC # FLD: 0 — SIGNIFICANT CHANGE UP
PHOSPHATE SERPL-MCNC: 2 MG/DL — LOW (ref 2.5–4.5)
PLATELET # BLD AUTO: 382 K/UL — SIGNIFICANT CHANGE UP (ref 150–400)
PMV BLD: 10.3 FL — SIGNIFICANT CHANGE UP (ref 7–13)
POTASSIUM SERPL-MCNC: 4.7 MMOL/L — SIGNIFICANT CHANGE UP (ref 3.5–5.3)
POTASSIUM SERPL-SCNC: 4.7 MMOL/L — SIGNIFICANT CHANGE UP (ref 3.5–5.3)
RBC # BLD: 4.01 M/UL — LOW (ref 4.2–5.8)
RBC # FLD: 12.9 % — SIGNIFICANT CHANGE UP (ref 10.3–14.5)
SODIUM SERPL-SCNC: 137 MMOL/L — SIGNIFICANT CHANGE UP (ref 135–145)
WBC # BLD: 8.42 K/UL — SIGNIFICANT CHANGE UP (ref 3.8–10.5)
WBC # FLD AUTO: 8.42 K/UL — SIGNIFICANT CHANGE UP (ref 3.8–10.5)

## 2018-12-12 PROCEDURE — 99233 SBSQ HOSP IP/OBS HIGH 50: CPT

## 2018-12-12 RX ORDER — INSULIN GLARGINE 100 [IU]/ML
12 INJECTION, SOLUTION SUBCUTANEOUS AT BEDTIME
Qty: 0 | Refills: 0 | Status: DISCONTINUED | OUTPATIENT
Start: 2018-12-12 | End: 2018-12-13

## 2018-12-12 RX ORDER — INSULIN LISPRO 100/ML
2 VIAL (ML) SUBCUTANEOUS
Qty: 0 | Refills: 0 | Status: DISCONTINUED | OUTPATIENT
Start: 2018-12-12 | End: 2018-12-15

## 2018-12-12 RX ADMIN — PIPERACILLIN AND TAZOBACTAM 25 GRAM(S): 4; .5 INJECTION, POWDER, LYOPHILIZED, FOR SOLUTION INTRAVENOUS at 13:28

## 2018-12-12 RX ADMIN — PANTOPRAZOLE SODIUM 40 MILLIGRAM(S): 20 TABLET, DELAYED RELEASE ORAL at 06:05

## 2018-12-12 RX ADMIN — DEXTROSE MONOHYDRATE, SODIUM CHLORIDE, AND POTASSIUM CHLORIDE 100 MILLILITER(S): 50; .745; 4.5 INJECTION, SOLUTION INTRAVENOUS at 06:04

## 2018-12-12 RX ADMIN — NYSTATIN CREAM 1 APPLICATION(S): 100000 CREAM TOPICAL at 06:04

## 2018-12-12 RX ADMIN — DEXTROSE MONOHYDRATE, SODIUM CHLORIDE, AND POTASSIUM CHLORIDE 100 MILLILITER(S): 50; .745; 4.5 INJECTION, SOLUTION INTRAVENOUS at 07:57

## 2018-12-12 RX ADMIN — DEXTROSE MONOHYDRATE, SODIUM CHLORIDE, AND POTASSIUM CHLORIDE 100 MILLILITER(S): 50; .745; 4.5 INJECTION, SOLUTION INTRAVENOUS at 16:22

## 2018-12-12 RX ADMIN — Medication 5 MILLIGRAM(S): at 17:23

## 2018-12-12 RX ADMIN — Medication 1: at 22:10

## 2018-12-12 RX ADMIN — PANTOPRAZOLE SODIUM 40 MILLIGRAM(S): 20 TABLET, DELAYED RELEASE ORAL at 17:22

## 2018-12-12 RX ADMIN — ENOXAPARIN SODIUM 40 MILLIGRAM(S): 100 INJECTION SUBCUTANEOUS at 12:24

## 2018-12-12 RX ADMIN — Medication 5 MILLIGRAM(S): at 12:25

## 2018-12-12 RX ADMIN — PIPERACILLIN AND TAZOBACTAM 25 GRAM(S): 4; .5 INJECTION, POWDER, LYOPHILIZED, FOR SOLUTION INTRAVENOUS at 22:10

## 2018-12-12 RX ADMIN — Medication 63.75 MILLIMOLE(S): at 16:22

## 2018-12-12 RX ADMIN — INSULIN GLARGINE 12 UNIT(S): 100 INJECTION, SOLUTION SUBCUTANEOUS at 22:10

## 2018-12-12 RX ADMIN — Medication 2: at 12:24

## 2018-12-12 RX ADMIN — PIPERACILLIN AND TAZOBACTAM 25 GRAM(S): 4; .5 INJECTION, POWDER, LYOPHILIZED, FOR SOLUTION INTRAVENOUS at 06:05

## 2018-12-12 RX ADMIN — Medication 1: at 17:23

## 2018-12-12 RX ADMIN — NYSTATIN CREAM 1 APPLICATION(S): 100000 CREAM TOPICAL at 17:23

## 2018-12-12 RX ADMIN — Medication 4: at 07:57

## 2018-12-12 NOTE — SWALLOW BEDSIDE ASSESSMENT ADULT - COMMENTS
Patient is an 83 year old male with PMHx of DM, Dementia (AAOx0-1 at baseline), DM, HTN, Open cholecystectomy (2017), open appendectomy (remote), presented as transfer from OSH for pneumatosis. Patient is mostly nonverbal and history was obtained from daughter at bedside. Pt's daughter states that pt began to have hematemesis this morning, prompting presentation to OSH. CT abd/pelvis obtained there which showed gastric pneumatosis, with gas tracking up the esophageal wall. Patient transferred to [facility] for further management.     Patient is known to this service from initial attempt on 12/10/18, at which time patient was too lethargic for PO trials. Patient was received with eyes closed with intermittent mumbling and not following low-level directives this PM. Immediate oral motor response noted upon dry spoon presentation. Recommendations discussed with NP (Astrid) and primary RN. Patient is an 83 year old male with PMHx of DM, Dementia (AAOx0-1 at baseline), DM, HTN, Open cholecystectomy (2017), open appendectomy (remote), presented as transfer from OSH for pneumatosis. Patient is mostly nonverbal and history was obtained from daughter at bedside. Pt's daughter states that pt began to have hematemesis this morning, prompting presentation to OSH. CT abd/pelvis obtained there which showed gastric pneumatosis, with gas tracking up the esophageal wall. Patient transferred to [facility] for further management.     Patient is known to this service from initial attempt on 12/10/18, at which time patient was too lethargic for PO trials. Patient was received with eyes closed, with intermittent mumbling and not following low-level directives this PM. Immediate oral motor response noted upon dry spoon presentation. Recommendations discussed with NP (Astrid) and primary RN.

## 2018-12-12 NOTE — CHART NOTE - NSCHARTNOTEFT_GEN_A_CORE
Confirmed with daughter Geraldine Chandra DNR/DNI : DNR form in chart signed by medical attending Dr. Archibald

## 2018-12-12 NOTE — CHART NOTE - NSCHARTNOTEFT_GEN_A_CORE
endocrine consulted endocrine recs over the phone humalog 2 units tid before meals, lantus 12units qhs, and corrective sliding scale

## 2018-12-12 NOTE — PROGRESS NOTE ADULT - ATTENDING COMMENTS
I have personally interviewed and examined this patient, reviewed pertinent labs and imaging, and discussed the case with colleagues, residents, and physician assistants on B Team rounds.    Plan  CT scan to eval for resolution of pneumatosis - if gone will plan to dc ngt, trial of CLD      [facility] (B Team) Attending Group Practice:  Dr. Clint Mccall, Dr. Olaf Apple, Dr. Harjinder Vogel, Dr. Norma Strong, Dr. Pelon An    urgent issues - spectra [id number] or [id number]  nonurgent issues - [phone]  patient appointments or afterhours - [phone]
I have personally interviewed and examined this patient, reviewed pertinent labs and imaging, and discussed the case with colleagues, residents, and physician assistants on B Team rounds.    Plan  Repeat CT scan tomorrow to re-evaluate pneumatosis   growing staph x 1, repeat blood cx negative so far. Follow up sensitivities  cont zosyn    [facility] (B Team) Attending Group Practice:  Dr. Clint Mccall, Dr. Olaf Apple, Dr. Harjinder Vogel, Dr. Norma Strong, Dr. Pelon An    urgent issues - spectra [phone] or [id number]  nonurgent issues - [phone]  patient appointments or afterhours - [phone]
Seen and examined, chart and note reviewed    Gastric pneumatosis  a.  Improved based on CT  b.  Unlikely to tolerated oral feeds as patient at baseline is obtunded  c.  Nutrition consult and eventual swallow evaluation  d.  Continue IV antibiotics at this time  e.  Unlikely need for surgical intervention at this time, and HCP unlikely to agree.    f.  Obtain medical consult re: DM management possible transfer of service
Patient seen and examined.  Agree with NP note.
Patient seen and examined.  Meds, labs and vitals reviewed.  Agree with plan as above.  Plan reviewed with NP.  Physical exam done by attending. Edited as above.

## 2018-12-12 NOTE — PROGRESS NOTE ADULT - SUBJECTIVE AND OBJECTIVE BOX
INTERVAL HPI/OVERNIGHT EVENTS:  Patient remains drowsy - passed speech and swallow evaluation     Code Status: DNR not DNI   Allergies    No Known Allergies    Intolerances    MEDICATIONS  (STANDING):  dextrose 5% + sodium chloride 0.45% with potassium chloride 20 mEq/L 1000 milliLiter(s) (100 mL/Hr) IV Continuous <Continuous>  dextrose 5%. 1000 milliLiter(s) (50 mL/Hr) IV Continuous <Continuous>  dextrose 50% Injectable 12.5 Gram(s) IV Push once  dextrose 50% Injectable 25 Gram(s) IV Push once  dextrose 50% Injectable 25 Gram(s) IV Push once  enoxaparin Injectable 40 milliGRAM(s) SubCutaneous daily  influenza   Vaccine 0.5 milliLiter(s) IntraMuscular once  insulin glargine Injectable (LANTUS) 8 Unit(s) SubCutaneous at bedtime  insulin lispro (HumaLOG) corrective regimen sliding scale   SubCutaneous three times a day before meals  insulin lispro (HumaLOG) corrective regimen sliding scale   SubCutaneous at bedtime  metoprolol tartrate Injectable 5 milliGRAM(s) IV Push every 6 hours  nystatin Powder 1 Application(s) Topical two times a day  pantoprazole  Injectable 40 milliGRAM(s) IV Push every 12 hours  piperacillin/tazobactam IVPB. 3.375 Gram(s) IV Intermittent every 8 hours  sodium phosphate IVPB 15 milliMole(s) IV Intermittent once    MEDICATIONS  (PRN):  dextrose 40% Gel 15 Gram(s) Oral once PRN Blood Glucose LESS THAN 70 milliGRAM(s)/deciliter  glucagon  Injectable 1 milliGRAM(s) IntraMuscular once PRN Glucose LESS THAN 70 milligrams/deciliter      PRESENT SYMPTOMS: [ x]Unable to obtain due to poor mentation   Source if other than patient:  [ ]Family   [ ]Team     Pain (Impact on QOL):    Location:  Severity:  Minimal acceptable level (0-10 scale):       Quality:       Onset:  Duration:  Aggravating factors:  Relieving Factors  Radiation:    Dyspnea:  Yes [ ] No [ ] - [ ]Mild [ ]Moderate [ ]Severe  Anxiety:    Yes [ ] No [ ] - [ ]Mild [ ]Moderate [ ]Severe  Fatigue:    Yes [ ] No [ ] - [ ]Mild [ ]Moderate [ ]Severe  Nausea:    Yes [ ] No [ ] - [ ]Mild [ ]Moderate [ ]Severe                         Loss of appetite: Yes [ ] No [ ] - [ ]Mild [ ]Moderate [ ]Severe             Constipation:  Yes [ ] No [ ] - [ ]Mild [ ]Moderate [ ]Severe    PAIN AD Score:	0  [email or web address] (Ctrl + left click to view)    Other Symptoms:  [ ]All other review of systems negative     Karnofsky Performance Score/Palliative Performance Status Version 2:   20-30%    http://palliative.info/resource_material/[email or web address].pdf    PHYSICAL EXAM:  Vital Signs Last 24 Hrs  T(C): 36.3 (12 Dec 2018 12:22), Max: 36.9 (11 Dec 2018 20:47)  T(F): 97.4 (12 Dec 2018 12:22), Max: 98.4 (11 Dec 2018 20:47)  HR: 61 (12 Dec 2018 12:22) (51 - 61)  BP: 140/86 (12 Dec 2018 12:22) (111/68 - 140/86)  BP(mean): --  RR: 16 (12 Dec 2018 12:22) (16 - 18)  SpO2: 100% (12 Dec 2018 12:22) (96% - 100%) I&O's Summary    11 Dec 2018 07:01  -  12 Dec 2018 07:00  --------------------------------------------------------  IN: 2325 mL / OUT: 1500 mL / NET: 825 mL    12 Dec 2018 07:01  -  12 Dec 2018 15:38  --------------------------------------------------------  IN: 1000 mL / OUT: 800 mL / NET: 200 mL    GENERAL:  Lethargic - non-verbal - not following commands   PULMONARY:   [x ]Clear - anteriorly    [ ]Rhonchi        [ ]Right [ ]Left [ ]Bilateral  [ ]Crackles        [ ]Right [ ]Left [ ]Bilateral  [ ]Wheezing     [ ]Right [ ]Left [ ]Bilateral  CARDIOVASCULAR:    [x ]Regular [ ]Irregular [ ]Tachy  [ ]Car [ ]Murmur [ ]Other  GASTROINTESTINAL:  [x ]Soft  [ ]Distended   [x ]+BS  [x ]Non tender [ ]Tender  [ ]PEG [ ]OGT/ NGT   Last BM: 12/11/18  GENITOURINARY:  [ ]Normal [x ] Incontinent   [ ]Oliguria/Anuria   [ ]Christina  MUSCULOSKELETAL:   [ ]Normal   [ ]Weakness  [x ]Bed/Wheelchair bound [ ]Edema  NEUROLOGIC:   [ ]No focal deficits  [x ] Cognitive impairment  [ ] Dysphagia [ ]Dysarthria [ ] Paresis [ ]Other   SKIN:   [ ]Normal   [x ]Pressure ulcer(s) - multiple pressure ulcers     CRITICAL CARE:  [ ] Shock Present  [ ]Septic [ ]Cardiogenic [ ]Neurologic [ ]Hypovolemic  [ ]  Vasopressors [ ]  Inotropes   [ ] Respiratory failure present  [ ] Acute  [ ] Chronic [ ] Hypoxic  [ ] Hypercarbic [ ] Other  [ ] Other organ failure     LABS:                        12.3   8.42  )-----------( 382      ( 12 Dec 2018 06:40 )             36.5   12-12    137  |  103  |  < 2<L>  ----------------------------<  279<H>  4.7   |  22  |  0.70    Ca    8.7      12 Dec 2018 06:40  Phos  2.0     12-12  Mg     1.8     12-12          RADIOLOGY & ADDITIONAL STUDIES:    Protein Calorie Malnutrition Present: [ ] yes [ ] no  [ ] PPSV2 < or = 30%  [ ] significant weight loss [ ] poor nutritional intake [ ] anasarca [ ] catabolic state Albumin, Serum: 2.8 g/dL (12-08-18 @ 08:24)      REFERRALS:   [ ]Chaplaincy  [ ] Hospice  [ ]Child Life  [ ]Social Work  [ ]Case management [ ]Holistic Therapy   Goals of Care Document: INTERVAL HPI/OVERNIGHT EVENTS:  Patient remains drowsy - passed speech and swallow evaluation     Code Status: DNR not DNI   Allergies    No Known Allergies    Intolerances    MEDICATIONS  (STANDING):  dextrose 5% + sodium chloride 0.45% with potassium chloride 20 mEq/L 1000 milliLiter(s) (100 mL/Hr) IV Continuous <Continuous>  dextrose 5%. 1000 milliLiter(s) (50 mL/Hr) IV Continuous <Continuous>  dextrose 50% Injectable 12.5 Gram(s) IV Push once  dextrose 50% Injectable 25 Gram(s) IV Push once  dextrose 50% Injectable 25 Gram(s) IV Push once  enoxaparin Injectable 40 milliGRAM(s) SubCutaneous daily  influenza   Vaccine 0.5 milliLiter(s) IntraMuscular once  insulin glargine Injectable (LANTUS) 8 Unit(s) SubCutaneous at bedtime  insulin lispro (HumaLOG) corrective regimen sliding scale   SubCutaneous three times a day before meals  insulin lispro (HumaLOG) corrective regimen sliding scale   SubCutaneous at bedtime  metoprolol tartrate Injectable 5 milliGRAM(s) IV Push every 6 hours  nystatin Powder 1 Application(s) Topical two times a day  pantoprazole  Injectable 40 milliGRAM(s) IV Push every 12 hours  piperacillin/tazobactam IVPB. 3.375 Gram(s) IV Intermittent every 8 hours  sodium phosphate IVPB 15 milliMole(s) IV Intermittent once    MEDICATIONS  (PRN):  dextrose 40% Gel 15 Gram(s) Oral once PRN Blood Glucose LESS THAN 70 milliGRAM(s)/deciliter  glucagon  Injectable 1 milliGRAM(s) IntraMuscular once PRN Glucose LESS THAN 70 milligrams/deciliter      PRESENT SYMPTOMS: [ x]Unable to obtain due to poor mentation   Source if other than patient:  [ ]Family   [ ]Team     Pain (Impact on QOL):    Location:  Severity:  Minimal acceptable level (0-10 scale):       Quality:       Onset:  Duration:  Aggravating factors:  Relieving Factors  Radiation:    Dyspnea:  Yes [ ] No [ ] - [ ]Mild [ ]Moderate [ ]Severe  Anxiety:    Yes [ ] No [ ] - [ ]Mild [ ]Moderate [ ]Severe  Fatigue:    Yes [ ] No [ ] - [ ]Mild [ ]Moderate [ ]Severe  Nausea:    Yes [ ] No [ ] - [ ]Mild [ ]Moderate [ ]Severe                         Loss of appetite: Yes [ ] No [ ] - [ ]Mild [ ]Moderate [ ]Severe             Constipation:  Yes [ ] No [ ] - [ ]Mild [ ]Moderate [ ]Severe    PAIN AD Score:	0  [email or web address] (Ctrl + left click to view)    Other Symptoms:  [ ]All other review of systems negative     Karnofsky Performance Score/Palliative Performance Status Version 2:   20-30%    http://palliative.info/resource_material/[email or web address].pdf    PHYSICAL EXAM:  Vital Signs Last 24 Hrs  T(C): 36.3 (12 Dec 2018 12:22), Max: 36.9 (11 Dec 2018 20:47)  T(F): 97.4 (12 Dec 2018 12:22), Max: 98.4 (11 Dec 2018 20:47)  HR: 61 (12 Dec 2018 12:22) (51 - 61)  BP: 140/86 (12 Dec 2018 12:22) (111/68 - 140/86)  BP(mean): --  RR: 16 (12 Dec 2018 12:22) (16 - 18)  SpO2: 100% (12 Dec 2018 12:22) (96% - 100%) I&O's Summary    11 Dec 2018 07:01  -  12 Dec 2018 07:00  --------------------------------------------------------  IN: 2325 mL / OUT: 1500 mL / NET: 825 mL    12 Dec 2018 07:01  -  12 Dec 2018 15:38  --------------------------------------------------------  IN: 1000 mL / OUT: 800 mL / NET: 200 mL    GENERAL:  Lethargic - non-verbal - not following commands. Minimally responsive   PULMONARY:   [x]Clear - anteriorly    [ ]Rhonchi        [ ]Right [ ]Left [ ]Bilateral  [ ]Crackles        [ ]Right [ ]Left [ ]Bilateral  [ ]Wheezing     [ ]Right [ ]Left [ ]Bilateral  CARDIOVASCULAR:    [x ]Regular [ ]Irregular [ ]Tachy  [ ]Car [ ]Murmur [ ]Other  GASTROINTESTINAL:  [x ]Soft  [ ]Distended   [x ]+BS  [x ]Non tender [ ]Tender  [ ]PEG [ ]OGT/ NGT   Last BM: 12/11/18  GENITOURINARY:  [ ]Normal [x ] Incontinent   [ ]Oliguria/Anuria   [ ]Christina  MUSCULOSKELETAL:   [ ]Normal   [ ]Weakness  [x ]Bed/Wheelchair bound [ ]Edema  NEUROLOGIC:   [ ]No focal deficits  [x ] Cognitive impairment  [ ] Dysphagia [ ]Dysarthria [ ] Paresis [ ]Other   SKIN:   [ ]Normal   [x ]Pressure ulcer(s) - multiple pressure ulcers     CRITICAL CARE:  [ ] Shock Present  [ ]Septic [ ]Cardiogenic [ ]Neurologic [ ]Hypovolemic  [ ]  Vasopressors [ ]  Inotropes   [ ] Respiratory failure present  [ ] Acute  [ ] Chronic [ ] Hypoxic  [ ] Hypercarbic [ ] Other  [ ] Other organ failure     LABS:                        12.3   8.42  )-----------( 382      ( 12 Dec 2018 06:40 )             36.5   12-12    137  |  103  |  < 2<L>  ----------------------------<  279<H>  4.7   |  22  |  0.70    Ca    8.7      12 Dec 2018 06:40  Phos  2.0     12-12  Mg     1.8     12-12          RADIOLOGY & ADDITIONAL STUDIES:    Protein Calorie Malnutrition Present: [ ] yes [ ] no  [ ] PPSV2 < or = 30%  [ ] significant weight loss [ ] poor nutritional intake [ ] anasarca [ ] catabolic state Albumin, Serum: 2.8 g/dL (12-08-18 @ 08:24)      REFERRALS:   [ ]Chaplaincy  [ ] Hospice  [ ]Child Life  [ ]Social Work  [ ]Case management [ ]Holistic Therapy   Goals of Care Document:

## 2018-12-12 NOTE — SWALLOW BEDSIDE ASSESSMENT ADULT - ASR SWALLOW ASPIRATION MONITOR
pneumonia/upper respiratory infection/cough/fever/throat clearing/change of breathing pattern/position upright (90Y)/oral hygiene/gurgly voice

## 2018-12-12 NOTE — PROGRESS NOTE ADULT - SUBJECTIVE AND OBJECTIVE BOX
SUBJECTIVE / OVERNIGHT EVENTS: pt confused , unable to obtain ROS from pt     MEDICATIONS  (STANDING):  dextrose 5% + sodium chloride 0.45% with potassium chloride 20 mEq/L 1000 milliLiter(s) (100 mL/Hr) IV Continuous <Continuous>  dextrose 5%. 1000 milliLiter(s) (50 mL/Hr) IV Continuous <Continuous>  dextrose 50% Injectable 12.5 Gram(s) IV Push once  dextrose 50% Injectable 25 Gram(s) IV Push once  dextrose 50% Injectable 25 Gram(s) IV Push once  enoxaparin Injectable 40 milliGRAM(s) SubCutaneous daily  influenza   Vaccine 0.5 milliLiter(s) IntraMuscular once  insulin glargine Injectable (LANTUS) 12 Unit(s) SubCutaneous at bedtime  insulin lispro (HumaLOG) corrective regimen sliding scale   SubCutaneous three times a day before meals  insulin lispro (HumaLOG) corrective regimen sliding scale   SubCutaneous at bedtime  insulin lispro Injectable (HumaLOG) 2 Unit(s) SubCutaneous three times a day before meals  metoprolol tartrate Injectable 5 milliGRAM(s) IV Push every 6 hours  nystatin Powder 1 Application(s) Topical two times a day  pantoprazole  Injectable 40 milliGRAM(s) IV Push every 12 hours  piperacillin/tazobactam IVPB. 3.375 Gram(s) IV Intermittent every 8 hours    MEDICATIONS  (PRN):  dextrose 40% Gel 15 Gram(s) Oral once PRN Blood Glucose LESS THAN 70 milliGRAM(s)/deciliter  glucagon  Injectable 1 milliGRAM(s) IntraMuscular once PRN Glucose LESS THAN 70 milligrams/deciliter    Vital Signs Last 24 Hrs  T(C): 36.3 (12 Dec 2018 12:22), Max: 36.9 (11 Dec 2018 20:47)  T(F): 97.4 (12 Dec 2018 12:22), Max: 98.4 (11 Dec 2018 20:47)  HR: 60 (12 Dec 2018 17:20) (51 - 61)  BP: 153/79 (12 Dec 2018 17:20) (111/68 - 153/79)  BP(mean): --  RR: 16 (12 Dec 2018 12:22) (16 - 18)  SpO2: 100% (12 Dec 2018 12:22) (96% - 100%)    unable to obtain ros from pt - pt confused     PHYSICAL EXAM:  GENERAL: NAD  EYES: EOMI, PERRLA  NECK: Supple, No JVD  CHEST/LUNG: crepts at bases +  HEART:  S1 , S2 +  ABDOMEN: mild distension+, bs+, soft , tenderness+ on deep palpation  EXTREMITIES:  edema+  NEUROLOGY:alert awake confused   foely+  LABS:      137  |  103  |  < 2<L>  ----------------------------<  279<H>  4.7   |  22  |  0.70    Ca    8.7      12 Dec 2018 06:40  Phos  2.0       Mg     1.8           Creatinine Trend: 0.70 <--, 0.62 <--, 0.62 <--, 0.61 <--, 0.56 <--, 0.52 <--, 0.57 <--, 0.57 <--                        12.3   8.42  )-----------( 382      ( 12 Dec 2018 06:40 )             36.5     Urine Studies:  Urinalysis Basic - ( 07 Dec 2018 19:20 )    Color: LIGHT YELLOW / Appearance: CLEAR / S.014 / pH: 6.5  Gluc: 200 / Ketone: NEGATIVE  / Bili: NEGATIVE / Urobili: NORMAL   Blood: SMALL / Protein: 10 / Nitrite: NEGATIVE   Leuk Esterase: NEGATIVE / RBC: 11-25 / WBC 0-2   Sq Epi: OCC / Non Sq Epi:  / Bacteria: NEGATIVE                                Consultant(s) Notes Reviewed:      Care Discussed with Consultants/Other Providers:

## 2018-12-12 NOTE — PROGRESS NOTE ADULT - SUBJECTIVE AND OBJECTIVE BOX
CHANA SERJIO:[id number],   83yMale followed for:  No Known Allergies    PAST MEDICAL & SURGICAL HISTORY:  Dementia  DM (diabetes mellitus)  Glaucoma  Dementia  DM (diabetes mellitus)  HTN (hypertension)  History of cholecystectomy  History of appendectomy  History of cholecystectomy  History of nephrolithiasis  Status post open reduction with internal fixation of fracture: right femur    FAMILY HISTORY:  No pertinent family history in first degree relatives  No pertinent family history in first degree relatives    MEDICATIONS  (STANDING):  dextrose 5% + sodium chloride 0.45% with potassium chloride 20 mEq/L 1000 milliLiter(s) (100 mL/Hr) IV Continuous <Continuous>  dextrose 5%. 1000 milliLiter(s) (50 mL/Hr) IV Continuous <Continuous>  dextrose 50% Injectable 12.5 Gram(s) IV Push once  dextrose 50% Injectable 25 Gram(s) IV Push once  dextrose 50% Injectable 25 Gram(s) IV Push once  enoxaparin Injectable 40 milliGRAM(s) SubCutaneous daily  influenza   Vaccine 0.5 milliLiter(s) IntraMuscular once  insulin glargine Injectable (LANTUS) 8 Unit(s) SubCutaneous at bedtime  insulin lispro (HumaLOG) corrective regimen sliding scale   SubCutaneous three times a day before meals  insulin lispro (HumaLOG) corrective regimen sliding scale   SubCutaneous at bedtime  metoprolol tartrate Injectable 5 milliGRAM(s) IV Push every 6 hours  nystatin Powder 1 Application(s) Topical two times a day  pantoprazole  Injectable 40 milliGRAM(s) IV Push every 12 hours  piperacillin/tazobactam IVPB. 3.375 Gram(s) IV Intermittent every 8 hours    MEDICATIONS  (PRN):  dextrose 40% Gel 15 Gram(s) Oral once PRN Blood Glucose LESS THAN 70 milliGRAM(s)/deciliter  glucagon  Injectable 1 milliGRAM(s) IntraMuscular once PRN Glucose LESS THAN 70 milligrams/deciliter      Vital Signs Last 24 Hrs  T(C): 36.7 (12 Dec 2018 06:00), Max: 36.9 (11 Dec 2018 20:47)  T(F): 98 (12 Dec 2018 06:00), Max: 98.4 (11 Dec 2018 20:47)  HR: 58 (12 Dec 2018 06:00) (51 - 62)  BP: 136/63 (12 Dec 2018 06:00) (111/68 - 138/79)  BP(mean): --  RR: 16 (12 Dec 2018 06:00) (16 - 18)  SpO2: 100% (12 Dec 2018 06:00) (96% - 100%)  nc/at  s1s2  cta  soft, nt, nd no guarding or rebound  no c/c/e    CBC Full  -  ( 12 Dec 2018 06:40 )  WBC Count : 8.42 K/uL  Hemoglobin : 12.3 g/dL  Hematocrit : 36.5 %  Platelet Count - Automated : 382 K/uL  Mean Cell Volume : 91.0 fL  Mean Cell Hemoglobin : 30.7 pg  Mean Cell Hemoglobin Concentration : 33.7 %  Auto Neutrophil # : x  Auto Lymphocyte # : x  Auto Monocyte # : x  Auto Eosinophil # : x  Auto Basophil # : x  Auto Neutrophil % : x  Auto Lymphocyte % : x  Auto Monocyte % : x  Auto Eosinophil % : x  Auto Basophil % : x    12-12    137  |  103  |  < 2<L>  ----------------------------<  279<H>  4.7   |  22  |  0.70    Ca    8.7      12 Dec 2018 06:40  Phos  2.0     12-12  Mg     1.8     12-12

## 2018-12-12 NOTE — PROGRESS NOTE ADULT - ASSESSMENT
appreciate palliatve care.  await decision re : peg.  I agree will not change patient outcome. consider pleasurse feeds

## 2018-12-12 NOTE — PROGRESS NOTE ADULT - PROBLEM SELECTOR PLAN 5
Patient DNR/DNI - as per discussion with primary team this JUANITA Maria states that she would like physical therapy to see her father and that her and her siblings are still deciding on whether or not they would like hospice care.  Primary team aware.  Psychosocial support provided. Patient DNR/DNI - as per discussion with primary team this MARYSofia  Geraldine states that she would like physical therapy to see her father and that her and her siblings are still deciding on whether or not they would like hospice care.  Primary team aware.  Psychosocial support provided.

## 2018-12-12 NOTE — SWALLOW BEDSIDE ASSESSMENT ADULT - SWALLOW EVAL: CRITERIA FOR SKILLED INTERVENTION MET
pt does not demonstrate cognitive capacity to activity participate at this time/not appropriate for swallowing intervention

## 2018-12-12 NOTE — SWALLOW BEDSIDE ASSESSMENT ADULT - ORAL PREPARATORY PHASE
Within functional limits Slow mastication; Delayed bolus collection Anterior loss of bolus/Decreased bolus collection/control

## 2018-12-12 NOTE — SWALLOW BEDSIDE ASSESSMENT ADULT - SWALLOW EVAL: DIAGNOSIS
Patient demonstrated oropharyngeal dysphagia characterized by reduced labial seal with subsequent anterior spillage, decreased bolus collection/control and suspect premature bolus loss over base of tongue with thin liquids. Slow mastication, delayed bolus collection and delayed anterior-posterior transfer noted for soft solid textures. Adequate oral containment, anterior-posterior transfer and clearance noted for puree and nectar-thick liquids. Pharyngeal stage was marked by a suspected delay in pharyngeal trigger with hyolaryngeal elevation appreciated upon palpation. No overt, clinical s/s of laryngeal penetration/aspiration noted, however patient judged to be at increased risk with thin liquid viscosities given above clinical presentation.

## 2018-12-13 ENCOUNTER — TRANSCRIPTION ENCOUNTER (OUTPATIENT)
Age: 83
End: 2018-12-13

## 2018-12-13 DIAGNOSIS — I10 ESSENTIAL (PRIMARY) HYPERTENSION: ICD-10-CM

## 2018-12-13 DIAGNOSIS — E78.49 OTHER HYPERLIPIDEMIA: ICD-10-CM

## 2018-12-13 DIAGNOSIS — E11.65 TYPE 2 DIABETES MELLITUS WITH HYPERGLYCEMIA: ICD-10-CM

## 2018-12-13 LAB
BUN SERPL-MCNC: 2 MG/DL — LOW (ref 7–23)
CALCIUM SERPL-MCNC: 8.4 MG/DL — SIGNIFICANT CHANGE UP (ref 8.4–10.5)
CHLORIDE SERPL-SCNC: 104 MMOL/L — SIGNIFICANT CHANGE UP (ref 98–107)
CO2 SERPL-SCNC: 24 MMOL/L — SIGNIFICANT CHANGE UP (ref 22–31)
CREAT SERPL-MCNC: 0.6 MG/DL — SIGNIFICANT CHANGE UP (ref 0.5–1.3)
GLUCOSE BLDC GLUCOMTR-MCNC: 146 MG/DL — HIGH (ref 70–99)
GLUCOSE BLDC GLUCOMTR-MCNC: 154 MG/DL — HIGH (ref 70–99)
GLUCOSE BLDC GLUCOMTR-MCNC: 238 MG/DL — HIGH (ref 70–99)
GLUCOSE BLDC GLUCOMTR-MCNC: 268 MG/DL — HIGH (ref 70–99)
GLUCOSE SERPL-MCNC: 231 MG/DL — HIGH (ref 70–99)
HCT VFR BLD CALC: 36 % — LOW (ref 39–50)
HGB BLD-MCNC: 12 G/DL — LOW (ref 13–17)
MCHC RBC-ENTMCNC: 30.2 PG — SIGNIFICANT CHANGE UP (ref 27–34)
MCHC RBC-ENTMCNC: 33.3 % — SIGNIFICANT CHANGE UP (ref 32–36)
MCV RBC AUTO: 90.7 FL — SIGNIFICANT CHANGE UP (ref 80–100)
NRBC # FLD: 0 — SIGNIFICANT CHANGE UP
PLATELET # BLD AUTO: 387 K/UL — SIGNIFICANT CHANGE UP (ref 150–400)
PMV BLD: 10.2 FL — SIGNIFICANT CHANGE UP (ref 7–13)
POTASSIUM SERPL-MCNC: 4 MMOL/L — SIGNIFICANT CHANGE UP (ref 3.5–5.3)
POTASSIUM SERPL-SCNC: 4 MMOL/L — SIGNIFICANT CHANGE UP (ref 3.5–5.3)
RBC # BLD: 3.97 M/UL — LOW (ref 4.2–5.8)
RBC # FLD: 13 % — SIGNIFICANT CHANGE UP (ref 10.3–14.5)
SODIUM SERPL-SCNC: 136 MMOL/L — SIGNIFICANT CHANGE UP (ref 135–145)
WBC # BLD: 6.7 K/UL — SIGNIFICANT CHANGE UP (ref 3.8–10.5)
WBC # FLD AUTO: 6.7 K/UL — SIGNIFICANT CHANGE UP (ref 3.8–10.5)

## 2018-12-13 PROCEDURE — 99223 1ST HOSP IP/OBS HIGH 75: CPT | Mod: GC

## 2018-12-13 RX ORDER — INSULIN GLARGINE 100 [IU]/ML
14 INJECTION, SOLUTION SUBCUTANEOUS AT BEDTIME
Qty: 0 | Refills: 0 | Status: DISCONTINUED | OUTPATIENT
Start: 2018-12-13 | End: 2018-12-16

## 2018-12-13 RX ADMIN — PIPERACILLIN AND TAZOBACTAM 25 GRAM(S): 4; .5 INJECTION, POWDER, LYOPHILIZED, FOR SOLUTION INTRAVENOUS at 13:03

## 2018-12-13 RX ADMIN — NYSTATIN CREAM 1 APPLICATION(S): 100000 CREAM TOPICAL at 17:15

## 2018-12-13 RX ADMIN — DEXTROSE MONOHYDRATE, SODIUM CHLORIDE, AND POTASSIUM CHLORIDE 100 MILLILITER(S): 50; .745; 4.5 INJECTION, SOLUTION INTRAVENOUS at 02:55

## 2018-12-13 RX ADMIN — Medication 2 UNIT(S): at 12:04

## 2018-12-13 RX ADMIN — PIPERACILLIN AND TAZOBACTAM 25 GRAM(S): 4; .5 INJECTION, POWDER, LYOPHILIZED, FOR SOLUTION INTRAVENOUS at 06:11

## 2018-12-13 RX ADMIN — INSULIN GLARGINE 14 UNIT(S): 100 INJECTION, SOLUTION SUBCUTANEOUS at 21:10

## 2018-12-13 RX ADMIN — NYSTATIN CREAM 1 APPLICATION(S): 100000 CREAM TOPICAL at 06:10

## 2018-12-13 RX ADMIN — Medication 5 MILLIGRAM(S): at 17:15

## 2018-12-13 RX ADMIN — PANTOPRAZOLE SODIUM 40 MILLIGRAM(S): 20 TABLET, DELAYED RELEASE ORAL at 17:15

## 2018-12-13 RX ADMIN — Medication 5 MILLIGRAM(S): at 06:11

## 2018-12-13 RX ADMIN — PANTOPRAZOLE SODIUM 40 MILLIGRAM(S): 20 TABLET, DELAYED RELEASE ORAL at 06:11

## 2018-12-13 RX ADMIN — Medication 2 UNIT(S): at 08:11

## 2018-12-13 RX ADMIN — Medication 2: at 08:11

## 2018-12-13 RX ADMIN — ENOXAPARIN SODIUM 40 MILLIGRAM(S): 100 INJECTION SUBCUTANEOUS at 12:04

## 2018-12-13 RX ADMIN — Medication 5 MILLIGRAM(S): at 12:03

## 2018-12-13 RX ADMIN — Medication 2 UNIT(S): at 17:15

## 2018-12-13 RX ADMIN — DEXTROSE MONOHYDRATE, SODIUM CHLORIDE, AND POTASSIUM CHLORIDE 100 MILLILITER(S): 50; .745; 4.5 INJECTION, SOLUTION INTRAVENOUS at 13:02

## 2018-12-13 RX ADMIN — DEXTROSE MONOHYDRATE, SODIUM CHLORIDE, AND POTASSIUM CHLORIDE 100 MILLILITER(S): 50; .745; 4.5 INJECTION, SOLUTION INTRAVENOUS at 21:10

## 2018-12-13 RX ADMIN — Medication 3: at 12:04

## 2018-12-13 NOTE — DISCHARGE NOTE ADULT - PATIENT PORTAL LINK FT
You can access the [facility] Patient Portal, offered by [facility], by registering with the following website: [email or web address]

## 2018-12-13 NOTE — DISCHARGE NOTE ADULT - HOME CARE AGENCY
[facility] at Home  [phone]   RN to see patient at home 12/18/2018. [facility] to resume MLTC services 12/17/2018. [facility]  [phone]   RN to see patient at home 12/19/2018. [facility] ([place] to resume MLTC services 12/18/2018.

## 2018-12-13 NOTE — DISCHARGE NOTE ADULT - MEDICATION SUMMARY - MEDICATIONS TO CHANGE
I will SWITCH the dose or number of times a day I take the medications listed below when I get home from the hospital:    Levemir FlexTouch 100 units/mL subcutaneous solution  -- 25 unit(s) subcutaneous once a day (at bedtime)   -- Check with your doctor before becoming pregnant.  Do not drink alcoholic beverages when taking this medication.  Keep in refrigerator.  Do not freeze.

## 2018-12-13 NOTE — DISCHARGE NOTE ADULT - PLAN OF CARE
resolved Please follow up with your pcp within 1 week of discharge.  Please call to make an appointment within 1 week of discharge.  You completed your course of antibiotics.  Catscan shows improvement. continue dysphagia diet Please follow up with your pcp within 1 week of discharge.  Please call to make an appointment within 1 week of discharge. Continue dysphagia 3 pureed with nectar consistency diet. maintain adequate glucose control Please follow up with your pcp within 1 week of discharge.  Please call to make an appointment within 1 week of discharge.  Please continue your insulin as prescribed.  Please monitor your fingersticks Please follow up with your pcp within 1 week of discharge.. Please call to make an appointment within 1 week of discharge .  Please check your blood pressure prior to taking your blood pressure medications. - Please follow up with your PCP within 1 week of discharge.  Please call to make an appointment within 1 week of discharge.  You completed your course of antibiotics.  CT scan shows improvement. - Please follow up with your PCP within 1 week of discharge.  Please call to make an appointment within 1 week of discharge. Continue dysphagia 3 pureed with nectar consistency diet. - Please follow up with your PCP and Endo within 1 week of discharge.  Please call to make an appointment within 1 week of discharge.  Please continue your insulin as prescribed.  Please monitor your fingersticks - Please follow up with your PCP within 1 week of discharge. Please call to make an appointment within 1 week of discharge.  Please check your blood pressure prior to taking your blood pressure medications. - Please follow up with your PCP within 1 week of discharge.  Please call to make an appointment within 1 week of discharge. Continue dysphagia 1 pureed with nectar consistency diet. - Please follow up with your PCP and Endo within 1 week of discharge.  Please call to make an appointment within 1 week of discharge.  Please continue your insulin as prescribed.  Please monitor your fingersticks.  Your levemir was decreased to 5 units qhs.  Please continue to monitor your fingersticks as prescribed. maintain adequate blood pressure control - Please follow up with your PCP within 1 week of discharge. Please call to make an appointment within 1 week of discharge. Your Heart rate was in the 50s during this admission.  Lopressor discontinued.  Continue your amlodipine home dose as prescribed.  Please check your blood pressure prior to taking your blood pressure medications.

## 2018-12-13 NOTE — DISCHARGE NOTE ADULT - CARE PLAN
Principal Discharge DX:	Pneumatosis intestinalis  Goal:	resolved  Assessment and plan of treatment:	Please follow up with your pcp within 1 week of discharge.  Please call to make an appointment within 1 week of discharge.  You completed your course of antibiotics.  Catscan shows improvement.  Secondary Diagnosis:	Dysphagia  Goal:	continue dysphagia diet  Assessment and plan of treatment:	Please follow up with your pcp within 1 week of discharge.  Please call to make an appointment within 1 week of discharge. Continue dysphagia 3 pureed with nectar consistency diet.  Secondary Diagnosis:	DM (diabetes mellitus)  Goal:	maintain adequate glucose control  Assessment and plan of treatment:	Please follow up with your pcp within 1 week of discharge.  Please call to make an appointment within 1 week of discharge.  Please continue your insulin as prescribed.  Please monitor your fingersticks  Secondary Diagnosis:	HTN (hypertension)  Goal:	maintain adequate glucose control  Assessment and plan of treatment:	Please follow up with your pcp within 1 week of discharge.. Please call to make an appointment within 1 week of discharge .  Please check your blood pressure prior to taking your blood pressure medications. Principal Discharge DX:	Pneumatosis intestinalis  Goal:	resolved  Assessment and plan of treatment:	- Please follow up with your PCP within 1 week of discharge.  Please call to make an appointment within 1 week of discharge.  You completed your course of antibiotics.  CT scan shows improvement.  Secondary Diagnosis:	Dysphagia  Goal:	continue dysphagia diet  Assessment and plan of treatment:	- Please follow up with your PCP within 1 week of discharge.  Please call to make an appointment within 1 week of discharge. Continue dysphagia 3 pureed with nectar consistency diet.  Secondary Diagnosis:	DM (diabetes mellitus)  Goal:	maintain adequate glucose control  Assessment and plan of treatment:	- Please follow up with your PCP and Endo within 1 week of discharge.  Please call to make an appointment within 1 week of discharge.  Please continue your insulin as prescribed.  Please monitor your fingersticks  Secondary Diagnosis:	HTN (hypertension)  Goal:	maintain adequate glucose control  Assessment and plan of treatment:	- Please follow up with your PCP within 1 week of discharge. Please call to make an appointment within 1 week of discharge.  Please check your blood pressure prior to taking your blood pressure medications. Principal Discharge DX:	Pneumatosis intestinalis  Goal:	resolved  Assessment and plan of treatment:	- Please follow up with your PCP within 1 week of discharge.  Please call to make an appointment within 1 week of discharge.  You completed your course of antibiotics.  CT scan shows improvement.  Secondary Diagnosis:	Dysphagia  Goal:	continue dysphagia diet  Assessment and plan of treatment:	- Please follow up with your PCP within 1 week of discharge.  Please call to make an appointment within 1 week of discharge. Continue dysphagia 1 pureed with nectar consistency diet.  Secondary Diagnosis:	DM (diabetes mellitus)  Goal:	maintain adequate glucose control  Assessment and plan of treatment:	- Please follow up with your PCP and Endo within 1 week of discharge.  Please call to make an appointment within 1 week of discharge.  Please continue your insulin as prescribed.  Please monitor your fingersticks.  Your levemir was decreased to 5 units qhs.  Please continue to monitor your fingersticks as prescribed.  Secondary Diagnosis:	HTN (hypertension)  Goal:	maintain adequate blood pressure control  Assessment and plan of treatment:	- Please follow up with your PCP within 1 week of discharge. Please call to make an appointment within 1 week of discharge. Your Heart rate was in the 50s during this admission.  Lopressor discontinued.  Continue your amlodipine home dose as prescribed.  Please check your blood pressure prior to taking your blood pressure medications.

## 2018-12-13 NOTE — CONSULT NOTE ADULT - PROBLEM SELECTOR RECOMMENDATION 3
Assist with ADL's.  Skin care PRN.  Fall precautions.
No benefit of stain given patient's age and comorbidities.

## 2018-12-13 NOTE — CONSULT NOTE ADULT - PROBLEM SELECTOR PROBLEM 1
Pneumatosis intestinalis
Type 2 diabetes mellitus with hyperglycemia, with long-term current use of insulin

## 2018-12-13 NOTE — CONSULT NOTE ADULT - PROBLEM SELECTOR RECOMMENDATION 9
-While inpatient would continue Lantus 12U HS and increase humalog to 3U TID with meals (only if patient consumes greater than 50% of meal) plus low dose correctional scale before meals and at bedtime.  -Discharge recommendations will depend on goals of care at time of discharge. If current status remains, patient can be discharged on Levemir and prandin TID (with meals if consuming >50%)  -He can follow up with endocrinologist in [facility]. -Please change D51/2 NS to 1/2 NS  -While inpatient would increase Lantus 14U HS and continue humalog 2U TID with meals (only if patient consumes greater than 50% of meal) plus low dose correctional scale before meals and at bedtime.  -Discharge recommendations will depend on goals of care at time of discharge. If current status remains, patient can be discharged on Levemir alone vs Levemir and prandin TID (with meals if consuming >50%)  -He can follow up with endocrinologist in [facility].

## 2018-12-13 NOTE — DISCHARGE NOTE ADULT - MEDICATION SUMMARY - MEDICATIONS TO TAKE
I will START or STAY ON the medications listed below when I get home from the hospital:    Levemir FlexTouch 100 units/mL subcutaneous solution  -- 5 unit(s) subcutaneous once a day (at bedtime)  -- Indication: For Diabetes mellitus     amLODIPine 10 mg oral tablet  -- 1 tab(s) by mouth once a day  -- Indication: For HTN (hypertension)    nystatin 100,000 units/g topical powder  -- 1 application on skin 2 times a day  -- Indication: For antifungal     Zantac 150 oral tablet  -- 1 tab(s) by mouth once a day   -- It is very important that you take or use this exactly as directed.  Do not skip doses or discontinue unless directed by your doctor.  Obtain medical advice before taking any non-prescription drugs as some may affect the action of this medication.    -- Indication: For Pneumatosis intestinalis    Cosopt 2.23%-0.68% ophthalmic solution  -- 1 drop(s) to each affected eye 2 times a day  -- Indication: For glaucoma I will START or STAY ON the medications listed below when I get home from the hospital:    alcohol swabs   -- Apply on skin to affected area 4 times a day   -- Indication: For DM (diabetes mellitus)    glucometer (per patient's insurance)  -- Test blood sugars four times a day. Dispense #1 glucometer.  -- Indication: For DM (diabetes mellitus)    lancets  -- 1 application subcutaneously 4 times a day   -- Indication: For DM (diabetes mellitus)    test strips (per patient's insurance)  -- 1 application subcutaneously 4 times a day. ** Compatible with patient's glucometer **  -- Indication: For DM (diabetes mellitus)    Levemir FlexTouch 100 units/mL subcutaneous solution  -- 5 unit(s) subcutaneous once a day (at bedtime)  -- Indication: For Diabetes mellitus     amLODIPine 10 mg oral tablet  -- 1 tab(s) by mouth once a day  -- Indication: For HTN (hypertension)    nystatin 100,000 units/g topical powder  -- 1 application on skin 2 times a day  -- Indication: For antifungal     Zantac 150 oral tablet  -- 1 tab(s) by mouth once a day   -- It is very important that you take or use this exactly as directed.  Do not skip doses or discontinue unless directed by your doctor.  Obtain medical advice before taking any non-prescription drugs as some may affect the action of this medication.    -- Indication: For Pneumatosis intestinalis    Cosopt 2.23%-0.68% ophthalmic solution  -- 1 drop(s) to each affected eye 2 times a day  -- Indication: For glaucoma I will START or STAY ON the medications listed below when I get home from the hospital:    alcohol swabs   -- Apply on skin to affected area 4 times a day   -- Indication: For DM (diabetes mellitus)    lancets  -- 1 application subcutaneously 4 times a day   -- Indication: For DM (diabetes mellitus)    test strips (per patient's insurance)  -- 1 application subcutaneously 4 times a day . ** Compatible with patient's glucometer **   ICD 10 Code E11.65  -- Indication: For DM (diabetes mellitus)    glucometer (per patient's insurance)  -- 1 application subcutaneous 4 times a day   ICD CODE E11.65  -- Indication: For DM (diabetes mellitus)    Levemir FlexTouch 100 units/mL subcutaneous solution  -- 5 unit(s) subcutaneous once a day (at bedtime)  -- Indication: For Diabetes mellitus     amLODIPine 10 mg oral tablet  -- 1 tab(s) by mouth once a day  -- Indication: For HTN (hypertension)    nystatin 100,000 units/g topical powder  -- 1 application on skin 2 times a day  -- Indication: For antifungal     Zantac 150 oral tablet  -- 1 tab(s) by mouth once a day   -- It is very important that you take or use this exactly as directed.  Do not skip doses or discontinue unless directed by your doctor.  Obtain medical advice before taking any non-prescription drugs as some may affect the action of this medication.    -- Indication: For Pneumatosis intestinalis    Cosopt 2.23%-0.68% ophthalmic solution  -- 1 drop(s) to each affected eye 2 times a day  -- Indication: For glaucoma

## 2018-12-13 NOTE — CONSULT NOTE ADULT - SUBJECTIVE AND OBJECTIVE BOX
HPI:  83M PMH DM, Dementia (AAOx0-1 at baseline), DM, HTN, Open cholecystectomy (2017), open appendectomy (remote), presented as transfer from OSH for pneumatosis. Patient is mostly nonverbal and history was obtained from daughter at bedside. Pt's daughter states that pt began to have hematemesis this morning, prompting presentation to OSH. CT abd/pelvis obtained there which showed gastric pneumatosis, with gas tracking up the esophageal wall. Patient transferred to [facility] for further management.     Upon arrival to [facility] ED, AVSS. Afebrile. WBC 21.06. Lactate 3.5 -->2 after IVF resuscitation. NGT placed which immediately drained 300cc murky gray fluid. Nonbloody. (04 Dec 2018 22:41)      PAST MEDICAL & SURGICAL HISTORY:  Dementia  DM (diabetes mellitus)  Glaucoma  Dementia  DM (diabetes mellitus)  HTN (hypertension)  History of cholecystectomy  History of appendectomy  History of cholecystectomy  History of nephrolithiasis  Status post open reduction with internal fixation of fracture: right femur      FAMILY HISTORY:  No pertinent family history in first degree relatives  No pertinent family history in first degree relatives      Social History:    Outpatient Medications:    MEDICATIONS  (STANDING):  dextrose 5% + sodium chloride 0.45% with potassium chloride 20 mEq/L 1000 milliLiter(s) (100 mL/Hr) IV Continuous <Continuous>  dextrose 5%. 1000 milliLiter(s) (50 mL/Hr) IV Continuous <Continuous>  dextrose 50% Injectable 12.5 Gram(s) IV Push once  dextrose 50% Injectable 25 Gram(s) IV Push once  dextrose 50% Injectable 25 Gram(s) IV Push once  enoxaparin Injectable 40 milliGRAM(s) SubCutaneous daily  influenza   Vaccine 0.5 milliLiter(s) IntraMuscular once  insulin glargine Injectable (LANTUS) 12 Unit(s) SubCutaneous at bedtime  insulin lispro (HumaLOG) corrective regimen sliding scale   SubCutaneous three times a day before meals  insulin lispro (HumaLOG) corrective regimen sliding scale   SubCutaneous at bedtime  insulin lispro Injectable (HumaLOG) 2 Unit(s) SubCutaneous three times a day before meals  metoprolol tartrate Injectable 5 milliGRAM(s) IV Push every 6 hours  nystatin Powder 1 Application(s) Topical two times a day  pantoprazole  Injectable 40 milliGRAM(s) IV Push every 12 hours  piperacillin/tazobactam IVPB. 3.375 Gram(s) IV Intermittent every 8 hours    MEDICATIONS  (PRN):  dextrose 40% Gel 15 Gram(s) Oral once PRN Blood Glucose LESS THAN 70 milliGRAM(s)/deciliter  glucagon  Injectable 1 milliGRAM(s) IntraMuscular once PRN Glucose LESS THAN 70 milligrams/deciliter      Allergies    No Known Allergies    Intolerances      Review of Systems:  Constitutional: No fever  Eyes: No blurry vision  Neuro: No tremors  HEENT: No pain  Cardiovascular: No chest pain, palpitations  Respiratory: No SOB, no cough  GI: No nausea, vomiting, abdominal pain  : No dysuria  Skin: no rash  Psych: no depression  Endocrine: no polyuria, polydipsia  Hem/lymph: no swelling  Osteoporosis: no fractures    ALL OTHER SYSTEMS REVIEWED AND NEGATIVE    UNABLE TO OBTAIN    PHYSICAL EXAM:  VITALS: T(C): 36.8 (12-13-18 @ 12:00)  T(F): 98.3 (12-13-18 @ 12:00), Max: 98.9 (12-13-18 @ 00:37)  HR: 63 (12-13-18 @ 12:00) (53 - 63)  BP: 138/67 (12-13-18 @ 12:00) (124/74 - 153/79)  RR:  (17 - 18)  SpO2:  (96% - 100%)  Wt(kg): --  GENERAL: NAD, well-groomed, well-developed  EYES: No proptosis, no lid lag, anicteric  HEENT:  Atraumatic, Normocephalic, moist mucous membranes  THYROID: Normal size, no palpable nodules  RESPIRATORY: Clear to auscultation bilaterally; No rales, rhonchi, wheezing, or rubs  CARDIOVASCULAR: Regular rate and rhythm; No murmurs; no peripheral edema  GI: Soft, nontender, non distended, normal bowel sounds  SKIN: Dry, intact, No rashes or lesions  MUSCULOSKELETAL: Full range of motion, normal strength  NEURO: sensation intact, extraocular movements intact, no tremor, normal reflexes  PSYCH: Alert and oriented x 3, normal affect, normal mood  CUSHING'S SIGNS: no striae    POCT Blood Glucose.: 268 mg/dL (12-13-18 @ 11:38)  POCT Blood Glucose.: 238 mg/dL (12-13-18 @ 07:58)  POCT Blood Glucose.: 275 mg/dL (12-12-18 @ 22:01)  POCT Blood Glucose.: 189 mg/dL (12-12-18 @ 17:08)  POCT Blood Glucose.: 213 mg/dL (12-12-18 @ 11:51)  POCT Blood Glucose.: 301 mg/dL (12-12-18 @ 07:51)  POCT Blood Glucose.: 205 mg/dL (12-11-18 @ 21:20)  POCT Blood Glucose.: 229 mg/dL (12-11-18 @ 17:02)  POCT Blood Glucose.: 185 mg/dL (12-11-18 @ 11:53)  POCT Blood Glucose.: 221 mg/dL (12-11-18 @ 06:39)  POCT Blood Glucose.: 162 mg/dL (12-10-18 @ 22:14)  POCT Blood Glucose.: 252 mg/dL (12-10-18 @ 18:07)                            12.0   6.70  )-----------( 387      ( 13 Dec 2018 06:40 )             36.0       12-13    136  |  104  |  2<L>  ----------------------------<  231<H>  4.0   |  24  |  0.60    EGFR if : 109  EGFR if non : 94    Ca    8.4      12-13  Mg     1.8     12-12  Phos  2.0     12-12        Thyroid Function Tests:      Hemoglobin A1C, Whole Blood: 9.5 % <H> [4.0 - 5.6] (12-05-18 @ 06:45)  Hemoglobin A1C, Whole Blood: 10.1 % <H> [4.0 - 5.6] (11-27-18 @ 08:09)          Radiology: HPI:  83M PMH DM, Dementia (AAOx0-1 at baseline), DM, HTN, Open cholecystectomy (2017), open appendectomy (remote), presented as transfer from OSH for pneumatosis. Patient is mostly nonverbal and history was obtained from daughter at bedside. Pt's daughter states that pt began to have hematemesis this morning, prompting presentation to OSH. CT abd/pelvis obtained there which showed gastric pneumatosis, with gas tracking up the esophageal wall. Patient transferred to [facility] for further management.     Upon arrival to [facility] ED, AVSS. Afebrile. WBC 21.06. Lactate 3.5 -->2 after IVF resuscitation. NGT placed which immediately drained 300cc murky gray fluid. Nonbloody. (04 Dec 2018 22:41)    Endocrine history: History obtained from daughter Geraldine Mooney  83 yr M with dementia, DM2, HTN transferred here from OSH due to finding of pneumotosis intestinalis. Patient has been here since Dec 4th receiving IV antibiotics-he is not a surgical candidate. As he has not shown improvement, palliative care was consulted. He is now DNR/DNI with active treatment. Patient is nonverbal, bedbound with multiple decubitus ulcers. He has passed speech and swallow evaluation for pureed nectar thick diet. As per nursing staff, he is fed by PCA and consumes usually around 50% of his meals. Patient follows with an endocrinologist in the [facility]. He was diagnosed with DM over 20 years ago. He was previously on Lantus and Humalog but was switched most recently to Levemir 25U HS which he has been taking at night. Patient was hospitalized for DKA approximately 6 years ago. Daughter oversees his care at home and makes sure he follows a carb cosnistent diet. She does not check his FS regeualry at home. 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       PAST MEDICAL & SURGICAL HISTORY:  Dementia  DM (diabetes mellitus)  Glaucoma  Dementia  DM (diabetes mellitus)  HTN (hypertension)  History of cholecystectomy  History of appendectomy  History of cholecystectomy  History of nephrolithiasis  Status post open reduction with internal fixation of fracture: right femur      FAMILY HISTORY:  No pertinent family history in first degree relatives    Social History: Lives with daughter    Outpatient Medications: Levemir 25U HS    MEDICATIONS  (STANDING):  dextrose 5% + sodium chloride 0.45% with potassium chloride 20 mEq/L 1000 milliLiter(s) (100 mL/Hr) IV Continuous <Continuous>  dextrose 5%. 1000 milliLiter(s) (50 mL/Hr) IV Continuous <Continuous>  dextrose 50% Injectable 12.5 Gram(s) IV Push once  dextrose 50% Injectable 25 Gram(s) IV Push once  dextrose 50% Injectable 25 Gram(s) IV Push once  enoxaparin Injectable 40 milliGRAM(s) SubCutaneous daily  influenza   Vaccine 0.5 milliLiter(s) IntraMuscular once  insulin glargine Injectable (LANTUS) 12 Unit(s) SubCutaneous at bedtime  insulin lispro (HumaLOG) corrective regimen sliding scale   SubCutaneous three times a day before meals  insulin lispro (HumaLOG) corrective regimen sliding scale   SubCutaneous at bedtime  insulin lispro Injectable (HumaLOG) 2 Unit(s) SubCutaneous three times a day before meals  metoprolol tartrate Injectable 5 milliGRAM(s) IV Push every 6 hours  nystatin Powder 1 Application(s) Topical two times a day  pantoprazole  Injectable 40 milliGRAM(s) IV Push every 12 hours  piperacillin/tazobactam IVPB. 3.375 Gram(s) IV Intermittent every 8 hours    MEDICATIONS  (PRN):  dextrose 40% Gel 15 Gram(s) Oral once PRN Blood Glucose LESS THAN 70 milliGRAM(s)/deciliter  glucagon  Injectable 1 milliGRAM(s) IntraMuscular once PRN Glucose LESS THAN 70 milligrams/deciliter      Allergies    No Known Allergies    Intolerances      Review of Systems:    UNABLE TO OBTAIN    PHYSICAL EXAM:  VITALS: T(C): 36.8 (12-13-18 @ 12:00)  T(F): 98.3 (12-13-18 @ 12:00), Max: 98.9 (12-13-18 @ 00:37)  HR: 63 (12-13-18 @ 12:00) (53 - 63)  BP: 138/67 (12-13-18 @ 12:00) (124/74 - 153/79)  RR:  (17 - 18)  SpO2:  (96% - 100%)  Wt(kg): --  GENERAL: NAD, nonverbal  EYES: No proptosis, no lid lag, anicteric  HEENT:  Atraumatic, Normocephalic, moist mucous membranes  THYROID: Normal size, no palpable nodules  RESPIRATORY: Clear to auscultation bilaterally; No rales, rhonchi, wheezing, or rubs  CARDIOVASCULAR: Regular rate and rhythm; No murmurs; no peripheral edema  GI: Soft, nontender, non distended, normal bowel sounds  Neuro: Does not follow commands        POCT Blood Glucose.: 268 mg/dL (12-13-18 @ 11:38)  POCT Blood Glucose.: 238 mg/dL (12-13-18 @ 07:58)  POCT Blood Glucose.: 275 mg/dL (12-12-18 @ 22:01)  POCT Blood Glucose.: 189 mg/dL (12-12-18 @ 17:08)  POCT Blood Glucose.: 213 mg/dL (12-12-18 @ 11:51)  POCT Blood Glucose.: 301 mg/dL (12-12-18 @ 07:51)  POCT Blood Glucose.: 205 mg/dL (12-11-18 @ 21:20)  POCT Blood Glucose.: 229 mg/dL (12-11-18 @ 17:02)  POCT Blood Glucose.: 185 mg/dL (12-11-18 @ 11:53)  POCT Blood Glucose.: 221 mg/dL (12-11-18 @ 06:39)  POCT Blood Glucose.: 162 mg/dL (12-10-18 @ 22:14)  POCT Blood Glucose.: 252 mg/dL (12-10-18 @ 18:07)                            12.0   6.70  )-----------( 387      ( 13 Dec 2018 06:40 )             36.0       12-13    136  |  104  |  2<L>  ----------------------------<  231<H>  4.0   |  24  |  0.60    EGFR if : 109  EGFR if non : 94    Ca    8.4      12-13  Mg     1.8     12-12  Phos  2.0     12-12             Hemoglobin A1C, Whole Blood: 9.5 % <H> [4.0 - 5.6] (12-05-18 @ 06:45)  Hemoglobin A1C, Whole Blood: 10.1 % <H> [4.0 - 5.6] (11-27-18 @ 08:09)

## 2018-12-13 NOTE — CONSULT NOTE ADULT - ATTENDING COMMENTS
patient presents with acute abdomen - what appears to be pneumatosis in the entirety of his stomach unclear etiology - no hernia/volvulus identified on scan. initial consent from family to proceed with exploratory laparotomy by gen surg w/ us on back up for possible esophageal involvement. daughter then deferred any aggressive measures given father's comorbidities . will be admitted to surgery - and conservatively managed - palliative care for GOC.
83M with dementia, poor clinical status, DNR/DNI with uncontrolled DM2. Given overall clinical picture not looking for tight glycemic control but will need to continue basal insulin at dc to prevent DKA. Check c-peptide. Agree with basal bolus as outlined.  Can stop dextrose is IV fluids since patient is eating somewhat.
Patient seen and examined.  Agree with NP note.

## 2018-12-13 NOTE — CONSULT NOTE ADULT - ASSESSMENT
? po vs peg.  abdomen beningn.  will discuss with family re: goals of care
83M PMH DM, Dementia (AAOx0-1 at baseline), DM, HTN, Open cholecystectomy (2017), open appendectomy (remote), presented as transfer from OSH for pneumatosis.  Palliative consulted for goals of care.
83 yr M with HTN, dementia, DM2 uncontrolled A1C 9.5 c/b retinopathy and vascular dementia here with pneumatosis intestinalis-not a surgical candidate. Patient is bedbound, nonverbal with multiple decubiti. Palliative care on board. Patient is DNR/DNI with active treatment Goal glucose 100-250. A1C goal 7.5-8.0% and avoidance of hypoglycemia.

## 2018-12-13 NOTE — PROGRESS NOTE ADULT - SUBJECTIVE AND OBJECTIVE BOX
SUBJECTIVE / OVERNIGHT EVENTS: pt confused , unable to obtain ROS from pt     MEDICATIONS  (STANDING):  dextrose 5% + sodium chloride 0.45% with potassium chloride 20 mEq/L 1000 milliLiter(s) (100 mL/Hr) IV Continuous <Continuous>  dextrose 5%. 1000 milliLiter(s) (50 mL/Hr) IV Continuous <Continuous>  dextrose 50% Injectable 12.5 Gram(s) IV Push once  dextrose 50% Injectable 25 Gram(s) IV Push once  dextrose 50% Injectable 25 Gram(s) IV Push once  enoxaparin Injectable 40 milliGRAM(s) SubCutaneous daily  influenza   Vaccine 0.5 milliLiter(s) IntraMuscular once  insulin glargine Injectable (LANTUS) 14 Unit(s) SubCutaneous at bedtime  insulin lispro (HumaLOG) corrective regimen sliding scale   SubCutaneous three times a day before meals  insulin lispro (HumaLOG) corrective regimen sliding scale   SubCutaneous at bedtime  insulin lispro Injectable (HumaLOG) 2 Unit(s) SubCutaneous three times a day before meals  metoprolol tartrate Injectable 5 milliGRAM(s) IV Push every 6 hours  nystatin Powder 1 Application(s) Topical two times a day  pantoprazole  Injectable 40 milliGRAM(s) IV Push every 12 hours    MEDICATIONS  (PRN):  dextrose 40% Gel 15 Gram(s) Oral once PRN Blood Glucose LESS THAN 70 milliGRAM(s)/deciliter  glucagon  Injectable 1 milliGRAM(s) IntraMuscular once PRN Glucose LESS THAN 70 milligrams/deciliter    Vital Signs Last 24 Hrs  T(C): 36.8 (13 Dec 2018 20:30), Max: 37.2 (13 Dec 2018 00:37)  T(F): 98.2 (13 Dec 2018 20:30), Max: 98.9 (13 Dec 2018 00:37)  HR: 60 (13 Dec 2018 20:30) (53 - 63)  BP: 120/58 (13 Dec 2018 20:30) (120/58 - 152/62)  BP(mean): --  RR: 17 (13 Dec 2018 20:30) (17 - 18)  SpO2: 99% (13 Dec 2018 20:30) (99% - 100%)    unable to obtain ros from pt - pt confused     PHYSICAL EXAM:  GENERAL: NAD  EYES: EOMI, PERRLA  NECK: Supple, No JVD  CHEST/LUNG: crepts at bases +  HEART:  S1 , S2 +  ABDOMEN: mild distension+, bs+, soft , tenderness+ on deep palpation  EXTREMITIES:  edema+  NEUROLOGY:alert awake confused     LABS:      136  |  104  |  2<L>  ----------------------------<  231<H>  4.0   |  24  |  0.60    Ca    8.4      13 Dec 2018 06:40  Phos  2.0     12  Mg     1.8           Creatinine Trend: 0.60 <--, 0.70 <--, 0.62 <--, 0.62 <--, 0.61 <--, 0.56 <--, 0.52 <--, 0.57 <--                        12.0   6.70  )-----------( 387      ( 13 Dec 2018 06:40 )             36.0     Urine Studies:  Urinalysis Basic - ( 07 Dec 2018 19:20 )    Color: LIGHT YELLOW / Appearance: CLEAR / S.014 / pH: 6.5  Gluc: 200 / Ketone: NEGATIVE  / Bili: NEGATIVE / Urobili: NORMAL   Blood: SMALL / Protein: 10 / Nitrite: NEGATIVE   Leuk Esterase: NEGATIVE / RBC: 11-25 / WBC 0-2   Sq Epi: OCC / Non Sq Epi:  / Bacteria: NEGATIVE                                            Consultant(s) Notes Reviewed:      Care Discussed with Consultants/Other Providers:

## 2018-12-13 NOTE — DISCHARGE NOTE ADULT - HOSPITAL COURSE
83M Pmhx: dementia, aphasia presents with gastric pneumotosis extending to exophagus.   Admitted to [facility] with hematemesis.    Pneumatosis intestinalis.   -12/4:CT ABD AND PELVIS:  Extensive gastric pneumatosis with air tracking along the distal   esophagus and mediastinum. Findings raise concern for ischemic process.  Trace left pleural effusion and associated atelectasis.  - CT with IV and PO contrast:Interval decrease in gastric distention and gastric pneumatosis w trace pneumatosis remaining. Mild decrease in pneumatosis of the visualized distal esophagus.    - S/p SICU admission: was treated for gastric ischemia and pneumatosis  - not a surgical candidate  - pt improving clinically   -speech and swallow eval: Dyshagia diet pureed nectar thickened fluids   - iv abx - on zosyn: completed course     DYSPHAGIA   -12/9 pt pulled out NGT   -Palliative following   -Unable to cooperate with speech and swallow evaluation due to lethargy.   - Extensive discussion with patient's daughter Geraldine regarding nutritional goals of care.   - Geraldine aware that given the patient's advanced dementia, a PEG would likely not improve quality or quantity of life.    -Speech and Swallow:pureed with nectar-thick liquids     Dementia  -Currently non-verbal, bedbound, incontinent, +dysphagia.    -Geraldine is aware that patient will likely not return to his baseline prior to admission  - Geraldine verbalized understanding.    -Continue supportive care.   -Hospice referral made follow up recs----------------------    DM (diabetes mellitus).   -Endocrine consulted  -Lantus 12U HS and increase humalog to 3U TID with meals (only if patient consumes greater than 50% of meal) plus low dose correctional scale before meals and at bedtime.  -Discharge recommendations will depend on goals of care at time of discharge. If current status remains, patient can be discharged on Levemir and prandin TID (with meals if consuming >50%)  -He can follow up with endocrinologist in [facility].    HTN (hypertension).    -cont bb.     Dispo: Home with Home Care 83M Pmhx: dementia, aphasia presents with gastric pneumotosis extending to exophagus.   Admitted to [place] with hematemesis.    Pneumatosis intestinalis-12/4 CT A/P -Extensive gastric pneumatosis with air tracking along the distal esophagus and mediastinum. Findings raise concern for ischemic process.  Trace left pleural effusion and associated atelectasis. CT with IV and PO cont - showed Interval decrease in gastric distention and gastric pneumatosis w trace pneumatosis remaining. Mild decrease in pneumatosis of the visualized distal esophagus. S/p SICU admission: was treated for gastric ischemia and pneumatosis not a surgical candidate. Pt improving clinically   Speech and swallow eval- Dysphagia diet Pureed nectar thickened fluids. Treated with Zosyn: completed course     DYSPHAGIA -12/9 pt pulled out NGT. Palliative following. Unable to cooperate with speech and swallow evaluation due to lethargy. Extensive discussion with patient's daughter Geraldnie regarding nutritional goals of care. Geraldine aware that given the patient's advanced dementia, a PEG would likely not improve quality or quantity of life. Speech and Swallow- Pureed with nectar-thick liquids     Dementia- Currently non-verbal, bedbound, incontinent, +dysphagia. Geraldine is aware that patient will likely not return to his baseline prior to admission. Continue supportive care.   -Hospice referral made daughter wants pt to Sub/ Acute Rehab    DM (diabetes mellitus)- Endocrine consulted treated with Lantus, Humalog. Discharge recommendations on Levemir, follow up with endocrinologist in [facility].    HTN (hypertension)- cont BB    Dispo: Sub/ Acute Rehab 83M Pmhx: dementia, aphasia presents with gastric pneumotosis extending to exophagus.   Admitted to [place] with hematemesis.    Pneumatosis intestinalis-12/4 CT A/P -Extensive gastric pneumatosis with air tracking along the distal esophagus and mediastinum. Findings raise concern for ischemic process.  Trace left pleural effusion and associated atelectasis. CT with IV and PO cont - showed Interval decrease in gastric distention and gastric pneumatosis w trace pneumatosis remaining. Mild decrease in pneumatosis of the visualized distal esophagus. S/p SICU admission: was treated for gastric ischemia and pneumatosis not a surgical candidate. Pt improving clinically   Speech and swallow eval- Dysphagia diet Pureed nectar thickened fluids. Treated with Zosyn: completed course     DYSPHAGIA -12/9 pt pulled out NGT. Palliative following. Unable to cooperate with speech and swallow evaluation due to lethargy. Extensive discussion with patient's daughter Geraldine regarding nutritional goals of care. Geraldine aware that given the patient's advanced dementia, a PEG would likely not improve quality or quantity of life. Speech and Swallow- Pureed with nectar-thick liquids     Dementia- Currently non-verbal, bedbound, incontinent, +dysphagia. Geraldine is aware that patient will likely not return to his baseline prior to admission. Continue supportive care.   -Hospice referral made: Dc home with 24 hour Home Care     DM (diabetes mellitus)- Endocrine consulted treated with Lantus, Humalog. Discharge recommendations on Levemir, follow up with endocrinologist in [facility].    HTN (hypertension)- cont BB    Dispo: Sub/ Acute Rehab 83M Pmhx: dementia, aphasia presents with gastric pneumotosis extending to exophagus.   Admitted to [place] with hematemesis.    Pneumatosis intestinalis-12/4 CT A/P -Extensive gastric pneumatosis with air tracking along the distal esophagus and mediastinum. Findings raise concern for ischemic process.  Trace left pleural effusion and associated atelectasis. CT with IV and PO cont - showed Interval decrease in gastric distention and gastric pneumatosis w trace pneumatosis remaining. Mild decrease in pneumatosis of the visualized distal esophagus. S/p SICU admission: was treated for gastric ischemia and pneumatosis not a surgical candidate. Pt improving clinically   Speech and swallow eval- Dysphagia diet Pureed nectar thickened fluids. Treated with Zosyn: completed course     DYSPHAGIA -12/9 pt pulled out NGT. Palliative following. Unable to cooperate with speech and swallow evaluation due to lethargy. Extensive discussion with patient's daughter Geraldine regarding nutritional goals of care. Geraldine aware that given the patient's advanced dementia, a PEG would likely not improve quality or quantity of life. Speech and Swallow- Dysphagia 1 Pureed with nectar-thick liquids     Dementia- Currently non-verbal, bedbound, incontinent, +dysphagia. Geraldine is aware that patient will likely not return to his baseline prior to admission. Continue supportive care.   -Hospice referral made: Dc home with 24 hour Home Care     DM (diabetes mellitus)- Endocrine consulted treated with Lantus, Humalog. Discharge recommendations on Levemir decreased to 5 units qhs, monitor fingersticks, follow up with endocrinologist in [facility].    HTN (hypertension)- Beta blocker discontinued as heart rate in the 50s.  Please continue amlodipine as prescribed    Dispo: Home with 24 hour home care services

## 2018-12-13 NOTE — GOALS OF CARE CONVERSATION - PERSONAL ADVANCE DIRECTIVE - CONVERSATION DETAILS
[facility]:  Left VM msg for dtr, Geraldine Mooney, requesting c/b to discuss possibility of pt receiving home hospice svces upon d/c.  By end of day had not gotten c/b.  CHARLES Kennedy aware.

## 2018-12-13 NOTE — PROGRESS NOTE ADULT - SUBJECTIVE AND OBJECTIVE BOX
CHANA SERJIO:[id number],   83yMale followed for:  No Known Allergies    PAST MEDICAL & SURGICAL HISTORY:  Dementia  DM (diabetes mellitus)  Glaucoma  Dementia  DM (diabetes mellitus)  HTN (hypertension)  History of cholecystectomy  History of appendectomy  History of cholecystectomy  History of nephrolithiasis  Status post open reduction with internal fixation of fracture: right femur    FAMILY HISTORY:  No pertinent family history in first degree relatives  No pertinent family history in first degree relatives    MEDICATIONS  (STANDING):  dextrose 5% + sodium chloride 0.45% with potassium chloride 20 mEq/L 1000 milliLiter(s) (100 mL/Hr) IV Continuous <Continuous>  dextrose 5%. 1000 milliLiter(s) (50 mL/Hr) IV Continuous <Continuous>  dextrose 50% Injectable 12.5 Gram(s) IV Push once  dextrose 50% Injectable 25 Gram(s) IV Push once  dextrose 50% Injectable 25 Gram(s) IV Push once  enoxaparin Injectable 40 milliGRAM(s) SubCutaneous daily  influenza   Vaccine 0.5 milliLiter(s) IntraMuscular once  insulin glargine Injectable (LANTUS) 12 Unit(s) SubCutaneous at bedtime  insulin lispro (HumaLOG) corrective regimen sliding scale   SubCutaneous three times a day before meals  insulin lispro (HumaLOG) corrective regimen sliding scale   SubCutaneous at bedtime  insulin lispro Injectable (HumaLOG) 2 Unit(s) SubCutaneous three times a day before meals  metoprolol tartrate Injectable 5 milliGRAM(s) IV Push every 6 hours  nystatin Powder 1 Application(s) Topical two times a day  pantoprazole  Injectable 40 milliGRAM(s) IV Push every 12 hours  piperacillin/tazobactam IVPB. 3.375 Gram(s) IV Intermittent every 8 hours    MEDICATIONS  (PRN):  dextrose 40% Gel 15 Gram(s) Oral once PRN Blood Glucose LESS THAN 70 milliGRAM(s)/deciliter  glucagon  Injectable 1 milliGRAM(s) IntraMuscular once PRN Glucose LESS THAN 70 milligrams/deciliter      Vital Signs Last 24 Hrs  T(C): 36.6 (13 Dec 2018 06:04), Max: 37.2 (13 Dec 2018 00:37)  T(F): 97.9 (13 Dec 2018 06:04), Max: 98.9 (13 Dec 2018 00:37)  HR: 56 (13 Dec 2018 06:20) (53 - 62)  BP: 138/60 (13 Dec 2018 06:20) (124/74 - 153/79)  BP(mean): --  RR: 17 (13 Dec 2018 06:04) (16 - 18)  SpO2: 100% (13 Dec 2018 06:04) (96% - 100%)  nc/at  s1s2  cta  soft, nt, nd no guarding or rebound  no c/c/e    CBC Full  -  ( 13 Dec 2018 06:40 )  WBC Count : 6.70 K/uL  Hemoglobin : 12.0 g/dL  Hematocrit : 36.0 %  Platelet Count - Automated : 387 K/uL  Mean Cell Volume : 90.7 fL  Mean Cell Hemoglobin : 30.2 pg  Mean Cell Hemoglobin Concentration : 33.3 %  Auto Neutrophil # : x  Auto Lymphocyte # : x  Auto Monocyte # : x  Auto Eosinophil # : x  Auto Basophil # : x  Auto Neutrophil % : x  Auto Lymphocyte % : x  Auto Monocyte % : x  Auto Eosinophil % : x  Auto Basophil % : x    12-13    136  |  104  |  2<L>  ----------------------------<  231<H>  4.0   |  24  |  0.60    Ca    8.4      13 Dec 2018 06:40  Phos  2.0     12-12  Mg     1.8     12-12

## 2018-12-13 NOTE — CONSULT NOTE ADULT - PROBLEM/RECOMMENDATION-2
Caller would like to discuss an/a Desk Adjust. Writer advised of callback within 24-48 hours.    Patient Name: Yoselin Gordon  Callback number: [phone]  Additional Info: Patient states her employer needs her speak with her primary care provider for a desk adjustment.  Did you confirm the message with the caller? yes    Thank you,     
DISPLAY PLAN FREE TEXT
DISPLAY PLAN FREE TEXT

## 2018-12-14 LAB
C PEPTIDE SERPL-MCNC: 0.4 NG/ML — LOW (ref 0.9–7.1)
GLUCOSE BLDC GLUCOMTR-MCNC: 141 MG/DL — HIGH (ref 70–99)
GLUCOSE BLDC GLUCOMTR-MCNC: 197 MG/DL — HIGH (ref 70–99)
GLUCOSE BLDC GLUCOMTR-MCNC: 229 MG/DL — HIGH (ref 70–99)
GLUCOSE BLDC GLUCOMTR-MCNC: 233 MG/DL — HIGH (ref 70–99)
GLUCOSE BLDC GLUCOMTR-MCNC: 249 MG/DL — HIGH (ref 70–99)
GLUCOSE BLDC GLUCOMTR-MCNC: 97 MG/DL — SIGNIFICANT CHANGE UP (ref 70–99)

## 2018-12-14 PROCEDURE — 99232 SBSQ HOSP IP/OBS MODERATE 35: CPT

## 2018-12-14 RX ADMIN — INSULIN GLARGINE 14 UNIT(S): 100 INJECTION, SOLUTION SUBCUTANEOUS at 22:12

## 2018-12-14 RX ADMIN — Medication 5 MILLIGRAM(S): at 06:01

## 2018-12-14 RX ADMIN — Medication 2: at 08:28

## 2018-12-14 RX ADMIN — NYSTATIN CREAM 1 APPLICATION(S): 100000 CREAM TOPICAL at 06:00

## 2018-12-14 RX ADMIN — DEXTROSE MONOHYDRATE, SODIUM CHLORIDE, AND POTASSIUM CHLORIDE 100 MILLILITER(S): 50; .745; 4.5 INJECTION, SOLUTION INTRAVENOUS at 08:29

## 2018-12-14 RX ADMIN — DEXTROSE MONOHYDRATE, SODIUM CHLORIDE, AND POTASSIUM CHLORIDE 100 MILLILITER(S): 50; .745; 4.5 INJECTION, SOLUTION INTRAVENOUS at 19:50

## 2018-12-14 RX ADMIN — PANTOPRAZOLE SODIUM 40 MILLIGRAM(S): 20 TABLET, DELAYED RELEASE ORAL at 18:05

## 2018-12-14 RX ADMIN — NYSTATIN CREAM 1 APPLICATION(S): 100000 CREAM TOPICAL at 18:05

## 2018-12-14 RX ADMIN — DEXTROSE MONOHYDRATE, SODIUM CHLORIDE, AND POTASSIUM CHLORIDE 100 MILLILITER(S): 50; .745; 4.5 INJECTION, SOLUTION INTRAVENOUS at 09:51

## 2018-12-14 RX ADMIN — Medication 2 UNIT(S): at 08:27

## 2018-12-14 RX ADMIN — Medication 2 UNIT(S): at 12:18

## 2018-12-14 RX ADMIN — PANTOPRAZOLE SODIUM 40 MILLIGRAM(S): 20 TABLET, DELAYED RELEASE ORAL at 06:01

## 2018-12-14 RX ADMIN — ENOXAPARIN SODIUM 40 MILLIGRAM(S): 100 INJECTION SUBCUTANEOUS at 12:12

## 2018-12-14 RX ADMIN — Medication 2: at 12:19

## 2018-12-14 NOTE — PROGRESS NOTE ADULT - ASSESSMENT
83 yr M with HTN, dementia, DM2 uncontrolled A1C 9.5 c/b retinopathy and vascular dementia here with pneumatosis intestinalis-not a surgical candidate. Patient is bedbound, nonverbal with multiple decubiti. Palliative care on board. Patient is DNR/DNI with active treatment Goal glucose 100-250. A1C goal 7.5-8.0% and avoidance of hypoglycemia.

## 2018-12-14 NOTE — PROVIDER CONTACT NOTE (MEDICATION) - ASSESSMENT
Pt with /68, HR 58 bpm auscultated, IVP Metoprolol due at this time, pt asymptomatic RAMIRO Way aware of pt HR.

## 2018-12-14 NOTE — PROGRESS NOTE ADULT - SUBJECTIVE AND OBJECTIVE BOX
SUBJECTIVE / OVERNIGHT EVENTS: pt confused , unable to obtain ROS from pt     MEDICATIONS  (STANDING):  dextrose 5% + sodium chloride 0.45% with potassium chloride 20 mEq/L 1000 milliLiter(s) (100 mL/Hr) IV Continuous <Continuous>  dextrose 5%. 1000 milliLiter(s) (50 mL/Hr) IV Continuous <Continuous>  dextrose 50% Injectable 12.5 Gram(s) IV Push once  dextrose 50% Injectable 25 Gram(s) IV Push once  dextrose 50% Injectable 25 Gram(s) IV Push once  enoxaparin Injectable 40 milliGRAM(s) SubCutaneous daily  influenza   Vaccine 0.5 milliLiter(s) IntraMuscular once  insulin glargine Injectable (LANTUS) 14 Unit(s) SubCutaneous at bedtime  insulin lispro (HumaLOG) corrective regimen sliding scale   SubCutaneous three times a day before meals  insulin lispro (HumaLOG) corrective regimen sliding scale   SubCutaneous at bedtime  insulin lispro Injectable (HumaLOG) 2 Unit(s) SubCutaneous three times a day before meals  metoprolol tartrate Injectable 5 milliGRAM(s) IV Push every 6 hours  nystatin Powder 1 Application(s) Topical two times a day  pantoprazole  Injectable 40 milliGRAM(s) IV Push every 12 hours    MEDICATIONS  (PRN):  dextrose 40% Gel 15 Gram(s) Oral once PRN Blood Glucose LESS THAN 70 milliGRAM(s)/deciliter  glucagon  Injectable 1 milliGRAM(s) IntraMuscular once PRN Glucose LESS THAN 70 milligrams/deciliter    Vital Signs Last 24 Hrs  T(C): 36.9 (14 Dec 2018 18:00), Max: 36.9 (14 Dec 2018 18:00)  T(F): 98.4 (14 Dec 2018 18:00), Max: 98.4 (14 Dec 2018 18:00)  HR: 56 (14 Dec 2018 18:00) (56 - 62)  BP: 148/71 (14 Dec 2018 11:54) (120/58 - 158/75)  BP(mean): --  RR: 17 (14 Dec 2018 11:54) (16 - 17)  SpO2: 100% (14 Dec 2018 11:54) (97% - 100%)    unable to obtain ros from pt - pt confused     PHYSICAL EXAM:  GENERAL: NAD  EYES: EOMI, PERRLA  NECK: Supple, No JVD  CHEST/LUNG: crepts at bases +  HEART:  S1 , S2 +  ABDOMEN: mild distension+, bs+, soft , tenderness+ on deep palpation  EXTREMITIES:  edema+  NEUROLOGY:alert awake confused     LABS:      136  |  104  |  2<L>  ----------------------------<  231<H>  4.0   |  24  |  0.60    Ca    8.4      13 Dec 2018 06:40      Creatinine Trend: 0.60 <--, 0.70 <--, 0.62 <--, 0.62 <--, 0.61 <--, 0.56 <--                        12.0   6.70  )-----------( 387      ( 13 Dec 2018 06:40 )             36.0     Urine Studies:  Urinalysis Basic - ( 07 Dec 2018 19:20 )    Color: LIGHT YELLOW / Appearance: CLEAR / S.014 / pH: 6.5  Gluc: 200 / Ketone: NEGATIVE  / Bili: NEGATIVE / Urobili: NORMAL   Blood: SMALL / Protein: 10 / Nitrite: NEGATIVE   Leuk Esterase: NEGATIVE / RBC: 11-25 / WBC 0-2   Sq Epi: OCC / Non Sq Epi:  / Bacteria: NEGATIVE                            Consultant(s) Notes Reviewed:      Care Discussed with Consultants/Other Providers:

## 2018-12-14 NOTE — PROGRESS NOTE ADULT - SUBJECTIVE AND OBJECTIVE BOX
CHANA SERJIO:[id number],   83yMale followed for:  No Known Allergies    PAST MEDICAL & SURGICAL HISTORY:  Dementia  DM (diabetes mellitus)  Glaucoma  Dementia  DM (diabetes mellitus)  HTN (hypertension)  History of cholecystectomy  History of appendectomy  History of cholecystectomy  History of nephrolithiasis  Status post open reduction with internal fixation of fracture: right femur    FAMILY HISTORY:  No pertinent family history in first degree relatives  No pertinent family history in first degree relatives    MEDICATIONS  (STANDING):  dextrose 5% + sodium chloride 0.45% with potassium chloride 20 mEq/L 1000 milliLiter(s) (100 mL/Hr) IV Continuous <Continuous>  dextrose 5%. 1000 milliLiter(s) (50 mL/Hr) IV Continuous <Continuous>  dextrose 50% Injectable 12.5 Gram(s) IV Push once  dextrose 50% Injectable 25 Gram(s) IV Push once  dextrose 50% Injectable 25 Gram(s) IV Push once  enoxaparin Injectable 40 milliGRAM(s) SubCutaneous daily  influenza   Vaccine 0.5 milliLiter(s) IntraMuscular once  insulin glargine Injectable (LANTUS) 14 Unit(s) SubCutaneous at bedtime  insulin lispro (HumaLOG) corrective regimen sliding scale   SubCutaneous three times a day before meals  insulin lispro (HumaLOG) corrective regimen sliding scale   SubCutaneous at bedtime  insulin lispro Injectable (HumaLOG) 2 Unit(s) SubCutaneous three times a day before meals  metoprolol tartrate Injectable 5 milliGRAM(s) IV Push every 6 hours  nystatin Powder 1 Application(s) Topical two times a day  pantoprazole  Injectable 40 milliGRAM(s) IV Push every 12 hours    MEDICATIONS  (PRN):  dextrose 40% Gel 15 Gram(s) Oral once PRN Blood Glucose LESS THAN 70 milliGRAM(s)/deciliter  glucagon  Injectable 1 milliGRAM(s) IntraMuscular once PRN Glucose LESS THAN 70 milligrams/deciliter      Vital Signs Last 24 Hrs  T(C): 36.7 (14 Dec 2018 06:00), Max: 36.8 (13 Dec 2018 12:00)  T(F): 98.1 (14 Dec 2018 06:00), Max: 98.3 (13 Dec 2018 12:00)  HR: 57 (14 Dec 2018 06:15) (53 - 63)  BP: 137/62 (14 Dec 2018 06:15) (120/58 - 158/75)  BP(mean): --  RR: 16 (14 Dec 2018 06:15) (16 - 17)  SpO2: 97% (14 Dec 2018 06:15) (97% - 100%)  nc/at  s1s2  cta  soft, nt, nd no guarding or rebound  no c/c/e    CBC Full  -  ( 13 Dec 2018 06:40 )  WBC Count : 6.70 K/uL  Hemoglobin : 12.0 g/dL  Hematocrit : 36.0 %  Platelet Count - Automated : 387 K/uL  Mean Cell Volume : 90.7 fL  Mean Cell Hemoglobin : 30.2 pg  Mean Cell Hemoglobin Concentration : 33.3 %  Auto Neutrophil # : x  Auto Lymphocyte # : x  Auto Monocyte # : x  Auto Eosinophil # : x  Auto Basophil # : x  Auto Neutrophil % : x  Auto Lymphocyte % : x  Auto Monocyte % : x  Auto Eosinophil % : x  Auto Basophil % : x    12-13    136  |  104  |  2<L>  ----------------------------<  231<H>  4.0   |  24  |  0.60    Ca    8.4      13 Dec 2018 06:40

## 2018-12-14 NOTE — PROVIDER CONTACT NOTE (MEDICATION) - ACTION/TREATMENT ORDERED:
As per ADS okay to hold IVP metoprolol at this time for parameters to hold for HR <60 bpm. No further interventions, continuing to monitor.
As per ADS okay to hold IVP metoprolol at this time for parameters to hold for HR <60 bpm. No further interventions, continuing to monitor.

## 2018-12-14 NOTE — PROGRESS NOTE ADULT - SUBJECTIVE AND OBJECTIVE BOX
Chief Complaint: DM2    History:    MEDICATIONS  (STANDING):  dextrose 5% + sodium chloride 0.45% with potassium chloride 20 mEq/L 1000 milliLiter(s) (100 mL/Hr) IV Continuous <Continuous>  dextrose 5%. 1000 milliLiter(s) (50 mL/Hr) IV Continuous <Continuous>  dextrose 50% Injectable 12.5 Gram(s) IV Push once  dextrose 50% Injectable 25 Gram(s) IV Push once  dextrose 50% Injectable 25 Gram(s) IV Push once  enoxaparin Injectable 40 milliGRAM(s) SubCutaneous daily  influenza   Vaccine 0.5 milliLiter(s) IntraMuscular once  insulin glargine Injectable (LANTUS) 14 Unit(s) SubCutaneous at bedtime  insulin lispro (HumaLOG) corrective regimen sliding scale   SubCutaneous three times a day before meals  insulin lispro (HumaLOG) corrective regimen sliding scale   SubCutaneous at bedtime  insulin lispro Injectable (HumaLOG) 2 Unit(s) SubCutaneous three times a day before meals  metoprolol tartrate Injectable 5 milliGRAM(s) IV Push every 6 hours  nystatin Powder 1 Application(s) Topical two times a day  pantoprazole  Injectable 40 milliGRAM(s) IV Push every 12 hours    MEDICATIONS  (PRN):  dextrose 40% Gel 15 Gram(s) Oral once PRN Blood Glucose LESS THAN 70 milliGRAM(s)/deciliter  glucagon  Injectable 1 milliGRAM(s) IntraMuscular once PRN Glucose LESS THAN 70 milligrams/deciliter      Allergies    No Known Allergies    Intolerances      Review of Systems:  Constitutional: No fever  Eyes: No blurry vision  Neuro: No tremors  HEENT: No pain  Cardiovascular: No chest pain, palpitations  Respiratory: No SOB, no cough  GI: No nausea, vomiting, abdominal pain  : No dysuria  Skin: no rash  Psych: no depression  Endocrine: no polyuria, polydipsia  Hem/lymph: no swelling  Osteoporosis: no fractures    ALL OTHER SYSTEMS REVIEWED AND NEGATIVE    UNABLE TO OBTAIN    PHYSICAL EXAM:  VITALS: T(C): 36.7 (12-14-18 @ 11:54)  T(F): 98 (12-14-18 @ 11:54), Max: 98.3 (12-14-18 @ 00:12)  HR: 56 (12-14-18 @ 11:54) (53 - 62)  BP: 148/71 (12-14-18 @ 11:54) (120/58 - 158/75)  RR:  (16 - 17)  SpO2:  (97% - 100%)  Wt(kg): --  GENERAL: NAD, well-groomed, well-developed  EYES: No proptosis, no lid lag, anicteric  HEENT:  Atraumatic, Normocephalic, moist mucous membranes  THYROID: Normal size, no palpable nodules  RESPIRATORY: Clear to auscultation bilaterally; No rales, rhonchi, wheezing  CARDIOVASCULAR: Regular rate and rhythm; No murmurs; no peripheral edema  GI: Soft, nontender, non distended  SKIN: Dry, intact, No rashes or lesions  MUSCULOSKELETAL: Full range of motion, normal strength  NEURO: extraocular movements intact, no tremor  PSYCH: Alert and oriented x 3, normal affect, normal mood      CAPILLARY BLOOD GLUCOSE      POCT Blood Glucose.: 233 mg/dL (14 Dec 2018 12:16)  POCT Blood Glucose.: 229 mg/dL (14 Dec 2018 10:33)  POCT Blood Glucose.: 249 mg/dL (14 Dec 2018 07:52)  POCT Blood Glucose.: 154 mg/dL (13 Dec 2018 20:46)  POCT Blood Glucose.: 146 mg/dL (13 Dec 2018 17:03)      12-13    136  |  104  |  2<L>  ----------------------------<  231<H>  4.0   |  24  |  0.60    EGFR if : 109  EGFR if non : 94    Ca    8.4      12-13  Mg     1.8     12-12  Phos  2.0     12-12            Thyroid Function Tests:      Hemoglobin A1C, Whole Blood: 9.5 % <H> [4.0 - 5.6] (12-05-18 @ 06:45)  Hemoglobin A1C, Whole Blood: 10.1 % <H> [4.0 - 5.6] (11-27-18 @ 08:09) Chief Complaint: DM2    History: continues on IV dextrose.    MEDICATIONS  (STANDING):  dextrose 5% + sodium chloride 0.45% with potassium chloride 20 mEq/L 1000 milliLiter(s) (100 mL/Hr) IV Continuous <Continuous>  dextrose 5%. 1000 milliLiter(s) (50 mL/Hr) IV Continuous <Continuous>  dextrose 50% Injectable 12.5 Gram(s) IV Push once  dextrose 50% Injectable 25 Gram(s) IV Push once  dextrose 50% Injectable 25 Gram(s) IV Push once  enoxaparin Injectable 40 milliGRAM(s) SubCutaneous daily  influenza   Vaccine 0.5 milliLiter(s) IntraMuscular once  insulin glargine Injectable (LANTUS) 14 Unit(s) SubCutaneous at bedtime  insulin lispro (HumaLOG) corrective regimen sliding scale   SubCutaneous three times a day before meals  insulin lispro (HumaLOG) corrective regimen sliding scale   SubCutaneous at bedtime  insulin lispro Injectable (HumaLOG) 2 Unit(s) SubCutaneous three times a day before meals  metoprolol tartrate Injectable 5 milliGRAM(s) IV Push every 6 hours  nystatin Powder 1 Application(s) Topical two times a day  pantoprazole  Injectable 40 milliGRAM(s) IV Push every 12 hours    MEDICATIONS  (PRN):  dextrose 40% Gel 15 Gram(s) Oral once PRN Blood Glucose LESS THAN 70 milliGRAM(s)/deciliter  glucagon  Injectable 1 milliGRAM(s) IntraMuscular once PRN Glucose LESS THAN 70 milligrams/deciliter      Allergies    No Known Allergies    Intolerances      Review of Systems:  UNABLE TO OBTAIN    PHYSICAL EXAM:  VITALS: T(C): 36.7 (12-14-18 @ 11:54)  T(F): 98 (12-14-18 @ 11:54), Max: 98.3 (12-14-18 @ 00:12)  HR: 56 (12-14-18 @ 11:54) (53 - 62)  BP: 148/71 (12-14-18 @ 11:54) (120/58 - 158/75)  RR:  (16 - 17)  SpO2:  (97% - 100%)  Wt(kg): --  GENERAL: NAD, appears comfortable  EYES: No proptosis,  anicteric  HEENT:  Atraumatic, Normocephalic, moist mucous membranes  GI: Soft, nontender, non distended  SKIN: Dry, intact, No rashes or lesions      CAPILLARY BLOOD GLUCOSE      POCT Blood Glucose.: 233 mg/dL (14 Dec 2018 12:16)  POCT Blood Glucose.: 229 mg/dL (14 Dec 2018 10:33)  POCT Blood Glucose.: 249 mg/dL (14 Dec 2018 07:52)  POCT Blood Glucose.: 154 mg/dL (13 Dec 2018 20:46)  POCT Blood Glucose.: 146 mg/dL (13 Dec 2018 17:03)      12-13    136  |  104  |  2<L>  ----------------------------<  231<H>  4.0   |  24  |  0.60    EGFR if : 109  EGFR if non : 94    Ca    8.4      12-13  Mg     1.8     12-12  Phos  2.0     12-12            Thyroid Function Tests:      Hemoglobin A1C, Whole Blood: 9.5 % <H> [4.0 - 5.6] (12-05-18 @ 06:45)  Hemoglobin A1C, Whole Blood: 10.1 % <H> [4.0 - 5.6] (11-27-18 @ 08:09)

## 2018-12-15 LAB
BUN SERPL-MCNC: 3 MG/DL — LOW (ref 7–23)
CALCIUM SERPL-MCNC: 8.9 MG/DL — SIGNIFICANT CHANGE UP (ref 8.4–10.5)
CHLORIDE SERPL-SCNC: 104 MMOL/L — SIGNIFICANT CHANGE UP (ref 98–107)
CO2 SERPL-SCNC: 24 MMOL/L — SIGNIFICANT CHANGE UP (ref 22–31)
CREAT SERPL-MCNC: 0.65 MG/DL — SIGNIFICANT CHANGE UP (ref 0.5–1.3)
GLUCOSE BLDC GLUCOMTR-MCNC: 179 MG/DL — HIGH (ref 70–99)
GLUCOSE BLDC GLUCOMTR-MCNC: 185 MG/DL — HIGH (ref 70–99)
GLUCOSE BLDC GLUCOMTR-MCNC: 80 MG/DL — SIGNIFICANT CHANGE UP (ref 70–99)
GLUCOSE BLDC GLUCOMTR-MCNC: 94 MG/DL — SIGNIFICANT CHANGE UP (ref 70–99)
GLUCOSE SERPL-MCNC: 90 MG/DL — SIGNIFICANT CHANGE UP (ref 70–99)
POTASSIUM SERPL-MCNC: 4.1 MMOL/L — SIGNIFICANT CHANGE UP (ref 3.5–5.3)
POTASSIUM SERPL-SCNC: 4.1 MMOL/L — SIGNIFICANT CHANGE UP (ref 3.5–5.3)
SODIUM SERPL-SCNC: 140 MMOL/L — SIGNIFICANT CHANGE UP (ref 135–145)

## 2018-12-15 RX ORDER — CEPHALEXIN 500 MG
0 CAPSULE ORAL
Qty: 0 | Refills: 0 | COMMUNITY

## 2018-12-15 RX ORDER — INSULIN DETEMIR 100/ML (3)
14 INSULIN PEN (ML) SUBCUTANEOUS
Qty: 3 | Refills: 0 | OUTPATIENT
Start: 2018-12-15 | End: 2019-01-13

## 2018-12-15 RX ORDER — NYSTATIN CREAM 100000 [USP'U]/G
1 CREAM TOPICAL
Qty: 0 | Refills: 0 | COMMUNITY
Start: 2018-12-15

## 2018-12-15 RX ADMIN — Medication 2 UNIT(S): at 12:10

## 2018-12-15 RX ADMIN — NYSTATIN CREAM 1 APPLICATION(S): 100000 CREAM TOPICAL at 18:01

## 2018-12-15 RX ADMIN — NYSTATIN CREAM 1 APPLICATION(S): 100000 CREAM TOPICAL at 05:30

## 2018-12-15 RX ADMIN — Medication 1: at 08:29

## 2018-12-15 RX ADMIN — Medication 5 MILLIGRAM(S): at 18:02

## 2018-12-15 RX ADMIN — PANTOPRAZOLE SODIUM 40 MILLIGRAM(S): 20 TABLET, DELAYED RELEASE ORAL at 18:02

## 2018-12-15 RX ADMIN — PANTOPRAZOLE SODIUM 40 MILLIGRAM(S): 20 TABLET, DELAYED RELEASE ORAL at 05:30

## 2018-12-15 RX ADMIN — Medication 1: at 12:10

## 2018-12-15 RX ADMIN — INSULIN GLARGINE 14 UNIT(S): 100 INJECTION, SOLUTION SUBCUTANEOUS at 22:38

## 2018-12-15 RX ADMIN — Medication 2 UNIT(S): at 08:30

## 2018-12-15 RX ADMIN — DEXTROSE MONOHYDRATE, SODIUM CHLORIDE, AND POTASSIUM CHLORIDE 100 MILLILITER(S): 50; .745; 4.5 INJECTION, SOLUTION INTRAVENOUS at 08:29

## 2018-12-15 RX ADMIN — DEXTROSE MONOHYDRATE, SODIUM CHLORIDE, AND POTASSIUM CHLORIDE 100 MILLILITER(S): 50; .745; 4.5 INJECTION, SOLUTION INTRAVENOUS at 05:38

## 2018-12-15 RX ADMIN — ENOXAPARIN SODIUM 40 MILLIGRAM(S): 100 INJECTION SUBCUTANEOUS at 12:10

## 2018-12-15 NOTE — PROGRESS NOTE ADULT - SUBJECTIVE AND OBJECTIVE BOX
CHANA SERJIO:[id number],   83yMale followed for:  No Known Allergies    PAST MEDICAL & SURGICAL HISTORY:  Dementia  DM (diabetes mellitus)  Glaucoma  Dementia  DM (diabetes mellitus)  HTN (hypertension)  History of cholecystectomy  History of appendectomy  History of cholecystectomy  History of nephrolithiasis  Status post open reduction with internal fixation of fracture: right femur    FAMILY HISTORY:  No pertinent family history in first degree relatives  No pertinent family history in first degree relatives    MEDICATIONS  (STANDING):  dextrose 5% + sodium chloride 0.45% with potassium chloride 20 mEq/L 1000 milliLiter(s) (100 mL/Hr) IV Continuous <Continuous>  dextrose 5%. 1000 milliLiter(s) (50 mL/Hr) IV Continuous <Continuous>  dextrose 50% Injectable 12.5 Gram(s) IV Push once  dextrose 50% Injectable 25 Gram(s) IV Push once  dextrose 50% Injectable 25 Gram(s) IV Push once  enoxaparin Injectable 40 milliGRAM(s) SubCutaneous daily  influenza   Vaccine 0.5 milliLiter(s) IntraMuscular once  insulin glargine Injectable (LANTUS) 14 Unit(s) SubCutaneous at bedtime  insulin lispro (HumaLOG) corrective regimen sliding scale   SubCutaneous three times a day before meals  insulin lispro (HumaLOG) corrective regimen sliding scale   SubCutaneous at bedtime  insulin lispro Injectable (HumaLOG) 2 Unit(s) SubCutaneous three times a day before meals  metoprolol tartrate Injectable 5 milliGRAM(s) IV Push every 6 hours  nystatin Powder 1 Application(s) Topical two times a day  pantoprazole  Injectable 40 milliGRAM(s) IV Push every 12 hours    MEDICATIONS  (PRN):  dextrose 40% Gel 15 Gram(s) Oral once PRN Blood Glucose LESS THAN 70 milliGRAM(s)/deciliter  glucagon  Injectable 1 milliGRAM(s) IntraMuscular once PRN Glucose LESS THAN 70 milligrams/deciliter      Vital Signs Last 24 Hrs  T(C): 36.7 (15 Dec 2018 05:28), Max: 37 (14 Dec 2018 20:11)  T(F): 98.1 (15 Dec 2018 05:28), Max: 98.6 (14 Dec 2018 20:11)  HR: 54 (15 Dec 2018 05:28) (52 - 56)  BP: 154/66 (15 Dec 2018 05:28) (133/688 - 154/66)  BP(mean): --  RR: 17 (15 Dec 2018 05:28) (17 - 18)  SpO2: 100% (15 Dec 2018 05:28) (99% - 100%)  nc/at  s1s2  cta  soft, nt, nd no guarding or rebound  no c/c/e

## 2018-12-15 NOTE — PROGRESS NOTE ADULT - SUBJECTIVE AND OBJECTIVE BOX
SUBJECTIVE / OVERNIGHT EVENTS: pt confused , unable to obtain ROS from pt , pts daughter next to p s bed    MEDICATIONS  (STANDING):  dextrose 5%. 1000 milliLiter(s) (50 mL/Hr) IV Continuous <Continuous>  dextrose 50% Injectable 12.5 Gram(s) IV Push once  dextrose 50% Injectable 25 Gram(s) IV Push once  dextrose 50% Injectable 25 Gram(s) IV Push once  enoxaparin Injectable 40 milliGRAM(s) SubCutaneous daily  influenza   Vaccine 0.5 milliLiter(s) IntraMuscular once  insulin glargine Injectable (LANTUS) 14 Unit(s) SubCutaneous at bedtime  insulin lispro (HumaLOG) corrective regimen sliding scale   SubCutaneous three times a day before meals  insulin lispro (HumaLOG) corrective regimen sliding scale   SubCutaneous at bedtime  metoprolol tartrate Injectable 5 milliGRAM(s) IV Push every 6 hours  nystatin Powder 1 Application(s) Topical two times a day  pantoprazole  Injectable 40 milliGRAM(s) IV Push every 12 hours    MEDICATIONS  (PRN):  dextrose 40% Gel 15 Gram(s) Oral once PRN Blood Glucose LESS THAN 70 milliGRAM(s)/deciliter  glucagon  Injectable 1 milliGRAM(s) IntraMuscular once PRN Glucose LESS THAN 70 milligrams/deciliter    Vital Signs Last 24 Hrs  T(C): 36.9 (15 Dec 2018 12:07), Max: 37 (14 Dec 2018 20:11)  T(F): 98.4 (15 Dec 2018 12:07), Max: 98.6 (14 Dec 2018 20:11)  HR: 57 (15 Dec 2018 12:07) (52 - 57)  BP: 137/84 (15 Dec 2018 12:07) (133/688 - 154/66)  BP(mean): --  RR: 17 (15 Dec 2018 12:07) (17 - 18)  SpO2: 100% (15 Dec 2018 12:07) (99% - 100%)    unable to obtain ros from pt - pt confused     PHYSICAL EXAM:  GENERAL: NAD  EYES: EOMI, PERRLA  NECK: Supple, No JVD  CHEST/LUNG: crepts at bases +  HEART:  S1 , S2 +  ABDOMEN: mild distension+, bs+, soft , tenderness+ on deep palpation  EXTREMITIES:  edema+  NEUROLOGY:alert awake confused     LABS:  12-15    140  |  104  |  3<L>  ----------------------------<  90  4.1   |  24  |  0.65    Ca    8.9      15 Dec 2018 14:30      Creatinine Trend: 0.65 <--, 0.60 <--, 0.70 <--, 0.62 <--, 0.62 <--    Urine Studies:                                        Consultant(s) Notes Reviewed:      Care Discussed with Consultants/Other Providers:

## 2018-12-16 LAB
GLUCOSE BLDC GLUCOMTR-MCNC: 77 MG/DL — SIGNIFICANT CHANGE UP (ref 70–99)
GLUCOSE BLDC GLUCOMTR-MCNC: 81 MG/DL — SIGNIFICANT CHANGE UP (ref 70–99)
GLUCOSE BLDC GLUCOMTR-MCNC: 92 MG/DL — SIGNIFICANT CHANGE UP (ref 70–99)
GLUCOSE BLDC GLUCOMTR-MCNC: 97 MG/DL — SIGNIFICANT CHANGE UP (ref 70–99)

## 2018-12-16 RX ORDER — METOPROLOL TARTRATE 50 MG
25 TABLET ORAL
Qty: 0 | Refills: 0 | Status: DISCONTINUED | OUTPATIENT
Start: 2018-12-16 | End: 2018-12-18

## 2018-12-16 RX ORDER — INSULIN GLARGINE 100 [IU]/ML
11 INJECTION, SOLUTION SUBCUTANEOUS AT BEDTIME
Qty: 0 | Refills: 0 | Status: DISCONTINUED | OUTPATIENT
Start: 2018-12-16 | End: 2018-12-17

## 2018-12-16 RX ADMIN — ENOXAPARIN SODIUM 40 MILLIGRAM(S): 100 INJECTION SUBCUTANEOUS at 12:21

## 2018-12-16 RX ADMIN — Medication 5 MILLIGRAM(S): at 01:38

## 2018-12-16 RX ADMIN — Medication 25 MILLIGRAM(S): at 18:15

## 2018-12-16 RX ADMIN — Medication 5 MILLIGRAM(S): at 06:02

## 2018-12-16 RX ADMIN — NYSTATIN CREAM 1 APPLICATION(S): 100000 CREAM TOPICAL at 06:02

## 2018-12-16 RX ADMIN — NYSTATIN CREAM 1 APPLICATION(S): 100000 CREAM TOPICAL at 18:07

## 2018-12-16 RX ADMIN — INSULIN GLARGINE 11 UNIT(S): 100 INJECTION, SOLUTION SUBCUTANEOUS at 22:37

## 2018-12-16 RX ADMIN — PANTOPRAZOLE SODIUM 40 MILLIGRAM(S): 20 TABLET, DELAYED RELEASE ORAL at 06:01

## 2018-12-16 RX ADMIN — PANTOPRAZOLE SODIUM 40 MILLIGRAM(S): 20 TABLET, DELAYED RELEASE ORAL at 18:08

## 2018-12-16 NOTE — PROGRESS NOTE ADULT - SUBJECTIVE AND OBJECTIVE BOX
SUBJECTIVE / OVERNIGHT EVENTS: pt confused , unable to obtain ROS from pt     MEDICATIONS  (STANDING):  dextrose 5%. 1000 milliLiter(s) (50 mL/Hr) IV Continuous <Continuous>  dextrose 50% Injectable 12.5 Gram(s) IV Push once  dextrose 50% Injectable 25 Gram(s) IV Push once  dextrose 50% Injectable 25 Gram(s) IV Push once  enoxaparin Injectable 40 milliGRAM(s) SubCutaneous daily  influenza   Vaccine 0.5 milliLiter(s) IntraMuscular once  insulin glargine Injectable (LANTUS) 11 Unit(s) SubCutaneous at bedtime  insulin lispro (HumaLOG) corrective regimen sliding scale   SubCutaneous three times a day before meals  insulin lispro (HumaLOG) corrective regimen sliding scale   SubCutaneous at bedtime  metoprolol tartrate 25 milliGRAM(s) Oral two times a day  nystatin Powder 1 Application(s) Topical two times a day  pantoprazole  Injectable 40 milliGRAM(s) IV Push every 12 hours    MEDICATIONS  (PRN):  dextrose 40% Gel 15 Gram(s) Oral once PRN Blood Glucose LESS THAN 70 milliGRAM(s)/deciliter  glucagon  Injectable 1 milliGRAM(s) IntraMuscular once PRN Glucose LESS THAN 70 milligrams/deciliter    Vital Signs Last 24 Hrs  T(C): 36.7 (16 Dec 2018 12:13), Max: 36.9 (16 Dec 2018 05:46)  T(F): 98 (16 Dec 2018 12:13), Max: 98.4 (16 Dec 2018 05:46)  HR: 69 (16 Dec 2018 18:06) (52 - 69)  BP: 131/76 (16 Dec 2018 18:06) (127/60 - 149/68)  BP(mean): --  RR: 17 (16 Dec 2018 12:13) (16 - 17)  SpO2: 100% (16 Dec 2018 12:13) (100% - 100%)  unable to obtain ros from pt - pt confused     PHYSICAL EXAM:  GENERAL: NAD  EYES: EOMI, PERRLA  NECK: Supple, No JVD  CHEST/LUNG: crepts at bases +  HEART:  S1 , S2 +  ABDOMEN: mild distension+, bs+, soft , tenderness+ on deep palpation  EXTREMITIES:  edema+  NEUROLOGY:alert awake confused     LABS:  12-15    140  |  104  |  3<L>  ----------------------------<  90  4.1   |  24  |  0.65    Ca    8.9      15 Dec 2018 14:30      Creatinine Trend: 0.65 <--, 0.60 <--, 0.70 <--, 0.62 <--    Urine Studies:                                    Consultant(s) Notes Reviewed:      Care Discussed with Consultants/Other Providers:

## 2018-12-16 NOTE — CHART NOTE - NSCHARTNOTEFT_GEN_A_CORE
BG values currently under 100 mg/dL. Decreased Lantus to 11 units for tonight. Will follow.    Sienna Garcia MD  Endocrine Fellow

## 2018-12-16 NOTE — PROGRESS NOTE ADULT - SUBJECTIVE AND OBJECTIVE BOX
CHANA SERJIO:[id number],   83yMale followed for:  No Known Allergies    PAST MEDICAL & SURGICAL HISTORY:  Dementia  DM (diabetes mellitus)  Glaucoma  Dementia  DM (diabetes mellitus)  HTN (hypertension)  History of cholecystectomy  History of appendectomy  History of cholecystectomy  History of nephrolithiasis  Status post open reduction with internal fixation of fracture: right femur    FAMILY HISTORY:  No pertinent family history in first degree relatives  No pertinent family history in first degree relatives    MEDICATIONS  (STANDING):  dextrose 5%. 1000 milliLiter(s) (50 mL/Hr) IV Continuous <Continuous>  dextrose 50% Injectable 12.5 Gram(s) IV Push once  dextrose 50% Injectable 25 Gram(s) IV Push once  dextrose 50% Injectable 25 Gram(s) IV Push once  enoxaparin Injectable 40 milliGRAM(s) SubCutaneous daily  influenza   Vaccine 0.5 milliLiter(s) IntraMuscular once  insulin glargine Injectable (LANTUS) 11 Unit(s) SubCutaneous at bedtime  insulin lispro (HumaLOG) corrective regimen sliding scale   SubCutaneous three times a day before meals  insulin lispro (HumaLOG) corrective regimen sliding scale   SubCutaneous at bedtime  metoprolol tartrate Injectable 5 milliGRAM(s) IV Push every 6 hours  nystatin Powder 1 Application(s) Topical two times a day  pantoprazole  Injectable 40 milliGRAM(s) IV Push every 12 hours    MEDICATIONS  (PRN):  dextrose 40% Gel 15 Gram(s) Oral once PRN Blood Glucose LESS THAN 70 milliGRAM(s)/deciliter  glucagon  Injectable 1 milliGRAM(s) IntraMuscular once PRN Glucose LESS THAN 70 milligrams/deciliter      Vital Signs Last 24 Hrs  T(C): 36.8 (16 Dec 2018 06:58), Max: 36.9 (15 Dec 2018 12:07)  T(F): 98.2 (16 Dec 2018 06:58), Max: 98.5 (15 Dec 2018 20:23)  HR: 52 (16 Dec 2018 06:58) (52 - 63)  BP: 140/62 (16 Dec 2018 06:58) (126/65 - 150/71)  BP(mean): --  RR: 16 (16 Dec 2018 06:58) (16 - 18)  SpO2: 100% (16 Dec 2018 06:58) (100% - 100%)  nc/at  s1s2  cta  soft, nt, nd no guarding or rebound  no c/c/e      12-15    140  |  104  |  3<L>  ----------------------------<  90  4.1   |  24  |  0.65    Ca    8.9      15 Dec 2018 14:30

## 2018-12-17 LAB
GLUCOSE BLDC GLUCOMTR-MCNC: 100 MG/DL — HIGH (ref 70–99)
GLUCOSE BLDC GLUCOMTR-MCNC: 123 MG/DL — HIGH (ref 70–99)
GLUCOSE BLDC GLUCOMTR-MCNC: 133 MG/DL — HIGH (ref 70–99)
GLUCOSE BLDC GLUCOMTR-MCNC: 290 MG/DL — HIGH (ref 70–99)
GLUCOSE BLDC GLUCOMTR-MCNC: 57 MG/DL — LOW (ref 70–99)
GLUCOSE BLDC GLUCOMTR-MCNC: 58 MG/DL — LOW (ref 70–99)
GLUCOSE BLDC GLUCOMTR-MCNC: 58 MG/DL — LOW (ref 70–99)
GLUCOSE BLDC GLUCOMTR-MCNC: 68 MG/DL — LOW (ref 70–99)
GLUCOSE BLDC GLUCOMTR-MCNC: 70 MG/DL — SIGNIFICANT CHANGE UP (ref 70–99)
GLUCOSE BLDC GLUCOMTR-MCNC: 82 MG/DL — SIGNIFICANT CHANGE UP (ref 70–99)
GLUCOSE BLDC GLUCOMTR-MCNC: 84 MG/DL — SIGNIFICANT CHANGE UP (ref 70–99)
GLUCOSE BLDC GLUCOMTR-MCNC: 94 MG/DL — SIGNIFICANT CHANGE UP (ref 70–99)

## 2018-12-17 PROCEDURE — 99232 SBSQ HOSP IP/OBS MODERATE 35: CPT

## 2018-12-17 RX ORDER — DEXTROSE 50 % IN WATER 50 %
15 SYRINGE (ML) INTRAVENOUS ONCE
Qty: 0 | Refills: 0 | Status: COMPLETED | OUTPATIENT
Start: 2018-12-17 | End: 2018-12-17

## 2018-12-17 RX ORDER — RANITIDINE HYDROCHLORIDE 150 MG/1
1 TABLET, FILM COATED ORAL
Qty: 30 | Refills: 0 | OUTPATIENT
Start: 2018-12-17 | End: 2019-01-15

## 2018-12-17 RX ORDER — NYSTATIN CREAM 100000 [USP'U]/G
1 CREAM TOPICAL
Qty: 1 | Refills: 0 | OUTPATIENT
Start: 2018-12-17 | End: 2019-01-15

## 2018-12-17 RX ORDER — SODIUM CHLORIDE 9 MG/ML
1000 INJECTION, SOLUTION INTRAVENOUS
Qty: 0 | Refills: 0 | Status: DISCONTINUED | OUTPATIENT
Start: 2018-12-17 | End: 2018-12-17

## 2018-12-17 RX ORDER — INSULIN GLARGINE 100 [IU]/ML
5 INJECTION, SOLUTION SUBCUTANEOUS AT BEDTIME
Qty: 0 | Refills: 0 | Status: DISCONTINUED | OUTPATIENT
Start: 2018-12-17 | End: 2018-12-18

## 2018-12-17 RX ORDER — INSULIN DETEMIR 100/ML (3)
5 INSULIN PEN (ML) SUBCUTANEOUS
Qty: 0 | Refills: 0 | COMMUNITY

## 2018-12-17 RX ADMIN — PANTOPRAZOLE SODIUM 40 MILLIGRAM(S): 20 TABLET, DELAYED RELEASE ORAL at 05:33

## 2018-12-17 RX ADMIN — SODIUM CHLORIDE 50 MILLILITER(S): 9 INJECTION, SOLUTION INTRAVENOUS at 15:35

## 2018-12-17 RX ADMIN — PANTOPRAZOLE SODIUM 40 MILLIGRAM(S): 20 TABLET, DELAYED RELEASE ORAL at 17:53

## 2018-12-17 RX ADMIN — ENOXAPARIN SODIUM 40 MILLIGRAM(S): 100 INJECTION SUBCUTANEOUS at 17:51

## 2018-12-17 RX ADMIN — Medication 25 MILLIGRAM(S): at 17:53

## 2018-12-17 RX ADMIN — Medication 15 GRAM(S): at 12:55

## 2018-12-17 RX ADMIN — NYSTATIN CREAM 1 APPLICATION(S): 100000 CREAM TOPICAL at 05:32

## 2018-12-17 RX ADMIN — INSULIN GLARGINE 5 UNIT(S): 100 INJECTION, SOLUTION SUBCUTANEOUS at 21:17

## 2018-12-17 RX ADMIN — NYSTATIN CREAM 1 APPLICATION(S): 100000 CREAM TOPICAL at 17:51

## 2018-12-17 NOTE — PROGRESS NOTE ADULT - PROBLEM SELECTOR PROBLEM 3
DM (diabetes mellitus)
Dysphagia
Dysphagia
Functional quadriplegia

## 2018-12-17 NOTE — PROGRESS NOTE ADULT - PROBLEM SELECTOR PLAN 2
supportive care  PT  dc home with home as per pts daughter
Currently non-verbal, bedbound, incontinent, +dysphagia.  Geraldine is aware that patient will likely not return to his baseline prior to admission - Geraldine verbalized understanding.  Continue supportive care.
Currently non-verbal, bedbound, incontinent, multiple decubitus ulcers.  FAST ~7c.    Continue supportive care.
as per surgery no acute surgical intervention   pt eating with assistance  had extensive lengthy discussion with pts daughter about pts current clinical status. management plan, pt is DNR/ DNI as per pts daughter request   PT /oob in chair  poss dc with home care service after blood sugar improves
supportive care
supportive care  PT
supportive care  PT

## 2018-12-17 NOTE — PROGRESS NOTE ADULT - SUBJECTIVE AND OBJECTIVE BOX
CHANA SERJIO:[id number],   83yMale followed for:  No Known Allergies    PAST MEDICAL & SURGICAL HISTORY:  Dementia  DM (diabetes mellitus)  Glaucoma  Dementia  DM (diabetes mellitus)  HTN (hypertension)  History of cholecystectomy  History of appendectomy  History of cholecystectomy  History of nephrolithiasis  Status post open reduction with internal fixation of fracture: right femur    FAMILY HISTORY:  No pertinent family history in first degree relatives  No pertinent family history in first degree relatives    MEDICATIONS  (STANDING):  dextrose 5%. 1000 milliLiter(s) (50 mL/Hr) IV Continuous <Continuous>  dextrose 50% Injectable 12.5 Gram(s) IV Push once  dextrose 50% Injectable 25 Gram(s) IV Push once  dextrose 50% Injectable 25 Gram(s) IV Push once  enoxaparin Injectable 40 milliGRAM(s) SubCutaneous daily  influenza   Vaccine 0.5 milliLiter(s) IntraMuscular once  insulin glargine Injectable (LANTUS) 11 Unit(s) SubCutaneous at bedtime  insulin lispro (HumaLOG) corrective regimen sliding scale   SubCutaneous three times a day before meals  insulin lispro (HumaLOG) corrective regimen sliding scale   SubCutaneous at bedtime  metoprolol tartrate 25 milliGRAM(s) Oral two times a day  nystatin Powder 1 Application(s) Topical two times a day  pantoprazole  Injectable 40 milliGRAM(s) IV Push every 12 hours    MEDICATIONS  (PRN):  dextrose 40% Gel 15 Gram(s) Oral once PRN Blood Glucose LESS THAN 70 milliGRAM(s)/deciliter  glucagon  Injectable 1 milliGRAM(s) IntraMuscular once PRN Glucose LESS THAN 70 milligrams/deciliter      Vital Signs Last 24 Hrs  T(C): 36.7 (17 Dec 2018 05:02), Max: 36.7 (16 Dec 2018 12:13)  T(F): 98 (17 Dec 2018 05:02), Max: 98 (16 Dec 2018 12:13)  HR: 57 (17 Dec 2018 06:51) (54 - 69)  BP: 120/55 (17 Dec 2018 06:51) (120/55 - 149/68)  BP(mean): --  RR: 18 (17 Dec 2018 05:02) (16 - 18)  SpO2: 100% (17 Dec 2018 05:02) (97% - 100%)  nc/at  s1s2  cta  soft, nt, nd no guarding or rebound  no c/c/e      12-15    140  |  104  |  3<L>  ----------------------------<  90  4.1   |  24  |  0.65    Ca    8.9      15 Dec 2018 14:30

## 2018-12-17 NOTE — PROGRESS NOTE ADULT - PROBLEM SELECTOR PLAN 4
cont bb
Assist with ADL's.  Skin and wound care PRN.  Fall precautions.
Assist with ADL's.  Skin and wound care PRN.  Fall precautions.
cont bb

## 2018-12-17 NOTE — PROGRESS NOTE ADULT - PROBLEM SELECTOR PROBLEM 4
HTN (hypertension)
Functional quadriplegia
Functional quadriplegia
HTN (hypertension)

## 2018-12-17 NOTE — PROVIDER CONTACT NOTE (CRITICAL VALUE NOTIFICATION) - ACTION/TREATMENT ORDERED:
s/p 2 oral attempts on hypoglycemia protocol with no improvement  D5 ordered per protocol
Glucose gel 37.5g as per protocol
hypoglycemia protocol followed

## 2018-12-17 NOTE — CHART NOTE - NSCHARTNOTEFT_GEN_A_CORE
Called By RN pt with fs in the 50s : hypoglycemic protocol initiated. will repeat until FS is above 100  d/w Dr. Welsh Endocrinologist discharge recs decrease levemir to 5units q hs.  as per medical attending Dr. Dragan parker to dc pt today

## 2018-12-17 NOTE — PROGRESS NOTE ADULT - PROBLEM SELECTOR PLAN 3
iss
Patient passed speech and swallow evaluation today.  Puree with nectar thick recommended.  Assist with all meals.  Aspiration precautions.
Unable to cooperate with speech and swallow evaluation due to lethargy.  Extensive discussion with patient's daughter Geraldine regarding nutritional goals of care.  Geraldine aware that given the patient's advanced dementia, a PEG would likely not improve quality or quantity of life.  Pleasure feeds and hospice discussed at length.  Geraldine would like to discuss options with her siblings prior to making a decision.  Will follow-up.
iss
supportive care  PT  dc home with home as per pts daughter

## 2018-12-17 NOTE — PROGRESS NOTE ADULT - SUBJECTIVE AND OBJECTIVE BOX
Chief Complaint: DM2    History: Noted with hypoglycemia today. Daughter at bedside, has been feeding patient.  He is no longer receiving IV fluids.    MEDICATIONS  (STANDING):  dextrose 5%. 1000 milliLiter(s) (50 mL/Hr) IV Continuous <Continuous>  dextrose 50% Injectable 12.5 Gram(s) IV Push once  dextrose 50% Injectable 25 Gram(s) IV Push once  dextrose 50% Injectable 25 Gram(s) IV Push once  enoxaparin Injectable 40 milliGRAM(s) SubCutaneous daily  influenza   Vaccine 0.5 milliLiter(s) IntraMuscular once  insulin glargine Injectable (LANTUS) 11 Unit(s) SubCutaneous at bedtime  insulin lispro (HumaLOG) corrective regimen sliding scale   SubCutaneous three times a day before meals  insulin lispro (HumaLOG) corrective regimen sliding scale   SubCutaneous at bedtime  metoprolol tartrate 25 milliGRAM(s) Oral two times a day  nystatin Powder 1 Application(s) Topical two times a day  pantoprazole  Injectable 40 milliGRAM(s) IV Push every 12 hours    MEDICATIONS  (PRN):  dextrose 40% Gel 15 Gram(s) Oral once PRN Blood Glucose LESS THAN 70 milliGRAM(s)/deciliter  glucagon  Injectable 1 milliGRAM(s) IntraMuscular once PRN Glucose LESS THAN 70 milligrams/deciliter      Allergies    No Known Allergies    Intolerances      Review of Systems:  UNABLE TO OBTAIN    PHYSICAL EXAM:  VITALS: T(C): 36.9 (12-17-18 @ 12:26)  T(F): 98.4 (12-17-18 @ 12:26), Max: 98.4 (12-17-18 @ 12:26)  HR: 64 (12-17-18 @ 12:26) (54 - 69)  BP: 141/72 (12-17-18 @ 12:26) (120/55 - 143/108)  RR:  (16 - 18)  SpO2:  (97% - 100%)  Wt(kg): --  GENERAL: NAD, well-groomed, well-developed  EYES: No proptosis, anicteric  HEENT:  Atraumatic, Normocephalic, moist mucous membranes  RESPIRATORY: nonlabored  PSYCH: unable to assess      CAPILLARY BLOOD GLUCOSE      POCT Blood Glucose.: 58 mg/dL (17 Dec 2018 12:43)  POCT Blood Glucose.: 57 mg/dL (17 Dec 2018 12:24)  POCT Blood Glucose.: 58 mg/dL (17 Dec 2018 12:06)  POCT Blood Glucose.: 84 mg/dL (17 Dec 2018 07:51)  POCT Blood Glucose.: 68 mg/dL (17 Dec 2018 07:27)  POCT Blood Glucose.: 77 mg/dL (16 Dec 2018 22:34)  POCT Blood Glucose.: 81 mg/dL (16 Dec 2018 17:34)      12-15    140  |  104  |  3<L>  ----------------------------<  90  4.1   |  24  |  0.65    EGFR if : 105  EGFR if non : 91    Ca    8.9      12-15            Thyroid Function Tests:      Hemoglobin A1C, Whole Blood: 9.5 % <H> [4.0 - 5.6] (12-05-18 @ 06:45)  Hemoglobin A1C, Whole Blood: 10.1 % <H> [4.0 - 5.6] (11-27-18 @ 08:09)

## 2018-12-17 NOTE — CHART NOTE - NSCHARTNOTEFT_GEN_A_CORE
d/w endocrine fellow Dr. Varela glucose 290 ok to dc IVF decrease lantus to 5units and continue with corrective sliding scale d/w medical attending Dr. nam

## 2018-12-17 NOTE — PROGRESS NOTE ADULT - PROBLEM SELECTOR PROBLEM 2
Functional quadriplegia
Dementia
Dementia
Functional quadriplegia
Pneumatosis intestinalis

## 2018-12-17 NOTE — PROGRESS NOTE ADULT - SUBJECTIVE AND OBJECTIVE BOX
SUBJECTIVE / OVERNIGHT EVENTS: pt confused , unable to obtain ROS from pt     MEDICATIONS  (STANDING):  dextrose 5%. 1000 milliLiter(s) (50 mL/Hr) IV Continuous <Continuous>  dextrose 50% Injectable 12.5 Gram(s) IV Push once  dextrose 50% Injectable 25 Gram(s) IV Push once  dextrose 50% Injectable 25 Gram(s) IV Push once  enoxaparin Injectable 40 milliGRAM(s) SubCutaneous daily  influenza   Vaccine 0.5 milliLiter(s) IntraMuscular once  insulin glargine Injectable (LANTUS) 5 Unit(s) SubCutaneous at bedtime  insulin lispro (HumaLOG) corrective regimen sliding scale   SubCutaneous three times a day before meals  insulin lispro (HumaLOG) corrective regimen sliding scale   SubCutaneous at bedtime  metoprolol tartrate 25 milliGRAM(s) Oral two times a day  nystatin Powder 1 Application(s) Topical two times a day  pantoprazole  Injectable 40 milliGRAM(s) IV Push every 12 hours    MEDICATIONS  (PRN):  dextrose 40% Gel 15 Gram(s) Oral once PRN Blood Glucose LESS THAN 70 milliGRAM(s)/deciliter  glucagon  Injectable 1 milliGRAM(s) IntraMuscular once PRN Glucose LESS THAN 70 milligrams/deciliter    Vital Signs Last 24 Hrs  T(C): 36.9 (17 Dec 2018 12:26), Max: 36.9 (17 Dec 2018 12:26)  T(F): 98.4 (17 Dec 2018 12:26), Max: 98.4 (17 Dec 2018 12:26)  HR: 64 (17 Dec 2018 12:26) (54 - 69)  BP: 141/72 (17 Dec 2018 12:26) (120/55 - 143/108)  BP(mean): --  RR: 18 (17 Dec 2018 12:26) (16 - 18)  SpO2: 100% (17 Dec 2018 12:26) (97% - 100%)    unable to obtain ros from pt - pt confused     PHYSICAL EXAM:  GENERAL: NAD  EYES: EOMI, PERRLA  NECK: Supple, No JVD  CHEST/LUNG: crepts at bases +  HEART:  S1 , S2 +  ABDOMEN: mild distension+, bs+, soft , tenderness+ on deep palpation  EXTREMITIES:  edema+  NEUROLOGY:alert awake confused     LABS:        Creatinine Trend: 0.65 <--, 0.60 <--, 0.70 <--    Urine Studies:                                          Consultant(s) Notes Reviewed:      Care Discussed with Consultants/Other Providers:

## 2018-12-17 NOTE — PROVIDER CONTACT NOTE (CRITICAL VALUE NOTIFICATION) - SITUATION
patient actively eating and drinking lunch
Patient eating and drinking, must be fed
hypoglycemia protocol followed with no improvement of blood glucose

## 2018-12-18 VITALS
OXYGEN SATURATION: 100 % | TEMPERATURE: 98 F | SYSTOLIC BLOOD PRESSURE: 114 MMHG | RESPIRATION RATE: 16 BRPM | DIASTOLIC BLOOD PRESSURE: 66 MMHG | HEART RATE: 68 BPM

## 2018-12-18 LAB
BUN SERPL-MCNC: 10 MG/DL — SIGNIFICANT CHANGE UP (ref 7–23)
CALCIUM SERPL-MCNC: 8.7 MG/DL — SIGNIFICANT CHANGE UP (ref 8.4–10.5)
CHLORIDE SERPL-SCNC: 103 MMOL/L — SIGNIFICANT CHANGE UP (ref 98–107)
CO2 SERPL-SCNC: 23 MMOL/L — SIGNIFICANT CHANGE UP (ref 22–31)
CREAT SERPL-MCNC: 0.7 MG/DL — SIGNIFICANT CHANGE UP (ref 0.5–1.3)
GLUCOSE BLDC GLUCOMTR-MCNC: 157 MG/DL — HIGH (ref 70–99)
GLUCOSE BLDC GLUCOMTR-MCNC: 175 MG/DL — HIGH (ref 70–99)
GLUCOSE SERPL-MCNC: 162 MG/DL — HIGH (ref 70–99)
HCT VFR BLD CALC: 37.1 % — LOW (ref 39–50)
HGB BLD-MCNC: 12 G/DL — LOW (ref 13–17)
MCHC RBC-ENTMCNC: 30.3 PG — SIGNIFICANT CHANGE UP (ref 27–34)
MCHC RBC-ENTMCNC: 32.3 % — SIGNIFICANT CHANGE UP (ref 32–36)
MCV RBC AUTO: 93.7 FL — SIGNIFICANT CHANGE UP (ref 80–100)
NRBC # FLD: 0 — SIGNIFICANT CHANGE UP
PLATELET # BLD AUTO: 365 K/UL — SIGNIFICANT CHANGE UP (ref 150–400)
PMV BLD: 10.4 FL — SIGNIFICANT CHANGE UP (ref 7–13)
POTASSIUM SERPL-MCNC: 4.2 MMOL/L — SIGNIFICANT CHANGE UP (ref 3.5–5.3)
POTASSIUM SERPL-SCNC: 4.2 MMOL/L — SIGNIFICANT CHANGE UP (ref 3.5–5.3)
RBC # BLD: 3.96 M/UL — LOW (ref 4.2–5.8)
RBC # FLD: 12.9 % — SIGNIFICANT CHANGE UP (ref 10.3–14.5)
SODIUM SERPL-SCNC: 139 MMOL/L — SIGNIFICANT CHANGE UP (ref 135–145)
WBC # BLD: 10.57 K/UL — HIGH (ref 3.8–10.5)
WBC # FLD AUTO: 10.57 K/UL — HIGH (ref 3.8–10.5)

## 2018-12-18 PROCEDURE — 99232 SBSQ HOSP IP/OBS MODERATE 35: CPT

## 2018-12-18 RX ORDER — DORZOLAMIDE HYDROCHLORIDE TIMOLOL MALEATE 20; 5 MG/ML; MG/ML
1 SOLUTION/ DROPS OPHTHALMIC
Qty: 1 | Refills: 0 | OUTPATIENT
Start: 2018-12-18

## 2018-12-18 RX ORDER — ISOPROPYL ALCOHOL, BENZOCAINE .7; .06 ML/ML; ML/ML
1 SWAB TOPICAL
Qty: 100 | Refills: 1 | OUTPATIENT
Start: 2018-12-18 | End: 2019-02-05

## 2018-12-18 RX ORDER — DORZOLAMIDE HYDROCHLORIDE TIMOLOL MALEATE 20; 5 MG/ML; MG/ML
1 SOLUTION/ DROPS OPHTHALMIC
Qty: 0 | Refills: 0 | COMMUNITY

## 2018-12-18 RX ADMIN — NYSTATIN CREAM 1 APPLICATION(S): 100000 CREAM TOPICAL at 06:44

## 2018-12-18 RX ADMIN — Medication 1: at 08:33

## 2018-12-18 RX ADMIN — PANTOPRAZOLE SODIUM 40 MILLIGRAM(S): 20 TABLET, DELAYED RELEASE ORAL at 06:45

## 2018-12-18 RX ADMIN — ENOXAPARIN SODIUM 40 MILLIGRAM(S): 100 INJECTION SUBCUTANEOUS at 12:44

## 2018-12-18 RX ADMIN — Medication 1: at 12:44

## 2018-12-18 NOTE — PROGRESS NOTE ADULT - PROBLEM SELECTOR PLAN 1
as per surgery no acute surgical intervention   pt eating with assistance  had extensive lengthy discussion with pts daughter about pts current clinical status. management plan, pt is DNR/ DNI as per pts daughter request   PT /oob in chair  poss dc with home care servbice
BG are at goal of 100-250 mg/dl  Not looking for tight glycemic control.  Continue Lantus 14 units qhs  Continue Humalog 2/2/2 plus low correction scales    DC plan: Once daily Levemir, dose to be determined before dc. Given history of DKA would not stop basal insulin at dc.
Hypoglycemia noted yesterday which appears to be resolved with decrease of Lantus to 5 units at bedtime.  No premeal insulin.  continue low correction scales    DC plan: Once daily Levemir 5 units qhs. Given history of DKA would not stop basal insulin at dc. Reviewed glucose targets with daughter ok to tolerate mild to moderate hyperglycemia with goal to avoid hypoglycemia or severe hyperglycemia/DKA. Prescribe new glucometer with supplies for dc.  Daughter planning to arrange medical home visits for ongoing care.
Hypoglycemia noted, with decreased insulin requirements at this time.  Daughter is able to feed the patient.  Decrease Lantus to 5 units at bedtime.  No premeal insulin.  continue low correction scales    DC plan: Once daily Levemir 5 units qhs. Given history of DKA would not stop basal insulin at dc. Reviewed glucose targets with daughter ok to tolerate mild to moderate hyperglycemia with goal to avoid hypoglycemia or severe hyperglycemia/DKA. Prescribe new glucometer with supplies for dc.  Daughter planning to arrange medical home visits for ongoing care.
IV antibiotics in place.  Continue medical management.
IV antibiotics in place.  Continue medical management.
as per surgery     speech and swallow eval after pt mentally improves  , iv abx
as per surgery , pt improving clinically   advance diet   speech and swallow eval , iv abx  transferred to medicine service for further care
as per surgery no acute surgical intervention     pt eating with assistance  had extensive lengthy discussion with pts daughter about pts current clinical status. management plan, pt is DNR/ DNI as per pts daughter request   PT /oob in chair
as per surgery no acute surgical intervention   pt eating with assistance  had extensive lengthy discussion with pts daughter about pts current clinical status. management plan, pt is DNR/ DNI as per pts daughter request   PT /oob in chair  poss dc with home care servbice
as per surgery no acute surgical intervention   speech and swallow reeval  pt eating with assistance  had extensive lengthy discussion with pts daughter about pts current clinical status. management plan, pt is DNR/ DNI as per pts daughter request   PT /oob in chair
as per surgery no acute surgical intervention   speech and swallow reeval  pt eating with assistance  had extensive lengthy discussion with pts daughter about pts current clinical status. management plan, pt is DNR/ DNI as per pts daughter request   PT /oob in chair
hypoglycemia+ - restart dextrose drip and feed pt with assistance  endo f/u to adjust insulin regimen

## 2018-12-18 NOTE — PROGRESS NOTE ADULT - REASON FOR ADMISSION
Pneumatosis

## 2018-12-18 NOTE — PROGRESS NOTE ADULT - SUBJECTIVE AND OBJECTIVE BOX
CHANA SERJIO:[id number],   83yMale followed for:  No Known Allergies    PAST MEDICAL & SURGICAL HISTORY:  Dementia  DM (diabetes mellitus)  Glaucoma  Dementia  DM (diabetes mellitus)  HTN (hypertension)  History of cholecystectomy  History of appendectomy  History of cholecystectomy  History of nephrolithiasis  Status post open reduction with internal fixation of fracture: right femur    FAMILY HISTORY:  No pertinent family history in first degree relatives  No pertinent family history in first degree relatives    MEDICATIONS  (STANDING):  dextrose 5%. 1000 milliLiter(s) (50 mL/Hr) IV Continuous <Continuous>  dextrose 50% Injectable 12.5 Gram(s) IV Push once  dextrose 50% Injectable 25 Gram(s) IV Push once  dextrose 50% Injectable 25 Gram(s) IV Push once  enoxaparin Injectable 40 milliGRAM(s) SubCutaneous daily  influenza   Vaccine 0.5 milliLiter(s) IntraMuscular once  insulin glargine Injectable (LANTUS) 5 Unit(s) SubCutaneous at bedtime  insulin lispro (HumaLOG) corrective regimen sliding scale   SubCutaneous three times a day before meals  insulin lispro (HumaLOG) corrective regimen sliding scale   SubCutaneous at bedtime  metoprolol tartrate 25 milliGRAM(s) Oral two times a day  nystatin Powder 1 Application(s) Topical two times a day  pantoprazole  Injectable 40 milliGRAM(s) IV Push every 12 hours    MEDICATIONS  (PRN):  dextrose 40% Gel 15 Gram(s) Oral once PRN Blood Glucose LESS THAN 70 milliGRAM(s)/deciliter  glucagon  Injectable 1 milliGRAM(s) IntraMuscular once PRN Glucose LESS THAN 70 milligrams/deciliter      Vital Signs Last 24 Hrs  T(C): 36.4 (18 Dec 2018 06:42), Max: 36.9 (17 Dec 2018 12:26)  T(F): 97.6 (18 Dec 2018 06:42), Max: 98.4 (17 Dec 2018 12:26)  HR: 56 (18 Dec 2018 06:42) (56 - 66)  BP: 136/65 (18 Dec 2018 06:42) (114/64 - 141/72)  BP(mean): --  RR: 16 (18 Dec 2018 06:42) (16 - 18)  SpO2: 100% (18 Dec 2018 06:42) (98% - 100%)  nc/at  s1s2  cta  soft, nt, nd no guarding or rebound  no c/c/e

## 2018-12-18 NOTE — PROGRESS NOTE ADULT - PROVIDER SPECIALTY LIST ADULT
Endocrinology
Gastroenterology
Internal Medicine
Palliative Care
Palliative Care
Surgery
Internal Medicine

## 2018-12-18 NOTE — PROGRESS NOTE ADULT - PROBLEM SELECTOR PROBLEM 1
Pneumatosis intestinalis
DM (diabetes mellitus)
Pneumatosis intestinalis
Type 2 diabetes mellitus with hyperglycemia, with long-term current use of insulin

## 2018-12-18 NOTE — PROGRESS NOTE ADULT - SUBJECTIVE AND OBJECTIVE BOX
Chief Complaint: DM2    History: Hypoglycemia several times yesterday (after receiving higher Lantus dose of 11 units the night before). Was started on IV dextrose which has since been stopped.  Today so far no hypoglycemia noted.  On dysphagia diet.    MEDICATIONS  (STANDING):  dextrose 5%. 1000 milliLiter(s) (50 mL/Hr) IV Continuous <Continuous>  dextrose 50% Injectable 12.5 Gram(s) IV Push once  dextrose 50% Injectable 25 Gram(s) IV Push once  dextrose 50% Injectable 25 Gram(s) IV Push once  enoxaparin Injectable 40 milliGRAM(s) SubCutaneous daily  influenza   Vaccine 0.5 milliLiter(s) IntraMuscular once  insulin glargine Injectable (LANTUS) 5 Unit(s) SubCutaneous at bedtime  insulin lispro (HumaLOG) corrective regimen sliding scale   SubCutaneous three times a day before meals  insulin lispro (HumaLOG) corrective regimen sliding scale   SubCutaneous at bedtime  metoprolol tartrate 25 milliGRAM(s) Oral two times a day  nystatin Powder 1 Application(s) Topical two times a day  pantoprazole  Injectable 40 milliGRAM(s) IV Push every 12 hours    MEDICATIONS  (PRN):  dextrose 40% Gel 15 Gram(s) Oral once PRN Blood Glucose LESS THAN 70 milliGRAM(s)/deciliter  glucagon  Injectable 1 milliGRAM(s) IntraMuscular once PRN Glucose LESS THAN 70 milligrams/deciliter      Allergies    No Known Allergies    Intolerances      Review of Systems:    UNABLE TO OBTAIN    PHYSICAL EXAM:  VITALS: T(C): 36.4 (12-18-18 @ 06:42)  T(F): 97.6 (12-18-18 @ 06:42), Max: 98.4 (12-17-18 @ 20:31)  HR: 63 (12-18-18 @ 09:12) (56 - 66)  BP: 110/87 (12-18-18 @ 09:12) (110/87 - 136/65)  RR:  (16 - 17)  SpO2:  (98% - 100%)  Wt(kg): --  GENERAL: NAD, appears comfortable  EYES: No proptosis, anicteric  HEENT:  Atraumatic, Normocephalic, moist mucous membranes  RESPIRATORY: nonlabored  SKIN: Dry, intact, No rashes or lesions  PSYCH: cannot assess      CAPILLARY BLOOD GLUCOSE      POCT Blood Glucose.: 175 mg/dL (18 Dec 2018 11:51)  POCT Blood Glucose.: 157 mg/dL (18 Dec 2018 07:53)  POCT Blood Glucose.: 123 mg/dL (17 Dec 2018 21:06)  POCT Blood Glucose.: 133 mg/dL (17 Dec 2018 17:17)  POCT Blood Glucose.: 290 mg/dL (17 Dec 2018 15:20)  POCT Blood Glucose.: 94 mg/dL (17 Dec 2018 15:05)  POCT Blood Glucose.: 100 mg/dL (17 Dec 2018 14:43)  POCT Blood Glucose.: 82 mg/dL (17 Dec 2018 14:03)  POCT Blood Glucose.: 70 mg/dL (17 Dec 2018 13:27)      12-18    139  |  103  |  10  ----------------------------<  162<H>  4.2   |  23  |  0.70    EGFR if : 102  EGFR if non : 88    Ca    8.7      12-18            Thyroid Function Tests:      Hemoglobin A1C, Whole Blood: 9.5 % <H> [4.0 - 5.6] (12-05-18 @ 06:45)  Hemoglobin A1C, Whole Blood: 10.1 % <H> [4.0 - 5.6] (11-27-18 @ 08:09)

## 2018-12-30 PROBLEM — F03.90 UNSPECIFIED DEMENTIA WITHOUT BEHAVIORAL DISTURBANCE: Chronic | Status: ACTIVE | Noted: 2018-12-04

## 2018-12-30 PROBLEM — E11.9 TYPE 2 DIABETES MELLITUS WITHOUT COMPLICATIONS: Chronic | Status: ACTIVE | Noted: 2018-12-04

## 2019-01-02 ENCOUNTER — APPOINTMENT (OUTPATIENT)
Dept: HOME HEALTH SERVICES | Facility: HOME HEALTH | Age: 84
End: 2019-01-02

## 2019-01-02 ENCOUNTER — APPOINTMENT (OUTPATIENT)
Dept: HOME HEALTH SERVICES | Facility: HOME HEALTH | Age: 84
End: 2019-01-02
Payer: MEDICARE

## 2019-01-02 VITALS
TEMPERATURE: 97.4 F | DIASTOLIC BLOOD PRESSURE: 58 MMHG | SYSTOLIC BLOOD PRESSURE: 122 MMHG | RESPIRATION RATE: 14 BRPM | OXYGEN SATURATION: 98 % | HEART RATE: 67 BPM

## 2019-01-02 DIAGNOSIS — R53.81 OTHER MALAISE: ICD-10-CM

## 2019-01-02 DIAGNOSIS — K21.9 GASTRO-ESOPHAGEAL REFLUX DISEASE W/OUT ESOPHAGITIS: ICD-10-CM

## 2019-01-02 DIAGNOSIS — E11.39 TYPE 2 DIABETES MELLITUS WITH HYPERGLYCEMIA: ICD-10-CM

## 2019-01-02 DIAGNOSIS — Z78.9 OTHER SPECIFIED HEALTH STATUS: ICD-10-CM

## 2019-01-02 DIAGNOSIS — Z71.89 OTHER SPECIFIED COUNSELING: ICD-10-CM

## 2019-01-02 DIAGNOSIS — F02.80 ALZHEIMER'S DISEASE, UNSPECIFIED: ICD-10-CM

## 2019-01-02 DIAGNOSIS — I10 ESSENTIAL (PRIMARY) HYPERTENSION: ICD-10-CM

## 2019-01-02 DIAGNOSIS — E11.621 TYPE 2 DIABETES MELLITUS WITH FOOT ULCER: ICD-10-CM

## 2019-01-02 DIAGNOSIS — G30.1 ALZHEIMER'S DISEASE WITH LATE ONSET: ICD-10-CM

## 2019-01-02 DIAGNOSIS — H42 TYPE 2 DIABETES MELLITUS WITH HYPERGLYCEMIA: ICD-10-CM

## 2019-01-02 DIAGNOSIS — G30.9 ALZHEIMER'S DISEASE, UNSPECIFIED: ICD-10-CM

## 2019-01-02 DIAGNOSIS — I70.0 ATHEROSCLEROSIS OF AORTA: ICD-10-CM

## 2019-01-02 DIAGNOSIS — L89.602 TYPE 2 DIABETES MELLITUS WITH FOOT ULCER: ICD-10-CM

## 2019-01-02 DIAGNOSIS — E11.65 TYPE 2 DIABETES MELLITUS WITH HYPERGLYCEMIA: ICD-10-CM

## 2019-01-02 DIAGNOSIS — Z74.01 BED CONFINEMENT STATUS: ICD-10-CM

## 2019-01-02 DIAGNOSIS — M25.562 PAIN IN LEFT KNEE: ICD-10-CM

## 2019-01-02 DIAGNOSIS — R13.10 DYSPHAGIA, UNSPECIFIED: ICD-10-CM

## 2019-01-02 DIAGNOSIS — F02.80 ALZHEIMER'S DISEASE WITH LATE ONSET: ICD-10-CM

## 2019-01-02 PROCEDURE — 99497 ADVNCD CARE PLAN 30 MIN: CPT

## 2019-01-02 PROCEDURE — 99345 HOME/RES VST NEW HIGH MDM 75: CPT | Mod: 25

## 2019-01-02 PROCEDURE — G0506: CPT

## 2019-01-02 RX ORDER — DORZOLAMIDE HYDROCHLORIDE AND TIMOLOL MALEATE 20; 5 MG/ML; MG/ML
22.3-6.8 SOLUTION/ DROPS OPHTHALMIC TWICE DAILY
Qty: 1 | Refills: 0 | Status: ACTIVE | COMMUNITY
Start: 2019-01-02

## 2019-01-02 RX ORDER — INSULIN DETEMIR 100 [IU]/ML
100 INJECTION, SOLUTION SUBCUTANEOUS AT BEDTIME
Qty: 1 | Refills: 0 | Status: ACTIVE | COMMUNITY
Start: 2019-01-02

## 2019-01-16 ENCOUNTER — MEDICATION RENEWAL (OUTPATIENT)
Age: 84
End: 2019-01-16

## 2019-01-16 RX ORDER — AMLODIPINE BESYLATE 10 MG/1
10 TABLET ORAL DAILY
Qty: 90 | Refills: 3 | Status: ACTIVE | COMMUNITY
Start: 2019-01-02 | End: 1900-01-01

## 2019-01-18 ENCOUNTER — MEDICATION RENEWAL (OUTPATIENT)
Age: 84
End: 2019-01-18

## 2019-01-18 RX ORDER — RANITIDINE 150 MG/1
150 TABLET ORAL DAILY
Qty: 90 | Refills: 2 | Status: ACTIVE | COMMUNITY
Start: 2019-01-02 | End: 1900-01-01

## 2019-01-25 ENCOUNTER — TRANSCRIPTION ENCOUNTER (OUTPATIENT)
Age: 84
End: 2019-01-25

## 2019-01-25 DIAGNOSIS — L98.429 NON-PRESSURE CHRONIC ULCER OF BACK WITH UNSPECIFIED SEVERITY: ICD-10-CM

## 2019-01-30 ENCOUNTER — OTHER (OUTPATIENT)
Age: 84
End: 2019-01-30

## 2019-01-31 ENCOUNTER — APPOINTMENT (OUTPATIENT)
Dept: HOME HEALTH SERVICES | Facility: HOME HEALTH | Age: 84
End: 2019-01-31

## 2019-02-06 ENCOUNTER — APPOINTMENT (OUTPATIENT)
Dept: HOME HEALTH SERVICES | Facility: HOME HEALTH | Age: 84
End: 2019-02-06

## 2019-02-06 ENCOUNTER — TRANSCRIPTION ENCOUNTER (OUTPATIENT)
Age: 84
End: 2019-02-06

## 2019-02-07 ENCOUNTER — APPOINTMENT (OUTPATIENT)
Dept: HOME HEALTH SERVICES | Facility: HOME HEALTH | Age: 84
End: 2019-02-07
Payer: MEDICARE

## 2019-02-07 VITALS
SYSTOLIC BLOOD PRESSURE: 110 MMHG | HEART RATE: 69 BPM | OXYGEN SATURATION: 95 % | DIASTOLIC BLOOD PRESSURE: 60 MMHG | RESPIRATION RATE: 14 BRPM | TEMPERATURE: 97.8 F

## 2019-02-07 DIAGNOSIS — L98.429 NON-PRESSURE CHRONIC ULCER OF BACK WITH UNSPECIFIED SEVERITY: ICD-10-CM

## 2019-02-07 DIAGNOSIS — R53.2 FUNCTIONAL QUADRIPLEGIA: ICD-10-CM

## 2019-02-07 DIAGNOSIS — K59.00 CONSTIPATION, UNSPECIFIED: ICD-10-CM

## 2019-02-07 PROCEDURE — 99350 HOME/RES VST EST HIGH MDM 60: CPT

## 2019-02-07 RX ORDER — COLLAGENASE SANTYL 250 [ARB'U]/G
250 OINTMENT TOPICAL DAILY
Qty: 1 | Refills: 5 | Status: ACTIVE | COMMUNITY
Start: 2019-01-25 | End: 1900-01-01

## 2019-02-07 RX ORDER — BLOOD SUGAR DIAGNOSTIC
STRIP MISCELLANEOUS
Qty: 100 | Refills: 0 | Status: ACTIVE | COMMUNITY
Start: 2019-01-21

## 2019-02-08 PROBLEM — L98.429 UNSTAGEABLE SKIN ULCER OF SACRAL REGION: Status: ACTIVE | Noted: 2019-02-07

## 2019-02-08 PROBLEM — R53.2 FUNCTIONAL QUADRIPLEGIA: Status: ACTIVE | Noted: 2019-01-02

## 2019-02-08 PROBLEM — K59.00 CONSTIPATION: Status: ACTIVE | Noted: 2019-02-07

## 2019-02-09 ENCOUNTER — TRANSCRIPTION ENCOUNTER (OUTPATIENT)
Age: 84
End: 2019-02-09

## 2019-02-11 ENCOUNTER — OTHER (OUTPATIENT)
Age: 84
End: 2019-02-11

## 2019-02-11 ENCOUNTER — TRANSCRIPTION ENCOUNTER (OUTPATIENT)
Age: 84
End: 2019-02-11

## 2019-02-11 RX ORDER — SULFAMETHOXAZOLE AND TRIMETHOPRIM 800; 160 MG/1; MG/1
800-160 TABLET ORAL TWICE DAILY
Qty: 14 | Refills: 0 | Status: ACTIVE | COMMUNITY
Start: 2019-02-11 | End: 1900-01-01

## 2019-02-14 ENCOUNTER — APPOINTMENT (OUTPATIENT)
Dept: WOUND CARE | Facility: CLINIC | Age: 84
End: 2019-02-14

## 2019-02-21 ENCOUNTER — OTHER (OUTPATIENT)
Age: 84
End: 2019-02-21

## 2019-02-21 RX ORDER — SODIUM HYPOCHLORITE 1.25 MG/ML
0.12 SOLUTION TOPICAL
Qty: 1 | Refills: 1 | Status: ACTIVE | COMMUNITY
Start: 2019-02-21 | End: 1900-01-01

## 2019-02-24 ENCOUNTER — TRANSCRIPTION ENCOUNTER (OUTPATIENT)
Age: 84
End: 2019-02-24

## 2019-02-26 ENCOUNTER — MOBILE ON CALL (OUTPATIENT)
Age: 84
End: 2019-02-26

## 2019-02-26 NOTE — HISTORY OF PRESENT ILLNESS
[Family Member] : family member [Formal Caregiver] : formal caregiver [FreeTextEntry1] : Alzheimer's dementia, functional quadriplegia [FreeTextEntry2] : This is an 83 year old male with history of Alzheimer's dementia without behavioral disturbance, functional quadriplegia, hypertension, type 2 diabetes with use of insulin, glaucoma, stage 2 pressure ulcer of left ankle who is being seen for acute visit.  ROS reviewed by patient's daughter. \par \par \par Patient is being seen today for acute visit due for worsening wound on his right buttocks and hip. Patient has been using Santyl to wound, but daughter states the wound has increased in size with foul smelling odor. Patient also has no BM x four days.

## 2019-02-26 NOTE — REVIEW OF SYSTEMS
[Constipation] : constipation [Incontinence] : incontinence [Joint Stiffness] : joint stiffness [Muscle Weakness] : muscle weakness [Negative] : Heme/Lymph [Abdominal Pain] : no abdominal pain [Vomiting] : no vomiting [Dysuria] : no dysuria [Hesitancy] : no hesitancy [Nocturia] : no nocturia [Hematuria] : no hematuria [Joint Pain] : no joint pain [Muscle Pain] : no muscle pain [Back Pain] : no back pain [Joint Swelling] : no joint swelling [FreeTextEntry9] : contracture to lower extremities  [de-identified] : pressure ulcer on sacrum

## 2019-02-26 NOTE — ADDENDUM
[FreeTextEntry1] : Patient has a right buttock wound that is 4cm x 4.5 cm x 4 cm, right trochanter wound that is 2.5 cm x 3 cm x 0.1 cm, both stage 2/3. \par \par Mr. Don equires positioning of the body to alleviate pain and pressure in ways not feasible in an ordinary bed. He requires frequent changes in body position due to multiple > stage 2 pressure ulcers. He also requires the head of the bed to be elevated more than 30 degrees due to GERD and dysphagia and to avoid problems with aspiration. \par \par He has 2 stage 2/3 pressure ulcers with measurements of  4cm x 4.5 cm x 4 cm and 2.5 cm x 3 cm x 0.1 cm. He has been on regular assessment by a nurse and MD along with appropriate turning and positioning and management of moisture and incontinence for at least the past month which has included the use of a group 1 support surface which is less than 3.5 inches AND the wounds have worsened. Will order hospital bed with low airloss mattress\par \par

## 2019-02-26 NOTE — PHYSICAL EXAM
[No Acute Distress] : no acute distress [Well Nourished] : well nourished [Well Developed] : well developed [Normal Sclera/Conjunctiva] : normal sclera/conjunctiva [Normal Outer Ear/Nose] : the ears and nose were normal in appearance [Normal Oropharynx] : the oropharynx was normal [Normal TMs] : both tympanic membranes were normal [No JVD] : no jugular venous distention [Supple] : the neck was supple [No LAD] : no lymphadenopathy [No Respiratory Distress] : no respiratory distress [Clear to Auscultation] : lungs were clear to auscultation bilaterally [No Accessory Muscle Use] : no accessory muscle use [Normal Rate] : heart rate was normal  [Regular Rhythm] : with a regular rhythm [Normal S1, S2] : normal S1 and S2 [No Murmurs] : no murmurs heard [No Carotid Bruit] : No carotid bruit [Pedal Pulses Present] : the pedal pulses are present [No Edema] : there was no peripheral edema [Normal Bowel Sounds] : normal bowel sounds [Soft] : abdomen soft [No Masses] : no abdominal mass palpated [Normal Supraclavicular Nodes] : no supraclavicular lymphadenopathy [Normal Post Cervical Nodes] : no posterior cervical lymphadenopathy [Normal Anterior Cervical Nodes] : no anterior cervical lymphadenopathy [No CVA Tenderness] : no ~M costovertebral angle tenderness [No Spinal Tenderness] : no spinal tenderness [No Joint Swelling] : no joint swelling seen [No Clubbing, Cyanosis] : no clubbing  or cyanosis of the fingernails [Normal Nasal Mucosa] : the nasal mucosa was normal [Thyroid Normal, No Nodules] : the thyroid was normal and there were no nodules present [Acne] : no acne [de-identified] : non-verbal  [de-identified] : bedbound with contractures [de-identified] : un stageable to right buttocks  [de-identified] : unable to assess due to advanced dementia  [de-identified] : unable to assess due to advanced dementia

## 2019-02-28 PROCEDURE — G0180 MD CERTIFICATION HHA PATIENT: CPT

## 2019-03-01 ENCOUNTER — APPOINTMENT (OUTPATIENT)
Dept: HOME HEALTH SERVICES | Facility: HOME HEALTH | Age: 84
End: 2019-03-01

## 2019-03-01 ENCOUNTER — INPATIENT (INPATIENT)
Facility: HOSPITAL | Age: 84
LOS: 16 days | End: 2019-03-18
Attending: INTERNAL MEDICINE | Admitting: INTERNAL MEDICINE
Payer: MEDICARE

## 2019-03-01 ENCOUNTER — TRANSCRIPTION ENCOUNTER (OUTPATIENT)
Age: 84
End: 2019-03-01

## 2019-03-01 VITALS — RESPIRATION RATE: 40 BRPM | WEIGHT: 104.94 LBS | HEART RATE: 110 BPM | OXYGEN SATURATION: 93 % | HEIGHT: 65 IN

## 2019-03-01 DIAGNOSIS — Z96.7 PRESENCE OF OTHER BONE AND TENDON IMPLANTS: Chronic | ICD-10-CM

## 2019-03-01 DIAGNOSIS — Z90.49 ACQUIRED ABSENCE OF OTHER SPECIFIED PARTS OF DIGESTIVE TRACT: Chronic | ICD-10-CM

## 2019-03-01 DIAGNOSIS — Z87.442 PERSONAL HISTORY OF URINARY CALCULI: Chronic | ICD-10-CM

## 2019-03-01 LAB
ACETONE SERPL-MCNC: ABNORMAL
ALBUMIN SERPL ELPH-MCNC: 2.2 G/DL — LOW (ref 3.3–5)
ALP SERPL-CCNC: 115 U/L — SIGNIFICANT CHANGE UP (ref 40–120)
ALT FLD-CCNC: 21 U/L — SIGNIFICANT CHANGE UP (ref 12–78)
ANION GAP SERPL CALC-SCNC: 11 MMOL/L — SIGNIFICANT CHANGE UP (ref 5–17)
ANION GAP SERPL CALC-SCNC: 12 MMOL/L — SIGNIFICANT CHANGE UP (ref 5–17)
ANION GAP SERPL CALC-SCNC: 24 MMOL/L — HIGH (ref 5–17)
APPEARANCE UR: ABNORMAL
APTT BLD: 26.1 SEC — LOW (ref 27.5–36.3)
AST SERPL-CCNC: 22 U/L — SIGNIFICANT CHANGE UP (ref 15–37)
BACTERIA # UR AUTO: ABNORMAL
BASE EXCESS BLDA CALC-SCNC: -13.8 MMOL/L — LOW (ref -2–2)
BASE EXCESS BLDA CALC-SCNC: -14.4 MMOL/L — LOW (ref -2–2)
BASE EXCESS BLDA CALC-SCNC: -8.3 MMOL/L — LOW (ref -2–2)
BASOPHILS # BLD AUTO: 0 K/UL — SIGNIFICANT CHANGE UP (ref 0–0.2)
BASOPHILS NFR BLD AUTO: 0 % — SIGNIFICANT CHANGE UP (ref 0–2)
BILIRUB SERPL-MCNC: 0.6 MG/DL — SIGNIFICANT CHANGE UP (ref 0.2–1.2)
BILIRUB UR-MCNC: NEGATIVE — SIGNIFICANT CHANGE UP
BLOOD GAS COMMENTS: SIGNIFICANT CHANGE UP
BLOOD GAS SOURCE: SIGNIFICANT CHANGE UP
BUN SERPL-MCNC: 64 MG/DL — HIGH (ref 7–23)
BUN SERPL-MCNC: 67 MG/DL — HIGH (ref 7–23)
BUN SERPL-MCNC: 85 MG/DL — HIGH (ref 7–23)
CALCIUM SERPL-MCNC: 7.7 MG/DL — LOW (ref 8.5–10.1)
CALCIUM SERPL-MCNC: 7.8 MG/DL — LOW (ref 8.5–10.1)
CALCIUM SERPL-MCNC: 9.2 MG/DL — SIGNIFICANT CHANGE UP (ref 8.5–10.1)
CHLORIDE SERPL-SCNC: 113 MMOL/L — HIGH (ref 96–108)
CHLORIDE SERPL-SCNC: 125 MMOL/L — HIGH (ref 96–108)
CHLORIDE SERPL-SCNC: 128 MMOL/L — HIGH (ref 96–108)
CK MB BLD-MCNC: 1.1 % — SIGNIFICANT CHANGE UP (ref 0–3.5)
CK MB BLD-MCNC: 1.6 % — SIGNIFICANT CHANGE UP (ref 0–3.5)
CK MB CFR SERPL CALC: 3.1 NG/ML — SIGNIFICANT CHANGE UP (ref 0.5–3.6)
CK MB CFR SERPL CALC: 3.7 NG/ML — HIGH (ref 0.5–3.6)
CK MB CFR SERPL CALC: 4.1 NG/ML — HIGH (ref 0.5–3.6)
CK SERPL-CCNC: 197 U/L — SIGNIFICANT CHANGE UP (ref 26–308)
CK SERPL-CCNC: 369 U/L — HIGH (ref 26–308)
CK SERPL-CCNC: 448 U/L — HIGH (ref 26–308)
CO2 SERPL-SCNC: 15 MMOL/L — LOW (ref 22–31)
CO2 SERPL-SCNC: 16 MMOL/L — LOW (ref 22–31)
CO2 SERPL-SCNC: 17 MMOL/L — LOW (ref 22–31)
COLOR SPEC: YELLOW — SIGNIFICANT CHANGE UP
CREAT SERPL-MCNC: 1.46 MG/DL — HIGH (ref 0.5–1.3)
CREAT SERPL-MCNC: 1.66 MG/DL — HIGH (ref 0.5–1.3)
CREAT SERPL-MCNC: 2.37 MG/DL — HIGH (ref 0.5–1.3)
DIFF PNL FLD: ABNORMAL
EOSINOPHIL # BLD AUTO: 0 K/UL — SIGNIFICANT CHANGE UP (ref 0–0.5)
EOSINOPHIL NFR BLD AUTO: 0 % — SIGNIFICANT CHANGE UP (ref 0–6)
EPI CELLS # UR: ABNORMAL
FLU A RESULT: SIGNIFICANT CHANGE UP
FLU A RESULT: SIGNIFICANT CHANGE UP
FLUAV AG NPH QL: SIGNIFICANT CHANGE UP
FLUBV AG NPH QL: SIGNIFICANT CHANGE UP
GLUCOSE BLDC GLUCOMTR-MCNC: 198 MG/DL — HIGH (ref 70–99)
GLUCOSE BLDC GLUCOMTR-MCNC: 208 MG/DL — HIGH (ref 70–99)
GLUCOSE BLDC GLUCOMTR-MCNC: 237 MG/DL — HIGH (ref 70–99)
GLUCOSE BLDC GLUCOMTR-MCNC: 263 MG/DL — HIGH (ref 70–99)
GLUCOSE BLDC GLUCOMTR-MCNC: 297 MG/DL — HIGH (ref 70–99)
GLUCOSE BLDC GLUCOMTR-MCNC: 474 MG/DL — CRITICAL HIGH (ref 70–99)
GLUCOSE BLDC GLUCOMTR-MCNC: 488 MG/DL — CRITICAL HIGH (ref 70–99)
GLUCOSE BLDC GLUCOMTR-MCNC: 555 MG/DL — CRITICAL HIGH (ref 70–99)
GLUCOSE BLDC GLUCOMTR-MCNC: >600 MG/DL — CRITICAL HIGH (ref 70–99)
GLUCOSE SERPL-MCNC: 278 MG/DL — HIGH (ref 70–99)
GLUCOSE SERPL-MCNC: 568 MG/DL — CRITICAL HIGH (ref 70–99)
GLUCOSE SERPL-MCNC: 803 MG/DL — CRITICAL HIGH (ref 70–99)
GLUCOSE UR QL: 1000 MG/DL
HCO3 BLDA-SCNC: 13 MMOL/L — LOW (ref 21–29)
HCO3 BLDA-SCNC: 15 MMOL/L — LOW (ref 21–29)
HCO3 BLDA-SCNC: 18 MMOL/L — LOW (ref 21–29)
HCT VFR BLD CALC: 47.7 % — SIGNIFICANT CHANGE UP (ref 39–50)
HGB BLD-MCNC: 13.8 G/DL — SIGNIFICANT CHANGE UP (ref 13–17)
HOROWITZ INDEX BLDA+IHG-RTO: 80 — SIGNIFICANT CHANGE UP
INR BLD: 1.33 RATIO — HIGH (ref 0.88–1.16)
KETONES UR-MCNC: ABNORMAL
LACTATE SERPL-SCNC: 10.2 MMOL/L — CRITICAL HIGH (ref 0.7–2)
LACTATE SERPL-SCNC: 5.6 MMOL/L — CRITICAL HIGH (ref 0.7–2)
LACTATE SERPL-SCNC: 8.2 MMOL/L — CRITICAL HIGH (ref 0.7–2)
LEUKOCYTE ESTERASE UR-ACNC: NEGATIVE — SIGNIFICANT CHANGE UP
LYMPHOCYTES # BLD AUTO: 0.73 K/UL — LOW (ref 1–3.3)
LYMPHOCYTES # BLD AUTO: 4 % — LOW (ref 13–44)
MAGNESIUM SERPL-MCNC: 2.4 MG/DL — SIGNIFICANT CHANGE UP (ref 1.6–2.6)
MANUAL SMEAR VERIFICATION: SIGNIFICANT CHANGE UP
MCHC RBC-ENTMCNC: 28.3 PG — SIGNIFICANT CHANGE UP (ref 27–34)
MCHC RBC-ENTMCNC: 28.9 GM/DL — LOW (ref 32–36)
MCV RBC AUTO: 97.7 FL — SIGNIFICANT CHANGE UP (ref 80–100)
MONOCYTES # BLD AUTO: 0.55 K/UL — SIGNIFICANT CHANGE UP (ref 0–0.9)
MONOCYTES NFR BLD AUTO: 3 % — SIGNIFICANT CHANGE UP (ref 2–14)
NEUTROPHILS # BLD AUTO: 16.85 K/UL — HIGH (ref 1.8–7.4)
NEUTROPHILS NFR BLD AUTO: 86 % — HIGH (ref 43–77)
NEUTS BAND # BLD: 6 % — SIGNIFICANT CHANGE UP (ref 0–8)
NITRITE UR-MCNC: NEGATIVE — SIGNIFICANT CHANGE UP
NRBC # BLD: 0 /100 — SIGNIFICANT CHANGE UP (ref 0–0)
NRBC # BLD: SIGNIFICANT CHANGE UP /100 WBCS (ref 0–0)
PCO2 BLDA: 38 MMHG — SIGNIFICANT CHANGE UP (ref 32–46)
PCO2 BLDA: 39 MMHG — SIGNIFICANT CHANGE UP (ref 32–46)
PCO2 BLDA: 48 MMHG — HIGH (ref 32–46)
PH BLD: 7.12 — CRITICAL LOW (ref 7.35–7.45)
PH BLD: 7.16 — CRITICAL LOW (ref 7.35–7.45)
PH BLD: 7.28 — LOW (ref 7.35–7.45)
PH UR: 5 — SIGNIFICANT CHANGE UP (ref 5–8)
PHOSPHATE SERPL-MCNC: 3.5 MG/DL — SIGNIFICANT CHANGE UP (ref 2.5–4.5)
PLAT MORPH BLD: NORMAL — SIGNIFICANT CHANGE UP
PLATELET # BLD AUTO: 422 K/UL — HIGH (ref 150–400)
PO2 BLDA: 71 MMHG — LOW (ref 74–108)
PO2 BLDA: 76 MMHG — SIGNIFICANT CHANGE UP (ref 74–108)
PO2 BLDA: 91 MMHG — SIGNIFICANT CHANGE UP (ref 74–108)
POTASSIUM SERPL-MCNC: 3.3 MMOL/L — LOW (ref 3.5–5.3)
POTASSIUM SERPL-MCNC: 3.7 MMOL/L — SIGNIFICANT CHANGE UP (ref 3.5–5.3)
POTASSIUM SERPL-MCNC: 4.8 MMOL/L — SIGNIFICANT CHANGE UP (ref 3.5–5.3)
POTASSIUM SERPL-SCNC: 3.3 MMOL/L — LOW (ref 3.5–5.3)
POTASSIUM SERPL-SCNC: 3.7 MMOL/L — SIGNIFICANT CHANGE UP (ref 3.5–5.3)
POTASSIUM SERPL-SCNC: 4.8 MMOL/L — SIGNIFICANT CHANGE UP (ref 3.5–5.3)
PROT SERPL-MCNC: 7.1 GM/DL — SIGNIFICANT CHANGE UP (ref 6–8.3)
PROT UR-MCNC: 30 MG/DL
PROTHROM AB SERPL-ACNC: 15 SEC — HIGH (ref 10–12.9)
RBC # BLD: 4.88 M/UL — SIGNIFICANT CHANGE UP (ref 4.2–5.8)
RBC # FLD: 13.7 % — SIGNIFICANT CHANGE UP (ref 10.3–14.5)
RBC BLD AUTO: NORMAL — SIGNIFICANT CHANGE UP
RBC CASTS # UR COMP ASSIST: ABNORMAL /HPF (ref 0–4)
RSV RESULT: SIGNIFICANT CHANGE UP
RSV RNA RESP QL NAA+PROBE: SIGNIFICANT CHANGE UP
SAO2 % BLDA: 88 % — LOW (ref 92–96)
SAO2 % BLDA: 92 % — SIGNIFICANT CHANGE UP (ref 92–96)
SAO2 % BLDA: 93 % — SIGNIFICANT CHANGE UP (ref 92–96)
SODIUM SERPL-SCNC: 152 MMOL/L — HIGH (ref 135–145)
SODIUM SERPL-SCNC: 153 MMOL/L — HIGH (ref 135–145)
SODIUM SERPL-SCNC: 156 MMOL/L — HIGH (ref 135–145)
SP GR SPEC: 1.02 — SIGNIFICANT CHANGE UP (ref 1.01–1.02)
TROPONIN I SERPL-MCNC: 0.06 NG/ML — HIGH (ref 0.01–0.04)
TROPONIN I SERPL-MCNC: 0.06 NG/ML — HIGH (ref 0.01–0.04)
TROPONIN I SERPL-MCNC: 0.08 NG/ML — HIGH (ref 0.01–0.04)
UROBILINOGEN FLD QL: NEGATIVE MG/DL — SIGNIFICANT CHANGE UP
VARIANT LYMPHS # BLD: 1 % — SIGNIFICANT CHANGE UP (ref 0–6)
WBC # BLD: 18.32 K/UL — HIGH (ref 3.8–10.5)
WBC # FLD AUTO: 18.32 K/UL — HIGH (ref 3.8–10.5)

## 2019-03-01 PROCEDURE — 71045 X-RAY EXAM CHEST 1 VIEW: CPT | Mod: 26,77

## 2019-03-01 PROCEDURE — 99291 CRITICAL CARE FIRST HOUR: CPT | Mod: 25

## 2019-03-01 PROCEDURE — 31500 INSERT EMERGENCY AIRWAY: CPT

## 2019-03-01 PROCEDURE — 71045 X-RAY EXAM CHEST 1 VIEW: CPT | Mod: 26

## 2019-03-01 PROCEDURE — 99291 CRITICAL CARE FIRST HOUR: CPT

## 2019-03-01 RX ORDER — SODIUM CHLORIDE 9 MG/ML
2000 INJECTION INTRAMUSCULAR; INTRAVENOUS; SUBCUTANEOUS ONCE
Qty: 0 | Refills: 0 | Status: COMPLETED | OUTPATIENT
Start: 2019-03-01 | End: 2019-03-01

## 2019-03-01 RX ORDER — HEPARIN SODIUM 5000 [USP'U]/ML
5000 INJECTION INTRAVENOUS; SUBCUTANEOUS EVERY 12 HOURS
Qty: 0 | Refills: 0 | Status: DISCONTINUED | OUTPATIENT
Start: 2019-03-01 | End: 2019-03-18

## 2019-03-01 RX ORDER — SODIUM CHLORIDE 9 MG/ML
1000 INJECTION INTRAMUSCULAR; INTRAVENOUS; SUBCUTANEOUS ONCE
Qty: 0 | Refills: 0 | Status: COMPLETED | OUTPATIENT
Start: 2019-03-01 | End: 2019-03-01

## 2019-03-01 RX ORDER — SODIUM CHLORIDE 9 MG/ML
1000 INJECTION, SOLUTION INTRAVENOUS
Qty: 0 | Refills: 0 | Status: DISCONTINUED | OUTPATIENT
Start: 2019-03-01 | End: 2019-03-02

## 2019-03-01 RX ORDER — POTASSIUM CHLORIDE 20 MEQ
40 PACKET (EA) ORAL ONCE
Qty: 0 | Refills: 0 | Status: COMPLETED | OUTPATIENT
Start: 2019-03-01 | End: 2019-03-01

## 2019-03-01 RX ORDER — PANTOPRAZOLE SODIUM 20 MG/1
40 TABLET, DELAYED RELEASE ORAL DAILY
Qty: 0 | Refills: 0 | Status: DISCONTINUED | OUTPATIENT
Start: 2019-03-01 | End: 2019-03-03

## 2019-03-01 RX ORDER — VANCOMYCIN HCL 1 G
1000 VIAL (EA) INTRAVENOUS ONCE
Qty: 0 | Refills: 0 | Status: COMPLETED | OUTPATIENT
Start: 2019-03-01 | End: 2019-03-01

## 2019-03-01 RX ORDER — ASPIRIN/CALCIUM CARB/MAGNESIUM 324 MG
300 TABLET ORAL ONCE
Qty: 0 | Refills: 0 | Status: COMPLETED | OUTPATIENT
Start: 2019-03-01 | End: 2019-03-01

## 2019-03-01 RX ORDER — INSULIN HUMAN 100 [IU]/ML
5 INJECTION, SOLUTION SUBCUTANEOUS
Qty: 100 | Refills: 0 | Status: DISCONTINUED | OUTPATIENT
Start: 2019-03-01 | End: 2019-03-01

## 2019-03-01 RX ORDER — INSULIN HUMAN 100 [IU]/ML
10 INJECTION, SOLUTION SUBCUTANEOUS
Qty: 100 | Refills: 0 | Status: DISCONTINUED | OUTPATIENT
Start: 2019-03-01 | End: 2019-03-03

## 2019-03-01 RX ORDER — ACETAMINOPHEN 500 MG
650 TABLET ORAL EVERY 6 HOURS
Qty: 0 | Refills: 0 | Status: DISCONTINUED | OUTPATIENT
Start: 2019-03-01 | End: 2019-03-04

## 2019-03-01 RX ORDER — FENTANYL CITRATE 50 UG/ML
50 INJECTION INTRAVENOUS ONCE
Qty: 0 | Refills: 0 | Status: DISCONTINUED | OUTPATIENT
Start: 2019-03-01 | End: 2019-03-01

## 2019-03-01 RX ORDER — POTASSIUM CHLORIDE 20 MEQ
10 PACKET (EA) ORAL
Qty: 0 | Refills: 0 | Status: COMPLETED | OUTPATIENT
Start: 2019-03-01 | End: 2019-03-01

## 2019-03-01 RX ORDER — INSULIN HUMAN 100 [IU]/ML
10 INJECTION, SOLUTION SUBCUTANEOUS
Qty: 100 | Refills: 0 | Status: DISCONTINUED | OUTPATIENT
Start: 2019-03-01 | End: 2019-03-01

## 2019-03-01 RX ORDER — CHLORHEXIDINE GLUCONATE 213 G/1000ML
15 SOLUTION TOPICAL
Qty: 0 | Refills: 0 | Status: DISCONTINUED | OUTPATIENT
Start: 2019-03-01 | End: 2019-03-08

## 2019-03-01 RX ORDER — PIPERACILLIN AND TAZOBACTAM 4; .5 G/20ML; G/20ML
3.38 INJECTION, POWDER, LYOPHILIZED, FOR SOLUTION INTRAVENOUS ONCE
Qty: 0 | Refills: 0 | Status: COMPLETED | OUTPATIENT
Start: 2019-03-01 | End: 2019-03-01

## 2019-03-01 RX ORDER — SODIUM CHLORIDE 9 MG/ML
1000 INJECTION, SOLUTION INTRAVENOUS
Qty: 0 | Refills: 0 | Status: DISCONTINUED | OUTPATIENT
Start: 2019-03-01 | End: 2019-03-01

## 2019-03-01 RX ORDER — PROPOFOL 10 MG/ML
2 INJECTION, EMULSION INTRAVENOUS
Qty: 1000 | Refills: 0 | Status: DISCONTINUED | OUTPATIENT
Start: 2019-03-01 | End: 2019-03-01

## 2019-03-01 RX ORDER — NOREPINEPHRINE BITARTRATE/D5W 8 MG/250ML
0.05 PLASTIC BAG, INJECTION (ML) INTRAVENOUS
Qty: 8 | Refills: 0 | Status: DISCONTINUED | OUTPATIENT
Start: 2019-03-01 | End: 2019-03-05

## 2019-03-01 RX ORDER — CHLORHEXIDINE GLUCONATE 213 G/1000ML
1 SOLUTION TOPICAL
Qty: 0 | Refills: 0 | Status: DISCONTINUED | OUTPATIENT
Start: 2019-03-01 | End: 2019-03-18

## 2019-03-01 RX ORDER — INSULIN HUMAN 100 [IU]/ML
10 INJECTION, SOLUTION SUBCUTANEOUS ONCE
Qty: 0 | Refills: 0 | Status: COMPLETED | OUTPATIENT
Start: 2019-03-01 | End: 2019-03-01

## 2019-03-01 RX ORDER — PIPERACILLIN AND TAZOBACTAM 4; .5 G/20ML; G/20ML
3.38 INJECTION, POWDER, LYOPHILIZED, FOR SOLUTION INTRAVENOUS EVERY 12 HOURS
Qty: 0 | Refills: 0 | Status: DISCONTINUED | OUTPATIENT
Start: 2019-03-01 | End: 2019-03-02

## 2019-03-01 RX ADMIN — CHLORHEXIDINE GLUCONATE 15 MILLILITER(S): 213 SOLUTION TOPICAL at 17:47

## 2019-03-01 RX ADMIN — SODIUM CHLORIDE 150 MILLILITER(S): 9 INJECTION, SOLUTION INTRAVENOUS at 15:55

## 2019-03-01 RX ADMIN — Medication 300 MILLIGRAM(S): at 13:16

## 2019-03-01 RX ADMIN — INSULIN HUMAN 10 UNIT(S)/HR: 100 INJECTION, SOLUTION SUBCUTANEOUS at 13:28

## 2019-03-01 RX ADMIN — Medication 250 MILLIGRAM(S): at 12:46

## 2019-03-01 RX ADMIN — INSULIN HUMAN 10 UNIT(S)/HR: 100 INJECTION, SOLUTION SUBCUTANEOUS at 22:06

## 2019-03-01 RX ADMIN — SODIUM CHLORIDE 150 MILLILITER(S): 9 INJECTION, SOLUTION INTRAVENOUS at 22:31

## 2019-03-01 RX ADMIN — INSULIN HUMAN 10 UNIT(S)/HR: 100 INJECTION, SOLUTION SUBCUTANEOUS at 19:44

## 2019-03-01 RX ADMIN — Medication 100 MILLIEQUIVALENT(S): at 21:38

## 2019-03-01 RX ADMIN — FENTANYL CITRATE 50 MICROGRAM(S): 50 INJECTION INTRAVENOUS at 11:59

## 2019-03-01 RX ADMIN — INSULIN HUMAN 10 UNIT(S)/HR: 100 INJECTION, SOLUTION SUBCUTANEOUS at 23:45

## 2019-03-01 RX ADMIN — CHLORHEXIDINE GLUCONATE 1 APPLICATION(S): 213 SOLUTION TOPICAL at 15:57

## 2019-03-01 RX ADMIN — Medication 4.73 MICROGRAM(S)/KG/MIN: at 20:00

## 2019-03-01 RX ADMIN — INSULIN HUMAN 10 UNIT(S): 100 INJECTION, SOLUTION SUBCUTANEOUS at 13:16

## 2019-03-01 RX ADMIN — Medication 100 MILLIEQUIVALENT(S): at 22:07

## 2019-03-01 RX ADMIN — HEPARIN SODIUM 5000 UNIT(S): 5000 INJECTION INTRAVENOUS; SUBCUTANEOUS at 17:47

## 2019-03-01 RX ADMIN — PIPERACILLIN AND TAZOBACTAM 200 GRAM(S): 4; .5 INJECTION, POWDER, LYOPHILIZED, FOR SOLUTION INTRAVENOUS at 12:02

## 2019-03-01 RX ADMIN — PIPERACILLIN AND TAZOBACTAM 25 GRAM(S): 4; .5 INJECTION, POWDER, LYOPHILIZED, FOR SOLUTION INTRAVENOUS at 17:47

## 2019-03-01 RX ADMIN — PANTOPRAZOLE SODIUM 40 MILLIGRAM(S): 20 TABLET, DELAYED RELEASE ORAL at 13:37

## 2019-03-01 RX ADMIN — SODIUM CHLORIDE 2000 MILLILITER(S): 9 INJECTION INTRAMUSCULAR; INTRAVENOUS; SUBCUTANEOUS at 19:37

## 2019-03-01 RX ADMIN — Medication 40 MILLIEQUIVALENT(S): at 22:09

## 2019-03-01 RX ADMIN — Medication 100 MILLIEQUIVALENT(S): at 19:34

## 2019-03-01 RX ADMIN — SODIUM CHLORIDE 2000 MILLILITER(S): 9 INJECTION INTRAMUSCULAR; INTRAVENOUS; SUBCUTANEOUS at 11:45

## 2019-03-01 RX ADMIN — SODIUM CHLORIDE 1000 MILLILITER(S): 9 INJECTION INTRAMUSCULAR; INTRAVENOUS; SUBCUTANEOUS at 12:49

## 2019-03-01 RX ADMIN — SODIUM CHLORIDE 150 MILLILITER(S): 9 INJECTION, SOLUTION INTRAVENOUS at 19:35

## 2019-03-01 NOTE — H&P ADULT - NSHPPHYSICALEXAM_GEN_ALL_CORE
ICU Vital Signs Last 24 Hrs  T(C): --  T(F): --  HR: 110 (01 Mar 2019 11:39) (110 - 110)  BP: 105/65  BP(mean): --  ABP: --  ABP(mean): --  RR: 40 (01 Mar 2019 11:39) (40 - 40)  SpO2: 93% (01 Mar 2019 12:13) (93% - 93%)      Gen: Elderly, cachectic male lying in bed, intubated, contracted, NAD  HEENT: NC/AT sclerae anicteric, dry oral mucosa  Resp: Tachypneic, overbreathing vent, mechanical breath sounds b/l  CV: S1, S2  Abd: Soft, + BS  Ext: WWP, no edema  Skin: Unstagable sacral ulcer, heel ulcer  Neuro: Awake, does not follow commands, contracted

## 2019-03-01 NOTE — ED PROVIDER NOTE - OBJECTIVE STATEMENT
84 years old male by ems with daughter c/o fever sob since yesterday. Pt is in respiratory distress unable to speak.

## 2019-03-01 NOTE — ED ADULT NURSE NOTE - PMH
Dementia    Dementia    DM (diabetes mellitus)    DM (diabetes mellitus)    Glaucoma    HTN (hypertension)

## 2019-03-01 NOTE — H&P ADULT - HISTORY OF PRESENT ILLNESS
84 y/o M w/severe dementia, bedbound and contracted at baseline, DM, prior gastric/intestinal pneumatosis managed nonoperatively p/w fevers and dyspnea. Intubated for acute respiratory failure. Patient unable to provide history. Family unavailable.

## 2019-03-01 NOTE — ED ADULT NURSE REASSESSMENT NOTE - REASSESS COMMUNICATION
spoke with Dr edwards informed of RR 44, aware no sedation at this time, will continue to monitor closely will transport pt to ICU shortly report given
ED physician notified

## 2019-03-01 NOTE — ED PROVIDER NOTE - CARE PLAN
Principal Discharge DX:	Respiratory failure  Secondary Diagnosis:	Pneumonia  Secondary Diagnosis:	DKA (diabetic ketoacidoses)  Secondary Diagnosis:	Dehydration

## 2019-03-01 NOTE — ED ADULT NURSE NOTE - OBJECTIVE STATEMENT
pt BIBA with c/o tachypnea and respiratory distress, pt brought to critical bed stat  intubated 7.5 25@ the lip,  AC 14 fio2 100 peep 5

## 2019-03-01 NOTE — H&P ADULT - NSHPLABSRESULTS_GEN_ALL_CORE
Lab Results:  CBC  CBC Full  -  ( 01 Mar 2019 12:02 )  WBC Count : 18.32 K/uL  Hemoglobin : 13.8 g/dL  Hematocrit : 47.7 %  Platelet Count - Automated : 422 K/uL  Mean Cell Volume : 97.7 fl  Mean Cell Hemoglobin : 28.3 pg  Mean Cell Hemoglobin Concentration : 28.9 gm/dL  Auto Neutrophil # : x  Auto Lymphocyte # : x  Auto Monocyte # : x  Auto Eosinophil # : x  Auto Basophil # : x  Auto Neutrophil % : x  Auto Lymphocyte % : x  Auto Monocyte % : x  Auto Eosinophil % : x  Auto Basophil % : x    .		Differential:	[] Automated		[] Manual  Chemistry                        13.8   18.32 )-----------( 422      ( 01 Mar 2019 12:02 )             47.7     03-    152<H>  |  113<H>  |  85<H>  ----------------------------<  803<HH>  4.8   |  15<L>  |  2.37<H>    Ca    9.2      01 Mar 2019 12:02    TPro  7.1  /  Alb  2.2<L>  /  TBili  0.6  /  DBili  x   /  AST  22  /  ALT  21  /  AlkPhos  115  03-01    LIVER FUNCTIONS - ( 01 Mar 2019 12:02 )  Alb: 2.2 g/dL / Pro: 7.1 gm/dL / ALK PHOS: 115 U/L / ALT: 21 U/L / AST: 22 U/L / GGT: x           PT/INR - ( 01 Mar 2019 12:02 )   PT: 15.0 sec;   INR: 1.33 ratio         PTT - ( 01 Mar 2019 12:02 )  PTT:26.1 sec  Urinalysis Basic - ( 01 Mar 2019 12:54 )    Color: Yellow / Appearance: Slightly Turbid / S.020 / pH: x  Gluc: x / Ketone: Trace  / Bili: Negative / Urobili: Negative mg/dL   Blood: x / Protein: 30 mg/dL / Nitrite: Negative   Leuk Esterase: Negative / RBC: x / WBC x   Sq Epi: x / Non Sq Epi: x / Bacteria: x        ABG - ( 01 Mar 2019 12:07 )  pH, Arterial: x     pH, Blood: 7.16  /  pCO2: 38    /  pO2: 91    / HCO3: 13    / Base Excess: -14.4 /  SaO2: 93                  MICROBIOLOGY/CULTURES:      RADIOLOGY RESULTS:  CXR reviewed and interpreted by me.

## 2019-03-01 NOTE — ED ADULT NURSE NOTE - NSIMPLEMENTINTERV_GEN_ALL_ED
Implemented All Fall with Harm Risk Interventions:  [place] to call system. Call bell, personal items and telephone within reach. Instruct patient to call for assistance. Room bathroom lighting operational. Non-slip footwear when patient is off stretcher. Physically safe environment: no spills, clutter or unnecessary equipment. Stretcher in lowest position, wheels locked, appropriate side rails in place. Provide visual cue, wrist band, yellow gown, etc. Monitor gait and stability. Monitor for mental status changes and reorient to person, place, and time. Review medications for side effects contributing to fall risk. Reinforce activity limits and safety measures with patient and family. Provide visual clues: red socks.

## 2019-03-01 NOTE — H&P ADULT - PMH
Dementia    Dementia    DM (diabetes mellitus)    DM (diabetes mellitus)    Glaucoma    HTN (hypertension)    Pneumatosis of intestines

## 2019-03-01 NOTE — H&P ADULT - PSH
History of appendectomy    History of cholecystectomy    History of cholecystectomy    History of nephrolithiasis    Status post open reduction with internal fixation of fracture  right femur

## 2019-03-01 NOTE — H&P ADULT - ASSESSMENT
84 y/o M w/severe dementia, bedbound and contracted at baseline, DM, prior gastric/intestinal pneumatosis managed nonoperatively now presenting with acute respiratory failure requiring intubation, severe sepsis likely secondary to PNA, DKA, ANSLEY (prestonley prerenal +/- sepsis), hypernatremia, and lactic acidosis (likely combination of DKA and sepsis).    Neuro: Poor baseline  - Sedation as needed     Resp  - Daily SAT/SBT    CV: Elevated troponin, likely demand ischemia vs secondary to ANSLEY. EKG without evidence of ischemia/infarct  - Trend cardiac enzymes    ID: Severe sepsis, likely PNA  - Broad spectrum abx    Renal: ANSLEY  - Trend Cr, avoid nephrotoxins  - IV fluids    Endo: DKA  - IV fluids  - Insulin gtt  - Endo consult  - Monitor gap    FEN:  - NPO  - Replete lytes PRN    PPx:  - Heparin SC/PPI    Attending critical care time 55 minutes 84 y/o M w/severe dementia, bedbound and contracted at baseline, DM, prior gastric/intestinal pneumatosis managed nonoperatively now presenting with acute respiratory failure requiring intubation, severe sepsis likely secondary to PNA, DKA, ANSLEY (likley prerenal +/- sepsis), hypernatremia, and lactic acidosis (likely combination of DKA and sepsis).    Neuro: Poor baseline  - Sedation as needed     Resp  - Daily SAT/SBT    CV: Elevated troponin, likely demand ischemia vs secondary to ANSLEY. EKG without evidence of ischemia/infarct  - Trend cardiac enzymes    ID: Severe sepsis, likely PNA  - Broad spectrum abx  - Wound care    Renal: ANSLEY  - Trend Cr, avoid nephrotoxins  - IV fluids    Endo: DKA  - IV fluids  - Insulin gtt  - Endo consult  - Monitor gap    FEN:  - NPO  - Replete lytes PRN    PPx:  - Heparin SC/PPI    Attending critical care time 55 minutes

## 2019-03-01 NOTE — ED PROVIDER NOTE - THROAT FINDINGS
no redness/uvula midline/NO TONGUE ELEVATION/NO STRIDOR/dry oral mucosa + thick yellow mucus/NO DROOLING

## 2019-03-02 LAB
ANION GAP SERPL CALC-SCNC: 11 MMOL/L — SIGNIFICANT CHANGE UP (ref 5–17)
ANION GAP SERPL CALC-SCNC: 6 MMOL/L — SIGNIFICANT CHANGE UP (ref 5–17)
ANION GAP SERPL CALC-SCNC: 7 MMOL/L — SIGNIFICANT CHANGE UP (ref 5–17)
ANION GAP SERPL CALC-SCNC: 7 MMOL/L — SIGNIFICANT CHANGE UP (ref 5–17)
BASE EXCESS BLDA CALC-SCNC: -4.7 MMOL/L — LOW (ref -2–2)
BLOOD GAS COMMENTS: SIGNIFICANT CHANGE UP
BLOOD GAS COMMENTS: SIGNIFICANT CHANGE UP
BLOOD GAS SOURCE: SIGNIFICANT CHANGE UP
BUN SERPL-MCNC: 41 MG/DL — HIGH (ref 7–23)
BUN SERPL-MCNC: 50 MG/DL — HIGH (ref 7–23)
BUN SERPL-MCNC: 57 MG/DL — HIGH (ref 7–23)
BUN SERPL-MCNC: 59 MG/DL — HIGH (ref 7–23)
CALCIUM SERPL-MCNC: 7.6 MG/DL — LOW (ref 8.5–10.1)
CALCIUM SERPL-MCNC: 7.7 MG/DL — LOW (ref 8.5–10.1)
CALCIUM SERPL-MCNC: 7.8 MG/DL — LOW (ref 8.5–10.1)
CALCIUM SERPL-MCNC: 8 MG/DL — LOW (ref 8.5–10.1)
CHLORIDE SERPL-SCNC: 126 MMOL/L — HIGH (ref 96–108)
CHLORIDE SERPL-SCNC: 128 MMOL/L — HIGH (ref 96–108)
CHLORIDE SERPL-SCNC: 128 MMOL/L — HIGH (ref 96–108)
CHLORIDE SERPL-SCNC: 129 MMOL/L — HIGH (ref 96–108)
CO2 SERPL-SCNC: 18 MMOL/L — LOW (ref 22–31)
CO2 SERPL-SCNC: 18 MMOL/L — LOW (ref 22–31)
CO2 SERPL-SCNC: 19 MMOL/L — LOW (ref 22–31)
CO2 SERPL-SCNC: 19 MMOL/L — LOW (ref 22–31)
CREAT SERPL-MCNC: 0.91 MG/DL — SIGNIFICANT CHANGE UP (ref 0.5–1.3)
CREAT SERPL-MCNC: 1.19 MG/DL — SIGNIFICANT CHANGE UP (ref 0.5–1.3)
CREAT SERPL-MCNC: 1.27 MG/DL — SIGNIFICANT CHANGE UP (ref 0.5–1.3)
CREAT SERPL-MCNC: 1.33 MG/DL — HIGH (ref 0.5–1.3)
CULTURE RESULTS: NO GROWTH — SIGNIFICANT CHANGE UP
GLUCOSE BLDC GLUCOMTR-MCNC: 141 MG/DL — HIGH (ref 70–99)
GLUCOSE BLDC GLUCOMTR-MCNC: 141 MG/DL — HIGH (ref 70–99)
GLUCOSE BLDC GLUCOMTR-MCNC: 146 MG/DL — HIGH (ref 70–99)
GLUCOSE BLDC GLUCOMTR-MCNC: 148 MG/DL — HIGH (ref 70–99)
GLUCOSE BLDC GLUCOMTR-MCNC: 148 MG/DL — HIGH (ref 70–99)
GLUCOSE BLDC GLUCOMTR-MCNC: 153 MG/DL — HIGH (ref 70–99)
GLUCOSE BLDC GLUCOMTR-MCNC: 156 MG/DL — HIGH (ref 70–99)
GLUCOSE BLDC GLUCOMTR-MCNC: 159 MG/DL — HIGH (ref 70–99)
GLUCOSE BLDC GLUCOMTR-MCNC: 159 MG/DL — HIGH (ref 70–99)
GLUCOSE BLDC GLUCOMTR-MCNC: 161 MG/DL — HIGH (ref 70–99)
GLUCOSE BLDC GLUCOMTR-MCNC: 161 MG/DL — HIGH (ref 70–99)
GLUCOSE BLDC GLUCOMTR-MCNC: 168 MG/DL — HIGH (ref 70–99)
GLUCOSE BLDC GLUCOMTR-MCNC: 169 MG/DL — HIGH (ref 70–99)
GLUCOSE BLDC GLUCOMTR-MCNC: 188 MG/DL — HIGH (ref 70–99)
GLUCOSE BLDC GLUCOMTR-MCNC: 190 MG/DL — HIGH (ref 70–99)
GLUCOSE BLDC GLUCOMTR-MCNC: 191 MG/DL — HIGH (ref 70–99)
GLUCOSE BLDC GLUCOMTR-MCNC: 192 MG/DL — HIGH (ref 70–99)
GLUCOSE BLDC GLUCOMTR-MCNC: 194 MG/DL — HIGH (ref 70–99)
GLUCOSE BLDC GLUCOMTR-MCNC: 201 MG/DL — HIGH (ref 70–99)
GLUCOSE BLDC GLUCOMTR-MCNC: 204 MG/DL — HIGH (ref 70–99)
GLUCOSE BLDC GLUCOMTR-MCNC: 216 MG/DL — HIGH (ref 70–99)
GLUCOSE BLDC GLUCOMTR-MCNC: 231 MG/DL — HIGH (ref 70–99)
GLUCOSE BLDC GLUCOMTR-MCNC: 253 MG/DL — HIGH (ref 70–99)
GLUCOSE SERPL-MCNC: 154 MG/DL — HIGH (ref 70–99)
GLUCOSE SERPL-MCNC: 189 MG/DL — HIGH (ref 70–99)
GLUCOSE SERPL-MCNC: 216 MG/DL — HIGH (ref 70–99)
GLUCOSE SERPL-MCNC: 240 MG/DL — HIGH (ref 70–99)
GRAM STN FLD: SIGNIFICANT CHANGE UP
HCO3 BLDA-SCNC: 18 MMOL/L — LOW (ref 21–29)
HCT VFR BLD CALC: 40.5 % — SIGNIFICANT CHANGE UP (ref 39–50)
HGB BLD-MCNC: 12.2 G/DL — LOW (ref 13–17)
HOROWITZ INDEX BLDA+IHG-RTO: 80 — SIGNIFICANT CHANGE UP
LACTATE SERPL-SCNC: 4.2 MMOL/L — CRITICAL HIGH (ref 0.7–2)
LEGIONELLA AG UR QL: NEGATIVE — SIGNIFICANT CHANGE UP
MAGNESIUM SERPL-MCNC: 2.1 MG/DL — SIGNIFICANT CHANGE UP (ref 1.6–2.6)
MCHC RBC-ENTMCNC: 28.4 PG — SIGNIFICANT CHANGE UP (ref 27–34)
MCHC RBC-ENTMCNC: 30.1 GM/DL — LOW (ref 32–36)
MCV RBC AUTO: 94.2 FL — SIGNIFICANT CHANGE UP (ref 80–100)
NRBC # BLD: 0 /100 WBCS — SIGNIFICANT CHANGE UP (ref 0–0)
PCO2 BLDA: 30 MMHG — LOW (ref 32–46)
PH BLD: 7.41 — SIGNIFICANT CHANGE UP (ref 7.35–7.45)
PHOSPHATE SERPL-MCNC: 1.5 MG/DL — LOW (ref 2.5–4.5)
PHOSPHATE SERPL-MCNC: 1.5 MG/DL — LOW (ref 2.5–4.5)
PLATELET # BLD AUTO: 242 K/UL — SIGNIFICANT CHANGE UP (ref 150–400)
PO2 BLDA: 133 MMHG — HIGH (ref 74–108)
POTASSIUM SERPL-MCNC: 3.8 MMOL/L — SIGNIFICANT CHANGE UP (ref 3.5–5.3)
POTASSIUM SERPL-MCNC: 4.2 MMOL/L — SIGNIFICANT CHANGE UP (ref 3.5–5.3)
POTASSIUM SERPL-MCNC: 4.7 MMOL/L — SIGNIFICANT CHANGE UP (ref 3.5–5.3)
POTASSIUM SERPL-MCNC: 5.6 MMOL/L — HIGH (ref 3.5–5.3)
POTASSIUM SERPL-SCNC: 3.8 MMOL/L — SIGNIFICANT CHANGE UP (ref 3.5–5.3)
POTASSIUM SERPL-SCNC: 4.2 MMOL/L — SIGNIFICANT CHANGE UP (ref 3.5–5.3)
POTASSIUM SERPL-SCNC: 4.7 MMOL/L — SIGNIFICANT CHANGE UP (ref 3.5–5.3)
POTASSIUM SERPL-SCNC: 5.6 MMOL/L — HIGH (ref 3.5–5.3)
PROCALCITONIN SERPL-MCNC: 2.88 NG/ML — HIGH (ref 0.02–0.1)
RBC # BLD: 4.3 M/UL — SIGNIFICANT CHANGE UP (ref 4.2–5.8)
RBC # FLD: 13.5 % — SIGNIFICANT CHANGE UP (ref 10.3–14.5)
SAO2 % BLDA: 99 % — HIGH (ref 92–96)
SODIUM SERPL-SCNC: 151 MMOL/L — HIGH (ref 135–145)
SODIUM SERPL-SCNC: 153 MMOL/L — HIGH (ref 135–145)
SODIUM SERPL-SCNC: 155 MMOL/L — HIGH (ref 135–145)
SODIUM SERPL-SCNC: 157 MMOL/L — HIGH (ref 135–145)
SPECIMEN SOURCE: SIGNIFICANT CHANGE UP
SPECIMEN SOURCE: SIGNIFICANT CHANGE UP
VANCOMYCIN FLD-MCNC: 9.6 UG/ML — SIGNIFICANT CHANGE UP
WBC # BLD: 20.77 K/UL — HIGH (ref 3.8–10.5)
WBC # FLD AUTO: 20.77 K/UL — HIGH (ref 3.8–10.5)

## 2019-03-02 PROCEDURE — 99291 CRITICAL CARE FIRST HOUR: CPT

## 2019-03-02 RX ORDER — SODIUM CHLORIDE 9 MG/ML
1000 INJECTION, SOLUTION INTRAVENOUS
Qty: 0 | Refills: 0 | Status: DISCONTINUED | OUTPATIENT
Start: 2019-03-02 | End: 2019-03-03

## 2019-03-02 RX ORDER — INSULIN GLARGINE 100 [IU]/ML
10 INJECTION, SOLUTION SUBCUTANEOUS AT BEDTIME
Qty: 0 | Refills: 0 | Status: DISCONTINUED | OUTPATIENT
Start: 2019-03-02 | End: 2019-03-03

## 2019-03-02 RX ORDER — AZITHROMYCIN 500 MG/1
TABLET, FILM COATED ORAL
Qty: 0 | Refills: 0 | Status: DISCONTINUED | OUTPATIENT
Start: 2019-03-02 | End: 2019-03-03

## 2019-03-02 RX ORDER — VANCOMYCIN HCL 1 G
750 VIAL (EA) INTRAVENOUS EVERY 12 HOURS
Qty: 0 | Refills: 0 | Status: DISCONTINUED | OUTPATIENT
Start: 2019-03-02 | End: 2019-03-02

## 2019-03-02 RX ORDER — PIPERACILLIN AND TAZOBACTAM 4; .5 G/20ML; G/20ML
3.38 INJECTION, POWDER, LYOPHILIZED, FOR SOLUTION INTRAVENOUS EVERY 8 HOURS
Qty: 0 | Refills: 0 | Status: DISCONTINUED | OUTPATIENT
Start: 2019-03-02 | End: 2019-03-04

## 2019-03-02 RX ORDER — AZITHROMYCIN 500 MG/1
500 TABLET, FILM COATED ORAL ONCE
Qty: 0 | Refills: 0 | Status: COMPLETED | OUTPATIENT
Start: 2019-03-02 | End: 2019-03-02

## 2019-03-02 RX ORDER — VANCOMYCIN HCL 1 G
500 VIAL (EA) INTRAVENOUS EVERY 12 HOURS
Qty: 0 | Refills: 0 | Status: DISCONTINUED | OUTPATIENT
Start: 2019-03-02 | End: 2019-03-04

## 2019-03-02 RX ORDER — POTASSIUM PHOSPHATE, MONOBASIC POTASSIUM PHOSPHATE, DIBASIC 236; 224 MG/ML; MG/ML
15 INJECTION, SOLUTION INTRAVENOUS ONCE
Qty: 0 | Refills: 0 | Status: COMPLETED | OUTPATIENT
Start: 2019-03-02 | End: 2019-03-02

## 2019-03-02 RX ORDER — FENTANYL CITRATE 50 UG/ML
50 INJECTION INTRAVENOUS ONCE
Qty: 0 | Refills: 0 | Status: DISCONTINUED | OUTPATIENT
Start: 2019-03-02 | End: 2019-03-02

## 2019-03-02 RX ORDER — SODIUM CHLORIDE 9 MG/ML
1000 INJECTION, SOLUTION INTRAVENOUS
Qty: 0 | Refills: 0 | Status: DISCONTINUED | OUTPATIENT
Start: 2019-03-02 | End: 2019-03-02

## 2019-03-02 RX ORDER — FENTANYL CITRATE 50 UG/ML
0.5 INJECTION INTRAVENOUS
Qty: 2500 | Refills: 0 | Status: DISCONTINUED | OUTPATIENT
Start: 2019-03-02 | End: 2019-03-07

## 2019-03-02 RX ORDER — AZITHROMYCIN 500 MG/1
500 TABLET, FILM COATED ORAL EVERY 24 HOURS
Qty: 0 | Refills: 0 | Status: DISCONTINUED | OUTPATIENT
Start: 2019-03-03 | End: 2019-03-03

## 2019-03-02 RX ADMIN — PIPERACILLIN AND TAZOBACTAM 25 GRAM(S): 4; .5 INJECTION, POWDER, LYOPHILIZED, FOR SOLUTION INTRAVENOUS at 06:07

## 2019-03-02 RX ADMIN — CHLORHEXIDINE GLUCONATE 15 MILLILITER(S): 213 SOLUTION TOPICAL at 17:03

## 2019-03-02 RX ADMIN — FENTANYL CITRATE 50 MICROGRAM(S): 50 INJECTION INTRAVENOUS at 12:19

## 2019-03-02 RX ADMIN — HEPARIN SODIUM 5000 UNIT(S): 5000 INJECTION INTRAVENOUS; SUBCUTANEOUS at 06:07

## 2019-03-02 RX ADMIN — PIPERACILLIN AND TAZOBACTAM 25 GRAM(S): 4; .5 INJECTION, POWDER, LYOPHILIZED, FOR SOLUTION INTRAVENOUS at 14:00

## 2019-03-02 RX ADMIN — PANTOPRAZOLE SODIUM 40 MILLIGRAM(S): 20 TABLET, DELAYED RELEASE ORAL at 11:59

## 2019-03-02 RX ADMIN — Medication 650 MILLIGRAM(S): at 12:47

## 2019-03-02 RX ADMIN — POTASSIUM PHOSPHATE, MONOBASIC POTASSIUM PHOSPHATE, DIBASIC 63.75 MILLIMOLE(S): 236; 224 INJECTION, SOLUTION INTRAVENOUS at 12:34

## 2019-03-02 RX ADMIN — AZITHROMYCIN 255 MILLIGRAM(S): 500 TABLET, FILM COATED ORAL at 11:59

## 2019-03-02 RX ADMIN — SODIUM CHLORIDE 75 MILLILITER(S): 9 INJECTION, SOLUTION INTRAVENOUS at 21:49

## 2019-03-02 RX ADMIN — SODIUM CHLORIDE 75 MILLILITER(S): 9 INJECTION, SOLUTION INTRAVENOUS at 12:06

## 2019-03-02 RX ADMIN — Medication 4.73 MICROGRAM(S)/KG/MIN: at 21:46

## 2019-03-02 RX ADMIN — INSULIN GLARGINE 10 UNIT(S): 100 INJECTION, SOLUTION SUBCUTANEOUS at 21:45

## 2019-03-02 RX ADMIN — FENTANYL CITRATE 50 MICROGRAM(S): 50 INJECTION INTRAVENOUS at 12:06

## 2019-03-02 RX ADMIN — Medication 650 MILLIGRAM(S): at 14:01

## 2019-03-02 RX ADMIN — CHLORHEXIDINE GLUCONATE 1 APPLICATION(S): 213 SOLUTION TOPICAL at 06:08

## 2019-03-02 RX ADMIN — FENTANYL CITRATE 2.52 MICROGRAM(S)/KG/HR: 50 INJECTION INTRAVENOUS at 14:00

## 2019-03-02 RX ADMIN — HEPARIN SODIUM 5000 UNIT(S): 5000 INJECTION INTRAVENOUS; SUBCUTANEOUS at 17:03

## 2019-03-02 RX ADMIN — PIPERACILLIN AND TAZOBACTAM 25 GRAM(S): 4; .5 INJECTION, POWDER, LYOPHILIZED, FOR SOLUTION INTRAVENOUS at 21:46

## 2019-03-02 RX ADMIN — Medication 100 MILLIGRAM(S): at 17:03

## 2019-03-02 RX ADMIN — INSULIN HUMAN 10 UNIT(S)/HR: 100 INJECTION, SOLUTION SUBCUTANEOUS at 05:33

## 2019-03-02 RX ADMIN — CHLORHEXIDINE GLUCONATE 15 MILLILITER(S): 213 SOLUTION TOPICAL at 06:07

## 2019-03-02 NOTE — GOALS OF CARE CONVERSATION - PERSONAL ADVANCE DIRECTIVE - CONVERSATION DETAILS
84M with underlying dementia, CVA, contracted and bedbound here with severe sepsis, septic shock, DKA, respiratory failure intubated.    Advanced directives and goals of care discussed with daughter.     DNR with DNI reviewed vs aggressive therapy.    Pt has very poor functional status at baseline and wife states he has had progressive decline since last hospitalization to the point of being bedbound and non ambulatory with LE contracted now.     Daughter has not made any decisions regarding advanced directives yet.

## 2019-03-02 NOTE — PHARMACY COMMUNICATION NOTE - COMMENTS
spoke with [facility] to confirm they want the dextrose - pt is also on insulin drip already and wants both

## 2019-03-02 NOTE — PROGRESS NOTE ADULT - SUBJECTIVE AND OBJECTIVE BOX
HPI:  82 y/o M w/severe dementia, bedbound and contracted at baseline, DM, prior gastric/intestinal pneumatosis managed nonoperatively p/w fevers and dyspnea. Intubated for acute respiratory failure. Patient unable to provide history. Family unavailable. (01 Mar 2019 13:03)      24 hr events:      ## ROS:  [ ] unable to obtain  CONSTITUTIONAL: No fever, weight loss, or fatigue  EYES: No eye pain, visual disturbances, or discharge  ENMT:  No difficulty hearing, tinnitus, vertigo; No sinus or throat pain  NECK: No pain or stiffness  RESPIRATORY: No cough, wheezing, chills or hemoptysis; No shortness of breath  CARDIOVASCULAR: No chest pain, palpitations, dizziness, or leg swelling  GASTROINTESTINAL: No abdominal or epigastric pain. No nausea, vomiting, or hematemesis; No diarrhea or constipation. No melena or hematochezia.  GENITOURINARY: No dysuria, frequency, hematuria, or incontinence  NEUROLOGICAL: No headaches, memory loss, loss of strength, numbness, or tremors  SKIN: No itching, burning, rashes, or lesions   LYMPH NODES: No enlarged glands  ENDOCRINE: No heat or cold intolerance; No hair loss  MUSCULOSKELETAL: No joint pain or swelling; No muscle, back, or extremity pain  PSYCHIATRIC: No depression, anxiety, mood swings, or difficulty sleeping  HEME/LYMPH: No easy bruising, or bleeding gums  ALLERGY AND IMMUNOLOGIC: No hives or eczema    ## Labs:  CBC:                        12.2   20.77 )-----------( 242      ( 02 Mar 2019 04:13 )             40.5     Chem:  03-02    151<H>  |  126<H>  |  41<H>  ----------------------------<  154<H>  3.8   |  19<L>  |  0.91    Ca    7.6<L>      02 Mar 2019 22:55  Phos  1.5     03-02  Mg     2.1     03-02    TPro  7.1  /  Alb  2.2<L>  /  TBili  0.6  /  DBili  x   /  AST  22  /  ALT  21  /  AlkPhos  115  03-01    Coags:  PT/INR - ( 01 Mar 2019 12:02 )   PT: 15.0 sec;   INR: 1.33 ratio         PTT - ( 01 Mar 2019 12:02 )  PTT:26.1 sec        ## Imaging:    ## Medications:  azithromycin  IVPB      piperacillin/tazobactam IVPB. 3.375 Gram(s) IV Intermittent every 8 hours  vancomycin  IVPB 500 milliGRAM(s) IV Intermittent every 12 hours    norepinephrine Infusion 0.05 MICROgram(s)/kG/Min IV Continuous <Continuous>      insulin glargine Injectable (LANTUS) 10 Unit(s) SubCutaneous at bedtime  insulin Infusion 10 Unit(s)/Hr IV Continuous <Continuous>    heparin  Injectable 5000 Unit(s) SubCutaneous every 12 hours    pantoprazole  Injectable 40 milliGRAM(s) IV Push daily    acetaminophen  Suppository .. 650 milliGRAM(s) Rectal every 6 hours PRN  fentaNYL   Infusion. 0.5 MICROgram(s)/kG/Hr IV Continuous <Continuous>      ## Vitals:  T(C): 36.6 (03-02-19 @ 19:34), Max: 38.6 (03-02-19 @ 12:52)  HR: 93 (03-02-19 @ 22:30) (83 - 112)  BP: 112/48 (03-02-19 @ 22:30) (79/28 - 128/74)  BP(mean): 64 (03-02-19 @ 22:30) (42 - 86)  RR: 26 (03-02-19 @ 22:30) (25 - 47)  SpO2: 100% (03-02-19 @ 22:30) (96% - 100%)  Wt(kg): --  Vent: Mode: AC/ CMV (Assist Control/ Continuous Mandatory Ventilation), RR (machine): 24, RR (patient): 30, TV (machine): 350, FiO2: 80, PEEP: 5, PIP: 12  ABG: ABG - ( 02 Mar 2019 09:20 )  pH, Arterial: x     pH, Blood: 7.41  /  pCO2: 30    /  pO2: 133   / HCO3: 18    / Base Excess: -4.7  /  SaO2: 99                    03-01 @ 07:01  -  03-02 @ 07:00  --------------------------------------------------------  IN: 4299 mL / OUT: 770 mL / NET: 3529 mL    03-02 @ 07:01  - 03-02 @ 23:51  --------------------------------------------------------  IN: 2935.6 mL / OUT: 800 mL / NET: 2135.6 mL          ## P/E:  Gen: lying comfortably in bed in no apparent distress  HEENT: PERRL, EOMI  Resp: CTA B/L no c/r/w  CVS: S1S2 no m/r/g  Abd: soft NT/ND +BS  Ext: no c/c/e  Neuro: A&Ox3    CENTRAL LINE: [ ] YES [ ] NO  LOCATION:   DATE INSERTED:  REMOVE: [ ] YES [ ] NO      PAZ: [ ] YES [ ] NO    DATE INSERTED:  REMOVE:  [ ] YES [ ] NO      A-LINE:  [ ] YES [ ] NO  LOCATION:   DATE INSERTED:  REMOVE:  [ ] YES [ ] NO  EXPLAIN:    GLOBAL ISSUE/BEST PRACTICE:  Analgesia:  Sedation:  HOB elevation: yes  Stress ulcer prophylaxis:  VTE prophylaxis:  Oral Care:  Glycemic control:  Nutrition:    CODE STATUS: [ ] full code  [ ] DNR  [ ] DNI  [ ] [facility]  Goals of care discussion: [ ] yes HPI:  83M PMH dementia, CVA, bedbound and contracted at baseline, functional paraplegia, DM, prior gastric/intestinal pneumatosis managed nonoperatively p/w fevers and dyspnea. Labs with leukocytosis WBC 18, glucose of 800s with anion gap 24, Cr 2.37, Ph 7.1, HCO3: 13, lactate 10. intubated for respiratory failure. Admitted to ICU for severe sepsis with septic shock, PNA acute respiratory failure, DKA, acute renal failure, lactic acidosis, multiple decubitus ulcers.       24 hr events:  remains on insulin drip  hypotensive overnight started on pressors  lactate coming down  hypernatremic  cr improving  remains tachypneic on vent  febrile    ## ROS:  [x] unable to obtain due to intubation      ## Labs:  CBC:                        12.2   20.77 )-----------( 242      ( 02 Mar 2019 04:13 )             40.5     Chem:  03-02    151<H>  |  126<H>  |  41<H>  ----------------------------<  154<H>  3.8   |  19<L>  |  0.91    Ca    7.6<L>      02 Mar 2019 22:55  Phos  1.5     03-02  Mg     2.1     03-02    TPro  7.1  /  Alb  2.2<L>  /  TBili  0.6  /  DBili  x   /  AST  22  /  ALT  21  /  AlkPhos  115  03-01    Coags:  PT/INR - ( 01 Mar 2019 12:02 )   PT: 15.0 sec;   INR: 1.33 ratio    PTT - ( 01 Mar 2019 12:02 )  PTT:26.1 sec        ## Imaging:  CXR < from: Xray Chest 1 View- PORTABLE-Urgent (03.01.19 @ 18:30) >  ET tube and NG tube in good position.    Worsening left perihilar infiltrate.      ## Medications:  azithromycin  IVPB      piperacillin/tazobactam IVPB. 3.375 Gram(s) IV Intermittent every 8 hours  vancomycin  IVPB 500 milliGRAM(s) IV Intermittent every 12 hours    norepinephrine Infusion 0.05 MICROgram(s)/kG/Min IV Continuous <Continuous>    insulin Infusion 10 Unit(s)/Hr IV Continuous <Continuous>    heparin  Injectable 5000 Unit(s) SubCutaneous every 12 hours    pantoprazole  Injectable 40 milliGRAM(s) IV Push daily    acetaminophen  Suppository .. 650 milliGRAM(s) Rectal every 6 hours PRN  fentaNYL   Infusion. 0.5 MICROgram(s)/kG/Hr IV Continuous <Continuous>      ## Vitals:  T(C): 36.6 (03-02-19 @ 19:34), Max: 38.6 (03-02-19 @ 12:52)  HR: 93 (03-02-19 @ 22:30) (83 - 112)  BP: 112/48 (03-02-19 @ 22:30) (79/28 - 128/74)  BP(mean): 64 (03-02-19 @ 22:30) (42 - 86)  RR: 26 (03-02-19 @ 22:30) (25 - 47)  SpO2: 100% (03-02-19 @ 22:30) (96% - 100%)  Vent: Mode: AC/ CMV (Assist Control/ Continuous Mandatory Ventilation), RR (machine): 24, RR (patient): 30, TV (machine): 350, FiO2: 80, PEEP: 5, PIP: 12  ABG: ABG - ( 02 Mar 2019 09:20 )  pH, Arterial: x     pH, Blood: 7.41  /  pCO2: 30    /  pO2: 133   / HCO3: 18    / Base Excess: -4.7  /  SaO2: 99              03-01 @ 07:01  -  03-02 @ 07:00  --------------------------------------------------------  IN: 4299 mL / OUT: 770 mL / NET: 3529 mL    03-02 @ 07:01  -  03-02 @ 23:51  --------------------------------------------------------  IN: 2935.6 mL / OUT: 800 mL / NET: 2135.6 mL          ## P/E:  Gen: lying comfortably in bed in no apparent distress, sedated, orally intubated on vent  HEENT: PERRL, ETT in place, NGT in place  Resp: scattered rhonchi bilaterally, no rales, no wheeze  CVS: S1S2 RRR  Abd: soft NT/ND +BS  Ext: no c/c/e, LE contracted  Neuro: sedated  SKIN: bilateral hip decub, stage IV sacral decub, R heel DTI    CENTRAL LINE: [ ] YES [x] NO  LOCATION:   DATE INSERTED:  REMOVE: [ ] YES [ ] NO      PAZ: [x] YES [ ] NO    DATE INSERTED:  REMOVE:  [ ] YES [x] NO      A-LINE:  [ ] YES [x] NO  LOCATION:   DATE INSERTED:  REMOVE:  [ ] YES [ ] NO  EXPLAIN:    GLOBAL ISSUE/BEST PRACTICE:  Analgesia: n/a  Sedation: fentanyl  HOB elevation: yes  Stress ulcer prophylaxis: protonix  VTE prophylaxis: heparin sc  Oral Care: chlorhexidine   Glycemic control: insulin drip  Nutrition: npo    CODE STATUS: [x] full code  [ ] DNR  [ ] DNI  [x] [facility]  Goals of care discussion: [x] yes

## 2019-03-02 NOTE — PROGRESS NOTE ADULT - ASSESSMENT
83M PMH dementia, CVA, bedbound and contracted at baseline, functional paraplegia, DM, prior gastric/intestinal pneumatosis managed nonoperatively p/w fevers and dyspnea. Labs with leukocytosis WBC 18, glucose of 800s with anion gap 24, Cr 2.37, Ph 7.1, HCO3: 13, lactate 10. intubated for respiratory failure. Admitted to ICU for severe sepsis with septic shock, PNA acute respiratory failure, DKA, acute renal failure, lactic acidosis, multiple decubitus ulcers. 83M PMH dementia, CVA, bedbound and contracted at baseline, functional paraplegia, DM, prior gastric/intestinal pneumatosis managed nonoperatively p/w fevers and dyspnea. Labs with leukocytosis WBC 18, glucose of 800s with anion gap 24, Cr 2.37, Ph 7.1, HCO3: 13, lactate 10. intubated for respiratory failure. Admitted to ICU for severe sepsis with septic shock, PNA acute respiratory failure, DKA, acute renal failure, lactic acidosis, multiple decubitus ulcers.     1. NEURO  - minimally responsive on sedation vacation  - baseline dementia    2. CV  - septic shock: remains on levophed  - maintain MAP >65    3. PULM  - acute respiratory failure, intubated  - unable to wean at present time due to underlying tachypnea, persistent acidosis  - CXR with left opacities  - will treat for PNA covering gram negative organisms  - check sputum cx  - RVP    4. RENAL  - ARF with ATN in setting of sepsis and dehydration from DKA  - monitor Cr  - s/p aggressive IVF boluses  - maintain NP and perfusion to kidneys  - hypernatremia: on D5    5. ID  - severe sepsis with septic shock likely form PNA but decubitus ulcers are also a source  - r/o bacteremia  - follow up blood cx, u cx  - wound consult for multiple decub, ? need for debridement for stage IV sacral wound  - will cont broad spectrum antibiotics for now vanco, zosyn  - azithromycin to cover atypical PNA  - check urine legionella    6. ENDO  - DKA: cont with insulin drip  - bicarb slowly improving, gap is closing  - once gap closes and bicarb closer to normal can transition to lantus  - follow up HbA1C levels    7. GEN  - GOC discussion with muna today. she has not made any decision regarding DNR/DNI, pt has a prior MOLST form that wants full resuscitation but daughter states that was "before he got so sick and became bedbound"  - full code for now    Total Critical Care Time: 50 min

## 2019-03-02 NOTE — PHYSICAL THERAPY INITIAL EVALUATION ADULT - ADDITIONAL COMMENTS
As per pt's daughter. pt lives in an apartment with 24/7 care. Pt is baseline dependent non-ambulatory. Pt on the house call program and has been getting wound care nurse for the last 2 weeks

## 2019-03-03 LAB
-  AMPICILLIN/SULBACTAM: SIGNIFICANT CHANGE UP
-  CEFAZOLIN: SIGNIFICANT CHANGE UP
-  CLINDAMYCIN: SIGNIFICANT CHANGE UP
-  ERYTHROMYCIN: SIGNIFICANT CHANGE UP
-  GENTAMICIN: SIGNIFICANT CHANGE UP
-  OXACILLIN: SIGNIFICANT CHANGE UP
-  PENICILLIN: SIGNIFICANT CHANGE UP
-  RIFAMPIN: SIGNIFICANT CHANGE UP
-  STREPTOCOCCUS SP. (NOT GRP A, B OR S PNEUMONIAE): SIGNIFICANT CHANGE UP
-  TETRACYCLINE: SIGNIFICANT CHANGE UP
-  TRIMETHOPRIM/SULFAMETHOXAZOLE: SIGNIFICANT CHANGE UP
-  VANCOMYCIN: SIGNIFICANT CHANGE UP
ANION GAP SERPL CALC-SCNC: 10 MMOL/L — SIGNIFICANT CHANGE UP (ref 5–17)
BUN SERPL-MCNC: 36 MG/DL — HIGH (ref 7–23)
CALCIUM SERPL-MCNC: 7.7 MG/DL — LOW (ref 8.5–10.1)
CHLORIDE SERPL-SCNC: 124 MMOL/L — HIGH (ref 96–108)
CO2 SERPL-SCNC: 20 MMOL/L — LOW (ref 22–31)
CREAT SERPL-MCNC: 0.84 MG/DL — SIGNIFICANT CHANGE UP (ref 0.5–1.3)
CULTURE RESULTS: SIGNIFICANT CHANGE UP
GLUCOSE BLDC GLUCOMTR-MCNC: 154 MG/DL — HIGH (ref 70–99)
GLUCOSE BLDC GLUCOMTR-MCNC: 222 MG/DL — HIGH (ref 70–99)
GLUCOSE BLDC GLUCOMTR-MCNC: 226 MG/DL — HIGH (ref 70–99)
GLUCOSE BLDC GLUCOMTR-MCNC: 255 MG/DL — HIGH (ref 70–99)
GLUCOSE BLDC GLUCOMTR-MCNC: 273 MG/DL — HIGH (ref 70–99)
GLUCOSE BLDC GLUCOMTR-MCNC: 329 MG/DL — HIGH (ref 70–99)
GLUCOSE BLDC GLUCOMTR-MCNC: 342 MG/DL — HIGH (ref 70–99)
GLUCOSE SERPL-MCNC: 241 MG/DL — HIGH (ref 70–99)
GRAM STN FLD: SIGNIFICANT CHANGE UP
HCT VFR BLD CALC: 35.7 % — LOW (ref 39–50)
HGB BLD-MCNC: 10.7 G/DL — LOW (ref 13–17)
MAGNESIUM SERPL-MCNC: 1.8 MG/DL — SIGNIFICANT CHANGE UP (ref 1.6–2.6)
MCHC RBC-ENTMCNC: 27.9 PG — SIGNIFICANT CHANGE UP (ref 27–34)
MCHC RBC-ENTMCNC: 30 GM/DL — LOW (ref 32–36)
MCV RBC AUTO: 93 FL — SIGNIFICANT CHANGE UP (ref 80–100)
METHOD TYPE: SIGNIFICANT CHANGE UP
METHOD TYPE: SIGNIFICANT CHANGE UP
NRBC # BLD: 0 /100 WBCS — SIGNIFICANT CHANGE UP (ref 0–0)
ORGANISM # SPEC MICROSCOPIC CNT: SIGNIFICANT CHANGE UP
ORGANISM # SPEC MICROSCOPIC CNT: SIGNIFICANT CHANGE UP
PHOSPHATE SERPL-MCNC: 1.2 MG/DL — LOW (ref 2.5–4.5)
PLATELET # BLD AUTO: 174 K/UL — SIGNIFICANT CHANGE UP (ref 150–400)
POTASSIUM SERPL-MCNC: 3.8 MMOL/L — SIGNIFICANT CHANGE UP (ref 3.5–5.3)
POTASSIUM SERPL-SCNC: 3.8 MMOL/L — SIGNIFICANT CHANGE UP (ref 3.5–5.3)
RBC # BLD: 3.84 M/UL — LOW (ref 4.2–5.8)
RBC # FLD: 13.6 % — SIGNIFICANT CHANGE UP (ref 10.3–14.5)
SODIUM SERPL-SCNC: 154 MMOL/L — HIGH (ref 135–145)
SPECIMEN SOURCE: SIGNIFICANT CHANGE UP
WBC # BLD: 28.23 K/UL — HIGH (ref 3.8–10.5)
WBC # FLD AUTO: 28.23 K/UL — HIGH (ref 3.8–10.5)

## 2019-03-03 PROCEDURE — 99291 CRITICAL CARE FIRST HOUR: CPT

## 2019-03-03 PROCEDURE — 71045 X-RAY EXAM CHEST 1 VIEW: CPT | Mod: 26

## 2019-03-03 RX ORDER — SODIUM CHLORIDE 9 MG/ML
1000 INJECTION, SOLUTION INTRAVENOUS
Qty: 0 | Refills: 0 | Status: DISCONTINUED | OUTPATIENT
Start: 2019-03-03 | End: 2019-03-18

## 2019-03-03 RX ORDER — PANTOPRAZOLE SODIUM 20 MG/1
40 TABLET, DELAYED RELEASE ORAL DAILY
Qty: 0 | Refills: 0 | Status: DISCONTINUED | OUTPATIENT
Start: 2019-03-03 | End: 2019-03-16

## 2019-03-03 RX ORDER — DEXTROSE 50 % IN WATER 50 %
15 SYRINGE (ML) INTRAVENOUS ONCE
Qty: 0 | Refills: 0 | Status: DISCONTINUED | OUTPATIENT
Start: 2019-03-03 | End: 2019-03-18

## 2019-03-03 RX ORDER — POTASSIUM PHOSPHATE, MONOBASIC POTASSIUM PHOSPHATE, DIBASIC 236; 224 MG/ML; MG/ML
15 INJECTION, SOLUTION INTRAVENOUS ONCE
Qty: 0 | Refills: 0 | Status: COMPLETED | OUTPATIENT
Start: 2019-03-03 | End: 2019-03-03

## 2019-03-03 RX ORDER — INSULIN LISPRO 100/ML
VIAL (ML) SUBCUTANEOUS EVERY 6 HOURS
Qty: 0 | Refills: 0 | Status: DISCONTINUED | OUTPATIENT
Start: 2019-03-03 | End: 2019-03-03

## 2019-03-03 RX ORDER — INSULIN GLARGINE 100 [IU]/ML
20 INJECTION, SOLUTION SUBCUTANEOUS AT BEDTIME
Qty: 0 | Refills: 0 | Status: DISCONTINUED | OUTPATIENT
Start: 2019-03-03 | End: 2019-03-18

## 2019-03-03 RX ORDER — DEXTROSE 50 % IN WATER 50 %
25 SYRINGE (ML) INTRAVENOUS ONCE
Qty: 0 | Refills: 0 | Status: DISCONTINUED | OUTPATIENT
Start: 2019-03-03 | End: 2019-03-18

## 2019-03-03 RX ORDER — INSULIN LISPRO 100/ML
VIAL (ML) SUBCUTANEOUS EVERY 4 HOURS
Qty: 0 | Refills: 0 | Status: DISCONTINUED | OUTPATIENT
Start: 2019-03-03 | End: 2019-03-10

## 2019-03-03 RX ORDER — GLUCAGON INJECTION, SOLUTION 0.5 MG/.1ML
1 INJECTION, SOLUTION SUBCUTANEOUS ONCE
Qty: 0 | Refills: 0 | Status: DISCONTINUED | OUTPATIENT
Start: 2019-03-03 | End: 2019-03-18

## 2019-03-03 RX ORDER — DEXTROSE 50 % IN WATER 50 %
12.5 SYRINGE (ML) INTRAVENOUS ONCE
Qty: 0 | Refills: 0 | Status: DISCONTINUED | OUTPATIENT
Start: 2019-03-03 | End: 2019-03-18

## 2019-03-03 RX ADMIN — Medication 100 MILLIGRAM(S): at 05:13

## 2019-03-03 RX ADMIN — HEPARIN SODIUM 5000 UNIT(S): 5000 INJECTION INTRAVENOUS; SUBCUTANEOUS at 05:14

## 2019-03-03 RX ADMIN — HEPARIN SODIUM 5000 UNIT(S): 5000 INJECTION INTRAVENOUS; SUBCUTANEOUS at 17:21

## 2019-03-03 RX ADMIN — Medication 650 MILLIGRAM(S): at 05:39

## 2019-03-03 RX ADMIN — Medication 100 MILLIGRAM(S): at 17:45

## 2019-03-03 RX ADMIN — Medication 6: at 23:13

## 2019-03-03 RX ADMIN — Medication 4: at 11:20

## 2019-03-03 RX ADMIN — POTASSIUM PHOSPHATE, MONOBASIC POTASSIUM PHOSPHATE, DIBASIC 63.75 MILLIMOLE(S): 236; 224 INJECTION, SOLUTION INTRAVENOUS at 13:56

## 2019-03-03 RX ADMIN — FENTANYL CITRATE 2.52 MICROGRAM(S)/KG/HR: 50 INJECTION INTRAVENOUS at 18:22

## 2019-03-03 RX ADMIN — Medication 4: at 13:55

## 2019-03-03 RX ADMIN — Medication 4.73 MICROGRAM(S)/KG/MIN: at 23:13

## 2019-03-03 RX ADMIN — Medication 2: at 05:17

## 2019-03-03 RX ADMIN — PIPERACILLIN AND TAZOBACTAM 25 GRAM(S): 4; .5 INJECTION, POWDER, LYOPHILIZED, FOR SOLUTION INTRAVENOUS at 13:56

## 2019-03-03 RX ADMIN — INSULIN GLARGINE 20 UNIT(S): 100 INJECTION, SOLUTION SUBCUTANEOUS at 23:14

## 2019-03-03 RX ADMIN — Medication 8: at 17:22

## 2019-03-03 RX ADMIN — Medication 650 MILLIGRAM(S): at 11:38

## 2019-03-03 RX ADMIN — Medication 650 MILLIGRAM(S): at 05:38

## 2019-03-03 RX ADMIN — PANTOPRAZOLE SODIUM 40 MILLIGRAM(S): 20 TABLET, DELAYED RELEASE ORAL at 11:20

## 2019-03-03 RX ADMIN — Medication 3: at 02:30

## 2019-03-03 RX ADMIN — CHLORHEXIDINE GLUCONATE 1 APPLICATION(S): 213 SOLUTION TOPICAL at 09:25

## 2019-03-03 RX ADMIN — PIPERACILLIN AND TAZOBACTAM 25 GRAM(S): 4; .5 INJECTION, POWDER, LYOPHILIZED, FOR SOLUTION INTRAVENOUS at 23:00

## 2019-03-03 RX ADMIN — PIPERACILLIN AND TAZOBACTAM 25 GRAM(S): 4; .5 INJECTION, POWDER, LYOPHILIZED, FOR SOLUTION INTRAVENOUS at 05:14

## 2019-03-03 RX ADMIN — Medication 650 MILLIGRAM(S): at 12:00

## 2019-03-03 RX ADMIN — CHLORHEXIDINE GLUCONATE 15 MILLILITER(S): 213 SOLUTION TOPICAL at 17:45

## 2019-03-03 RX ADMIN — CHLORHEXIDINE GLUCONATE 15 MILLILITER(S): 213 SOLUTION TOPICAL at 05:13

## 2019-03-03 NOTE — PROGRESS NOTE ADULT - ASSESSMENT
83M PMH dementia, CVA, bedbound and contracted at baseline, functional paraplegia, DM, prior gastric/intestinal pneumatosis managed nonoperatively p/w fevers and dyspnea. Labs with leukocytosis WBC 18, glucose of 800s with anion gap 24, Cr 2.37, Ph 7.1, HCO3: 13, lactate 10. intubated for respiratory failure. Admitted to ICU for severe sepsis with septic shock, PNA acute respiratory failure, DKA, acute renal failure, lactic acidosis, multiple decubitus ulcers. Found to have gram positive bacteremia     1. NEURO  - minimally responsive on sedation vacation  - baseline dementia    2. CV  - septic shock: remains on levophed  - maintain MAP >65    3. PULM  - acute respiratory failure, intubated  - daily wean as tolerates  - CXR with left opacities  - will treat for PNA covering gram negative organisms  - sputum cx with staph, cont vanco      4. RENAL  - ARF with ATN in setting of sepsis and dehydration from DKA  - monitor Cr, ARf improving   - s/p aggressive IVF boluses  - maintain BP and perfusion to kidneys  - hypernatremia: free water  - supplement hypophosphatemia    5. ID  - severe sepsis with septic shock likely form PNA but decubitus ulcers are also a source  - also with STREP bacteremia cont zosyn f/u sensitivities  - wound consult for multiple decub, recommend surgical eval for debridement, consult placed  - will cont broad spectrum antibiotics for now vanco, zosyn, can d/c azithromycin       6. ENDO  - DKA: resolved, off insulin drip  - cont lantus, increase dose to 20 units with sliding scale coverage  - check HbA1C      7. GEN  - GOC discussion with daughter yesterday. pt has a prior MOLST form that wants full resuscitation   - full code for now    Total Critical Care Time: 40 min

## 2019-03-03 NOTE — DIETITIAN INITIAL EVALUATION ADULT. - OTHER INFO
Pt seen for ICU admit and consult. Pt lives at home alone with 24 hour aid. Pt daughter does cooking and shopping. Pt unable to communicate with vent, sedated and cognitive limitations. Daughter denied any N/C/D/C PTA, and with no difficulty chewing, but with difficulty swallowing. Pt consumed pureed diet at home. Daughter reported slight decrease in PO intake 2 days PTA, and soft stool. Daughter reported increasing protein at home in aid in pressure ulcer wound healing. Daughter stated that pt has been bed ridden x 2 weeks and was unwilling to ambulate. Daughter checks blood sugar levels at home regularly and stated that she has noticed them increasing, pt was to see outpatient endocrinologist 03-04. Pt currently on lantus at night. Discussed with pt carbohydrate consistency, and physiology of blood glucose levels. Daughter was able to teach back.

## 2019-03-03 NOTE — PROGRESS NOTE ADULT - SUBJECTIVE AND OBJECTIVE BOX
HPI:  84 y/o M w/severe dementia, bedbound and contracted at baseline, DM, prior gastric/intestinal pneumatosis managed nonoperatively p/w fevers and dyspnea. Intubated for acute respiratory failure. Patient unable to provide history. Family unavailable. (01 Mar 2019 13:03)      24 hr events:      ## ROS:  [ ] unable to obtain  CONSTITUTIONAL: No fever, weight loss, or fatigue  EYES: No eye pain, visual disturbances, or discharge  ENMT:  No difficulty hearing, tinnitus, vertigo; No sinus or throat pain  NECK: No pain or stiffness  RESPIRATORY: No cough, wheezing, chills or hemoptysis; No shortness of breath  CARDIOVASCULAR: No chest pain, palpitations, dizziness, or leg swelling  GASTROINTESTINAL: No abdominal or epigastric pain. No nausea, vomiting, or hematemesis; No diarrhea or constipation. No melena or hematochezia.  GENITOURINARY: No dysuria, frequency, hematuria, or incontinence  NEUROLOGICAL: No headaches, memory loss, loss of strength, numbness, or tremors  SKIN: No itching, burning, rashes, or lesions   LYMPH NODES: No enlarged glands  ENDOCRINE: No heat or cold intolerance; No hair loss  MUSCULOSKELETAL: No joint pain or swelling; No muscle, back, or extremity pain  PSYCHIATRIC: No depression, anxiety, mood swings, or difficulty sleeping  HEME/LYMPH: No easy bruising, or bleeding gums  ALLERGY AND IMMUNOLOGIC: No hives or eczema    ## Labs:  CBC:                        10.7   28.23 )-----------( 174      ( 03 Mar 2019 06:07 )             35.7     Chem:  03-03    154<H>  |  124<H>  |  36<H>  ----------------------------<  241<H>  3.8   |  20<L>  |  0.84    Ca    7.7<L>      03 Mar 2019 06:07  Phos  1.2     03-03  Mg     1.8     03-03      Coags:          ## Imaging:    ## Medications:  piperacillin/tazobactam IVPB. 3.375 Gram(s) IV Intermittent every 8 hours  vancomycin  IVPB 500 milliGRAM(s) IV Intermittent every 12 hours    norepinephrine Infusion 0.05 MICROgram(s)/kG/Min IV Continuous <Continuous>      dextrose 40% Gel 15 Gram(s) Oral once PRN  dextrose 50% Injectable 12.5 Gram(s) IV Push once  dextrose 50% Injectable 25 Gram(s) IV Push once  dextrose 50% Injectable 25 Gram(s) IV Push once  glucagon  Injectable 1 milliGRAM(s) IntraMuscular once PRN  insulin glargine Injectable (LANTUS) 20 Unit(s) SubCutaneous at bedtime  insulin lispro (HumaLOG) corrective regimen sliding scale   SubCutaneous every 4 hours    heparin  Injectable 5000 Unit(s) SubCutaneous every 12 hours    pantoprazole   Suspension 40 milliGRAM(s) Oral daily    acetaminophen  Suppository .. 650 milliGRAM(s) Rectal every 6 hours PRN  fentaNYL   Infusion. 0.5 MICROgram(s)/kG/Hr IV Continuous <Continuous>      ## Vitals:  T(C): 38 (03-03-19 @ 19:18), Max: 38.5 (03-03-19 @ 08:10)  HR: 88 (03-03-19 @ 22:01) (79 - 110)  BP: 116/54 (03-03-19 @ 22:00) (88/48 - 159/78)  BP(mean): 69 (03-03-19 @ 22:00) (26 - 95)  RR: 25 (03-03-19 @ 22:00) (18 - 33)  SpO2: 99% (03-03-19 @ 22:01) (95% - 100%)  Wt(kg): --  Vent: Mode: AC/ CMV (Assist Control/ Continuous Mandatory Ventilation), RR (machine): 24, RR (patient): 26, TV (machine): 350, FiO2: 50, PEEP: 5, PIP: 11  ABG: ABG - ( 02 Mar 2019 09:20 )  pH, Arterial: x     pH, Blood: 7.41  /  pCO2: 30    /  pO2: 133   / HCO3: 18    / Base Excess: -4.7  /  SaO2: 99                    03-02 @ 07:01  -  03-03 @ 07:00  --------------------------------------------------------  IN: 3666.5 mL / OUT: 1700 mL / NET: 1966.5 mL    03-03 @ 07:01  - 03-03 @ 23:30  --------------------------------------------------------  IN: 1353.1 mL / OUT: 600 mL / NET: 753.1 mL          ## P/E:  Gen: lying comfortably in bed in no apparent distress  HEENT: PERRL, EOMI  Resp: CTA B/L no c/r/w  CVS: S1S2 no m/r/g  Abd: soft NT/ND +BS  Ext: no c/c/e  Neuro: A&Ox3    CENTRAL LINE: [ ] YES [ ] NO  LOCATION:   DATE INSERTED:  REMOVE: [ ] YES [ ] NO      PAZ: [ ] YES [ ] NO    DATE INSERTED:  REMOVE:  [ ] YES [ ] NO      A-LINE:  [ ] YES [ ] NO  LOCATION:   DATE INSERTED:  REMOVE:  [ ] YES [ ] NO  EXPLAIN:    GLOBAL ISSUE/BEST PRACTICE:  Analgesia:  Sedation:  HOB elevation: yes  Stress ulcer prophylaxis:  VTE prophylaxis:  Oral Care:  Glycemic control:  Nutrition:    CODE STATUS: [ ] full code  [ ] DNR  [ ] DNI  [ ] [facility]  Goals of care discussion: [ ] yes HPI:  83M PMH dementia, CVA, bedbound and contracted at baseline, functional paraplegia, DM, prior gastric/intestinal pneumatosis managed nonoperatively p/w fevers and dyspnea. Labs with leukocytosis WBC 18, glucose of 800s with anion gap 24, Cr 2.37, Ph 7.1, HCO3: 13, lactate 10. intubated for respiratory failure. Admitted to ICU for severe sepsis with septic shock, PNA acute respiratory failure, DKA, acute renal failure, lactic acidosis, multiple decubitus ulcers.     24 hr events:  remains on mechanical ventilation  still tachypneic overbreathing vent but RR slightly improved, no longer in the 40s, now in the 30s  off insulin drip overnight  weaning down FIO2 from 70% to 50%  remains on levohped    ## ROS:  [x] unable to obtain due to intubation      ## Labs:  CBC:                        10.7   28.23 )-----------( 174      ( 03 Mar 2019 06:07 )             35.7     Chem:  03-03    154<H>  |  124<H>  |  36<H>  ----------------------------<  241<H>  3.8   |  20<L>  |  0.84    Ca    7.7<L>      03 Mar 2019 06:07  Phos  1.2     03-03  Mg     1.8     03-03    Lactate, Blood (03.02.19 @ 04:13)    Lactate, Blood: 4.2 mmol/L    Lactate, Blood (03.01.19 @ 12:02)    Lactate, Blood: 10.2 mmol/L    Culture - Urine (03.01.19 @ 16:18)    Specimen Source: .Urine    Culture Results: No growth    Culture - Sputum . (03.02.19 @ 00:32)    Specimen Source: .Sputum    Culture Results: Numerous Staphylococcus aureus    Normal Respiratory Radhika present        Culture - Blood (03.01.19 @ 15:06)   Growth in aerobic bottle: Gram Positive Cocci in Clusters and Gram  Positive Cocci in Pairs and Chains  -  Streptococcus sp. (Not Grp A, B or S pneumoniae): Detec      Legionella pneumophila Antigen, Urine (03.02.19 @ 19:11)    Legionella Antigen, Urine: Negative        ## Imaging:  CXR < from: Xray Chest 1 View- PORTABLE-Routine (03.03.19 @ 09:19) >  Cardiomegaly.  Pneumonia left lung, unchanged.    ## Medications:  piperacillin/tazobactam IVPB. 3.375 Gram(s) IV Intermittent every 8 hours  vancomycin  IVPB 500 milliGRAM(s) IV Intermittent every 12 hours    norepinephrine Infusion 0.05 MICROgram(s)/kG/Min IV Continuous <Continuous>      dextrose 40% Gel 15 Gram(s) Oral once PRN  dextrose 50% Injectable 12.5 Gram(s) IV Push once  dextrose 50% Injectable 25 Gram(s) IV Push once  dextrose 50% Injectable 25 Gram(s) IV Push once  glucagon  Injectable 1 milliGRAM(s) IntraMuscular once PRN  insulin glargine Injectable (LANTUS) 20 Unit(s) SubCutaneous at bedtime  insulin lispro (HumaLOG) corrective regimen sliding scale   SubCutaneous every 4 hours    heparin  Injectable 5000 Unit(s) SubCutaneous every 12 hours    pantoprazole   Suspension 40 milliGRAM(s) Oral daily    acetaminophen  Suppository .. 650 milliGRAM(s) Rectal every 6 hours PRN  fentaNYL   Infusion. 0.5 MICROgram(s)/kG/Hr IV Continuous <Continuous>      ## Vitals:  T(C): 38 (03-03-19 @ 19:18), Max: 38.5 (03-03-19 @ 08:10)  HR: 88 (03-03-19 @ 22:01) (79 - 110)  BP: 116/54 (03-03-19 @ 22:00) (88/48 - 159/78)  BP(mean): 69 (03-03-19 @ 22:00) (26 - 95)  RR: 25 (03-03-19 @ 22:00) (18 - 33)  SpO2: 99% (03-03-19 @ 22:01) (95% - 100%)  Vent: Mode: AC/ CMV (Assist Control/ Continuous Mandatory Ventilation), RR (machine): 24, RR (patient): 26, TV (machine): 350, FiO2: 50, PEEP: 5, PIP: 11  ABG: ABG - ( 02 Mar 2019 09:20 )  pH, Arterial:   pH, Blood: 7.41  /  pCO2: 30    /  pO2: 133   / HCO3: 18    / Base Excess: -4.7  /  SaO2: 99            03-02 @ 07:01  -  03-03 @ 07:00  --------------------------------------------------------  IN: 3666.5 mL / OUT: 1700 mL / NET: 1966.5 mL    03-03 @ 07:01  -  03-03 @ 23:30  --------------------------------------------------------  IN: 1353.1 mL / OUT: 600 mL / NET: 753.1 mL          ## P/E:  Gen: lying comfortably in bed in no apparent distress, sedated, orally intubated on vent  HEENT: PERRL, ETT in place, NGT in place  Resp: scattered rhonchi bilaterally, no rales, no wheeze  CVS: S1S2 RRR  Abd: soft NT/ND +BS  Ext: no c/c/e, LE contracted  Neuro: sedated  SKIN: bilateral hip decub, stage IV sacral decub, R heel DTI    CENTRAL LINE: [ ] YES [x] NO  LOCATION:   DATE INSERTED:  REMOVE: [ ] YES [ ] NO      PAZ: [x] YES [ ] NO    DATE INSERTED:  REMOVE:  [ ] YES [x] NO      A-LINE:  [ ] YES [x] NO  LOCATION:   DATE INSERTED:  REMOVE:  [ ] YES [ ] NO  EXPLAIN:    GLOBAL ISSUE/BEST PRACTICE:  Analgesia: n/a  Sedation: fentanyl  HOB elevation: yes  Stress ulcer prophylaxis: protonix  VTE prophylaxis: heparin sc  Oral Care: chlorhexidine   Glycemic control: insulin drip  Nutrition: glucerna tube feeds    CODE STATUS: [x] full code  [ ] DNR  [ ] DNI  [x] [facility]  Goals of care discussion: [x] yes

## 2019-03-03 NOTE — DIETITIAN INITIAL EVALUATION ADULT. - ENERGY NEEDS
Ht (cm): 152.4  Wt (kg): 55.4  BMI: 21    IBW: 48.0         %IBW:    105%     UBW: 54.4kg       %UBW: 93%

## 2019-03-03 NOTE — DIETITIAN INITIAL EVALUATION ADULT. - PHYSICAL APPEARANCE
BMI 21.7 (actual height stated at 5'0"); 1+ generalized edema noted; Nutrition focused physical exam conducted:  Subcutaneous fat loss: [ moderate ] Orbital fat pads region, [ unable with vent ]Buccal fat region, [ severe ]Triceps region,  [ mild ]Ribs region.  Muscle wasting: [ moderate ]Temples region, [ moderate ]Clavicle region, [ moderate ]Shoulder region, [ left moderate, right unable]Scapula region, [ edema noted ]Interosseous region,  [moderate ]thigh region, [ mild ]Calf region/well nourished

## 2019-03-03 NOTE — DIETITIAN INITIAL EVALUATION ADULT. - ETIOLOGY
inadequate oral intake to meet increased energy & protein needs in the presence of multiple pressure injuries, PNA, DKA

## 2019-03-03 NOTE — DIETITIAN INITIAL EVALUATION ADULT. - PERTINENT MEDS FT
MEDICATIONS  (STANDING):  chlorhexidine 0.12% Liquid 15 milliLiter(s) Oral Mucosa two times a day  chlorhexidine 4% Liquid 1 Application(s) Topical <User Schedule>  dextrose 5%. 1000 milliLiter(s) (50 mL/Hr) IV Continuous <Continuous>  dextrose 50% Injectable 12.5 Gram(s) IV Push once  dextrose 50% Injectable 25 Gram(s) IV Push once  dextrose 50% Injectable 25 Gram(s) IV Push once  fentaNYL   Infusion. 0.5 MICROgram(s)/kG/Hr (2.525 mL/Hr) IV Continuous <Continuous>  heparin  Injectable 5000 Unit(s) SubCutaneous every 12 hours  insulin glargine Injectable (LANTUS) 20 Unit(s) SubCutaneous at bedtime  insulin lispro (HumaLOG) corrective regimen sliding scale   SubCutaneous every 4 hours  norepinephrine Infusion 0.05 MICROgram(s)/kG/Min (4.734 mL/Hr) IV Continuous <Continuous>  pantoprazole   Suspension 40 milliGRAM(s) Oral daily  piperacillin/tazobactam IVPB. 3.375 Gram(s) IV Intermittent every 8 hours  vancomycin  IVPB 500 milliGRAM(s) IV Intermittent every 12 hours    MEDICATIONS  (PRN):  acetaminophen  Suppository .. 650 milliGRAM(s) Rectal every 6 hours PRN Temp greater or equal to 38C (100.4F), Mild Pain (1 - 3)  dextrose 40% Gel 15 Gram(s) Oral once PRN Blood Glucose LESS THAN 70 milliGRAM(s)/deciliter  glucagon  Injectable 1 milliGRAM(s) IntraMuscular once PRN Glucose LESS THAN 70 milligrams/deciliter

## 2019-03-03 NOTE — CHART NOTE - NSCHARTNOTEFT_GEN_A_CORE
Upon Nutritional Assessment by the Registered Dietitian your patient was determined to meet criteria / has evidence of the following diagnosis/diagnoses:          [ ]  Mild Protein Calorie Malnutrition        [ ]  Moderate Protein Calorie Malnutrition        [ x] Severe Protein Calorie Malnutrition- in the context of acute illness         [ ] Unspecified Protein Calorie Malnutrition        [ ] Underweight / BMI <19        [ ] Morbid Obesity / BMI > 40      Findings as based on:  •  Comprehensive nutrition assessment and consultation  •  Calorie counts (nutrient intake analysis)  •  Food acceptance and intake status from observations by staff  •  Follow up  •  Patient education  •  Intervention secondary to interdisciplinary rounds  •   concerns      Treatment:  Actual height: 5'0"      Estimated needs: 1440-1680kcal (30-35kcal/kg), 67-77 grams of protein (1.2-1.4g/kg *increased for pressure injuries), 0546-2640 mL of fluid    The following diet has been recommended:        -Vital AF 1.2       -Total volume in 24 hr: 1320mL        -Provides 1584 kcal, 99 grams of protein, 1069 free h2o        -200ml free h20 flush q6h      PROVIDER Section:     By signing this assessment you are acknowledging and agree with the diagnosis/diagnoses assigned by the Registered Dietitian    Comments:

## 2019-03-03 NOTE — DIETITIAN INITIAL EVALUATION ADULT. - NS AS NUTRI INTERV ENTERAL NUTRITION
Concentration/Rate/Volume/Feeding tube flush/Rec changing TF to: Vital AF 1.2, Total volume 1320mL/24hr (provides 1584 kcal, 99 grams of protein, 1069 free h2o); 200ml free h20 flush q6h/Composition

## 2019-03-04 ENCOUNTER — TRANSCRIPTION ENCOUNTER (OUTPATIENT)
Age: 84
End: 2019-03-04

## 2019-03-04 LAB
ANION GAP SERPL CALC-SCNC: 7 MMOL/L — SIGNIFICANT CHANGE UP (ref 5–17)
BUN SERPL-MCNC: 27 MG/DL — HIGH (ref 7–23)
CALCIUM SERPL-MCNC: 7.8 MG/DL — LOW (ref 8.5–10.1)
CHLORIDE SERPL-SCNC: 122 MMOL/L — HIGH (ref 96–108)
CO2 SERPL-SCNC: 20 MMOL/L — LOW (ref 22–31)
CREAT SERPL-MCNC: 0.74 MG/DL — SIGNIFICANT CHANGE UP (ref 0.5–1.3)
GLUCOSE BLDC GLUCOMTR-MCNC: 218 MG/DL — HIGH (ref 70–99)
GLUCOSE BLDC GLUCOMTR-MCNC: 233 MG/DL — HIGH (ref 70–99)
GLUCOSE BLDC GLUCOMTR-MCNC: 234 MG/DL — HIGH (ref 70–99)
GLUCOSE BLDC GLUCOMTR-MCNC: 286 MG/DL — HIGH (ref 70–99)
GLUCOSE BLDC GLUCOMTR-MCNC: 332 MG/DL — HIGH (ref 70–99)
GLUCOSE BLDC GLUCOMTR-MCNC: 381 MG/DL — HIGH (ref 70–99)
GLUCOSE SERPL-MCNC: 312 MG/DL — HIGH (ref 70–99)
HBA1C BLD-MCNC: 9.7 % — HIGH (ref 4–5.6)
HCT VFR BLD CALC: 33.6 % — LOW (ref 39–50)
HGB BLD-MCNC: 10.6 G/DL — LOW (ref 13–17)
MAGNESIUM SERPL-MCNC: 2 MG/DL — SIGNIFICANT CHANGE UP (ref 1.6–2.6)
MCHC RBC-ENTMCNC: 28.4 PG — SIGNIFICANT CHANGE UP (ref 27–34)
MCHC RBC-ENTMCNC: 31.5 GM/DL — LOW (ref 32–36)
MCV RBC AUTO: 90.1 FL — SIGNIFICANT CHANGE UP (ref 80–100)
NRBC # BLD: 0 /100 WBCS — SIGNIFICANT CHANGE UP (ref 0–0)
PHOSPHATE SERPL-MCNC: 1 MG/DL — CRITICAL LOW (ref 2.5–4.5)
PLATELET # BLD AUTO: 169 K/UL — SIGNIFICANT CHANGE UP (ref 150–400)
POTASSIUM SERPL-MCNC: 3.8 MMOL/L — SIGNIFICANT CHANGE UP (ref 3.5–5.3)
POTASSIUM SERPL-SCNC: 3.8 MMOL/L — SIGNIFICANT CHANGE UP (ref 3.5–5.3)
RBC # BLD: 3.73 M/UL — LOW (ref 4.2–5.8)
RBC # FLD: 14.2 % — SIGNIFICANT CHANGE UP (ref 10.3–14.5)
SODIUM SERPL-SCNC: 149 MMOL/L — HIGH (ref 135–145)
VANCOMYCIN TROUGH SERPL-MCNC: 6.4 UG/ML — LOW (ref 10–20)
WBC # BLD: 29.04 K/UL — HIGH (ref 3.8–10.5)
WBC # FLD AUTO: 29.04 K/UL — HIGH (ref 3.8–10.5)

## 2019-03-04 PROCEDURE — 99291 CRITICAL CARE FIRST HOUR: CPT

## 2019-03-04 RX ORDER — POTASSIUM PHOSPHATE, MONOBASIC POTASSIUM PHOSPHATE, DIBASIC 236; 224 MG/ML; MG/ML
30 INJECTION, SOLUTION INTRAVENOUS ONCE
Qty: 0 | Refills: 0 | Status: COMPLETED | OUTPATIENT
Start: 2019-03-04 | End: 2019-03-04

## 2019-03-04 RX ORDER — ACETAMINOPHEN 500 MG
650 TABLET ORAL EVERY 6 HOURS
Qty: 0 | Refills: 0 | Status: DISCONTINUED | OUTPATIENT
Start: 2019-03-04 | End: 2019-03-18

## 2019-03-04 RX ORDER — NAFCILLIN 10 G/100ML
2 INJECTION, POWDER, FOR SOLUTION INTRAVENOUS EVERY 4 HOURS
Qty: 0 | Refills: 0 | Status: DISCONTINUED | OUTPATIENT
Start: 2019-03-04 | End: 2019-03-13

## 2019-03-04 RX ADMIN — NAFCILLIN 200 GRAM(S): 10 INJECTION, POWDER, FOR SOLUTION INTRAVENOUS at 13:17

## 2019-03-04 RX ADMIN — Medication 10: at 15:45

## 2019-03-04 RX ADMIN — PIPERACILLIN AND TAZOBACTAM 25 GRAM(S): 4; .5 INJECTION, POWDER, LYOPHILIZED, FOR SOLUTION INTRAVENOUS at 06:16

## 2019-03-04 RX ADMIN — NAFCILLIN 200 GRAM(S): 10 INJECTION, POWDER, FOR SOLUTION INTRAVENOUS at 15:46

## 2019-03-04 RX ADMIN — CHLORHEXIDINE GLUCONATE 15 MILLILITER(S): 213 SOLUTION TOPICAL at 06:15

## 2019-03-04 RX ADMIN — Medication 650 MILLIGRAM(S): at 05:32

## 2019-03-04 RX ADMIN — Medication 4: at 12:44

## 2019-03-04 RX ADMIN — PANTOPRAZOLE SODIUM 40 MILLIGRAM(S): 20 TABLET, DELAYED RELEASE ORAL at 12:47

## 2019-03-04 RX ADMIN — POTASSIUM PHOSPHATE, MONOBASIC POTASSIUM PHOSPHATE, DIBASIC 85 MILLIMOLE(S): 236; 224 INJECTION, SOLUTION INTRAVENOUS at 06:30

## 2019-03-04 RX ADMIN — NAFCILLIN 200 GRAM(S): 10 INJECTION, POWDER, FOR SOLUTION INTRAVENOUS at 20:00

## 2019-03-04 RX ADMIN — FENTANYL CITRATE 2.52 MICROGRAM(S)/KG/HR: 50 INJECTION INTRAVENOUS at 07:42

## 2019-03-04 RX ADMIN — Medication 650 MILLIGRAM(S): at 15:21

## 2019-03-04 RX ADMIN — CHLORHEXIDINE GLUCONATE 15 MILLILITER(S): 213 SOLUTION TOPICAL at 17:46

## 2019-03-04 RX ADMIN — Medication 4.73 MICROGRAM(S)/KG/MIN: at 07:42

## 2019-03-04 RX ADMIN — CHLORHEXIDINE GLUCONATE 1 APPLICATION(S): 213 SOLUTION TOPICAL at 02:30

## 2019-03-04 RX ADMIN — Medication 4: at 07:43

## 2019-03-04 RX ADMIN — INSULIN GLARGINE 20 UNIT(S): 100 INJECTION, SOLUTION SUBCUTANEOUS at 22:13

## 2019-03-04 RX ADMIN — Medication 6: at 18:58

## 2019-03-04 RX ADMIN — HEPARIN SODIUM 5000 UNIT(S): 5000 INJECTION INTRAVENOUS; SUBCUTANEOUS at 06:16

## 2019-03-04 RX ADMIN — Medication 100 MILLIGRAM(S): at 06:17

## 2019-03-04 RX ADMIN — Medication 4: at 22:13

## 2019-03-04 RX ADMIN — Medication 650 MILLIGRAM(S): at 01:54

## 2019-03-04 RX ADMIN — HEPARIN SODIUM 5000 UNIT(S): 5000 INJECTION INTRAVENOUS; SUBCUTANEOUS at 17:46

## 2019-03-04 RX ADMIN — Medication 8: at 03:25

## 2019-03-04 NOTE — PROGRESS NOTE ADULT - ASSESSMENT
84 y/o M w/severe dementia, bedbound and contracted at baseline, DM, prior gastric/intestinal pneumatosis managed nonoperatively now presenting with acute respiratory failure requiring intubation, severe sepsis likely secondary to PNA, DKA, ANSLEY (likley prerenal +/- sepsis), hypernatremia, and lactic acidosis (likely combination of DKA and sepsis).    Neuro: Poor baseline  - Sedation as needed     Resp  - Daily SAT/SBT    CV: VICTOR M    ID: Severe sepsis, MSSA PNA   - Nafcillin, broaden if needed  - Wound care    Renal: ANSLEY now resolved (likely combination of prerenal and ATN from sepsis)  - Avoid nephrotoxins    Endo:  - Continue insulin    FEN:  - Tube feeds  - Replete lytes PRN  - Free water for hypernatremia    PPx:  - Heparin SC/PPI    Attending critical care time 40 minutes

## 2019-03-04 NOTE — PROGRESS NOTE ADULT - SUBJECTIVE AND OBJECTIVE BOX
HPI:  84 y/o M w/severe dementia, bedbound and contracted at baseline, DM, prior gastric/intestinal pneumatosis managed nonoperatively p/w fevers and dyspnea. Intubated for acute respiratory failure. Patient unable to provide history. Family unavailable. (01 Mar 2019 13:03)      24 hr events: No acute events. Remains febrile. Remains on pressors. Unable to provide history.    ## ROS:  [x ] unable to obtain    ## Vitals  ICU Vital Signs Last 24 Hrs  T(C): 38.7 (04 Mar 2019 07:30), Max: 38.7 (04 Mar 2019 07:30)  T(F): 101.6 (04 Mar 2019 07:30), Max: 101.6 (04 Mar 2019 07:30)  HR: 97 (04 Mar 2019 09:01) (79 - 109)  BP: 135/56 (04 Mar 2019 09:00) (90/51 - 142/53)  BP(mean): 76 (04 Mar 2019 09:00) (26 - 82)  ABP: --  ABP(mean): --  RR: 23 (04 Mar 2019 09:00) (23 - 33)  SpO2: 100% (04 Mar 2019 09:01) (95% - 100%)      ## Physical Exam:  Gen: Elderly, cachectic male lying in bed, intubated, contracted, NAD  HEENT: NC/AT sclerae anicteric, ET tube in place  Resp: Tachypneic, overbreathing vent, mechanical breath sounds b/l  CV: S1, S2  Abd: Soft, + BS  Ext: WWP  Skin: Unstagable sacral ulcer, heel ulcer  Neuro: Sedated, unarousable, does not follow commands, contracted    ## Vent Data  Mode: AC/ CMV (Assist Control/ Continuous Mandatory Ventilation)  RR (machine): 24  TV (machine): 350  FiO2: 50  PEEP: 5  ITime: 1  MAP: 8  PIP: 10      ## Labs:  Chem:  03-04    149<H>  |  122<H>  |  27<H>  ----------------------------<  312<H>  3.8   |  20<L>  |  0.74    Ca    7.8<L>      04 Mar 2019 04:19  Phos  1.0     03-04  Mg     2.0     03-04        CBC:                        10.6   29.04 )-----------( 169      ( 04 Mar 2019 04:19 )             33.6     Coags:          ## Cardiac        ## Blood Gas      #I/Os  I&O's Detail    03 Mar 2019 07:01  -  04 Mar 2019 07:00  --------------------------------------------------------  IN:    fentaNYL Infusion.: 122.2 mL    Free Water: 1000 mL    Glucerna: 1235 mL    norepinephrine Infusion: 269.7 mL    Solution: 200 mL    Solution: 200 mL  Total IN: 3027 mL    OUT:    Incontinent per Condom Catheter: 575 mL    Indwelling Catheter - Urethral: 325 mL  Total OUT: 900 mL    Total NET: 2127 mL      04 Mar 2019 07:01  -  04 Mar 2019 10:20  --------------------------------------------------------  IN:    Solution: 500 mL  Total IN: 500 mL    OUT:  Total OUT: 0 mL    Total NET: 500 mL          ## Imaging:    ## Medications:  MEDICATIONS  (STANDING):  chlorhexidine 0.12% Liquid 15 milliLiter(s) Oral Mucosa two times a day  chlorhexidine 4% Liquid 1 Application(s) Topical <User Schedule>  dextrose 5%. 1000 milliLiter(s) (50 mL/Hr) IV Continuous <Continuous>  dextrose 50% Injectable 12.5 Gram(s) IV Push once  dextrose 50% Injectable 25 Gram(s) IV Push once  dextrose 50% Injectable 25 Gram(s) IV Push once  fentaNYL   Infusion. 0.5 MICROgram(s)/kG/Hr (2.525 mL/Hr) IV Continuous <Continuous>  heparin  Injectable 5000 Unit(s) SubCutaneous every 12 hours  insulin glargine Injectable (LANTUS) 20 Unit(s) SubCutaneous at bedtime  insulin lispro (HumaLOG) corrective regimen sliding scale   SubCutaneous every 4 hours  norepinephrine Infusion 0.05 MICROgram(s)/kG/Min (4.734 mL/Hr) IV Continuous <Continuous>  pantoprazole   Suspension 40 milliGRAM(s) Oral daily  piperacillin/tazobactam IVPB. 3.375 Gram(s) IV Intermittent every 8 hours  vancomycin  IVPB 500 milliGRAM(s) IV Intermittent every 12 hours    MEDICATIONS  (PRN):  acetaminophen    Suspension .. 650 milliGRAM(s) Oral every 6 hours PRN Temp greater or equal to 38C (100.4F)  dextrose 40% Gel 15 Gram(s) Oral once PRN Blood Glucose LESS THAN 70 milliGRAM(s)/deciliter  glucagon  Injectable 1 milliGRAM(s) IntraMuscular once PRN Glucose LESS THAN 70 milligrams/deciliter

## 2019-03-05 ENCOUNTER — RESULT REVIEW (OUTPATIENT)
Age: 84
End: 2019-03-05

## 2019-03-05 LAB
ALBUMIN SERPL ELPH-MCNC: 1.1 G/DL — LOW (ref 3.3–5)
ALP SERPL-CCNC: 168 U/L — HIGH (ref 40–120)
ALT FLD-CCNC: 26 U/L — SIGNIFICANT CHANGE UP (ref 12–78)
ANION GAP SERPL CALC-SCNC: 8 MMOL/L — SIGNIFICANT CHANGE UP (ref 5–17)
AST SERPL-CCNC: 28 U/L — SIGNIFICANT CHANGE UP (ref 15–37)
BILIRUB SERPL-MCNC: 1 MG/DL — SIGNIFICANT CHANGE UP (ref 0.2–1.2)
BUN SERPL-MCNC: 29 MG/DL — HIGH (ref 7–23)
CALCIUM SERPL-MCNC: 7.6 MG/DL — LOW (ref 8.5–10.1)
CHLORIDE SERPL-SCNC: 121 MMOL/L — HIGH (ref 96–108)
CO2 SERPL-SCNC: 23 MMOL/L — SIGNIFICANT CHANGE UP (ref 22–31)
CREAT SERPL-MCNC: 0.8 MG/DL — SIGNIFICANT CHANGE UP (ref 0.5–1.3)
GLUCOSE BLDC GLUCOMTR-MCNC: 200 MG/DL — HIGH (ref 70–99)
GLUCOSE BLDC GLUCOMTR-MCNC: 201 MG/DL — HIGH (ref 70–99)
GLUCOSE BLDC GLUCOMTR-MCNC: 229 MG/DL — HIGH (ref 70–99)
GLUCOSE BLDC GLUCOMTR-MCNC: 236 MG/DL — HIGH (ref 70–99)
GLUCOSE BLDC GLUCOMTR-MCNC: 251 MG/DL — HIGH (ref 70–99)
GLUCOSE BLDC GLUCOMTR-MCNC: 287 MG/DL — HIGH (ref 70–99)
GLUCOSE SERPL-MCNC: 215 MG/DL — HIGH (ref 70–99)
GRAM STN FLD: SIGNIFICANT CHANGE UP
HCT VFR BLD CALC: 34 % — LOW (ref 39–50)
HGB BLD-MCNC: 11 G/DL — LOW (ref 13–17)
MAGNESIUM SERPL-MCNC: 1.7 MG/DL — SIGNIFICANT CHANGE UP (ref 1.6–2.6)
MCHC RBC-ENTMCNC: 28.4 PG — SIGNIFICANT CHANGE UP (ref 27–34)
MCHC RBC-ENTMCNC: 32.4 GM/DL — SIGNIFICANT CHANGE UP (ref 32–36)
MCV RBC AUTO: 87.9 FL — SIGNIFICANT CHANGE UP (ref 80–100)
NRBC # BLD: 0 /100 WBCS — SIGNIFICANT CHANGE UP (ref 0–0)
PHOSPHATE SERPL-MCNC: 1.7 MG/DL — LOW (ref 2.5–4.5)
PLATELET # BLD AUTO: 146 K/UL — LOW (ref 150–400)
POTASSIUM SERPL-MCNC: 3.6 MMOL/L — SIGNIFICANT CHANGE UP (ref 3.5–5.3)
POTASSIUM SERPL-SCNC: 3.6 MMOL/L — SIGNIFICANT CHANGE UP (ref 3.5–5.3)
PROT SERPL-MCNC: 4.8 GM/DL — LOW (ref 6–8.3)
RBC # BLD: 3.87 M/UL — LOW (ref 4.2–5.8)
RBC # FLD: 14.5 % — SIGNIFICANT CHANGE UP (ref 10.3–14.5)
SODIUM SERPL-SCNC: 152 MMOL/L — HIGH (ref 135–145)
SPECIMEN SOURCE: SIGNIFICANT CHANGE UP
WBC # BLD: 27.58 K/UL — HIGH (ref 3.8–10.5)
WBC # FLD AUTO: 27.58 K/UL — HIGH (ref 3.8–10.5)

## 2019-03-05 PROCEDURE — 99291 CRITICAL CARE FIRST HOUR: CPT

## 2019-03-05 PROCEDURE — 88304 TISSUE EXAM BY PATHOLOGIST: CPT | Mod: 26

## 2019-03-05 RX ORDER — POTASSIUM PHOSPHATE, MONOBASIC POTASSIUM PHOSPHATE, DIBASIC 236; 224 MG/ML; MG/ML
15 INJECTION, SOLUTION INTRAVENOUS ONCE
Qty: 0 | Refills: 0 | Status: COMPLETED | OUTPATIENT
Start: 2019-03-05 | End: 2019-03-05

## 2019-03-05 RX ORDER — FENTANYL CITRATE 50 UG/ML
50 INJECTION INTRAVENOUS EVERY 6 HOURS
Qty: 0 | Refills: 0 | Status: DISCONTINUED | OUTPATIENT
Start: 2019-03-05 | End: 2019-03-06

## 2019-03-05 RX ORDER — NOREPINEPHRINE BITARTRATE/D5W 8 MG/250ML
0.05 PLASTIC BAG, INJECTION (ML) INTRAVENOUS
Qty: 8 | Refills: 0 | Status: DISCONTINUED | OUTPATIENT
Start: 2019-03-05 | End: 2019-03-10

## 2019-03-05 RX ORDER — SODIUM CHLORIDE 9 MG/ML
1000 INJECTION, SOLUTION INTRAVENOUS
Qty: 0 | Refills: 0 | Status: DISCONTINUED | OUTPATIENT
Start: 2019-03-05 | End: 2019-03-06

## 2019-03-05 RX ADMIN — Medication 650 MILLIGRAM(S): at 05:50

## 2019-03-05 RX ADMIN — Medication 650 MILLIGRAM(S): at 23:53

## 2019-03-05 RX ADMIN — CHLORHEXIDINE GLUCONATE 15 MILLILITER(S): 213 SOLUTION TOPICAL at 17:39

## 2019-03-05 RX ADMIN — NAFCILLIN 200 GRAM(S): 10 INJECTION, POWDER, FOR SOLUTION INTRAVENOUS at 20:03

## 2019-03-05 RX ADMIN — SODIUM CHLORIDE 50 MILLILITER(S): 9 INJECTION, SOLUTION INTRAVENOUS at 09:02

## 2019-03-05 RX ADMIN — Medication 4: at 15:39

## 2019-03-05 RX ADMIN — NAFCILLIN 200 GRAM(S): 10 INJECTION, POWDER, FOR SOLUTION INTRAVENOUS at 04:00

## 2019-03-05 RX ADMIN — Medication 4.73 MICROGRAM(S)/KG/MIN: at 16:20

## 2019-03-05 RX ADMIN — Medication 650 MILLIGRAM(S): at 11:32

## 2019-03-05 RX ADMIN — FENTANYL CITRATE 50 MICROGRAM(S): 50 INJECTION INTRAVENOUS at 21:43

## 2019-03-05 RX ADMIN — POTASSIUM PHOSPHATE, MONOBASIC POTASSIUM PHOSPHATE, DIBASIC 62.5 MILLIMOLE(S): 236; 224 INJECTION, SOLUTION INTRAVENOUS at 17:39

## 2019-03-05 RX ADMIN — NAFCILLIN 200 GRAM(S): 10 INJECTION, POWDER, FOR SOLUTION INTRAVENOUS at 08:25

## 2019-03-05 RX ADMIN — FENTANYL CITRATE 50 MICROGRAM(S): 50 INJECTION INTRAVENOUS at 04:00

## 2019-03-05 RX ADMIN — Medication 4: at 05:51

## 2019-03-05 RX ADMIN — Medication 6: at 18:00

## 2019-03-05 RX ADMIN — Medication 650 MILLIGRAM(S): at 20:16

## 2019-03-05 RX ADMIN — Medication 650 MILLIGRAM(S): at 12:00

## 2019-03-05 RX ADMIN — Medication 4: at 11:33

## 2019-03-05 RX ADMIN — FENTANYL CITRATE 50 MICROGRAM(S): 50 INJECTION INTRAVENOUS at 04:15

## 2019-03-05 RX ADMIN — INSULIN GLARGINE 20 UNIT(S): 100 INJECTION, SOLUTION SUBCUTANEOUS at 22:34

## 2019-03-05 RX ADMIN — FENTANYL CITRATE 50 MICROGRAM(S): 50 INJECTION INTRAVENOUS at 20:16

## 2019-03-05 RX ADMIN — NAFCILLIN 200 GRAM(S): 10 INJECTION, POWDER, FOR SOLUTION INTRAVENOUS at 00:34

## 2019-03-05 RX ADMIN — NAFCILLIN 200 GRAM(S): 10 INJECTION, POWDER, FOR SOLUTION INTRAVENOUS at 15:38

## 2019-03-05 RX ADMIN — PANTOPRAZOLE SODIUM 40 MILLIGRAM(S): 20 TABLET, DELAYED RELEASE ORAL at 11:33

## 2019-03-05 RX ADMIN — Medication 6: at 22:33

## 2019-03-05 RX ADMIN — CHLORHEXIDINE GLUCONATE 1 APPLICATION(S): 213 SOLUTION TOPICAL at 04:00

## 2019-03-05 RX ADMIN — HEPARIN SODIUM 5000 UNIT(S): 5000 INJECTION INTRAVENOUS; SUBCUTANEOUS at 05:50

## 2019-03-05 RX ADMIN — CHLORHEXIDINE GLUCONATE 15 MILLILITER(S): 213 SOLUTION TOPICAL at 05:49

## 2019-03-05 RX ADMIN — NAFCILLIN 200 GRAM(S): 10 INJECTION, POWDER, FOR SOLUTION INTRAVENOUS at 11:33

## 2019-03-05 RX ADMIN — Medication 2: at 02:13

## 2019-03-05 NOTE — CONSULT NOTE ADULT - SUBJECTIVE AND OBJECTIVE BOX
Vascular Attending:    3-5-19 The patient is seen and examined and the pressure ulcers are evaluated, discussed with the daughter at the bedside. Pt is here for PNE and sepssis on vasopressors. the pt has multiple pressure ulcers, which are all examined.    HPI:  84 y/o M w/severe dementia, bedbound and contracted at baseline, DM, prior gastric/intestinal pneumatosis managed nonoperatively p/w fevers and dyspnea. Intubated for acute respiratory failure. Patient unable to provide history. Family unavailable. (01 Mar 2019 13:03)      PAST MEDICAL & SURGICAL HISTORY:  Pneumatosis of intestines  Dementia  DM (diabetes mellitus)  Glaucoma  Dementia  DM (diabetes mellitus)  HTN (hypertension)  History of cholecystectomy  History of appendectomy  History of cholecystectomy  History of nephrolithiasis  Status post open reduction with internal fixation of fracture: right femur        MEDICATIONS  (STANDING):  chlorhexidine 0.12% Liquid 15 milliLiter(s) Oral Mucosa two times a day  chlorhexidine 4% Liquid 1 Application(s) Topical <User Schedule>  dextrose 5%. 1000 milliLiter(s) (50 mL/Hr) IV Continuous <Continuous>  dextrose 5%. 1000 milliLiter(s) (50 mL/Hr) IV Continuous <Continuous>  dextrose 50% Injectable 12.5 Gram(s) IV Push once  dextrose 50% Injectable 25 Gram(s) IV Push once  dextrose 50% Injectable 25 Gram(s) IV Push once  fentaNYL   Infusion. 0.5 MICROgram(s)/kG/Hr (2.525 mL/Hr) IV Continuous <Continuous>  heparin  Injectable 5000 Unit(s) SubCutaneous every 12 hours  insulin glargine Injectable (LANTUS) 20 Unit(s) SubCutaneous at bedtime  insulin lispro (HumaLOG) corrective regimen sliding scale   SubCutaneous every 4 hours  nafcillin  IVPB 2 Gram(s) IV Intermittent every 4 hours  norepinephrine Infusion 0.05 MICROgram(s)/kG/Min (4.734 mL/Hr) IV Continuous <Continuous>  pantoprazole   Suspension 40 milliGRAM(s) Oral daily    MEDICATIONS  (PRN):  acetaminophen    Suspension .. 650 milliGRAM(s) Oral every 6 hours PRN Temp greater or equal to 38C (100.4F)  dextrose 40% Gel 15 Gram(s) Oral once PRN Blood Glucose LESS THAN 70 milliGRAM(s)/deciliter  fentaNYL    Injectable 50 MICROGram(s) IV Push every 6 hours PRN dyssynchrony sedation  glucagon  Injectable 1 milliGRAM(s) IntraMuscular once PRN Glucose LESS THAN 70 milligrams/deciliter      Allergies    No Known Allergies    Intolerances        SOCIAL HISTORY:      Vital Signs Last 24 Hrs  T(C): 38.7 (05 Mar 2019 15:18), Max: 38.7 (05 Mar 2019 15:18)  T(F): 101.7 (05 Mar 2019 15:18), Max: 101.7 (05 Mar 2019 15:18)  HR: 113 (05 Mar 2019 16:55) (90 - 115)  BP: 81/43 (05 Mar 2019 16:07) (77/45 - 114/76)  BP(mean): 51 (05 Mar 2019 16:07) (36 - 84)  RR: 43 (05 Mar 2019 16:20) (30 - 46)  SpO2: 100% (05 Mar 2019 16:55) (95% - 100%)    P/E:- On Vent, non responsive.  CAROTIDS:- Bilateral carotids with no Bruits. No scars of previous catheterisation.  UPPER EXTREMITIES:- Bilateral radial artery pulses are normal and no ischemia of the Hands. No edema of the arms.  ABDOMEN:- No pulsatile mass in the abdomen and no ascites.  LOWER EXTREMITIES:- Bilateral LE with No Edema and no CVI, No varicose veins, no ulcers.The arterial pulse are examined with palpation and Dopplers and the findings are as follows,    Wounds:- The pt has following wounds and measurements.  Sacrum:- Stage  4,  8x10x2 cms, Necrotic needs debridement.  Gluteal:-Stage 1, 2, 3, 4,  Unstageable   Hips:- Stage, 3  right,  Left HIP has DTI 3x3 cms   Feet:- Both heels are DTI,  Unstageable      LABS:                        11.0   27.58 )-----------( 146      ( 05 Mar 2019 04:05 )             34.0     03-05    152<H>  |  121<H>  |  29<H>  ----------------------------<  215<H>  3.6   |  23  |  0.80    Ca    7.6<L>      05 Mar 2019 04:05  Phos  1.7     03-05  Mg     1.7     03-05    TPro  4.8<L>  /  Alb  1.1<L>  /  TBili  1.0  /  DBili  x   /  AST  28  /  ALT  26  /  AlkPhos  168<H>  03-05          RADIOLOGY & ADDITIONAL STUDIES    Impression and Plan: As above, need debridement of the scarl pressure ulcer stage4 and needs debridement od the right HIP pressure ulcer. The debridemnts are done today and dressings are placed.  ADV -- Iodoform packing to the sacral ulcer daily and when it is clean, can eval for VAC.

## 2019-03-05 NOTE — PROGRESS NOTE ADULT - SUBJECTIVE AND OBJECTIVE BOX
HPI:  82 y/o M w/severe dementia, bedbound and contracted at baseline, DM, prior gastric/intestinal pneumatosis managed nonoperatively p/w fevers and dyspnea. Intubated for acute respiratory failure. Patient unable to provide history. Family unavailable. (01 Mar 2019 13:03)      24 hr events: No acute events. Repeat blood cultures positive. Unable to provide.    ## ROS:  [x ] unable to obtain      ## Vitals  ICU Vital Signs Last 24 Hrs  T(C): 37.7 (05 Mar 2019 07:30), Max: 38.6 (05 Mar 2019 04:35)  T(F): 99.8 (05 Mar 2019 07:30), Max: 101.5 (05 Mar 2019 04:35)  HR: 105 (05 Mar 2019 09:34) (90 - 115)  BP: 107/59 (05 Mar 2019 09:34) (90/52 - 133/62)  BP(mean): 71 (05 Mar 2019 09:34) (56 - 84)  ABP: --  ABP(mean): --  RR: 43 (05 Mar 2019 09:34) (21 - 43)  SpO2: 99% (05 Mar 2019 09:34) (95% - 100%)      ## Physical Exam:  Gen: Elderly, cachectic male lying in bed, intubated, contracted, NAD  HEENT: NC/AT sclerae anicteric, ET tube in place  Resp: Tachypneic, overbreathing vent, mechanical breath sounds b/l  CV: S1, S2  Abd: Soft, + BS  Ext: WWP  Skin: Unstagable sacral ulcer, heel ulcer  Neuro: Sedated, unarousable, does not follow commands, contracted    ## Vent Data  Mode: AC/ CMV (Assist Control/ Continuous Mandatory Ventilation)  RR (machine): 12  TV (machine): 350  FiO2: 30  PEEP: 5  ITime: 1  MAP: 9  PIP: 17      ## Labs:  Chem:  03-05    152<H>  |  121<H>  |  29<H>  ----------------------------<  215<H>  3.6   |  23  |  0.80    Ca    7.6<L>      05 Mar 2019 04:05  Phos  1.7     03-05  Mg     1.7     03-05    TPro  4.8<L>  /  Alb  1.1<L>  /  TBili  1.0  /  DBili  x   /  AST  28  /  ALT  26  /  AlkPhos  168<H>  03-05    LIVER FUNCTIONS - ( 05 Mar 2019 04:05 )  Alb: 1.1 g/dL / Pro: 4.8 gm/dL / ALK PHOS: 168 U/L / ALT: 26 U/L / AST: 28 U/L / GGT: x           CBC:                        11.0   27.58 )-----------( 146      ( 05 Mar 2019 04:05 )             34.0     Coags:          ## Cardiac        ## Blood Gas      #I/Os  I&O's Detail    04 Mar 2019 07:01  -  05 Mar 2019 07:00  --------------------------------------------------------  IN:    Free Water: 1800 mL    Glucerna: 1320 mL    norepinephrine Infusion: 95 mL    Solution: 900 mL  Total IN: 4115 mL    OUT:    Incontinent per Condom Catheter: 1450 mL  Total OUT: 1450 mL    Total NET: 2665 mL          ## Imaging:    ## Medications:  MEDICATIONS  (STANDING):  chlorhexidine 0.12% Liquid 15 milliLiter(s) Oral Mucosa two times a day  chlorhexidine 4% Liquid 1 Application(s) Topical <User Schedule>  dextrose 5%. 1000 milliLiter(s) (50 mL/Hr) IV Continuous <Continuous>  dextrose 5%. 1000 milliLiter(s) (50 mL/Hr) IV Continuous <Continuous>  dextrose 50% Injectable 12.5 Gram(s) IV Push once  dextrose 50% Injectable 25 Gram(s) IV Push once  dextrose 50% Injectable 25 Gram(s) IV Push once  fentaNYL   Infusion. 0.5 MICROgram(s)/kG/Hr (2.525 mL/Hr) IV Continuous <Continuous>  heparin  Injectable 5000 Unit(s) SubCutaneous every 12 hours  insulin glargine Injectable (LANTUS) 20 Unit(s) SubCutaneous at bedtime  insulin lispro (HumaLOG) corrective regimen sliding scale   SubCutaneous every 4 hours  nafcillin  IVPB 2 Gram(s) IV Intermittent every 4 hours  norepinephrine Infusion 0.05 MICROgram(s)/kG/Min (4.734 mL/Hr) IV Continuous <Continuous>  pantoprazole   Suspension 40 milliGRAM(s) Oral daily    MEDICATIONS  (PRN):  acetaminophen    Suspension .. 650 milliGRAM(s) Oral every 6 hours PRN Temp greater or equal to 38C (100.4F)  dextrose 40% Gel 15 Gram(s) Oral once PRN Blood Glucose LESS THAN 70 milliGRAM(s)/deciliter  fentaNYL    Injectable 50 MICROGram(s) IV Push every 6 hours PRN dyssynchrony sedation  glucagon  Injectable 1 milliGRAM(s) IntraMuscular once PRN Glucose LESS THAN 70 milligrams/deciliter

## 2019-03-05 NOTE — PROGRESS NOTE ADULT - ASSESSMENT
82 y/o M w/severe dementia, bedbound and contracted at baseline, DM, prior gastric/intestinal pneumatosis managed nonoperatively now presenting with acute respiratory failure requiring intubation, severe sepsis likely secondary to PNA, DKA, ANSLEY (likley prerenal +/- sepsis), hypernatremia, and lactic acidosis (likely combination of DKA and sepsis).    Neuro: Poor baseline  - Sedation as needed     Resp  - Daily SAT/SBT    CV: VICTOR M    ID: Severe sepsis, MSSA PNA, blood cultures remain positive for MSSA. Will need echo to evaluate for endocarditis   - Nafcillin, broaden if needed  - TTE, possible SUNNY if TTE negative  - Wound care  - Vascular surgery consult    Renal: ANSLEY now resolved (likely combination of prerenal and ATN from sepsis)  - Avoid nephrotoxins    Endo:  - Continue insulin    FEN:  - Tube feeds  - Replete lytes PRN  - D5 gtt, Free water for hypernatremia    PPx:  - Heparin SC/PPI    Attending critical care time 35 minutes 82 y/o M w/severe dementia, bedbound and contracted at baseline, DM, prior gastric/intestinal pneumatosis managed nonoperatively now presenting with acute respiratory failure requiring intubation, severe sepsis likely secondary to PNA, DKA, ANSLEY (likley prerenal +/- sepsis), hypernatremia, and lactic acidosis (likely combination of DKA and sepsis).    Neuro: Poor baseline  - Sedation as needed     Resp  - Daily SAT/SBT    CV: VICTOR M    ID: Severe sepsis, MSSA PNA, blood cultures remain positive for gram positive cocci in clusters (likely still MSSA). Will need echo to evaluate for endocarditis   - Nafcillin, broaden if needed  - TTE, possible SUNNY if TTE negative  - Wound care  - Vascular surgery consult    Renal: ANSLEY now resolved (likely combination of prerenal and ATN from sepsis)  - Avoid nephrotoxins    Endo:  - Continue insulin    FEN:  - Tube feeds  - Replete lytes PRN  - D5 gtt, Free water for hypernatremia    PPx:  - Heparin SC/PPI    Attending critical care time 35 minutes

## 2019-03-06 LAB
-  CEFTRIAXONE: SIGNIFICANT CHANGE UP
-  CLINDAMYCIN: SIGNIFICANT CHANGE UP
-  ERYTHROMYCIN: SIGNIFICANT CHANGE UP
-  LEVOFLOXACIN: SIGNIFICANT CHANGE UP
-  PENICILLIN: SIGNIFICANT CHANGE UP
-  VANCOMYCIN: SIGNIFICANT CHANGE UP
ANION GAP SERPL CALC-SCNC: 9 MMOL/L — SIGNIFICANT CHANGE UP (ref 5–17)
BUN SERPL-MCNC: 46 MG/DL — HIGH (ref 7–23)
CALCIUM SERPL-MCNC: 7 MG/DL — LOW (ref 8.5–10.1)
CHLORIDE SERPL-SCNC: 114 MMOL/L — HIGH (ref 96–108)
CO2 SERPL-SCNC: 22 MMOL/L — SIGNIFICANT CHANGE UP (ref 22–31)
CREAT SERPL-MCNC: 1.21 MG/DL — SIGNIFICANT CHANGE UP (ref 0.5–1.3)
CULTURE RESULTS: SIGNIFICANT CHANGE UP
CULTURE RESULTS: SIGNIFICANT CHANGE UP
GLUCOSE BLDC GLUCOMTR-MCNC: 126 MG/DL — HIGH (ref 70–99)
GLUCOSE BLDC GLUCOMTR-MCNC: 141 MG/DL — HIGH (ref 70–99)
GLUCOSE BLDC GLUCOMTR-MCNC: 144 MG/DL — HIGH (ref 70–99)
GLUCOSE BLDC GLUCOMTR-MCNC: 78 MG/DL — SIGNIFICANT CHANGE UP (ref 70–99)
GLUCOSE BLDC GLUCOMTR-MCNC: 86 MG/DL — SIGNIFICANT CHANGE UP (ref 70–99)
GLUCOSE BLDC GLUCOMTR-MCNC: 94 MG/DL — SIGNIFICANT CHANGE UP (ref 70–99)
GLUCOSE BLDC GLUCOMTR-MCNC: 98 MG/DL — SIGNIFICANT CHANGE UP (ref 70–99)
GLUCOSE SERPL-MCNC: 153 MG/DL — HIGH (ref 70–99)
GRAM STN FLD: SIGNIFICANT CHANGE UP
HCT VFR BLD CALC: 36.6 % — LOW (ref 39–50)
HGB BLD-MCNC: 11.4 G/DL — LOW (ref 13–17)
MAGNESIUM SERPL-MCNC: 1.9 MG/DL — SIGNIFICANT CHANGE UP (ref 1.6–2.6)
MCHC RBC-ENTMCNC: 28 PG — SIGNIFICANT CHANGE UP (ref 27–34)
MCHC RBC-ENTMCNC: 31.1 GM/DL — LOW (ref 32–36)
MCV RBC AUTO: 89.9 FL — SIGNIFICANT CHANGE UP (ref 80–100)
METHOD TYPE: SIGNIFICANT CHANGE UP
METHOD TYPE: SIGNIFICANT CHANGE UP
NRBC # BLD: 0 /100 WBCS — SIGNIFICANT CHANGE UP (ref 0–0)
ORGANISM # SPEC MICROSCOPIC CNT: SIGNIFICANT CHANGE UP
PHOSPHATE SERPL-MCNC: 3.2 MG/DL — SIGNIFICANT CHANGE UP (ref 2.5–4.5)
PLATELET # BLD AUTO: 210 K/UL — SIGNIFICANT CHANGE UP (ref 150–400)
POTASSIUM SERPL-MCNC: 3.9 MMOL/L — SIGNIFICANT CHANGE UP (ref 3.5–5.3)
POTASSIUM SERPL-SCNC: 3.9 MMOL/L — SIGNIFICANT CHANGE UP (ref 3.5–5.3)
RBC # BLD: 4.07 M/UL — LOW (ref 4.2–5.8)
RBC # FLD: 14.8 % — HIGH (ref 10.3–14.5)
SODIUM SERPL-SCNC: 145 MMOL/L — SIGNIFICANT CHANGE UP (ref 135–145)
SPECIMEN SOURCE: SIGNIFICANT CHANGE UP
SPECIMEN SOURCE: SIGNIFICANT CHANGE UP
WBC # BLD: 28.55 K/UL — HIGH (ref 3.8–10.5)
WBC # FLD AUTO: 28.55 K/UL — HIGH (ref 3.8–10.5)

## 2019-03-06 PROCEDURE — 99291 CRITICAL CARE FIRST HOUR: CPT

## 2019-03-06 RX ADMIN — CHLORHEXIDINE GLUCONATE 15 MILLILITER(S): 213 SOLUTION TOPICAL at 18:25

## 2019-03-06 RX ADMIN — NAFCILLIN 200 GRAM(S): 10 INJECTION, POWDER, FOR SOLUTION INTRAVENOUS at 07:55

## 2019-03-06 RX ADMIN — NAFCILLIN 200 GRAM(S): 10 INJECTION, POWDER, FOR SOLUTION INTRAVENOUS at 21:33

## 2019-03-06 RX ADMIN — HEPARIN SODIUM 5000 UNIT(S): 5000 INJECTION INTRAVENOUS; SUBCUTANEOUS at 17:37

## 2019-03-06 RX ADMIN — HEPARIN SODIUM 5000 UNIT(S): 5000 INJECTION INTRAVENOUS; SUBCUTANEOUS at 05:20

## 2019-03-06 RX ADMIN — NAFCILLIN 200 GRAM(S): 10 INJECTION, POWDER, FOR SOLUTION INTRAVENOUS at 11:46

## 2019-03-06 RX ADMIN — Medication 650 MILLIGRAM(S): at 05:20

## 2019-03-06 RX ADMIN — Medication 0: at 23:50

## 2019-03-06 RX ADMIN — NAFCILLIN 200 GRAM(S): 10 INJECTION, POWDER, FOR SOLUTION INTRAVENOUS at 03:22

## 2019-03-06 RX ADMIN — NAFCILLIN 200 GRAM(S): 10 INJECTION, POWDER, FOR SOLUTION INTRAVENOUS at 23:50

## 2019-03-06 RX ADMIN — Medication 650 MILLIGRAM(S): at 15:32

## 2019-03-06 RX ADMIN — FENTANYL CITRATE 2.52 MICROGRAM(S)/KG/HR: 50 INJECTION INTRAVENOUS at 00:54

## 2019-03-06 RX ADMIN — NAFCILLIN 200 GRAM(S): 10 INJECTION, POWDER, FOR SOLUTION INTRAVENOUS at 00:04

## 2019-03-06 RX ADMIN — CHLORHEXIDINE GLUCONATE 15 MILLILITER(S): 213 SOLUTION TOPICAL at 05:20

## 2019-03-06 RX ADMIN — CHLORHEXIDINE GLUCONATE 1 APPLICATION(S): 213 SOLUTION TOPICAL at 05:20

## 2019-03-06 RX ADMIN — NAFCILLIN 200 GRAM(S): 10 INJECTION, POWDER, FOR SOLUTION INTRAVENOUS at 15:29

## 2019-03-06 RX ADMIN — Medication 650 MILLIGRAM(S): at 16:00

## 2019-03-06 RX ADMIN — SODIUM CHLORIDE 50 MILLILITER(S): 9 INJECTION, SOLUTION INTRAVENOUS at 05:24

## 2019-03-06 RX ADMIN — PANTOPRAZOLE SODIUM 40 MILLIGRAM(S): 20 TABLET, DELAYED RELEASE ORAL at 11:55

## 2019-03-06 RX ADMIN — Medication 650 MILLIGRAM(S): at 03:00

## 2019-03-06 NOTE — PROGRESS NOTE ADULT - ASSESSMENT
84 y/o M w/severe dementia, bedbound and contracted at baseline, DM, prior gastric/intestinal pneumatosis managed nonoperatively now presenting with acute respiratory failure requiring intubation, severe sepsis likely secondary to PNA, DKA, ANSLEY (prestonley prerenal +/- sepsis), hypernatremia, and lactic acidosis (likely combination of DKA and sepsis).    Neuro: Poor baseline  - Sedation as needed     Resp  - Daily SAT/SBT    CV: VICTOR M    ID: Severe sepsis, MSSA PNA, blood cultures remain positive for gram positive cocci in clusters (likely still MSSA). Will need echo to evaluate for endocarditis   - Nafcillin, broaden if needed  - TTE negative for endocarditis. Will need SUNNY. If SUNNY negative will need imaging to look for osteo/epidural abscess as possible source of persistent bacteremia  - Wound care    Renal: Recurrence of ANSLEY, likely ATN  - Avoid nephrotoxins    Endo:  - Continue insulin    FEN:  - Tube feeds  - Replete lytes PRN  - Hypernatremia resolved. D/C D5 gtt    PPx:  - Heparin SC/PPI    Attending critical care time 35 minutes

## 2019-03-06 NOTE — PROGRESS NOTE ADULT - SUBJECTIVE AND OBJECTIVE BOX
HPI:  84 y/o M w/severe dementia, bedbound and contracted at baseline, DM, prior gastric/intestinal pneumatosis managed nonoperatively p/w fevers and dyspnea. Intubated for acute respiratory failure. Patient unable to provide history. Family unavailable. (01 Mar 2019 13:03)      24 hr events:    ## ROS:  [ ] unable to obtain  CONSTITUTIONAL: No fever, weight loss, or fatigue  EYES: No eye pain, visual disturbances, or discharge  ENMT:  No difficulty hearing, tinnitus, vertigo; No sinus or throat pain  NECK: No pain or stiffness  RESPIRATORY: No cough, wheezing, chills or hemoptysis; No shortness of breath  CARDIOVASCULAR: No chest pain, palpitations, dizziness, or leg swelling  GASTROINTESTINAL: No abdominal or epigastric pain. No nausea, vomiting, or hematemesis; No diarrhea or constipation. No melena or hematochezia.  GENITOURINARY: No dysuria, frequency, hematuria, or incontinence  NEUROLOGICAL: No headaches, memory loss, loss of strength, numbness, or tremors  SKIN: No itching, burning, rashes, or lesions   LYMPH NODES: No enlarged glands  ENDOCRINE: No heat or cold intolerance; No hair loss  MUSCULOSKELETAL: No joint pain or swelling; No muscle, back, or extremity pain  PSYCHIATRIC: No depression, anxiety, mood swings, or difficulty sleeping  HEME/LYMPH: No easy bruising, or bleeding gums  ALLERGY AND IMMUNOLOGIC: No hives or eczema    ## Vitals  ICU Vital Signs Last 24 Hrs  T(C): 37.3 (06 Mar 2019 05:30), Max: 38.7 (05 Mar 2019 15:18)  T(F): 99.1 (06 Mar 2019 05:30), Max: 101.7 (05 Mar 2019 15:18)  HR: 95 (06 Mar 2019 07:13) (89 - 120)  BP: 127/74 (06 Mar 2019 07:00) (77/45 - 141/68)  BP(mean): 87 (06 Mar 2019 07:00) (35 - 91)  ABP: --  ABP(mean): --  RR: 29 (06 Mar 2019 07:13) (29 - 46)  SpO2: 97% (06 Mar 2019 07:13) (92% - 100%)      ## Physical Exam:  Gen:  HEENT:  Resp:  CV:  Abd:  Ext:  Neuro:    ## Vent Data  Mode: AC/ CMV (Assist Control/ Continuous Mandatory Ventilation)  RR (machine): 12  TV (machine): 350  FiO2: 30  PEEP: 5  ITime: 1  MAP: 9  PIP: 12      ## Labs:  Chem:  03-06    145  |  114<H>  |  46<H>  ----------------------------<  153<H>  3.9   |  22  |  1.21    Ca    7.0<L>      06 Mar 2019 04:06  Phos  3.2     03-06  Mg     1.9     03-06    TPro  4.8<L>  /  Alb  1.1<L>  /  TBili  1.0  /  DBili  x   /  AST  28  /  ALT  26  /  AlkPhos  168<H>  03-05    LIVER FUNCTIONS - ( 05 Mar 2019 04:05 )  Alb: 1.1 g/dL / Pro: 4.8 gm/dL / ALK PHOS: 168 U/L / ALT: 26 U/L / AST: 28 U/L / GGT: x           CBC:                        11.4   28.55 )-----------( 210      ( 06 Mar 2019 04:06 )             36.6     Coags:          ## Cardiac        ## Blood Gas      #I/Os  I&O's Detail    05 Mar 2019 07:01  -  06 Mar 2019 07:00  --------------------------------------------------------  IN:    dextrose 5%.: 1150 mL    fentaNYL Infusion.: 33.1 mL    Free Water: 1500 mL    Glucerna: 1320 mL    norepinephrine Infusion: 99.7 mL    Solution: 250 mL    Solution: 600 mL  Total IN: 4952.8 mL    OUT:    Incontinent per Condom Catheter: 400 mL    Voided: 430 mL  Total OUT: 830 mL    Total NET: 4122.8 mL          ## Imaging:    ## Medications:  MEDICATIONS  (STANDING):  chlorhexidine 0.12% Liquid 15 milliLiter(s) Oral Mucosa two times a day  chlorhexidine 4% Liquid 1 Application(s) Topical <User Schedule>  dextrose 5%. 1000 milliLiter(s) (50 mL/Hr) IV Continuous <Continuous>  dextrose 50% Injectable 12.5 Gram(s) IV Push once  dextrose 50% Injectable 25 Gram(s) IV Push once  dextrose 50% Injectable 25 Gram(s) IV Push once  fentaNYL   Infusion. 0.5 MICROgram(s)/kG/Hr (2.525 mL/Hr) IV Continuous <Continuous>  heparin  Injectable 5000 Unit(s) SubCutaneous every 12 hours  insulin glargine Injectable (LANTUS) 20 Unit(s) SubCutaneous at bedtime  insulin lispro (HumaLOG) corrective regimen sliding scale   SubCutaneous every 4 hours  nafcillin  IVPB 2 Gram(s) IV Intermittent every 4 hours  norepinephrine Infusion 0.05 MICROgram(s)/kG/Min (4.734 mL/Hr) IV Continuous <Continuous>  pantoprazole   Suspension 40 milliGRAM(s) Oral daily    MEDICATIONS  (PRN):  acetaminophen    Suspension .. 650 milliGRAM(s) Oral every 6 hours PRN Temp greater or equal to 38C (100.4F)  dextrose 40% Gel 15 Gram(s) Oral once PRN Blood Glucose LESS THAN 70 milliGRAM(s)/deciliter  glucagon  Injectable 1 milliGRAM(s) IntraMuscular once PRN Glucose LESS THAN 70 milligrams/deciliter HPI:  82 y/o M w/severe dementia, bedbound and contracted at baseline, DM, prior gastric/intestinal pneumatosis managed nonoperatively p/w fevers and dyspnea. Intubated for acute respiratory failure. Patient unable to provide history. Family unavailable. (01 Mar 2019 13:03)      24 hr events: Had ulcers debrided. Remains febrile. TTE negative for endocarditis. Unable to provide history    ## ROS:  [x ] unable to obtain    ## Vitals  ICU Vital Signs Last 24 Hrs  T(C): 37.3 (06 Mar 2019 05:30), Max: 38.7 (05 Mar 2019 15:18)  T(F): 99.1 (06 Mar 2019 05:30), Max: 101.7 (05 Mar 2019 15:18)  HR: 95 (06 Mar 2019 07:13) (89 - 120)  BP: 127/74 (06 Mar 2019 07:00) (77/45 - 141/68)  BP(mean): 87 (06 Mar 2019 07:00) (35 - 91)  ABP: --  ABP(mean): --  RR: 29 (06 Mar 2019 07:13) (29 - 46)  SpO2: 97% (06 Mar 2019 07:13) (92% - 100%)      ## Physical Exam:  Gen: Elderly, cachectic male lying in bed, intubated, contracted, NAD  HEENT: NC/AT sclerae anicteric, ET tube in place  Resp: Tachypneic, overbreathing vent, mechanical breath sounds b/l  CV: S1, S2  Abd: Soft, + BS  Ext: WWP  Skin: Unstagable sacral ulcer, heel ulcer  Neuro: Sedated, unarousable, does not follow commands, contracted    ## Vent Data  Mode: AC/ CMV (Assist Control/ Continuous Mandatory Ventilation)  RR (machine): 12  TV (machine): 350  FiO2: 30  PEEP: 5  ITime: 1  MAP: 9  PIP: 12      ## Labs:  Chem:  03-06    145  |  114<H>  |  46<H>  ----------------------------<  153<H>  3.9   |  22  |  1.21    Ca    7.0<L>      06 Mar 2019 04:06  Phos  3.2     03-06  Mg     1.9     03-06    TPro  4.8<L>  /  Alb  1.1<L>  /  TBili  1.0  /  DBili  x   /  AST  28  /  ALT  26  /  AlkPhos  168<H>  03-05    LIVER FUNCTIONS - ( 05 Mar 2019 04:05 )  Alb: 1.1 g/dL / Pro: 4.8 gm/dL / ALK PHOS: 168 U/L / ALT: 26 U/L / AST: 28 U/L / GGT: x           CBC:                        11.4   28.55 )-----------( 210      ( 06 Mar 2019 04:06 )             36.6     Coags:          ## Cardiac        ## Blood Gas      #I/Os  I&O's Detail    05 Mar 2019 07:01  -  06 Mar 2019 07:00  --------------------------------------------------------  IN:    dextrose 5%.: 1150 mL    fentaNYL Infusion.: 33.1 mL    Free Water: 1500 mL    Glucerna: 1320 mL    norepinephrine Infusion: 99.7 mL    Solution: 250 mL    Solution: 600 mL  Total IN: 4952.8 mL    OUT:    Incontinent per Condom Catheter: 400 mL    Voided: 430 mL  Total OUT: 830 mL    Total NET: 4122.8 mL          ## Imaging:    ## Medications:  MEDICATIONS  (STANDING):  chlorhexidine 0.12% Liquid 15 milliLiter(s) Oral Mucosa two times a day  chlorhexidine 4% Liquid 1 Application(s) Topical <User Schedule>  dextrose 5%. 1000 milliLiter(s) (50 mL/Hr) IV Continuous <Continuous>  dextrose 50% Injectable 12.5 Gram(s) IV Push once  dextrose 50% Injectable 25 Gram(s) IV Push once  dextrose 50% Injectable 25 Gram(s) IV Push once  fentaNYL   Infusion. 0.5 MICROgram(s)/kG/Hr (2.525 mL/Hr) IV Continuous <Continuous>  heparin  Injectable 5000 Unit(s) SubCutaneous every 12 hours  insulin glargine Injectable (LANTUS) 20 Unit(s) SubCutaneous at bedtime  insulin lispro (HumaLOG) corrective regimen sliding scale   SubCutaneous every 4 hours  nafcillin  IVPB 2 Gram(s) IV Intermittent every 4 hours  norepinephrine Infusion 0.05 MICROgram(s)/kG/Min (4.734 mL/Hr) IV Continuous <Continuous>  pantoprazole   Suspension 40 milliGRAM(s) Oral daily    MEDICATIONS  (PRN):  acetaminophen    Suspension .. 650 milliGRAM(s) Oral every 6 hours PRN Temp greater or equal to 38C (100.4F)  dextrose 40% Gel 15 Gram(s) Oral once PRN Blood Glucose LESS THAN 70 milliGRAM(s)/deciliter  glucagon  Injectable 1 milliGRAM(s) IntraMuscular once PRN Glucose LESS THAN 70 milligrams/deciliter

## 2019-03-07 ENCOUNTER — RX RENEWAL (OUTPATIENT)
Age: 84
End: 2019-03-07

## 2019-03-07 LAB
-  AMPICILLIN/SULBACTAM: SIGNIFICANT CHANGE UP
-  CEFAZOLIN: SIGNIFICANT CHANGE UP
-  CLINDAMYCIN: SIGNIFICANT CHANGE UP
-  ERYTHROMYCIN: SIGNIFICANT CHANGE UP
-  GENTAMICIN: SIGNIFICANT CHANGE UP
-  OXACILLIN: SIGNIFICANT CHANGE UP
-  PENICILLIN: SIGNIFICANT CHANGE UP
-  RIFAMPIN: SIGNIFICANT CHANGE UP
-  TETRACYCLINE: SIGNIFICANT CHANGE UP
-  TRIMETHOPRIM/SULFAMETHOXAZOLE: SIGNIFICANT CHANGE UP
-  VANCOMYCIN: SIGNIFICANT CHANGE UP
ALBUMIN SERPL ELPH-MCNC: 0.9 G/DL — LOW (ref 3.3–5)
ALP SERPL-CCNC: 140 U/L — HIGH (ref 40–120)
ALT FLD-CCNC: 18 U/L — SIGNIFICANT CHANGE UP (ref 12–78)
ANION GAP SERPL CALC-SCNC: 12 MMOL/L — SIGNIFICANT CHANGE UP (ref 5–17)
AST SERPL-CCNC: 19 U/L — SIGNIFICANT CHANGE UP (ref 15–37)
BASOPHILS # BLD AUTO: 0.09 K/UL — SIGNIFICANT CHANGE UP (ref 0–0.2)
BASOPHILS NFR BLD AUTO: 0.3 % — SIGNIFICANT CHANGE UP (ref 0–2)
BILIRUB SERPL-MCNC: 2 MG/DL — HIGH (ref 0.2–1.2)
BUN SERPL-MCNC: 55 MG/DL — HIGH (ref 7–23)
CALCIUM SERPL-MCNC: 6.4 MG/DL — CRITICAL LOW (ref 8.5–10.1)
CHLORIDE SERPL-SCNC: 109 MMOL/L — HIGH (ref 96–108)
CO2 SERPL-SCNC: 21 MMOL/L — LOW (ref 22–31)
CREAT SERPL-MCNC: 1.39 MG/DL — HIGH (ref 0.5–1.3)
CULTURE RESULTS: SIGNIFICANT CHANGE UP
EOSINOPHIL # BLD AUTO: 0.17 K/UL — SIGNIFICANT CHANGE UP (ref 0–0.5)
EOSINOPHIL NFR BLD AUTO: 0.6 % — SIGNIFICANT CHANGE UP (ref 0–6)
GLUCOSE BLDC GLUCOMTR-MCNC: 112 MG/DL — HIGH (ref 70–99)
GLUCOSE BLDC GLUCOMTR-MCNC: 113 MG/DL — HIGH (ref 70–99)
GLUCOSE BLDC GLUCOMTR-MCNC: 119 MG/DL — HIGH (ref 70–99)
GLUCOSE BLDC GLUCOMTR-MCNC: 124 MG/DL — HIGH (ref 70–99)
GLUCOSE BLDC GLUCOMTR-MCNC: 146 MG/DL — HIGH (ref 70–99)
GLUCOSE BLDC GLUCOMTR-MCNC: 194 MG/DL — HIGH (ref 70–99)
GLUCOSE BLDC GLUCOMTR-MCNC: 261 MG/DL — HIGH (ref 70–99)
GLUCOSE BLDC GLUCOMTR-MCNC: 299 MG/DL — HIGH (ref 70–99)
GLUCOSE SERPL-MCNC: 81 MG/DL — SIGNIFICANT CHANGE UP (ref 70–99)
GRAM STN FLD: SIGNIFICANT CHANGE UP
HCT VFR BLD CALC: 32.7 % — LOW (ref 39–50)
HGB BLD-MCNC: 10.2 G/DL — LOW (ref 13–17)
IMM GRANULOCYTES NFR BLD AUTO: 1.5 % — SIGNIFICANT CHANGE UP (ref 0–1.5)
LYMPHOCYTES # BLD AUTO: 1.1 K/UL — SIGNIFICANT CHANGE UP (ref 1–3.3)
LYMPHOCYTES # BLD AUTO: 4.2 % — LOW (ref 13–44)
MAGNESIUM SERPL-MCNC: 2 MG/DL — SIGNIFICANT CHANGE UP (ref 1.6–2.6)
MCHC RBC-ENTMCNC: 27.9 PG — SIGNIFICANT CHANGE UP (ref 27–34)
MCHC RBC-ENTMCNC: 31.2 GM/DL — LOW (ref 32–36)
MCV RBC AUTO: 89.3 FL — SIGNIFICANT CHANGE UP (ref 80–100)
METHOD TYPE: SIGNIFICANT CHANGE UP
MONOCYTES # BLD AUTO: 0.52 K/UL — SIGNIFICANT CHANGE UP (ref 0–0.9)
MONOCYTES NFR BLD AUTO: 2 % — SIGNIFICANT CHANGE UP (ref 2–14)
NEUTROPHILS # BLD AUTO: 23.93 K/UL — HIGH (ref 1.8–7.4)
NEUTROPHILS NFR BLD AUTO: 91.4 % — HIGH (ref 43–77)
NRBC # BLD: 0 /100 WBCS — SIGNIFICANT CHANGE UP (ref 0–0)
ORGANISM # SPEC MICROSCOPIC CNT: SIGNIFICANT CHANGE UP
ORGANISM # SPEC MICROSCOPIC CNT: SIGNIFICANT CHANGE UP
PHOSPHATE SERPL-MCNC: 4.5 MG/DL — SIGNIFICANT CHANGE UP (ref 2.5–4.5)
PLATELET # BLD AUTO: 223 K/UL — SIGNIFICANT CHANGE UP (ref 150–400)
POTASSIUM SERPL-MCNC: 4 MMOL/L — SIGNIFICANT CHANGE UP (ref 3.5–5.3)
POTASSIUM SERPL-SCNC: 4 MMOL/L — SIGNIFICANT CHANGE UP (ref 3.5–5.3)
PROT SERPL-MCNC: 4.6 GM/DL — LOW (ref 6–8.3)
RBC # BLD: 3.66 M/UL — LOW (ref 4.2–5.8)
RBC # FLD: 15.3 % — HIGH (ref 10.3–14.5)
SODIUM SERPL-SCNC: 142 MMOL/L — SIGNIFICANT CHANGE UP (ref 135–145)
SPECIMEN SOURCE: SIGNIFICANT CHANGE UP
SURGICAL PATHOLOGY STUDY: SIGNIFICANT CHANGE UP
WBC # BLD: 26.21 K/UL — HIGH (ref 3.8–10.5)
WBC # FLD AUTO: 26.21 K/UL — HIGH (ref 3.8–10.5)

## 2019-03-07 PROCEDURE — 99291 CRITICAL CARE FIRST HOUR: CPT

## 2019-03-07 PROCEDURE — 99223 1ST HOSP IP/OBS HIGH 75: CPT

## 2019-03-07 RX ORDER — MIDODRINE HYDROCHLORIDE 2.5 MG/1
10 TABLET ORAL EVERY 8 HOURS
Qty: 0 | Refills: 0 | Status: DISCONTINUED | OUTPATIENT
Start: 2019-03-07 | End: 2019-03-11

## 2019-03-07 RX ORDER — CALCIUM GLUCONATE 100 MG/ML
1 VIAL (ML) INTRAVENOUS ONCE
Qty: 0 | Refills: 0 | Status: DISCONTINUED | OUTPATIENT
Start: 2019-03-07 | End: 2019-03-07

## 2019-03-07 RX ORDER — LACTULOSE 10 G/15ML
10 SOLUTION ORAL
Qty: 473 | Refills: 0 | Status: ACTIVE | COMMUNITY
Start: 2019-02-07 | End: 1900-01-01

## 2019-03-07 RX ADMIN — NAFCILLIN 200 GRAM(S): 10 INJECTION, POWDER, FOR SOLUTION INTRAVENOUS at 07:58

## 2019-03-07 RX ADMIN — HEPARIN SODIUM 5000 UNIT(S): 5000 INJECTION INTRAVENOUS; SUBCUTANEOUS at 05:01

## 2019-03-07 RX ADMIN — CHLORHEXIDINE GLUCONATE 1 APPLICATION(S): 213 SOLUTION TOPICAL at 05:01

## 2019-03-07 RX ADMIN — Medication 0: at 23:24

## 2019-03-07 RX ADMIN — PANTOPRAZOLE SODIUM 40 MILLIGRAM(S): 20 TABLET, DELAYED RELEASE ORAL at 11:45

## 2019-03-07 RX ADMIN — NAFCILLIN 200 GRAM(S): 10 INJECTION, POWDER, FOR SOLUTION INTRAVENOUS at 21:25

## 2019-03-07 RX ADMIN — FENTANYL CITRATE 2.52 MICROGRAM(S)/KG/HR: 50 INJECTION INTRAVENOUS at 15:31

## 2019-03-07 RX ADMIN — Medication 6: at 15:01

## 2019-03-07 RX ADMIN — MIDODRINE HYDROCHLORIDE 10 MILLIGRAM(S): 2.5 TABLET ORAL at 21:28

## 2019-03-07 RX ADMIN — NAFCILLIN 200 GRAM(S): 10 INJECTION, POWDER, FOR SOLUTION INTRAVENOUS at 16:24

## 2019-03-07 RX ADMIN — NAFCILLIN 200 GRAM(S): 10 INJECTION, POWDER, FOR SOLUTION INTRAVENOUS at 11:45

## 2019-03-07 RX ADMIN — CHLORHEXIDINE GLUCONATE 15 MILLILITER(S): 213 SOLUTION TOPICAL at 05:01

## 2019-03-07 RX ADMIN — MIDODRINE HYDROCHLORIDE 10 MILLIGRAM(S): 2.5 TABLET ORAL at 13:08

## 2019-03-07 RX ADMIN — NAFCILLIN 200 GRAM(S): 10 INJECTION, POWDER, FOR SOLUTION INTRAVENOUS at 03:25

## 2019-03-07 RX ADMIN — Medication 6: at 19:04

## 2019-03-07 RX ADMIN — CHLORHEXIDINE GLUCONATE 15 MILLILITER(S): 213 SOLUTION TOPICAL at 17:26

## 2019-03-07 RX ADMIN — Medication 0: at 03:24

## 2019-03-07 RX ADMIN — HEPARIN SODIUM 5000 UNIT(S): 5000 INJECTION INTRAVENOUS; SUBCUTANEOUS at 17:26

## 2019-03-07 RX ADMIN — Medication 650 MILLIGRAM(S): at 03:08

## 2019-03-07 RX ADMIN — Medication 650 MILLIGRAM(S): at 03:38

## 2019-03-07 RX ADMIN — NAFCILLIN 200 GRAM(S): 10 INJECTION, POWDER, FOR SOLUTION INTRAVENOUS at 23:22

## 2019-03-07 NOTE — PROGRESS NOTE ADULT - SUBJECTIVE AND OBJECTIVE BOX
INTERVAL HPI/OVERNIGHT EVENTS:    84 y/o M w/severe dementia, bedbound and contracted at baseline, DM, prior gastric/intestinal pneumatosis managed nonoperatively p/w fevers and dyspnea. Intubated for acute respiratory failure. Patient unable to provide history. Family unavailable. (01 Mar 2019 13:03)    24 hour events: Remains febrile.  unable to obtain ROS    CENTRAL LINE: [ ] YES [ x] NO  LOCATION:   DATE INSERTED:  REMOVE: [ ] YES [ ] NO  EXPLAIN:    PAZ: [ ] YES [x ] NO    DATE INSERTED:  REMOVE:  [ ] YES [ ] NO  EXPLAIN:    A-LINE:  [ ] YES [x ] NO  LOCATION:   DATE INSERTED:  REMOVE:  [ ] YES [ ] NO  EXPLAIN:    REVIEW OF SYSTEMS: [x] Unable to obtain because: dementia    ICU Vital Signs Last 24 Hrs  T(C): 37.9 (07 Mar 2019 12:00), Max: 38.4 (06 Mar 2019 19:59)  T(F): 100.3 (07 Mar 2019 12:00), Max: 101.2 (06 Mar 2019 19:59)  HR: 100 (07 Mar 2019 14:00) (85 - 100)  BP: 113/55 (07 Mar 2019 14:00) (92/46 - 133/65)  BP(mean): 68 (07 Mar 2019 14:00) (59 - 97)  ABP: --  ABP(mean): --  RR: 29 (07 Mar 2019 14:00) (23 - 39)  SpO2: 100% (07 Mar 2019 14:00) (95% - 100%)      I&O's Detail    06 Mar 2019 07:01  -  07 Mar 2019 07:00  --------------------------------------------------------  IN:    fentaNYL Infusion.: 111.7 mL    Free Water: 1800 mL    Glucerna: 1210 mL    norepinephrine Infusion: 73.3 mL    Solution: 300 mL  Total IN: 3495 mL    OUT:    Incontinent per Condom Catheter: 950 mL  Total OUT: 950 mL    Total NET: 2545 mL      07 Mar 2019 07:01  -  07 Mar 2019 14:37  --------------------------------------------------------  IN:    fentaNYL Infusion.: 10.2 mL    Free Water: 600 mL    Glucerna: 385 mL    norepinephrine Infusion: 12.3 mL    Solution: 200 mL  Total IN: 1207.5 mL    OUT:    Incontinent per Condom Catheter: 700 mL  Total OUT: 700 mL    Total NET: 507.5 mL          Mode: CPAP with PS  FiO2: 30  PEEP: 5  PS: 5  ITime: 0.7  MAP: 7  PIP: 12      MEDICATIONS  NEURO  Meds: acetaminophen    Suspension .. 650 milliGRAM(s) Oral every 6 hours PRN Temp greater or equal to 38C (100.4F)  fentaNYL   Infusion. 0.5 MICROgram(s)/kG/Hr (2.525 mL/Hr) IV Continuous <Continuous>    RESPIRATORY    Meds:   CARDIOVASCULAR  Meds: midodrine 10 milliGRAM(s) Oral every 8 hours  norepinephrine Infusion 0.05 MICROgram(s)/kG/Min (4.734 mL/Hr) IV Continuous <Continuous>    GI/NUTRITION  Meds: pantoprazole   Suspension 40 milliGRAM(s) Oral daily    GENITOURINARY  Meds: dextrose 5%. 1000 milliLiter(s) IV Continuous <Continuous>    HEMATOLOGIC  Meds: heparin  Injectable 5000 Unit(s) SubCutaneous every 12 hours    [x] VTE Prophylaxis  INFECTIOUS DISEASES  Meds: levoFLOXacin IVPB      nafcillin  IVPB 2 Gram(s) IV Intermittent every 4 hours    ENDOCRINE  CAPILLARY BLOOD GLUCOSE      POCT Blood Glucose.: 194 mg/dL (07 Mar 2019 13:00)  POCT Blood Glucose.: 146 mg/dL (07 Mar 2019 11:04)  POCT Blood Glucose.: 112 mg/dL (07 Mar 2019 07:48)  POCT Blood Glucose.: 113 mg/dL (07 Mar 2019 03:22)  POCT Blood Glucose.: 86 mg/dL (06 Mar 2019 23:39)  POCT Blood Glucose.: 141 mg/dL (06 Mar 2019 21:22)  POCT Blood Glucose.: 98 mg/dL (06 Mar 2019 18:23)  POCT Blood Glucose.: 94 mg/dL (06 Mar 2019 14:46)    Meds: dextrose 40% Gel 15 Gram(s) Oral once PRN  dextrose 50% Injectable 12.5 Gram(s) IV Push once  dextrose 50% Injectable 25 Gram(s) IV Push once  dextrose 50% Injectable 25 Gram(s) IV Push once  glucagon  Injectable 1 milliGRAM(s) IntraMuscular once PRN  insulin glargine Injectable (LANTUS) 20 Unit(s) SubCutaneous at bedtime  insulin lispro (HumaLOG) corrective regimen sliding scale   SubCutaneous every 4 hours    OTHER MEDICATIONS:  chlorhexidine 0.12% Liquid 15 milliLiter(s) Oral Mucosa two times a day  chlorhexidine 4% Liquid 1 Application(s) Topical <User Schedule>  :    PHYSICAL EXAM:    Gen: Elderly, cachectic male lying in bed, intubated, contracted, NAD  HEENT: NC/AT sclerae anicteric, ET tube in place  Resp: Tachypneic, overbreathing vent, mechanical breath sounds b/l  CV: S1, S2  Abd: Soft, + BS  Ext: contracted, minimal edema  Skin: Unstagable sacral ulcer with tunneling s/p debridement by surg, heel ulcer  Neuro: Sedated, unarousable, does not follow commands, contracted    LABS:                        10.2   26.21 )-----------( 223      ( 07 Mar 2019 04:47 )             32.7      03-07    142  |  109<H>  |  55<H>  ----------------------------<  81  4.0   |  21<L>  |  1.39<H>    Ca    6.4<LL>      07 Mar 2019 04:47  Phos  4.5     03-07  Mg     2.0     03-07    TPro  4.6<L>  /  Alb  0.9<L>  /  TBili  2.0<H>  /  DBili  x   /  AST  19  /  ALT  18  /  AlkPhos  140<H>  03-07            Mode: CPAP with PS, FiO2: 30, PEEP: 5, PS: 5, ITime: 0.7, MAP: 7, PIP: 12    GLOBAL ISSUE/BEST PRACTICE:  Analgesia:  Sedation:  HOB elevation: yes  Stress ulcer prophylaxis:  VTE prophylaxis:  Oral Care:  Glycemic control:  Nutrition:    RADIOLOGY & ADDITIONAL STUDIES:    CULTURES

## 2019-03-07 NOTE — CONSULT NOTE ADULT - ATTENDING COMMENTS
MSSA BSI with pneumonia  Polymicrobial BSI with strept as well  SUNNY In am   cont nafcillin  add levauqin   f/u on repeat blood c/s

## 2019-03-07 NOTE — CONSULT NOTE ADULT - REASON FOR ADMISSION
Acute respiratory failure, DKA, severe sepsis, ANSLEY
Acute respiratory failure, DKA, severe sepsis, ANSLEY

## 2019-03-07 NOTE — CONSULT NOTE ADULT - PROBLEM SELECTOR RECOMMENDATION 9
MSSA BSI with pneumonia  Polymicrobial BSI with strept as well  SUNNY In am   cont nafcillin  f/u on repeat blood c/s

## 2019-03-07 NOTE — CHART NOTE - NSCHARTNOTEFT_GEN_A_CORE
Assessment:     Factors impacting intake: [ ] none [ ] nausea  [ ] vomiting [ ] diarrhea [ ] constipation  [ ]chewing problems [ ] swallowing issues  [ ] other:     Diet Prescription: Diet, NPO with Tube Feed:   Tube Feeding Modality: Nasogastric  Glucerna 1.2 Taqueria  Total Volume for 24 Hours (mL): 1320  Continuous  Starting Tube Feed Rate {mL per Hour}: 55  Until Goal Tube Feed Rate (mL per Hour): 55  Tube Feed Duration (in Hours): 24  Tube Feed Start Time: 13:00  No Carb Prosource (1pkg = 15gms Protein)     Qty per Day:  2 (03-03-19 @ 13:12)    Intake:     Current Weight: Weight (kg): 50.5 (03-01 @ 14:18)  % Weight Change    Physical appearance: +3 edema Triston arm, +4 edema Triston hand & feet; Nutrition focused physical exam conducted; Subcutaneous fat loss: [ moderate] Orbital fat pads region, [unable ]Buccal fat region, [edematous ]Triceps region,  [moderate ]Ribs region.  Muscle wasting: [moderate ]Temples region, [severe ]Clavicle region, [severe ]Shoulder region, [unable ]Scapula region, [edematous ]Interosseous region,  [severe ]thigh region, [severe ]Calf region    Pertinent Medications: MEDICATIONS  (STANDING):  chlorhexidine 0.12% Liquid 15 milliLiter(s) Oral Mucosa two times a day  chlorhexidine 4% Liquid 1 Application(s) Topical <User Schedule>  dextrose 5%. 1000 milliLiter(s) (50 mL/Hr) IV Continuous <Continuous>  dextrose 50% Injectable 12.5 Gram(s) IV Push once  dextrose 50% Injectable 25 Gram(s) IV Push once  dextrose 50% Injectable 25 Gram(s) IV Push once  fentaNYL   Infusion. 0.5 MICROgram(s)/kG/Hr (2.525 mL/Hr) IV Continuous <Continuous>  heparin  Injectable 5000 Unit(s) SubCutaneous every 12 hours  insulin glargine Injectable (LANTUS) 20 Unit(s) SubCutaneous at bedtime  insulin lispro (HumaLOG) corrective regimen sliding scale   SubCutaneous every 4 hours  midodrine 10 milliGRAM(s) Oral every 8 hours  nafcillin  IVPB 2 Gram(s) IV Intermittent every 4 hours  norepinephrine Infusion 0.05 MICROgram(s)/kG/Min (4.734 mL/Hr) IV Continuous <Continuous>  pantoprazole   Suspension 40 milliGRAM(s) Oral daily    MEDICATIONS  (PRN):  acetaminophen    Suspension .. 650 milliGRAM(s) Oral every 6 hours PRN Temp greater or equal to 38C (100.4F)  dextrose 40% Gel 15 Gram(s) Oral once PRN Blood Glucose LESS THAN 70 milliGRAM(s)/deciliter  glucagon  Injectable 1 milliGRAM(s) IntraMuscular once PRN Glucose LESS THAN 70 milligrams/deciliter    Pertinent Labs: 03-07 Na 142 mmol/L Glu 81 mg/dL K+ 4.0 mmol/L Cr 1.39 mg/dL<H> BUN 55 mg/dL<H> Phos 4.5 mg/dL Alb 0.9 g/dL<L> PAB n/a   Hgb 10.2 g/dL<L> Hct 32.7 %<L> HgA1C n/a    Glucose, Serum: 81 mg/dL   24Hr FS:146 mg/dL  112 mg/dL  113 mg/dL  86 mg/dL  141 mg/dL  98 mg/dL  94 mg/dL    Skin: Rt hip unstageable, Lt hip stage II, Sacrum stage IV, Triston heel DTI    Estimated Needs:   [ ] no change since previous assessment (  [ ] recalculated:     Previous Nutrition Diagnosis:   [ ] Inadequate Energy Intake [ ]Inadequate Oral Intake [ ] Excessive Energy Intake   [ ] Underweight [ ] Increased Nutrient Needs [ ] Overweight/Obesity   [ ] Altered GI Function [ ] Unintended Weight Loss [ ] Food & Nutrition Related Knowledge Deficit [ ] severe Malnutrition [ ] moderate malnutrition    Nutrition Diagnosis is [ ] ongoing  [ ] resolved  [ ] improved  [ ] not applicable   Previous Goal:     New Nutrition Diagnosis: [ ] not applicable       Interventions:   Recommend  [ ] Continue:  [ ] Change Diet To:  [ ] Nutrition Supplement:  [ ] Nutrition Support:  [ ] Other:     Monitoring and Evaluation:   [ ] PO intake [ x ] Tolerance to diet prescription [ x ] weights [ x ] labs[ x ] follow up per protocol  [ ] other: Assessment: Pt seen for follow-up & remains in critical care & continues severe malnutrition. Pt with hx dementia & bedbound presents with severe sepsis, pneumonia remains intubated on mechanical soft    Factors impacting intake: [ ] none [ ] nausea  [ ] vomiting [ ] diarrhea [ ] constipation  [ ]chewing problems [ ] swallowing issues  [ ] other:     Diet Prescription: Diet, NPO with Tube Feed:   Tube Feeding Modality: Nasogastric  Glucerna 1.2 Taqueria  Total Volume for 24 Hours (mL): 1320  Continuous  Starting Tube Feed Rate {mL per Hour}: 55  Until Goal Tube Feed Rate (mL per Hour): 55  Tube Feed Duration (in Hours): 24  Tube Feed Start Time: 13:00  No Carb Prosource (1pkg = 15gms Protein)     Qty per Day:  2 (03-03-19 @ 13:12)    Intake: NGT feeding & prosource 2 x day provides 1320ml, 1704kcal & 109gm protein. TF tolerated well. Residuals=0-30ml & last BM x 4(3/7). Pt received total volume 968ml x 24 hours. Pt received 89% energy needs & 116% protein needs x 24 hours    Current Weight: Weight (kg): 50.5 (03-01 @ 14:18)  % Weight Change    Physical appearance: +3 edema Triston arm, +4 edema Triston hand & feet; Nutrition focused physical exam conducted; Subcutaneous fat loss: [ moderate] Orbital fat pads region, [unable ]Buccal fat region, [edematous ]Triceps region,  [moderate ]Ribs region.  Muscle wasting: [moderate ]Temples region, [severe ]Clavicle region, [severe ]Shoulder region, [unable ]Scapula region, [edematous ]Interosseous region,  [moderate ]thigh region, [severe ]Calf region    Pertinent Medications: MEDICATIONS  (STANDING):  chlorhexidine 0.12% Liquid 15 milliLiter(s) Oral Mucosa two times a day  chlorhexidine 4% Liquid 1 Application(s) Topical <User Schedule>  dextrose 5%. 1000 milliLiter(s) (50 mL/Hr) IV Continuous <Continuous>  dextrose 50% Injectable 12.5 Gram(s) IV Push once  dextrose 50% Injectable 25 Gram(s) IV Push once  dextrose 50% Injectable 25 Gram(s) IV Push once  fentaNYL   Infusion. 0.5 MICROgram(s)/kG/Hr (2.525 mL/Hr) IV Continuous <Continuous>  heparin  Injectable 5000 Unit(s) SubCutaneous every 12 hours  insulin glargine Injectable (LANTUS) 20 Unit(s) SubCutaneous at bedtime  insulin lispro (HumaLOG) corrective regimen sliding scale   SubCutaneous every 4 hours  midodrine 10 milliGRAM(s) Oral every 8 hours  nafcillin  IVPB 2 Gram(s) IV Intermittent every 4 hours  norepinephrine Infusion 0.05 MICROgram(s)/kG/Min (4.734 mL/Hr) IV Continuous <Continuous>  pantoprazole   Suspension 40 milliGRAM(s) Oral daily    MEDICATIONS  (PRN):  acetaminophen    Suspension .. 650 milliGRAM(s) Oral every 6 hours PRN Temp greater or equal to 38C (100.4F)  dextrose 40% Gel 15 Gram(s) Oral once PRN Blood Glucose LESS THAN 70 milliGRAM(s)/deciliter  glucagon  Injectable 1 milliGRAM(s) IntraMuscular once PRN Glucose LESS THAN 70 milligrams/deciliter    Pertinent Labs: 03-07 Na 142 mmol/L Glu 81 mg/dL K+ 4.0 mmol/L Cr 1.39 mg/dL<H> BUN 55 mg/dL<H> Phos 4.5 mg/dL Alb 0.9 g/dL<L> PAB n/a   Hgb 10.2 g/dL<L> Hct 32.7 %<L> HgA1C n/a    Glucose, Serum: 81 mg/dL, WBC=26.2(3/7)   24Hr FS:146 mg/dL  112 mg/dL  113 mg/dL  86 mg/dL  141 mg/dL  98 mg/dL  94 mg/dL    Skin: Rt hip unstageable, Lt hip stage II, Sacrum stage IV, Triston heel DTI    Estimated Needs:   [x ] no change since previous assessment (3/3/19)  [ ] recalculated:     Previous Nutrition Diagnosis:   [ ] Inadequate Energy Intake [ ]Inadequate Oral Intake [ ] Excessive Energy Intake   [ ] Underweight [ ] Increased Nutrient Needs [ ] Overweight/Obesity   [ ] Altered GI Function [ ] Unintended Weight Loss [ ] Food & Nutrition Related Knowledge Deficit [x ] Acute severe Malnutrition     Nutrition Diagnosis is [x] ongoing  [ ] resolved  [ ] improved  [ ] not applicable   Previous Goal:  Pt to meet >75% energy & protein via TF to support wound healing; met    New Nutrition Diagnosis: [x ] not applicable       Interventions:   Recommend  [x ] Continue: Glucerna 1.2 @ 55ml/hr via NGT & Prosource 2 packets daily=1320ml, 1704kcal & 109gm protein  [ ] Change Diet To:  [ ] Nutrition Supplement:  [ ] Nutrition Support:  [ ] Other:     Monitoring and Evaluation:   [ ] PO intake [ x ] Tolerance to diet prescription [ x ] weights [ x ] labs[ x ] follow up per protocol  [ ] other: Assessment: Pt seen for follow-up & remains in critical care & continues severe malnutrition. Pt with hx dementia & bedbound presents with severe sepsis, pneumonia remains intubated on mechanical soft    Factors impacting intake: [ ] none [ ] nausea  [ ] vomiting [ ] diarrhea [ ] constipation  [ ]chewing problems [ ] swallowing issues  [ ] other:     Diet Prescription: Diet, NPO with Tube Feed:   Tube Feeding Modality: Nasogastric  Glucerna 1.2 Taqueria  Total Volume for 24 Hours (mL): 1320  Continuous  Starting Tube Feed Rate {mL per Hour}: 55  Until Goal Tube Feed Rate (mL per Hour): 55  Tube Feed Duration (in Hours): 24  Tube Feed Start Time: 13:00  No Carb Prosource (1pkg = 15gms Protein)     Qty per Day:  2 (03-03-19 @ 13:12)    Intake: NGT feeding & prosource 2 x day provides 1320ml, 1704kcal & 109gm protein. TF tolerated well. Residuals=0-30ml & last BM x 4(3/7). Pt received total volume 968ml x 24 hours. Pt received 89% energy needs & 116% protein needs x 24 hours    Current Weight: Weight (kg): Wt=55.4kg(3/3/19), Wt=60.1kg(3/7)  % Weight Change 4.7kg wt gain(8%) x 4 days due to increased edema    Physical appearance: +3 edema Triston arm, +4 edema Triston hand & feet; Nutrition focused physical exam conducted; Subcutaneous fat loss: [ moderate] Orbital fat pads region, [unable ]Buccal fat region, [edematous ]Triceps region,  [moderate ]Ribs region.  Muscle wasting: [moderate ]Temples region, [severe ]Clavicle region, [severe ]Shoulder region, [unable ]Scapula region, [edematous ]Interosseous region,  [moderate ]thigh region, [severe ]Calf region    Pertinent Medications: MEDICATIONS  (STANDING):  chlorhexidine 0.12% Liquid 15 milliLiter(s) Oral Mucosa two times a day  chlorhexidine 4% Liquid 1 Application(s) Topical <User Schedule>  dextrose 5%. 1000 milliLiter(s) (50 mL/Hr) IV Continuous <Continuous>  dextrose 50% Injectable 12.5 Gram(s) IV Push once  dextrose 50% Injectable 25 Gram(s) IV Push once  dextrose 50% Injectable 25 Gram(s) IV Push once  fentaNYL   Infusion. 0.5 MICROgram(s)/kG/Hr (2.525 mL/Hr) IV Continuous <Continuous>  heparin  Injectable 5000 Unit(s) SubCutaneous every 12 hours  insulin glargine Injectable (LANTUS) 20 Unit(s) SubCutaneous at bedtime  insulin lispro (HumaLOG) corrective regimen sliding scale   SubCutaneous every 4 hours  midodrine 10 milliGRAM(s) Oral every 8 hours  nafcillin  IVPB 2 Gram(s) IV Intermittent every 4 hours  norepinephrine Infusion 0.05 MICROgram(s)/kG/Min (4.734 mL/Hr) IV Continuous <Continuous>  pantoprazole   Suspension 40 milliGRAM(s) Oral daily    MEDICATIONS  (PRN):  acetaminophen    Suspension .. 650 milliGRAM(s) Oral every 6 hours PRN Temp greater or equal to 38C (100.4F)  dextrose 40% Gel 15 Gram(s) Oral once PRN Blood Glucose LESS THAN 70 milliGRAM(s)/deciliter  glucagon  Injectable 1 milliGRAM(s) IntraMuscular once PRN Glucose LESS THAN 70 milligrams/deciliter    Pertinent Labs: 03-07 Na 142 mmol/L Glu 81 mg/dL K+ 4.0 mmol/L Cr 1.39 mg/dL<H> BUN 55 mg/dL<H> Phos 4.5 mg/dL Alb 0.9 g/dL<L> PAB n/a   Hgb 10.2 g/dL<L> Hct 32.7 %<L> HgA1C n/a    Glucose, Serum: 81 mg/dL, WBC=26.2(3/7)   24Hr FS:146 mg/dL  112 mg/dL  113 mg/dL  86 mg/dL  141 mg/dL  98 mg/dL  94 mg/dL    Skin: Rt hip unstageable, Lt hip stage II, Sacrum stage IV, Triston heel DTI    Estimated Needs:   [x ] no change since previous assessment (3/3/19)  [ ] recalculated:     Previous Nutrition Diagnosis:   [ ] Inadequate Energy Intake [ ]Inadequate Oral Intake [ ] Excessive Energy Intake   [ ] Underweight [ ] Increased Nutrient Needs [ ] Overweight/Obesity   [ ] Altered GI Function [ ] Unintended Weight Loss [ ] Food & Nutrition Related Knowledge Deficit [x ] Acute severe Malnutrition     Nutrition Diagnosis is [x] ongoing  [ ] resolved  [ ] improved  [ ] not applicable   Previous Goal:  Pt to meet >75% energy & protein via TF to support wound healing; met    New Nutrition Diagnosis: [x ] not applicable       Interventions:   Recommend  [x ] Continue: Glucerna 1.2 @ 55ml/hr via NGT & Prosource 2 packets daily=1320ml, 1704kcal & 109gm protein  [ ] Change Diet To:  [ ] Nutrition Supplement:  [ ] Nutrition Support:  [ ] Other:     Monitoring and Evaluation:   [ ] PO intake [ x ] Tolerance to diet prescription [ x ] weights [ x ] labs[ x ] follow up per protocol  [ ] other:

## 2019-03-07 NOTE — PROGRESS NOTE ADULT - ASSESSMENT
82 y/o M w/severe dementia, bedbound and contracted at baseline, DM, prior gastric/intestinal pneumatosis managed nonoperatively now presenting with acute respiratory failure requiring intubation, severe sepsis likely secondary to PNA, DKA, ANSLEY (prestonley prerenal +/- sepsis), hypernatremia, and lactic acidosis (likely combination of DKA and sepsis).    Neuro: Poor baseline, requiring minimal sedation.  Continue daily sbt/sat; tachypneic at baseline.    CV: Septic shock, on low dose levophed.  Continue to wean as tolerated. Midodrine 10mg q8h  Pulm: Intbuated for acute respiratory failure, will attempt to wean, poor neuro baseline. Remains contracted.   GI: Protonix, and Glucerna; decreased free water to q6h today  Renal/Metabolic: ANSLEY likely 2/2 ATN, creatinine slowly trending back up.  Continue to monitor.   ID: Severe sepsis, MSSA PNA, blood cultures remain positive for gram positive cocci in clusters (likely still MSSA). Will need echo to evaluate for endocarditis; Cont Nafcillin, broaden if needed; TTE negative for endocarditis. Will need SUNNY. If SUNNY negative will need imaging to look for osteo/epidural abscess as possible source of persistent bacteremia  Skin: S/p debridement with surgery.  continue wound care as per consult, frequent position changes.    GOC: requires ongoing goals of care given respiratory failure, will attempt to wean.      Attending critical care time 35 minutes

## 2019-03-08 LAB
ALBUMIN SERPL ELPH-MCNC: 0.8 G/DL — LOW (ref 3.3–5)
ALP SERPL-CCNC: 119 U/L — SIGNIFICANT CHANGE UP (ref 40–120)
ALT FLD-CCNC: 14 U/L — SIGNIFICANT CHANGE UP (ref 12–78)
ANION GAP SERPL CALC-SCNC: 11 MMOL/L — SIGNIFICANT CHANGE UP (ref 5–17)
AST SERPL-CCNC: 18 U/L — SIGNIFICANT CHANGE UP (ref 15–37)
BASOPHILS # BLD AUTO: 0.13 K/UL — SIGNIFICANT CHANGE UP (ref 0–0.2)
BASOPHILS NFR BLD AUTO: 0.4 % — SIGNIFICANT CHANGE UP (ref 0–2)
BILIRUB SERPL-MCNC: 2.2 MG/DL — HIGH (ref 0.2–1.2)
BUN SERPL-MCNC: 53 MG/DL — HIGH (ref 7–23)
CALCIUM SERPL-MCNC: 6.2 MG/DL — CRITICAL LOW (ref 8.5–10.1)
CHLORIDE SERPL-SCNC: 106 MMOL/L — SIGNIFICANT CHANGE UP (ref 96–108)
CO2 SERPL-SCNC: 22 MMOL/L — SIGNIFICANT CHANGE UP (ref 22–31)
CREAT SERPL-MCNC: 1.33 MG/DL — HIGH (ref 0.5–1.3)
EOSINOPHIL # BLD AUTO: 0.15 K/UL — SIGNIFICANT CHANGE UP (ref 0–0.5)
EOSINOPHIL NFR BLD AUTO: 0.4 % — SIGNIFICANT CHANGE UP (ref 0–6)
GLUCOSE BLDC GLUCOMTR-MCNC: 124 MG/DL — HIGH (ref 70–99)
GLUCOSE BLDC GLUCOMTR-MCNC: 199 MG/DL — HIGH (ref 70–99)
GLUCOSE BLDC GLUCOMTR-MCNC: 212 MG/DL — HIGH (ref 70–99)
GLUCOSE BLDC GLUCOMTR-MCNC: 214 MG/DL — HIGH (ref 70–99)
GLUCOSE BLDC GLUCOMTR-MCNC: 244 MG/DL — HIGH (ref 70–99)
GLUCOSE BLDC GLUCOMTR-MCNC: 260 MG/DL — HIGH (ref 70–99)
GLUCOSE BLDC GLUCOMTR-MCNC: 263 MG/DL — HIGH (ref 70–99)
GLUCOSE SERPL-MCNC: 172 MG/DL — HIGH (ref 70–99)
HCT VFR BLD CALC: 31.6 % — LOW (ref 39–50)
HGB BLD-MCNC: 10.1 G/DL — LOW (ref 13–17)
IMM GRANULOCYTES NFR BLD AUTO: 3.3 % — HIGH (ref 0–1.5)
LYMPHOCYTES # BLD AUTO: 1.18 K/UL — SIGNIFICANT CHANGE UP (ref 1–3.3)
LYMPHOCYTES # BLD AUTO: 3.2 % — LOW (ref 13–44)
MAGNESIUM SERPL-MCNC: 1.9 MG/DL — SIGNIFICANT CHANGE UP (ref 1.6–2.6)
MCHC RBC-ENTMCNC: 28 PG — SIGNIFICANT CHANGE UP (ref 27–34)
MCHC RBC-ENTMCNC: 32 GM/DL — SIGNIFICANT CHANGE UP (ref 32–36)
MCV RBC AUTO: 87.5 FL — SIGNIFICANT CHANGE UP (ref 80–100)
MONOCYTES # BLD AUTO: 0.62 K/UL — SIGNIFICANT CHANGE UP (ref 0–0.9)
MONOCYTES NFR BLD AUTO: 1.7 % — LOW (ref 2–14)
NEUTROPHILS # BLD AUTO: 33.45 K/UL — HIGH (ref 1.8–7.4)
NEUTROPHILS NFR BLD AUTO: 91 % — HIGH (ref 43–77)
NRBC # BLD: 0 /100 WBCS — SIGNIFICANT CHANGE UP (ref 0–0)
PHOSPHATE SERPL-MCNC: 4.6 MG/DL — HIGH (ref 2.5–4.5)
PLATELET # BLD AUTO: 346 K/UL — SIGNIFICANT CHANGE UP (ref 150–400)
POTASSIUM SERPL-MCNC: 3.6 MMOL/L — SIGNIFICANT CHANGE UP (ref 3.5–5.3)
POTASSIUM SERPL-SCNC: 3.6 MMOL/L — SIGNIFICANT CHANGE UP (ref 3.5–5.3)
PROT SERPL-MCNC: 4.8 GM/DL — LOW (ref 6–8.3)
RBC # BLD: 3.61 M/UL — LOW (ref 4.2–5.8)
RBC # FLD: 15.7 % — HIGH (ref 10.3–14.5)
SODIUM SERPL-SCNC: 139 MMOL/L — SIGNIFICANT CHANGE UP (ref 135–145)
WBC # BLD: 36.75 K/UL — HIGH (ref 3.8–10.5)
WBC # FLD AUTO: 36.75 K/UL — HIGH (ref 3.8–10.5)

## 2019-03-08 PROCEDURE — 99233 SBSQ HOSP IP/OBS HIGH 50: CPT

## 2019-03-08 PROCEDURE — 99291 CRITICAL CARE FIRST HOUR: CPT

## 2019-03-08 RX ORDER — SODIUM CHLORIDE 9 MG/ML
1000 INJECTION, SOLUTION INTRAVENOUS
Qty: 0 | Refills: 0 | Status: DISCONTINUED | OUTPATIENT
Start: 2019-03-08 | End: 2019-03-09

## 2019-03-08 RX ORDER — ALBUMIN HUMAN 25 %
100 VIAL (ML) INTRAVENOUS ONCE
Qty: 0 | Refills: 0 | Status: COMPLETED | OUTPATIENT
Start: 2019-03-08 | End: 2019-03-08

## 2019-03-08 RX ORDER — CHLORHEXIDINE GLUCONATE 213 G/1000ML
15 SOLUTION TOPICAL
Qty: 0 | Refills: 0 | Status: DISCONTINUED | OUTPATIENT
Start: 2019-03-08 | End: 2019-03-15

## 2019-03-08 RX ORDER — FENTANYL CITRATE 50 UG/ML
25 INJECTION INTRAVENOUS ONCE
Qty: 0 | Refills: 0 | Status: DISCONTINUED | OUTPATIENT
Start: 2019-03-08 | End: 2019-03-08

## 2019-03-08 RX ADMIN — Medication 4: at 11:00

## 2019-03-08 RX ADMIN — Medication 4: at 03:45

## 2019-03-08 RX ADMIN — MIDODRINE HYDROCHLORIDE 10 MILLIGRAM(S): 2.5 TABLET ORAL at 23:31

## 2019-03-08 RX ADMIN — NAFCILLIN 200 GRAM(S): 10 INJECTION, POWDER, FOR SOLUTION INTRAVENOUS at 07:44

## 2019-03-08 RX ADMIN — NAFCILLIN 200 GRAM(S): 10 INJECTION, POWDER, FOR SOLUTION INTRAVENOUS at 15:11

## 2019-03-08 RX ADMIN — Medication 50 MILLILITER(S): at 08:17

## 2019-03-08 RX ADMIN — MIDODRINE HYDROCHLORIDE 10 MILLIGRAM(S): 2.5 TABLET ORAL at 13:25

## 2019-03-08 RX ADMIN — NAFCILLIN 200 GRAM(S): 10 INJECTION, POWDER, FOR SOLUTION INTRAVENOUS at 03:43

## 2019-03-08 RX ADMIN — HEPARIN SODIUM 5000 UNIT(S): 5000 INJECTION INTRAVENOUS; SUBCUTANEOUS at 17:29

## 2019-03-08 RX ADMIN — CHLORHEXIDINE GLUCONATE 15 MILLILITER(S): 213 SOLUTION TOPICAL at 05:06

## 2019-03-08 RX ADMIN — Medication 4.73 MICROGRAM(S)/KG/MIN: at 00:42

## 2019-03-08 RX ADMIN — INSULIN GLARGINE 20 UNIT(S): 100 INJECTION, SOLUTION SUBCUTANEOUS at 23:29

## 2019-03-08 RX ADMIN — MIDODRINE HYDROCHLORIDE 10 MILLIGRAM(S): 2.5 TABLET ORAL at 05:06

## 2019-03-08 RX ADMIN — NAFCILLIN 200 GRAM(S): 10 INJECTION, POWDER, FOR SOLUTION INTRAVENOUS at 20:58

## 2019-03-08 RX ADMIN — Medication 4: at 23:30

## 2019-03-08 RX ADMIN — Medication 6: at 07:44

## 2019-03-08 RX ADMIN — PANTOPRAZOLE SODIUM 40 MILLIGRAM(S): 20 TABLET, DELAYED RELEASE ORAL at 12:09

## 2019-03-08 RX ADMIN — NAFCILLIN 200 GRAM(S): 10 INJECTION, POWDER, FOR SOLUTION INTRAVENOUS at 11:42

## 2019-03-08 RX ADMIN — CHLORHEXIDINE GLUCONATE 1 APPLICATION(S): 213 SOLUTION TOPICAL at 05:06

## 2019-03-08 RX ADMIN — CHLORHEXIDINE GLUCONATE 15 MILLILITER(S): 213 SOLUTION TOPICAL at 17:28

## 2019-03-08 RX ADMIN — HEPARIN SODIUM 5000 UNIT(S): 5000 INJECTION INTRAVENOUS; SUBCUTANEOUS at 05:06

## 2019-03-08 NOTE — PROGRESS NOTE ADULT - SUBJECTIVE AND OBJECTIVE BOX
Patient is a 84y old  Male who presents with a chief complaint of Acute respiratory failure, DKA, severe sepsis, ANSLEY (10 Mar 2019 17:16)      INTERVAL HPI / OVERNIGHT EVENTS: No new event    MEDICATIONS  (STANDING):  chlorhexidine 0.12% Liquid 15 milliLiter(s) Oral Mucosa two times a day  chlorhexidine 4% Liquid 1 Application(s) Topical <User Schedule>  dextrose 5%. 1000 milliLiter(s) (50 mL/Hr) IV Continuous <Continuous>  dextrose 50% Injectable 12.5 Gram(s) IV Push once  dextrose 50% Injectable 25 Gram(s) IV Push once  dextrose 50% Injectable 25 Gram(s) IV Push once  heparin  Injectable 5000 Unit(s) SubCutaneous every 12 hours  insulin glargine Injectable (LANTUS) 20 Unit(s) SubCutaneous at bedtime  insulin lispro (HumaLOG) corrective regimen sliding scale   SubCutaneous every 6 hours  levoFLOXacin IVPB 500 milliGRAM(s) IV Intermittent every 48 hours  levoFLOXacin IVPB      midodrine 10 milliGRAM(s) Oral every 8 hours  nafcillin  IVPB 2 Gram(s) IV Intermittent every 4 hours  pantoprazole   Suspension 40 milliGRAM(s) Oral daily    MEDICATIONS  (PRN):  acetaminophen    Suspension .. 650 milliGRAM(s) Oral every 6 hours PRN Temp greater or equal to 38C (100.4F)  dextrose 40% Gel 15 Gram(s) Oral once PRN Blood Glucose LESS THAN 70 milliGRAM(s)/deciliter  glucagon  Injectable 1 milliGRAM(s) IntraMuscular once PRN Glucose LESS THAN 70 milligrams/deciliter      Vital Signs Last 24 Hrs  T(C): 37.9 (08 Mar 2019 16:00), Max: 38 (07 Mar 2019 20:24)  T(F): 100.2 (08 Mar 2019 16:00), Max: 100.4 (07 Mar 2019 20:24)  HR: 93 (08 Mar 2019 17:07) (80 - 101)  BP: 139/54 (08 Mar 2019 17:00) (114/48 - 143/71)  BP(mean): 72 (08 Mar 2019 17:00) (63 - 99)  ABP: --  ABP(mean): --  RR: 34 (08 Mar 2019 17:00) (14 - 38)  SpO2: 100% (08 Mar 2019 17:07) (85% - 100%    Review of systems:  cant be done, intubated        PHYSICAL EXAM:  General : NAD, intubated and sedated  Constitutional: thin built  Respiratory: CTAB/L, coarse breath sounds  Cardiovascular: S1 and S2, RRR, no M/G/R  Gastrointestinal: BS+, soft, NT/ND  Extremities: No peripheral edema  Vascular: 2+ peripheral pulses  Skin: sacral decub s/p debridement  multiple pressure ulcers (Hip ,ankle etc)      LABS:                          10.1   36.75 )-----------( 346      ( 08 Mar 2019 04:46 )             31.6      03-08    139  |  106  |  53<H>  ----------------------------<  172<H>  3.6   |  22  |  1.33<H>    Ca    6.2<LL>      08 Mar 2019 04:46  Phos  4.6     03-08  Mg     1.9     03-08    TPro  4.8<L>  /  Alb  0.8<L>  /  TBili  2.2<H>  /  DBili  x   /  AST  18  /  ALT  14  /  AlkPhos  119      MICROBIOLOGY:  RECENT CULTURES:  03-07 .Blood XXXX XXXX   No growth to date.    03-06 .Blood XXXX XXXX   No growth to date.    03-04 .Blood Staphylococcus aureus   Growth in anaerobic bottle: Gram Positive Cocci in Clusters   Growth in anaerobic bottle: Staphylococcus aureus          RADIOLOGY & ADDITIONAL STUDIES:

## 2019-03-08 NOTE — PROGRESS NOTE ADULT - ASSESSMENT
84 y/o M w/severe dementia, bedbound and contracted at baseline, DM, prior gastric/intestinal pneumatosis managed nonoperatively now presenting with acute respiratory failure requiring intubation, severe sepsis likely secondary to PNA, DKA, ANSLEY (prestonley prerenal +/- sepsis), hypernatremia, and lactic acidosis (likely combination of DKA and sepsis).    Neuro: Poor baseline, requiring minimal sedation.  Continue daily sbt/sat; tachypneic at baseline.    CV: Septic shock, on low dose levophed.  Continue to wean as tolerated. Midodrine 10mg q8h  Pulm: Intubated for acute respiratory failure, will attempt to wean, poor neuro baseline. Remains contracted.   GI: Protonix, and Glucerna; decreased free water to q6h today  Renal/Metabolic: ANSLEY likely 2/2 ATN, creatinine slowly trending back up.  Continue to monitor.   ID: Severe sepsis, MSSA PNA, blood cultures remain positive for gram positive cocci in clusters (likely still MSSA). Will need echo to evaluate for endocarditis; Cont Nafcillin, broaden if needed; TTE negative for endocarditis. Discussed with ID to continue naficillin for now.  Repeat blood cultures no growth to date.  SUNNY will not change the overall clinical course given patient is bedbound with severe dementia and has a likely source with stage iv decubiti.  Will follow up blood cultures and continue naficillin for mssa bacteremia.    Skin: S/p debridement with surgery.  continue wound care as per consult, frequent position changes.    GOC:  Family wants patient full code at this point.  Will continue weaning of vent.    Attending critical care time 35 minutes

## 2019-03-08 NOTE — PROGRESS NOTE ADULT - SUBJECTIVE AND OBJECTIVE BOX
I83 y/o M w/severe dementia, bedbound and contracted at baseline, DM, prior gastric/intestinal pneumatosis managed nonoperatively p/w fevers and dyspnea. Intubated for acute respiratory failure. Patient unable to provide history. Family unavailable. (01 Mar 2019 13:03)    24 hour events: Remains febrile.  unable to obtain ROS.  Discussed goals of care with daughter.  Daughter wants everything done for patient.      CENTRAL LINE: [ ] YES [ x] NO  LOCATION:   DATE INSERTED:  REMOVE: [ ] YES [ ] NO  EXPLAIN:    PAZ: [ ] YES [x ] NO    DATE INSERTED:  REMOVE:  [ ] YES [ ] NO  EXPLAIN:    A-LINE:  [ ] YES [x ] NO  LOCATION:   DATE INSERTED:  REMOVE:  [ ] YES [ ] NO  EXPLAIN:    REVIEW OF SYSTEMS: [x] Unable to obtain because: dementia      ICU Vital Signs Last 24 Hrs  T(C): 37.9 (08 Mar 2019 16:00), Max: 38 (07 Mar 2019 20:24)  T(F): 100.2 (08 Mar 2019 16:00), Max: 100.4 (07 Mar 2019 20:24)  HR: 93 (08 Mar 2019 17:07) (80 - 101)  BP: 139/54 (08 Mar 2019 17:00) (114/48 - 143/71)  BP(mean): 72 (08 Mar 2019 17:00) (63 - 99)  ABP: --  ABP(mean): --  RR: 34 (08 Mar 2019 17:00) (14 - 38)  SpO2: 100% (08 Mar 2019 17:07) (85% - 100%)      I&O's Detail    07 Mar 2019 07:01  -  08 Mar 2019 07:00  --------------------------------------------------------  IN:    fentaNYL Infusion.: 86.7 mL    Free Water: 1200 mL    Glucerna: 1265 mL    norepinephrine Infusion: 67.9 mL    Solution: 100 mL    Solution: 300 mL  Total IN: 3019.6 mL    OUT:    Incontinent per Condom Catheter: 1650 mL  Total OUT: 1650 mL    Total NET: 1369.6 mL      08 Mar 2019 07:01  -  08 Mar 2019 17:54  --------------------------------------------------------  IN:    Albumin 25%: 100 mL    fentaNYL Infusion.: 25.5 mL    Free Water: 400 mL    Glucerna: 275 mL    norepinephrine Infusion: 2.8 mL    Solution: 200 mL  Total IN: 1003.3 mL    OUT:  Total OUT: 0 mL    Total NET: 1003.3 mL          Mode: CPAP with PS  FiO2: 30  PEEP: 5  PS: 5  MAP: 7  PIP: 11      MEDICATIONS  NEURO  Meds: acetaminophen    Suspension .. 650 milliGRAM(s) Oral every 6 hours PRN Temp greater or equal to 38C (100.4F)  fentaNYL   Infusion. 0.5 MICROgram(s)/kG/Hr (2.525 mL/Hr) IV Continuous <Continuous>    RESPIRATORY    Meds:   CARDIOVASCULAR  Meds: midodrine 10 milliGRAM(s) Oral every 8 hours  norepinephrine Infusion 0.05 MICROgram(s)/kG/Min (4.734 mL/Hr) IV Continuous <Continuous>    GI/NUTRITION  Meds: pantoprazole   Suspension 40 milliGRAM(s) Oral daily    GENITOURINARY  Meds: dextrose 5%. 1000 milliLiter(s) IV Continuous <Continuous>    HEMATOLOGIC  Meds: heparin  Injectable 5000 Unit(s) SubCutaneous every 12 hours    [x] VTE Prophylaxis  INFECTIOUS DISEASES  Meds: levoFLOXacin IVPB      nafcillin  IVPB 2 Gram(s) IV Intermittent every 4 hours    ENDOCRINE  CAPILLARY BLOOD GLUCOSE      POCT Blood Glucose.: 124 mg/dL (08 Mar 2019 15:09)  POCT Blood Glucose.: 212 mg/dL (08 Mar 2019 10:49)  POCT Blood Glucose.: 260 mg/dL (08 Mar 2019 07:41)  POCT Blood Glucose.: 214 mg/dL (08 Mar 2019 03:45)  POCT Blood Glucose.: 124 mg/dL (07 Mar 2019 23:22)  POCT Blood Glucose.: 119 mg/dL (07 Mar 2019 21:30)  POCT Blood Glucose.: 299 mg/dL (07 Mar 2019 19:01)    Meds: dextrose 40% Gel 15 Gram(s) Oral once PRN  dextrose 50% Injectable 12.5 Gram(s) IV Push once  dextrose 50% Injectable 25 Gram(s) IV Push once  dextrose 50% Injectable 25 Gram(s) IV Push once  glucagon  Injectable 1 milliGRAM(s) IntraMuscular once PRN  insulin glargine Injectable (LANTUS) 20 Unit(s) SubCutaneous at bedtime  insulin lispro (HumaLOG) corrective regimen sliding scale   SubCutaneous every 4 hours    OTHER MEDICATIONS:  chlorhexidine 0.12% Liquid 15 milliLiter(s) Oral Mucosa two times a day  chlorhexidine 4% Liquid 1 Application(s) Topical <User Schedule>  :    PHYSICAL EXAM:    Gen: Elderly, cachectic male lying in bed, intubated, contracted, NAD  HEENT: NC/AT sclerae anicteric, ET tube in place  Resp: Tachypneic, overbreathing vent, mechanical breath sounds b/l  CV: S1, S2  Abd: Soft, + BS  Ext: contracted, minimal edema  Skin: Unstagable sacral ulcer with tunneling s/p debridement by surg, heel ulcer  Neuro: Sedated, unarousable, does not follow commands, contracted    LABS:                        10.1   36.75 )-----------( 346      ( 08 Mar 2019 04:46 )             31.6      03-08    139  |  106  |  53<H>  ----------------------------<  172<H>  3.6   |  22  |  1.33<H>    Ca    6.2<LL>      08 Mar 2019 04:46  Phos  4.6     03-08  Mg     1.9     03-08    TPro  4.8<L>  /  Alb  0.8<L>  /  TBili  2.2<H>  /  DBili  x   /  AST  18  /  ALT  14  /  AlkPhos  119  03-08        Culture Results:   No growth to date. (03-07 @ 09:12)  Culture Results:   No growth to date. (03-06 @ 14:08)      Mode: CPAP with PS, FiO2: 30, PEEP: 5, PS: 5, MAP: 7, PIP: 11      GLOBAL ISSUE/BEST PRACTICE:  Analgesia: Tylenol/fentanyl	  Sedation: Fentanyl  HOB elevation: yes  Stress ulcer prophylaxis: Protonix  VTE prophylaxis: HSQ  Oral Care: Chlorhexidine	  Glycemic control: ISS/lantus  Nutrition: Glucerna

## 2019-03-08 NOTE — PROGRESS NOTE ADULT - PROBLEM SELECTOR PLAN 1
with pneumonia  cont nafcillin repeat blood c/s negative  SUNNY  may be required eventually ,not required right now

## 2019-03-08 NOTE — CHART NOTE - NSCHARTNOTEFT_GEN_A_CORE
Patient critically ill and it appears that a SUNNY at this time would not be beneficial and may be harmful. This was discussed with ICU team and with Dr. Bautista.  Please reconsult if needed.

## 2019-03-09 LAB
ALBUMIN SERPL ELPH-MCNC: 1.2 G/DL — LOW (ref 3.3–5)
ALP SERPL-CCNC: 117 U/L — SIGNIFICANT CHANGE UP (ref 40–120)
ALT FLD-CCNC: 13 U/L — SIGNIFICANT CHANGE UP (ref 12–78)
ANION GAP SERPL CALC-SCNC: 11 MMOL/L — SIGNIFICANT CHANGE UP (ref 5–17)
ANION GAP SERPL CALC-SCNC: 13 MMOL/L — SIGNIFICANT CHANGE UP (ref 5–17)
AST SERPL-CCNC: 15 U/L — SIGNIFICANT CHANGE UP (ref 15–37)
BASOPHILS # BLD AUTO: 0.06 K/UL — SIGNIFICANT CHANGE UP (ref 0–0.2)
BASOPHILS NFR BLD AUTO: 0.2 % — SIGNIFICANT CHANGE UP (ref 0–2)
BILIRUB SERPL-MCNC: 1.8 MG/DL — HIGH (ref 0.2–1.2)
BUN SERPL-MCNC: 53 MG/DL — HIGH (ref 7–23)
BUN SERPL-MCNC: 55 MG/DL — HIGH (ref 7–23)
CALCIUM SERPL-MCNC: 6.1 MG/DL — CRITICAL LOW (ref 8.5–10.1)
CALCIUM SERPL-MCNC: 6.2 MG/DL — CRITICAL LOW (ref 8.5–10.1)
CHLORIDE SERPL-SCNC: 106 MMOL/L — SIGNIFICANT CHANGE UP (ref 96–108)
CHLORIDE SERPL-SCNC: 109 MMOL/L — HIGH (ref 96–108)
CO2 SERPL-SCNC: 20 MMOL/L — LOW (ref 22–31)
CO2 SERPL-SCNC: 23 MMOL/L — SIGNIFICANT CHANGE UP (ref 22–31)
CREAT SERPL-MCNC: 1.27 MG/DL — SIGNIFICANT CHANGE UP (ref 0.5–1.3)
CREAT SERPL-MCNC: 1.35 MG/DL — HIGH (ref 0.5–1.3)
EOSINOPHIL # BLD AUTO: 0.05 K/UL — SIGNIFICANT CHANGE UP (ref 0–0.5)
EOSINOPHIL NFR BLD AUTO: 0.2 % — SIGNIFICANT CHANGE UP (ref 0–6)
GLUCOSE BLDC GLUCOMTR-MCNC: 122 MG/DL — HIGH (ref 70–99)
GLUCOSE BLDC GLUCOMTR-MCNC: 133 MG/DL — HIGH (ref 70–99)
GLUCOSE BLDC GLUCOMTR-MCNC: 167 MG/DL — HIGH (ref 70–99)
GLUCOSE BLDC GLUCOMTR-MCNC: 194 MG/DL — HIGH (ref 70–99)
GLUCOSE BLDC GLUCOMTR-MCNC: 212 MG/DL — HIGH (ref 70–99)
GLUCOSE BLDC GLUCOMTR-MCNC: 230 MG/DL — HIGH (ref 70–99)
GLUCOSE SERPL-MCNC: 140 MG/DL — HIGH (ref 70–99)
GLUCOSE SERPL-MCNC: 193 MG/DL — HIGH (ref 70–99)
HCT VFR BLD CALC: 28.5 % — LOW (ref 39–50)
HGB BLD-MCNC: 9.2 G/DL — LOW (ref 13–17)
IMM GRANULOCYTES NFR BLD AUTO: 1.6 % — HIGH (ref 0–1.5)
LYMPHOCYTES # BLD AUTO: 0.7 K/UL — LOW (ref 1–3.3)
LYMPHOCYTES # BLD AUTO: 2.2 % — LOW (ref 13–44)
MAGNESIUM SERPL-MCNC: 1.9 MG/DL — SIGNIFICANT CHANGE UP (ref 1.6–2.6)
MAGNESIUM SERPL-MCNC: 2 MG/DL — SIGNIFICANT CHANGE UP (ref 1.6–2.6)
MCHC RBC-ENTMCNC: 27.9 PG — SIGNIFICANT CHANGE UP (ref 27–34)
MCHC RBC-ENTMCNC: 32.3 GM/DL — SIGNIFICANT CHANGE UP (ref 32–36)
MCV RBC AUTO: 86.4 FL — SIGNIFICANT CHANGE UP (ref 80–100)
MONOCYTES # BLD AUTO: 0.5 K/UL — SIGNIFICANT CHANGE UP (ref 0–0.9)
MONOCYTES NFR BLD AUTO: 1.6 % — LOW (ref 2–14)
NEUTROPHILS # BLD AUTO: 29.41 K/UL — HIGH (ref 1.8–7.4)
NEUTROPHILS NFR BLD AUTO: 94.2 % — HIGH (ref 43–77)
NRBC # BLD: 0 /100 WBCS — SIGNIFICANT CHANGE UP (ref 0–0)
PHOSPHATE SERPL-MCNC: 4 MG/DL — SIGNIFICANT CHANGE UP (ref 2.5–4.5)
PHOSPHATE SERPL-MCNC: 4.3 MG/DL — SIGNIFICANT CHANGE UP (ref 2.5–4.5)
PLATELET # BLD AUTO: 409 K/UL — HIGH (ref 150–400)
POTASSIUM SERPL-MCNC: 2.8 MMOL/L — CRITICAL LOW (ref 3.5–5.3)
POTASSIUM SERPL-MCNC: 3.3 MMOL/L — LOW (ref 3.5–5.3)
POTASSIUM SERPL-SCNC: 2.8 MMOL/L — CRITICAL LOW (ref 3.5–5.3)
POTASSIUM SERPL-SCNC: 3.3 MMOL/L — LOW (ref 3.5–5.3)
PROT SERPL-MCNC: 4.9 GM/DL — LOW (ref 6–8.3)
RBC # BLD: 3.3 M/UL — LOW (ref 4.2–5.8)
RBC # FLD: 15.8 % — HIGH (ref 10.3–14.5)
SODIUM SERPL-SCNC: 139 MMOL/L — SIGNIFICANT CHANGE UP (ref 135–145)
SODIUM SERPL-SCNC: 143 MMOL/L — SIGNIFICANT CHANGE UP (ref 135–145)
WBC # BLD: 31.21 K/UL — HIGH (ref 3.8–10.5)
WBC # FLD AUTO: 31.21 K/UL — HIGH (ref 3.8–10.5)

## 2019-03-09 PROCEDURE — 99291 CRITICAL CARE FIRST HOUR: CPT

## 2019-03-09 RX ORDER — POTASSIUM CHLORIDE 20 MEQ
40 PACKET (EA) ORAL ONCE
Qty: 0 | Refills: 0 | Status: COMPLETED | OUTPATIENT
Start: 2019-03-09 | End: 2019-03-09

## 2019-03-09 RX ADMIN — Medication 4: at 02:48

## 2019-03-09 RX ADMIN — Medication 650 MILLIGRAM(S): at 22:44

## 2019-03-09 RX ADMIN — NAFCILLIN 200 GRAM(S): 10 INJECTION, POWDER, FOR SOLUTION INTRAVENOUS at 01:00

## 2019-03-09 RX ADMIN — MIDODRINE HYDROCHLORIDE 10 MILLIGRAM(S): 2.5 TABLET ORAL at 06:23

## 2019-03-09 RX ADMIN — CHLORHEXIDINE GLUCONATE 15 MILLILITER(S): 213 SOLUTION TOPICAL at 05:56

## 2019-03-09 RX ADMIN — Medication 650 MILLIGRAM(S): at 07:05

## 2019-03-09 RX ADMIN — MIDODRINE HYDROCHLORIDE 10 MILLIGRAM(S): 2.5 TABLET ORAL at 13:38

## 2019-03-09 RX ADMIN — INSULIN GLARGINE 20 UNIT(S): 100 INJECTION, SOLUTION SUBCUTANEOUS at 21:27

## 2019-03-09 RX ADMIN — NAFCILLIN 200 GRAM(S): 10 INJECTION, POWDER, FOR SOLUTION INTRAVENOUS at 11:38

## 2019-03-09 RX ADMIN — NAFCILLIN 200 GRAM(S): 10 INJECTION, POWDER, FOR SOLUTION INTRAVENOUS at 07:44

## 2019-03-09 RX ADMIN — PANTOPRAZOLE SODIUM 40 MILLIGRAM(S): 20 TABLET, DELAYED RELEASE ORAL at 11:38

## 2019-03-09 RX ADMIN — Medication 650 MILLIGRAM(S): at 21:46

## 2019-03-09 RX ADMIN — MIDODRINE HYDROCHLORIDE 10 MILLIGRAM(S): 2.5 TABLET ORAL at 21:29

## 2019-03-09 RX ADMIN — HEPARIN SODIUM 5000 UNIT(S): 5000 INJECTION INTRAVENOUS; SUBCUTANEOUS at 05:56

## 2019-03-09 RX ADMIN — HEPARIN SODIUM 5000 UNIT(S): 5000 INJECTION INTRAVENOUS; SUBCUTANEOUS at 17:36

## 2019-03-09 RX ADMIN — Medication 40 MILLIEQUIVALENT(S): at 05:59

## 2019-03-09 RX ADMIN — Medication 2: at 14:44

## 2019-03-09 RX ADMIN — NAFCILLIN 200 GRAM(S): 10 INJECTION, POWDER, FOR SOLUTION INTRAVENOUS at 20:38

## 2019-03-09 RX ADMIN — Medication 4: at 21:28

## 2019-03-09 RX ADMIN — Medication 650 MILLIGRAM(S): at 06:40

## 2019-03-09 RX ADMIN — CHLORHEXIDINE GLUCONATE 15 MILLILITER(S): 213 SOLUTION TOPICAL at 17:37

## 2019-03-09 RX ADMIN — CHLORHEXIDINE GLUCONATE 1 APPLICATION(S): 213 SOLUTION TOPICAL at 05:53

## 2019-03-09 RX ADMIN — NAFCILLIN 200 GRAM(S): 10 INJECTION, POWDER, FOR SOLUTION INTRAVENOUS at 04:01

## 2019-03-09 RX ADMIN — NAFCILLIN 200 GRAM(S): 10 INJECTION, POWDER, FOR SOLUTION INTRAVENOUS at 15:50

## 2019-03-09 RX ADMIN — Medication 2: at 17:37

## 2019-03-09 NOTE — PROGRESS NOTE ADULT - ASSESSMENT
83M PMH dementia, CVA, bedbound and contracted at baseline, functional paraplegia, DM, prior gastric/intestinal pneumatosis managed nonoperatively p/w fevers and dyspnea. Labs with leukocytosis WBC 18, glucose of 800s with anion gap 24, Cr 2.37, Ph 7.1, HCO3: 13, lactate 10. intubated for respiratory failure. Admitted to ICU for severe sepsis with septic shock, staph PNA with acute respiratory failure, DKA requiring insulin drip, acute renal failure with ATN, lactic acidosis, multiple decubitus ulcers. Found to have gram positive bacteremia (staph and strep).    1. NEURO  - unresponsive off sedation  - baseline dementia    2. CV  - septic shock: weaned off levophed x24 hours  - maintain MAP >65  - cont midodrine     3. PULM  - acute respiratory failure, intubated  - daily wean as tolerates, failed wean today due to RR 40s and accessory muscle use  - staph PNA cont with levaquin/nafcillin  - if pt continues to fail wean: will need trach vs withdrawal of care, family still deciding   - day #9 of intubation      4. RENAL  - ARF with ATN in setting of sepsis and dehydration from DKA  - monitor Cr, ARF improving  as Cr downtrending  - hypernatremia: resolved, cont with free water  - supplement hypokalemia    5. ID  - severe sepsis with septic shock from PNA, decubitus ulcers, and bacteremia  - on levaquin and nafcillin   - s/p debridement of sacral and R hip wound      6. ENDO  - DKA: resolved, off insulin drip  - cont lantus with sliding scale coverage      7. GEN  - GOC discussion with daughter again today, family deciding on trach vs comfort  - remains full code for now    Total Critical Care Time: 40 min

## 2019-03-09 NOTE — GOALS OF CARE CONVERSATION - PERSONAL ADVANCE DIRECTIVE - CONVERSATION DETAILS
Family at bedside Geraldine (daughter) and pt's other daughter.    Family inquiring how they could take patient home. Explained that pt is currently vent dependent and has not been weaning well due to tachypnea. In current state family informed that it would not be possible to take pt home orally intubated on vent.     Options of trach/peg vent facility presented if pt remains vent dependent and continues to fail wean vs  hospice comfort measures with DNR/DNI    Family still are not sure what to do. We have agreed that by mid week they would have to let staff know what their wishes would be trach/peg or withdrawal of care/comfort measures if pt has made no improvement or progress in clinical condition or weaning.

## 2019-03-09 NOTE — PROGRESS NOTE ADULT - SUBJECTIVE AND OBJECTIVE BOX
HPI:  83M PMH dementia, CVA, bedbound and contracted at baseline, functional paraplegia, DM, prior gastric/intestinal pneumatosis managed nonoperatively p/w fevers and dyspnea. Labs with leukocytosis WBC 18, glucose of 800s with anion gap 24, Cr 2.37, Ph 7.1, HCO3: 13, lactate 10. Intubated for respiratory failure. Admitted to ICU for severe sepsis with septic shock, PNA, acute respiratory failure, DKA, acute renal failure with ATN, lactic acidosis, multiple decubitus ulcers, bacteremia.       24 hr events:  still febrile 100.9  failed wean today due to tachypnea and accessory muscle use  leukocytosis slightly improved today  off levophed since yesterday morning   blood cultures remain negative to date since 3/6  unresponsive off sedation    ## ROS:  [x] unable to obtain due to intubation     ## Labs:  CBC:                        9.2    31.21 )-----------( 409      ( 09 Mar 2019 04:40 )             28.5     Chem:  03-09    143        |  109<H>  |  55<H>  ------------------------------------------<  140<H>  3.3<L>   |  23           |  1.27    Ca    6.2<LL>      09 Mar 2019 12:37  Phos  4.3     03-09  Mg     1.9     03-09    TPro  4.9<L>  /  Alb  1.2<L>  /  TBili  1.8<H>  /  AST  15  /  ALT  13  /  AlkPhos  117  03-09        Culture - Blood in AM (03.07.19 @ 09:12)    Specimen Source: .Blood    Culture Results: No growth to date.    Culture - Blood (03.06.19 @ 14:08)    Specimen Source: .Blood    Culture Results: No growth to date.    Culture - Blood in AM (03.04.19 @ 09:33)    Specimen Source: .Blood    Organism: Staphylococcus aureus    Culture Results: Growth in anaerobic bottle: Staphylococcus aureus       Culture - Sputum . (03.02.19 @ 00:32)    Specimen Source: .Sputum    Culture Results: Numerous Staphylococcus aureus      Culture - Urine (03.01.19 @ 16:18)    Specimen Source: .Urine    Culture Results: No growth    Culture - Blood (03.01.19 @ 15:06)   Upon re-evaluation of gram stain:   Growth in aerobic bottle: Gram Positive Cocci in Clusters and Gram Positive Cocci in Pairs and Chains   Specimen Source: .Blood   Organism: Streptococcus constellatus          ## Imaging:  no new imaging      ## Medications:  levoFLOXacin IVPB 500 milliGRAM(s) IV Intermittent every 48 hours  nafcillin  IVPB 2 Gram(s) IV Intermittent every 4 hours    midodrine 10 milliGRAM(s) Oral every 8 hours  norepinephrine Infusion 0.05 MICROgram(s)/kG/Min IV Continuous <Continuous> - off since yesterday      dextrose 40% Gel 15 Gram(s) Oral once PRN  dextrose 50% Injectable 12.5 Gram(s) IV Push once  dextrose 50% Injectable 25 Gram(s) IV Push once  dextrose 50% Injectable 25 Gram(s) IV Push once  glucagon  Injectable 1 milliGRAM(s) IntraMuscular once PRN  insulin glargine Injectable (LANTUS) 20 Unit(s) SubCutaneous at bedtime  insulin lispro (HumaLOG) corrective regimen sliding scale   SubCutaneous every 4 hours    heparin  Injectable 5000 Unit(s) SubCutaneous every 12 hours    pantoprazole   Suspension 40 milliGRAM(s) Oral daily    acetaminophen    Suspension .. 650 milliGRAM(s) Oral every 6 hours PRN  fentaNYL   Infusion. 0.5 MICROgram(s)/kG/Hr IV Continuous <Continuous>      ## Vitals:  T(C): 37.7 (03-09-19 @ 15:07), Max: 38.3 (03-09-19 @ 06:30)  HR: 96 (03-09-19 @ 18:00) (83 - 105)  BP: 119/56 (03-09-19 @ 18:00) (94/47 - 139/55)  BP(mean): 73 (03-09-19 @ 18:00) (56 - 83)  RR: 38 (03-09-19 @ 18:00) (21 - 40)  SpO2: 100% (03-09-19 @ 18:00) (93% - 100%)    Vent: Mode: AC/ CMV (Assist Control/ Continuous Mandatory Ventilation), RR (machine): 12, RR (patient): 38, TV (machine): 350, FiO2: 30, PEEP: 5, PIP: 25        03-08 @ 07:01  -  03-09 @ 07:00  --------------------------------------------------------  IN: 3773.3 mL / OUT: 1800 mL / NET: 1973.3 mL    03-09 @ 06:01  -  03-09 @ 18:20  --------------------------------------------------------  IN: 1760 mL / OUT: 800 mL / NET: 960 mL          ## P/E:  Gen: elderly cachectic, unresponsive off sedation, lying comfortably in bed in no apparent distress, orally intubated  HEENT: PERRL, ETT in place, NGT in place  Resp: CTA B/L, no rales, no wheeze  CVS: S1S2 RRR  Abd: soft ND +BS  Ext: bilateral hand and feet edema, contracted lower extremities    Neuro: unresponsive off sedation, does not follow commands     CENTRAL LINE: [ ] YES [x] NO  LOCATION:   DATE INSERTED:  REMOVE: [ ] YES [ ] NO      PAZ: [ ] YES [x] NO    DATE INSERTED:  REMOVE:  [ ] YES [ ] NO      A-LINE:  [ ] YES [x] NO  LOCATION:   DATE INSERTED:  REMOVE:  [ ] YES [ ] NO  EXPLAIN:    GLOBAL ISSUE/BEST PRACTICE:  Analgesia: n/a  Sedation: n/a  HOB elevation: yes  Stress ulcer prophylaxis: protonix  VTE prophylaxis: heparin sc  Oral Care: chlorhexidine   Glycemic control: lantus. sliding scale coverage  Nutrition: tube feeds    CODE STATUS: [x] full code  [ ] DNR  [ ] DNI  [ ] [facility]  Goals of care discussion: [x] yes

## 2019-03-10 DIAGNOSIS — L89.154 PRESSURE ULCER OF SACRAL REGION, STAGE 4: ICD-10-CM

## 2019-03-10 DIAGNOSIS — A41.9 SEPSIS, UNSPECIFIED ORGANISM: ICD-10-CM

## 2019-03-10 DIAGNOSIS — J96.00 ACUTE RESPIRATORY FAILURE, UNSPECIFIED WHETHER WITH HYPOXIA OR HYPERCAPNIA: ICD-10-CM

## 2019-03-10 DIAGNOSIS — A49.1 STREPTOCOCCAL INFECTION, UNSPECIFIED SITE: ICD-10-CM

## 2019-03-10 DIAGNOSIS — R78.81 BACTEREMIA: ICD-10-CM

## 2019-03-10 DIAGNOSIS — N17.9 ACUTE KIDNEY FAILURE, UNSPECIFIED: ICD-10-CM

## 2019-03-10 LAB
ALBUMIN SERPL ELPH-MCNC: 1.1 G/DL — LOW (ref 3.3–5)
ALP SERPL-CCNC: 103 U/L — SIGNIFICANT CHANGE UP (ref 40–120)
ALT FLD-CCNC: 15 U/L — SIGNIFICANT CHANGE UP (ref 12–78)
ANION GAP SERPL CALC-SCNC: 10 MMOL/L — SIGNIFICANT CHANGE UP (ref 5–17)
AST SERPL-CCNC: 15 U/L — SIGNIFICANT CHANGE UP (ref 15–37)
BASE EXCESS BLDA CALC-SCNC: 0.7 MMOL/L — SIGNIFICANT CHANGE UP (ref -2–2)
BASOPHILS # BLD AUTO: 0.07 K/UL — SIGNIFICANT CHANGE UP (ref 0–0.2)
BASOPHILS NFR BLD AUTO: 0.2 % — SIGNIFICANT CHANGE UP (ref 0–2)
BILIRUB SERPL-MCNC: 2.6 MG/DL — HIGH (ref 0.2–1.2)
BLOOD GAS COMMENTS: SIGNIFICANT CHANGE UP
BLOOD GAS COMMENTS: SIGNIFICANT CHANGE UP
BLOOD GAS SOURCE: SIGNIFICANT CHANGE UP
BUN SERPL-MCNC: 53 MG/DL — HIGH (ref 7–23)
CALCIUM SERPL-MCNC: 6.5 MG/DL — CRITICAL LOW (ref 8.5–10.1)
CHLORIDE SERPL-SCNC: 106 MMOL/L — SIGNIFICANT CHANGE UP (ref 96–108)
CO2 SERPL-SCNC: 24 MMOL/L — SIGNIFICANT CHANGE UP (ref 22–31)
CREAT SERPL-MCNC: 1.24 MG/DL — SIGNIFICANT CHANGE UP (ref 0.5–1.3)
EOSINOPHIL # BLD AUTO: 0.14 K/UL — SIGNIFICANT CHANGE UP (ref 0–0.5)
EOSINOPHIL NFR BLD AUTO: 0.4 % — SIGNIFICANT CHANGE UP (ref 0–6)
GLUCOSE BLDC GLUCOMTR-MCNC: 144 MG/DL — HIGH (ref 70–99)
GLUCOSE BLDC GLUCOMTR-MCNC: 149 MG/DL — HIGH (ref 70–99)
GLUCOSE BLDC GLUCOMTR-MCNC: 218 MG/DL — HIGH (ref 70–99)
GLUCOSE BLDC GLUCOMTR-MCNC: 225 MG/DL — HIGH (ref 70–99)
GLUCOSE BLDC GLUCOMTR-MCNC: 263 MG/DL — HIGH (ref 70–99)
GLUCOSE SERPL-MCNC: 244 MG/DL — HIGH (ref 70–99)
HCO3 BLDA-SCNC: 22 MMOL/L — SIGNIFICANT CHANGE UP (ref 21–29)
HCT VFR BLD CALC: 27 % — LOW (ref 39–50)
HGB BLD-MCNC: 9.1 G/DL — LOW (ref 13–17)
HOROWITZ INDEX BLDA+IHG-RTO: 30 — SIGNIFICANT CHANGE UP
IMM GRANULOCYTES NFR BLD AUTO: 1.9 % — HIGH (ref 0–1.5)
LYMPHOCYTES # BLD AUTO: 1.16 K/UL — SIGNIFICANT CHANGE UP (ref 1–3.3)
LYMPHOCYTES # BLD AUTO: 3.6 % — LOW (ref 13–44)
MAGNESIUM SERPL-MCNC: 2 MG/DL — SIGNIFICANT CHANGE UP (ref 1.6–2.6)
MCHC RBC-ENTMCNC: 28.4 PG — SIGNIFICANT CHANGE UP (ref 27–34)
MCHC RBC-ENTMCNC: 33.7 GM/DL — SIGNIFICANT CHANGE UP (ref 32–36)
MCV RBC AUTO: 84.4 FL — SIGNIFICANT CHANGE UP (ref 80–100)
MONOCYTES # BLD AUTO: 0.78 K/UL — SIGNIFICANT CHANGE UP (ref 0–0.9)
MONOCYTES NFR BLD AUTO: 2.4 % — SIGNIFICANT CHANGE UP (ref 2–14)
NEUTROPHILS # BLD AUTO: 29.72 K/UL — HIGH (ref 1.8–7.4)
NEUTROPHILS NFR BLD AUTO: 91.5 % — HIGH (ref 43–77)
NRBC # BLD: 0 /100 WBCS — SIGNIFICANT CHANGE UP (ref 0–0)
PCO2 BLDA: 25 MMHG — LOW (ref 32–46)
PH BLD: 7.55 — HIGH (ref 7.35–7.45)
PHOSPHATE SERPL-MCNC: 4.3 MG/DL — SIGNIFICANT CHANGE UP (ref 2.5–4.5)
PLATELET # BLD AUTO: 481 K/UL — HIGH (ref 150–400)
PO2 BLDA: 83 MMHG — SIGNIFICANT CHANGE UP (ref 74–108)
POTASSIUM SERPL-MCNC: 3 MMOL/L — LOW (ref 3.5–5.3)
POTASSIUM SERPL-SCNC: 3 MMOL/L — LOW (ref 3.5–5.3)
PROT SERPL-MCNC: 4.9 GM/DL — LOW (ref 6–8.3)
RBC # BLD: 3.2 M/UL — LOW (ref 4.2–5.8)
RBC # FLD: 16.1 % — HIGH (ref 10.3–14.5)
SAO2 % BLDA: 97 % — HIGH (ref 92–96)
SODIUM SERPL-SCNC: 140 MMOL/L — SIGNIFICANT CHANGE UP (ref 135–145)
WBC # BLD: 32.48 K/UL — HIGH (ref 3.8–10.5)
WBC # FLD AUTO: 32.48 K/UL — HIGH (ref 3.8–10.5)

## 2019-03-10 PROCEDURE — 71045 X-RAY EXAM CHEST 1 VIEW: CPT | Mod: 26

## 2019-03-10 PROCEDURE — 99291 CRITICAL CARE FIRST HOUR: CPT

## 2019-03-10 RX ORDER — POTASSIUM CHLORIDE 20 MEQ
40 PACKET (EA) ORAL EVERY 4 HOURS
Qty: 0 | Refills: 0 | Status: COMPLETED | OUTPATIENT
Start: 2019-03-10 | End: 2019-03-10

## 2019-03-10 RX ORDER — INSULIN LISPRO 100/ML
VIAL (ML) SUBCUTANEOUS EVERY 6 HOURS
Qty: 0 | Refills: 0 | Status: DISCONTINUED | OUTPATIENT
Start: 2019-03-10 | End: 2019-03-18

## 2019-03-10 RX ADMIN — NAFCILLIN 200 GRAM(S): 10 INJECTION, POWDER, FOR SOLUTION INTRAVENOUS at 21:28

## 2019-03-10 RX ADMIN — NAFCILLIN 200 GRAM(S): 10 INJECTION, POWDER, FOR SOLUTION INTRAVENOUS at 05:33

## 2019-03-10 RX ADMIN — Medication 40 MILLIEQUIVALENT(S): at 09:54

## 2019-03-10 RX ADMIN — NAFCILLIN 200 GRAM(S): 10 INJECTION, POWDER, FOR SOLUTION INTRAVENOUS at 17:21

## 2019-03-10 RX ADMIN — PANTOPRAZOLE SODIUM 40 MILLIGRAM(S): 20 TABLET, DELAYED RELEASE ORAL at 12:04

## 2019-03-10 RX ADMIN — CHLORHEXIDINE GLUCONATE 15 MILLILITER(S): 213 SOLUTION TOPICAL at 05:33

## 2019-03-10 RX ADMIN — HEPARIN SODIUM 5000 UNIT(S): 5000 INJECTION INTRAVENOUS; SUBCUTANEOUS at 05:33

## 2019-03-10 RX ADMIN — Medication 6: at 01:30

## 2019-03-10 RX ADMIN — CHLORHEXIDINE GLUCONATE 15 MILLILITER(S): 213 SOLUTION TOPICAL at 17:21

## 2019-03-10 RX ADMIN — NAFCILLIN 200 GRAM(S): 10 INJECTION, POWDER, FOR SOLUTION INTRAVENOUS at 00:15

## 2019-03-10 RX ADMIN — CHLORHEXIDINE GLUCONATE 1 APPLICATION(S): 213 SOLUTION TOPICAL at 05:34

## 2019-03-10 RX ADMIN — NAFCILLIN 200 GRAM(S): 10 INJECTION, POWDER, FOR SOLUTION INTRAVENOUS at 09:14

## 2019-03-10 RX ADMIN — Medication 4: at 05:59

## 2019-03-10 RX ADMIN — MIDODRINE HYDROCHLORIDE 10 MILLIGRAM(S): 2.5 TABLET ORAL at 13:42

## 2019-03-10 RX ADMIN — NAFCILLIN 200 GRAM(S): 10 INJECTION, POWDER, FOR SOLUTION INTRAVENOUS at 13:35

## 2019-03-10 RX ADMIN — HEPARIN SODIUM 5000 UNIT(S): 5000 INJECTION INTRAVENOUS; SUBCUTANEOUS at 17:20

## 2019-03-10 RX ADMIN — MIDODRINE HYDROCHLORIDE 10 MILLIGRAM(S): 2.5 TABLET ORAL at 05:33

## 2019-03-10 RX ADMIN — INSULIN GLARGINE 20 UNIT(S): 100 INJECTION, SOLUTION SUBCUTANEOUS at 21:35

## 2019-03-10 RX ADMIN — Medication 4: at 09:54

## 2019-03-10 RX ADMIN — Medication 40 MILLIEQUIVALENT(S): at 13:35

## 2019-03-10 RX ADMIN — MIDODRINE HYDROCHLORIDE 10 MILLIGRAM(S): 2.5 TABLET ORAL at 21:28

## 2019-03-10 NOTE — PROCEDURE NOTE - NSPROCDETAILS_GEN_ALL_CORE
audible air bolus/placement confirmed by auscultation/bowel sounds present to 4 quadrants/nasogastric
connected to ventilator/patient pre-oxygenated, tube inserted, placement confirmed

## 2019-03-10 NOTE — PROGRESS NOTE ADULT - ASSESSMENT
83M PMH dementia, CVA, bedbound and contracted at baseline, functional paraplegia, DM, prior gastric/intestinal pneumatosis managed nonoperatively p/w fevers and dyspnea. Labs with leukocytosis WBC 18, glucose of 800s with anion gap 24, Cr 2.37, Ph 7.1, HCO3: 13, lactate 10. intubated for respiratory failure. Admitted to ICU for severe sepsis with septic shock, staph PNA with acute respiratory failure, DKA requiring insulin drip, acute renal failure with ATN, lactic acidosis, multiple decubitus ulcers. Found to have gram positive bacteremia (staph and strep), respiratory alkalosis.     1. NEURO  - unresponsive off sedation  - baseline dementia  - monitor off sedation    2. CV  - septic shock: weaned off levophed x48 hours  - maintain MAP >65  - cont midodrine     3. PULM  - acute respiratory failure, intubated  - daily wean as tolerates, tolerating some weaning, but tachypneic with CPAP trials as RR increases to the 40s with accessory muscle use  - staph PNA cont with levaquin/nafcillin  - if pt continues to fail wean: will need trach vs withdrawal of care, family still deciding   - day #10 of intubation      4. RENAL  - ARF with ATN in setting of sepsis and dehydration from DKA  - monitor Cr, ARF improving  as Cr downtrending  - hypernatremia: resolved, cont with free water  - supplement hypokalemia    5. ID  - severe sepsis with septic shock from PNA, decubitus ulcers, and bacteremia  - on levaquin and nafcillin   - s/p debridement of sacral and R hip wound by vascular sx      6. ENDO  - DKA: resolved, off insulin drip  - cont lantus with sliding scale coverage      7. GEN  - GOC discussion with daughter yesterday, d/w family options of comfort with elective withdrawal of care vs trach/PEG vent facility  - remains full code for now as family undecided    Total Critical Care Time: 40 min 83M PMH dementia, CVA, bedbound and contracted at baseline, functional paraplegia, DM, prior gastric/intestinal pneumatosis managed nonoperatively p/w fevers and dyspnea. Labs with leukocytosis WBC 18, glucose of 800s with anion gap 24, Cr 2.37, Ph 7.1, HCO3: 13, lactate 10. intubated for respiratory failure. Admitted to ICU for severe sepsis with septic shock, staph PNA with acute respiratory failure, DKA requiring insulin drip, acute renal failure with ATN, lactic acidosis, multiple decubitus ulcers. Found to have gram positive bacteremia (staph and strep), respiratory alkalosis.     1. NEURO  - unresponsive off sedation  - baseline dementia  - monitor off sedation    2. CV  - septic shock: weaned off levophed x48 hours  - maintain MAP >65  - cont midodrine     3. PULM  - acute respiratory failure, intubated  - daily wean as tolerates, tolerating some weaning, but tachypneic with CPAP trials as RR increases to the 40s with accessory muscle use  - staph PNA cont with levaquin/nafcillin  - if pt continues to fail wean: will need trach vs withdrawal of care, family still deciding   - day #10 of intubation      4. RENAL  - ARF with ATN in setting of sepsis and dehydration from DKA  - monitor Cr, ARF improving  as Cr downtrending  - hypernatremia: resolved, cont with free water  - supplement hypokalemia  - hypocalcemic on labs but corrected Ca taking into albumin level is 8.82, no need for supplementation    5. ID  - severe sepsis with septic shock from PNA, decubitus ulcers, and bacteremia  - on levaquin and nafcillin   - s/p debridement of sacral and R hip wound by vascular sx      6. ENDO  - DKA: resolved, off insulin drip  - cont lantus with sliding scale coverage      7. GEN  - GOC discussion with daughter yesterday, d/w family options of comfort with elective withdrawal of care vs trach/PEG vent facility  - remains full code for now as family undecided    Total Critical Care Time: 40 min

## 2019-03-10 NOTE — PROGRESS NOTE ADULT - SUBJECTIVE AND OBJECTIVE BOX
HPI:  83M PMH dementia, CVA, bedbound and contracted at baseline, functional paraplegia, DM, prior gastric/intestinal pneumatosis managed nonoperatively p/w fevers and dyspnea. Labs with leukocytosis WBC 18, glucose of 800s with anion gap 24, Cr 2.37, Ph 7.1, HCO3: 13, lactate 10. Intubated for respiratory failure. Admitted to ICU for severe sepsis with septic shock, PNA, acute respiratory failure, DKA, acute renal failure with ATN, lactic acidosis, multiple decubitus ulcers, bacteremia.         24 hr events:      ## ROS:  [ ] unable to obtain  CONSTITUTIONAL: No fever, weight loss, or fatigue  EYES: No eye pain, visual disturbances, or discharge  ENMT:  No difficulty hearing, tinnitus, vertigo; No sinus or throat pain  NECK: No pain or stiffness  RESPIRATORY: No cough, wheezing, chills or hemoptysis; No shortness of breath  CARDIOVASCULAR: No chest pain, palpitations, dizziness, or leg swelling  GASTROINTESTINAL: No abdominal or epigastric pain. No nausea, vomiting, or hematemesis; No diarrhea or constipation. No melena or hematochezia.  GENITOURINARY: No dysuria, frequency, hematuria, or incontinence  NEUROLOGICAL: No headaches, memory loss, loss of strength, numbness, or tremors  SKIN: No itching, burning, rashes, or lesions   LYMPH NODES: No enlarged glands  ENDOCRINE: No heat or cold intolerance; No hair loss  MUSCULOSKELETAL: No joint pain or swelling; No muscle, back, or extremity pain  PSYCHIATRIC: No depression, anxiety, mood swings, or difficulty sleeping  HEME/LYMPH: No easy bruising, or bleeding gums  ALLERGY AND IMMUNOLOGIC: No hives or eczema    ## Labs:  CBC:                        9.1    32.48 )-----------( 481      ( 10 Mar 2019 04:37 )             27.0     Chem:  03-10    140  |  106  |  53<H>  ----------------------------<  244<H>  3.0<L>   |  24  |  1.24    Ca    6.5<LL>      10 Mar 2019 04:37  Phos  4.3     03-10  Mg     2.0     03-10    TPro  4.9<L>  /  Alb  1.1<L>  /  TBili  2.6<H>  /  DBili  x   /  AST  15  /  ALT  15  /  AlkPhos  103  03-10    Coags:          ## Imaging:    ## Medications:  levoFLOXacin IVPB 500 milliGRAM(s) IV Intermittent every 48 hours  levoFLOXacin IVPB      nafcillin  IVPB 2 Gram(s) IV Intermittent every 4 hours    midodrine 10 milliGRAM(s) Oral every 8 hours      dextrose 40% Gel 15 Gram(s) Oral once PRN  dextrose 50% Injectable 12.5 Gram(s) IV Push once  dextrose 50% Injectable 25 Gram(s) IV Push once  dextrose 50% Injectable 25 Gram(s) IV Push once  glucagon  Injectable 1 milliGRAM(s) IntraMuscular once PRN  insulin glargine Injectable (LANTUS) 20 Unit(s) SubCutaneous at bedtime  insulin lispro (HumaLOG) corrective regimen sliding scale   SubCutaneous every 6 hours    heparin  Injectable 5000 Unit(s) SubCutaneous every 12 hours    pantoprazole   Suspension 40 milliGRAM(s) Oral daily    acetaminophen    Suspension .. 650 milliGRAM(s) Oral every 6 hours PRN      ## Vitals:  T(C): 37.9 (03-10-19 @ 15:31), Max: 38.1 (03-09-19 @ 21:40)  HR: 93 (03-10-19 @ 16:53) (77 - 99)  BP: 131/72 (03-10-19 @ 16:00) (106/92 - 162/56)  BP(mean): 86 (03-10-19 @ 16:00) (66 - 96)  RR: 37 (03-10-19 @ 16:00) (23 - 42)  SpO2: 100% (03-10-19 @ 16:53) (90% - 100%)  Wt(kg): --  Vent: Mode: AC/ CMV (Assist Control/ Continuous Mandatory Ventilation), RR (machine): 12, RR (patient): 39, TV (machine): 350, FiO2: 30, PEEP: 5, PIP: 13  ABG: ABG - ( 10 Mar 2019 11:58 )  pH, Arterial: x     pH, Blood: 7.55  /  pCO2: 25    /  pO2: 83    / HCO3: 22    / Base Excess: 0.7   /  SaO2: 97                    03-09 @ 06:01  -  03-10 @ 07:00  --------------------------------------------------------  IN: 3265 mL / OUT: 1400 mL / NET: 1865 mL    03-10 @ 07:01  -  03-10 @ 17:17  --------------------------------------------------------  IN: 995 mL / OUT: 0 mL / NET: 995 mL          ## P/E:  Gen: lying comfortably in bed in no apparent distress  HEENT: PERRL, EOMI  Resp: CTA B/L no c/r/w  CVS: S1S2 no m/r/g  Abd: soft NT/ND +BS  Ext: no c/c/e  Neuro: A&Ox3    CENTRAL LINE: [ ] YES [ ] NO  LOCATION:   DATE INSERTED:  REMOVE: [ ] YES [ ] NO      BRANDI: [ ] YES [ ] NO    DATE INSERTED:  REMOVE:  [ ] YES [ ] NO      A-LINE:  [ ] YES [ ] NO  LOCATION:   DATE INSERTED:  REMOVE:  [ ] YES [ ] NO  EXPLAIN:    GLOBAL ISSUE/BEST PRACTICE:  Analgesia:  Sedation:  HOB elevation: yes  Stress ulcer prophylaxis:  VTE prophylaxis:  Oral Care:  Glycemic control:  Nutrition:    CODE STATUS: [ ] full code  [ ] DNR  [ ] DNI  [ ] [facility]  Goals of care discussion: [ ] yes HPI:  83M PMH dementia, CVA, bedbound and contracted at baseline, functional paraplegia, DM, prior gastric/intestinal pneumatosis managed nonoperatively p/w fevers and dyspnea. Labs with leukocytosis WBC 18, glucose of 800s with anion gap 24, Cr 2.37, Ph 7.1, HCO3: 13, lactate 10. Intubated for respiratory failure. Admitted to ICU for severe sepsis with septic shock, PNA, acute respiratory failure, DKA, acute renal failure with ATN, lactic acidosis, multiple decubitus ulcers, bacteremia.       24 hr events:  mental status remains poor, unresponsive, not following commands  remains tachypneic on vent  weaning but noted to have accessory muscle use  WBC remains high   love grade temps    ## ROS:  [x] unable to obtain due to intubation, dementia      ## Labs:  CBC:                        9.1    32.48 )-----------( 481      ( 10 Mar 2019 04:37 )             27.0     Chem:  03-10    140  |  106  |  53<H>  ----------------------------<  244<H>  3.0<L>   |  24  |  1.24    Ca    6.5<LL>      10 Mar 2019 04:37  Phos  4.3     03-10  Mg     2.0     03-10    TPro  4.9<L>  /  Alb  1.1<L>  /  TBili  2.6<H>  /  DBili  x   /  AST  15  /  ALT  15  /  AlkPhos  103  03-10    Culture - Blood in AM (03.07.19 @ 09:12)    Specimen Source: .Blood    Culture Results: No growth to date.    Culture - Blood (03.06.19 @ 14:08)    Specimen Source: .Blood    Culture Results: No growth to date.    Culture - Blood in AM (03.04.19 @ 09:33)     Culture Results: Growth in anaerobic bottle: Staphylococcus aureus      Culture - Sputum . (03.02.19 @ 00:32)    Specimen Source: .Sputum    Culture Results: Numerous Staphylococcus aureus      Culture - Blood (03.01.19 @ 15:06)    Streptococcus sp.(Not Grp A, B or S pneumoniae): Detec    Specimen Source: .Blood    Organism: Streptococcus constellatus         ## Imaging:  CXR < from: Xray Chest 1 View-PORTABLE IMMEDIATE (03.10.19 @ 08:50) >  ETT tip above esdras. NG tip below diaphragm. Heart   unremarkable. Left base patchy opacification/pneumonia is unchanged      ## Medications:  levoFLOXacin IVPB 500 milliGRAM(s) IV Intermittent every 48 hours    nafcillin  IVPB 2 Gram(s) IV Intermittent every 4 hours    midodrine 10 milliGRAM(s) Oral every 8 hours      dextrose 40% Gel 15 Gram(s) Oral once PRN  dextrose 50% Injectable 12.5 Gram(s) IV Push once  dextrose 50% Injectable 25 Gram(s) IV Push once  dextrose 50% Injectable 25 Gram(s) IV Push once  glucagon  Injectable 1 milliGRAM(s) IntraMuscular once PRN  insulin glargine Injectable (LANTUS) 20 Unit(s) SubCutaneous at bedtime  insulin lispro (HumaLOG) corrective regimen sliding scale   SubCutaneous every 6 hours    heparin  Injectable 5000 Unit(s) SubCutaneous every 12 hours    pantoprazole   Suspension 40 milliGRAM(s) Oral daily    acetaminophen    Suspension . 650 milliGRAM(s) Oral every 6 hours PRN      ## Vitals:  T(C): 37.9 (03-10-19 @ 15:31), Max: 38.1 (03-09-19 @ 21:40)  HR: 93 (03-10-19 @ 16:53) (77 - 99)  BP: 131/72 (03-10-19 @ 16:00) (106/92 - 162/56)  BP(mean): 86 (03-10-19 @ 16:00) (66 - 96)  RR: 37 (03-10-19 @ 16:00) (23 - 42)  SpO2: 100% (03-10-19 @ 16:53) (90% - 100%)    Vent: Mode: AC/ CMV (Assist Control/ Continuous Mandatory Ventilation), RR (machine): 12, RR (patient): 39, TV (machine): 350, FiO2: 30, PEEP: 5, PIP: 13    ABG: ABG - ( 10 Mar 2019 11:58 )  pH, Arterial: x     pH, Blood: 7.55  /  pCO2: 25    /  pO2: 83    / HCO3: 22    / Base Excess: 0.7   /  SaO2: 97                    03-09 @ 06:01  -  03-10 @ 07:00  --------------------------------------------------------  IN: 3265 mL / OUT: 1400 mL / NET: 1865 mL    03-10 @ 07:01  -  03-10 @ 17:17  --------------------------------------------------------  IN: 995 mL / OUT: 0 mL / NET: 995 mL          ## P/E:  Gen: elderly cachectic, unresponsive, lying comfortably in bed in no apparent distress, orally intubated  HEENT: PERRL, ETT in place, NGT in place  Resp: CTA B/L, no rales, no wheeze  CVS: S1S2 RRR  Abd: soft ND +BS  Ext: bilateral hand and feet edema, contracted lower extremities    Neuro: does not follow commands     CENTRAL LINE: [ ] YES [x] NO  LOCATION:   DATE INSERTED:  REMOVE: [ ] YES [ ] NO      PAZ: [ ] YES [x] NO    DATE INSERTED:  REMOVE:  [ ] YES [ ] NO      A-LINE:  [ ] YES [x] NO  LOCATION:   DATE INSERTED:  REMOVE:  [ ] YES [ ] NO  EXPLAIN:    GLOBAL ISSUE/BEST PRACTICE:  Analgesia: n/a  Sedation: n/a  HOB elevation: yes  Stress ulcer prophylaxis: protonix  VTE prophylaxis: heparin sc  Oral Care: chlorhexidine   Glycemic control: lantus. sliding scale coverage  Nutrition: tube feeds    CODE STATUS: [x] full code  [ ] DNR  [ ] DNI  [ ] MOLST  Goals of care discussion: [x] yes HPI:  83M PMH dementia, CVA, bedbound and contracted at baseline, functional paraplegia, DM, prior gastric/intestinal pneumatosis managed nonoperatively p/w fevers and dyspnea. Labs with leukocytosis WBC 18, glucose of 800s with anion gap 24, Cr 2.37, Ph 7.1, HCO3: 13, lactate 10. Intubated for respiratory failure. Admitted to ICU for severe sepsis with septic shock, PNA, acute respiratory failure, DKA, acute renal failure with ATN, lactic acidosis, multiple decubitus ulcers, bacteremia.       24 hr events:  mental status remains poor, unresponsive, not following commands  remains tachypneic on vent  weaning but noted to have accessory muscle use  WBC remains high   low grade temps    ## ROS:  [x] unable to obtain due to intubation, dementia      ## Labs:  CBC:                        9.1    32.48 )-----------( 481      ( 10 Mar 2019 04:37 )             27.0     Chem:  03-10    140  |  106  |  53<H>  ----------------------------<  244<H>  3.0<L>   |  24  |  1.24    Ca    6.5<LL>      10 Mar 2019 04:37  Phos  4.3     03-10  Mg     2.0     03-10    TPro  4.9<L>  /  Alb  1.1<L>  /  TBili  2.6<H>  /  DBili  x   /  AST  15  /  ALT  15  /  AlkPhos  103  03-10    Culture - Blood in AM (03.07.19 @ 09:12)    Specimen Source: .Blood    Culture Results: No growth to date.    Culture - Blood (03.06.19 @ 14:08)    Specimen Source: .Blood    Culture Results: No growth to date.    Culture - Blood in AM (03.04.19 @ 09:33)     Culture Results: Growth in anaerobic bottle: Staphylococcus aureus      Culture - Sputum . (03.02.19 @ 00:32)    Specimen Source: .Sputum    Culture Results: Numerous Staphylococcus aureus      Culture - Blood (03.01.19 @ 15:06)    Streptococcus sp.(Not Grp A, B or S pneumoniae): Detec    Specimen Source: .Blood    Organism: Streptococcus constellatus         ## Imaging:  CXR < from: Xray Chest 1 View-PORTABLE IMMEDIATE (03.10.19 @ 08:50) >  ETT tip above esdras. NG tip below diaphragm. Heart   unremarkable. Left base patchy opacification/pneumonia is unchanged      ## Medications:  levoFLOXacin IVPB 500 milliGRAM(s) IV Intermittent every 48 hours    nafcillin  IVPB 2 Gram(s) IV Intermittent every 4 hours    midodrine 10 milliGRAM(s) Oral every 8 hours      dextrose 40% Gel 15 Gram(s) Oral once PRN  dextrose 50% Injectable 12.5 Gram(s) IV Push once  dextrose 50% Injectable 25 Gram(s) IV Push once  dextrose 50% Injectable 25 Gram(s) IV Push once  glucagon  Injectable 1 milliGRAM(s) IntraMuscular once PRN  insulin glargine Injectable (LANTUS) 20 Unit(s) SubCutaneous at bedtime  insulin lispro (HumaLOG) corrective regimen sliding scale   SubCutaneous every 6 hours    heparin  Injectable 5000 Unit(s) SubCutaneous every 12 hours    pantoprazole   Suspension 40 milliGRAM(s) Oral daily    acetaminophen    Suspension . 650 milliGRAM(s) Oral every 6 hours PRN      ## Vitals:  T(C): 37.9 (03-10-19 @ 15:31), Max: 38.1 (03-09-19 @ 21:40)  HR: 93 (03-10-19 @ 16:53) (77 - 99)  BP: 131/72 (03-10-19 @ 16:00) (106/92 - 162/56)  BP(mean): 86 (03-10-19 @ 16:00) (66 - 96)  RR: 37 (03-10-19 @ 16:00) (23 - 42)  SpO2: 100% (03-10-19 @ 16:53) (90% - 100%)    Vent: Mode: AC/ CMV (Assist Control/ Continuous Mandatory Ventilation), RR (machine): 12, RR (patient): 39, TV (machine): 350, FiO2: 30, PEEP: 5, PIP: 13    ABG: ABG - ( 10 Mar 2019 11:58 )  pH, Arterial: x     pH, Blood: 7.55  /  pCO2: 25    /  pO2: 83    / HCO3: 22    / Base Excess: 0.7   /  SaO2: 97                    03-09 @ 06:01  -  03-10 @ 07:00  --------------------------------------------------------  IN: 3265 mL / OUT: 1400 mL / NET: 1865 mL    03-10 @ 07:01  -  03-10 @ 17:17  --------------------------------------------------------  IN: 995 mL / OUT: 0 mL / NET: 995 mL          ## P/E:  Gen: elderly cachectic, unresponsive, lying comfortably in bed in no apparent distress, orally intubated  HEENT: PERRL, ETT in place, NGT in place  Resp: CTA B/L, no rales, no wheeze  CVS: S1S2 RRR  Abd: soft ND +BS  Ext: bilateral hand and feet edema, contracted lower extremities    Neuro: does not follow commands     CENTRAL LINE: [ ] YES [x] NO  LOCATION:   DATE INSERTED:  REMOVE: [ ] YES [ ] NO      PAZ: [ ] YES [x] NO    DATE INSERTED:  REMOVE:  [ ] YES [ ] NO      A-LINE:  [ ] YES [x] NO  LOCATION:   DATE INSERTED:  REMOVE:  [ ] YES [ ] NO  EXPLAIN:    GLOBAL ISSUE/BEST PRACTICE:  Analgesia: n/a  Sedation: n/a  HOB elevation: yes  Stress ulcer prophylaxis: protonix  VTE prophylaxis: heparin sc  Oral Care: chlorhexidine   Glycemic control: lantus. sliding scale coverage  Nutrition: tube feeds    CODE STATUS: [x] full code  [ ] DNR  [ ] DNI  [ ] MOLST  Goals of care discussion: [x] yes

## 2019-03-11 LAB
ALBUMIN SERPL ELPH-MCNC: 0.9 G/DL — LOW (ref 3.3–5)
ALP SERPL-CCNC: 93 U/L — SIGNIFICANT CHANGE UP (ref 40–120)
ALT FLD-CCNC: 13 U/L — SIGNIFICANT CHANGE UP (ref 12–78)
ANION GAP SERPL CALC-SCNC: 10 MMOL/L — SIGNIFICANT CHANGE UP (ref 5–17)
AST SERPL-CCNC: 12 U/L — LOW (ref 15–37)
BILIRUB SERPL-MCNC: 2.8 MG/DL — HIGH (ref 0.2–1.2)
BUN SERPL-MCNC: 49 MG/DL — HIGH (ref 7–23)
CALCIUM SERPL-MCNC: 6.8 MG/DL — LOW (ref 8.5–10.1)
CHLORIDE SERPL-SCNC: 105 MMOL/L — SIGNIFICANT CHANGE UP (ref 96–108)
CO2 SERPL-SCNC: 24 MMOL/L — SIGNIFICANT CHANGE UP (ref 22–31)
CREAT SERPL-MCNC: 1.14 MG/DL — SIGNIFICANT CHANGE UP (ref 0.5–1.3)
CULTURE RESULTS: SIGNIFICANT CHANGE UP
GLUCOSE BLDC GLUCOMTR-MCNC: 110 MG/DL — HIGH (ref 70–99)
GLUCOSE BLDC GLUCOMTR-MCNC: 112 MG/DL — HIGH (ref 70–99)
GLUCOSE BLDC GLUCOMTR-MCNC: 167 MG/DL — HIGH (ref 70–99)
GLUCOSE BLDC GLUCOMTR-MCNC: 77 MG/DL — SIGNIFICANT CHANGE UP (ref 70–99)
GLUCOSE BLDC GLUCOMTR-MCNC: 80 MG/DL — SIGNIFICANT CHANGE UP (ref 70–99)
GLUCOSE SERPL-MCNC: 132 MG/DL — HIGH (ref 70–99)
HCT VFR BLD CALC: 26.5 % — LOW (ref 39–50)
HGB BLD-MCNC: 8.8 G/DL — LOW (ref 13–17)
MAGNESIUM SERPL-MCNC: 1.8 MG/DL — SIGNIFICANT CHANGE UP (ref 1.6–2.6)
MCHC RBC-ENTMCNC: 27.8 PG — SIGNIFICANT CHANGE UP (ref 27–34)
MCHC RBC-ENTMCNC: 33.2 GM/DL — SIGNIFICANT CHANGE UP (ref 32–36)
MCV RBC AUTO: 83.9 FL — SIGNIFICANT CHANGE UP (ref 80–100)
NRBC # BLD: 0 /100 WBCS — SIGNIFICANT CHANGE UP (ref 0–0)
PHOSPHATE SERPL-MCNC: 3.8 MG/DL — SIGNIFICANT CHANGE UP (ref 2.5–4.5)
PLATELET # BLD AUTO: 509 K/UL — HIGH (ref 150–400)
POTASSIUM SERPL-MCNC: 3.3 MMOL/L — LOW (ref 3.5–5.3)
POTASSIUM SERPL-SCNC: 3.3 MMOL/L — LOW (ref 3.5–5.3)
PROT SERPL-MCNC: 5 GM/DL — LOW (ref 6–8.3)
RBC # BLD: 3.16 M/UL — LOW (ref 4.2–5.8)
RBC # FLD: 16.3 % — HIGH (ref 10.3–14.5)
SODIUM SERPL-SCNC: 139 MMOL/L — SIGNIFICANT CHANGE UP (ref 135–145)
SPECIMEN SOURCE: SIGNIFICANT CHANGE UP
WBC # BLD: 30.1 K/UL — HIGH (ref 3.8–10.5)
WBC # FLD AUTO: 30.1 K/UL — HIGH (ref 3.8–10.5)

## 2019-03-11 PROCEDURE — 99291 CRITICAL CARE FIRST HOUR: CPT

## 2019-03-11 PROCEDURE — 99233 SBSQ HOSP IP/OBS HIGH 50: CPT

## 2019-03-11 RX ORDER — MIDODRINE HYDROCHLORIDE 2.5 MG/1
20 TABLET ORAL EVERY 8 HOURS
Qty: 0 | Refills: 0 | Status: DISCONTINUED | OUTPATIENT
Start: 2019-03-11 | End: 2019-03-16

## 2019-03-11 RX ORDER — SODIUM CHLORIDE 9 MG/ML
1000 INJECTION, SOLUTION INTRAVENOUS ONCE
Qty: 0 | Refills: 0 | Status: COMPLETED | OUTPATIENT
Start: 2019-03-11 | End: 2019-03-11

## 2019-03-11 RX ORDER — POTASSIUM CHLORIDE 20 MEQ
40 PACKET (EA) ORAL ONCE
Qty: 0 | Refills: 0 | Status: COMPLETED | OUTPATIENT
Start: 2019-03-11 | End: 2019-03-11

## 2019-03-11 RX ORDER — MAGNESIUM SULFATE 500 MG/ML
1 VIAL (ML) INJECTION ONCE
Qty: 0 | Refills: 0 | Status: COMPLETED | OUTPATIENT
Start: 2019-03-11 | End: 2019-03-11

## 2019-03-11 RX ADMIN — CHLORHEXIDINE GLUCONATE 15 MILLILITER(S): 213 SOLUTION TOPICAL at 17:42

## 2019-03-11 RX ADMIN — INSULIN GLARGINE 20 UNIT(S): 100 INJECTION, SOLUTION SUBCUTANEOUS at 21:09

## 2019-03-11 RX ADMIN — NAFCILLIN 200 GRAM(S): 10 INJECTION, POWDER, FOR SOLUTION INTRAVENOUS at 06:04

## 2019-03-11 RX ADMIN — Medication 650 MILLIGRAM(S): at 13:30

## 2019-03-11 RX ADMIN — NAFCILLIN 200 GRAM(S): 10 INJECTION, POWDER, FOR SOLUTION INTRAVENOUS at 21:08

## 2019-03-11 RX ADMIN — Medication 100 GRAM(S): at 12:25

## 2019-03-11 RX ADMIN — Medication 650 MILLIGRAM(S): at 12:25

## 2019-03-11 RX ADMIN — HEPARIN SODIUM 5000 UNIT(S): 5000 INJECTION INTRAVENOUS; SUBCUTANEOUS at 17:41

## 2019-03-11 RX ADMIN — NAFCILLIN 200 GRAM(S): 10 INJECTION, POWDER, FOR SOLUTION INTRAVENOUS at 12:25

## 2019-03-11 RX ADMIN — Medication 40 MILLIEQUIVALENT(S): at 12:25

## 2019-03-11 RX ADMIN — SODIUM CHLORIDE 2000 MILLILITER(S): 9 INJECTION, SOLUTION INTRAVENOUS at 18:12

## 2019-03-11 RX ADMIN — MIDODRINE HYDROCHLORIDE 10 MILLIGRAM(S): 2.5 TABLET ORAL at 13:49

## 2019-03-11 RX ADMIN — Medication 2: at 00:32

## 2019-03-11 RX ADMIN — CHLORHEXIDINE GLUCONATE 1 APPLICATION(S): 213 SOLUTION TOPICAL at 12:33

## 2019-03-11 RX ADMIN — Medication 40 MILLIEQUIVALENT(S): at 08:26

## 2019-03-11 RX ADMIN — NAFCILLIN 200 GRAM(S): 10 INJECTION, POWDER, FOR SOLUTION INTRAVENOUS at 17:41

## 2019-03-11 RX ADMIN — CHLORHEXIDINE GLUCONATE 15 MILLILITER(S): 213 SOLUTION TOPICAL at 06:04

## 2019-03-11 RX ADMIN — HEPARIN SODIUM 5000 UNIT(S): 5000 INJECTION INTRAVENOUS; SUBCUTANEOUS at 06:04

## 2019-03-11 RX ADMIN — NAFCILLIN 200 GRAM(S): 10 INJECTION, POWDER, FOR SOLUTION INTRAVENOUS at 00:33

## 2019-03-11 RX ADMIN — MIDODRINE HYDROCHLORIDE 10 MILLIGRAM(S): 2.5 TABLET ORAL at 06:04

## 2019-03-11 RX ADMIN — MIDODRINE HYDROCHLORIDE 20 MILLIGRAM(S): 2.5 TABLET ORAL at 21:09

## 2019-03-11 RX ADMIN — NAFCILLIN 200 GRAM(S): 10 INJECTION, POWDER, FOR SOLUTION INTRAVENOUS at 08:26

## 2019-03-11 RX ADMIN — PANTOPRAZOLE SODIUM 40 MILLIGRAM(S): 20 TABLET, DELAYED RELEASE ORAL at 12:25

## 2019-03-11 NOTE — PROGRESS NOTE ADULT - SUBJECTIVE AND OBJECTIVE BOX
HPI:  Pt is an 84 yo M with h/o DM, dementia, CVA, bedbound and contracted at baseline, functional paraplegia,, prior gastric/intestinal pneumatosis managed nonoperatively presented 2 to fevers and dyspnea. Pt with the following labs: WBC 18, glucose of 800s with anion gap 24, Cr 2.37, Ph 7.1, HCO3: 13, lactate 10. Pt intubated for respiratory failure. ICU admitting dx: 1) septic shock, 2 to Staph PNA 2) Acute respiratory failure 2 to SS/PNA 3) DKA 4) ANSLEY 2 to ATN, 5) multiple decubitus ulcers 6)  respiratory alkalosis.       ## Labs:  CBC:                        8.8    30.10 )-----------( 509      ( 11 Mar 2019 05:43 )             26.5     Chem:  03-11    139  |  105  |  49<H>  ----------------------------<  132<H>  3.3<L>   |  24  |  1.14    Ca    6.8<L>      11 Mar 2019 05:43  Phos  3.8     03-11  Mg     1.8     03-11    TPro  5.0<L>  /  Alb  0.9<L>  /  TBili  2.8<H>  /  DBili  x   /  AST  12<L>  /  ALT  13  /  AlkPhos  93  03-11    Coags:          ## Imaging:    ## Medications:  levoFLOXacin IVPB 500 milliGRAM(s) IV Intermittent every 48 hours  levoFLOXacin IVPB      nafcillin  IVPB 2 Gram(s) IV Intermittent every 4 hours    midodrine 20 milliGRAM(s) Oral every 8 hours      dextrose 40% Gel 15 Gram(s) Oral once PRN  dextrose 50% Injectable 12.5 Gram(s) IV Push once  dextrose 50% Injectable 25 Gram(s) IV Push once  dextrose 50% Injectable 25 Gram(s) IV Push once  glucagon  Injectable 1 milliGRAM(s) IntraMuscular once PRN  insulin glargine Injectable (LANTUS) 20 Unit(s) SubCutaneous at bedtime  insulin lispro (HumaLOG) corrective regimen sliding scale   SubCutaneous every 6 hours    heparin  Injectable 5000 Unit(s) SubCutaneous every 12 hours    pantoprazole   Suspension 40 milliGRAM(s) Oral daily    acetaminophen    Suspension .. 650 milliGRAM(s) Oral every 6 hours PRN      ## Vitals:  T(C): 37.3 (03-11-19 @ 20:00), Max: 38 (03-11-19 @ 11:44)  HR: 83 (03-11-19 @ 21:47) (78 - 156)  BP: 98/40 (03-11-19 @ 20:00) (86/44 - 129/61)  BP(mean): 55 (03-11-19 @ 20:00) (53 - 84)  RR: 31 (03-11-19 @ 20:00) (24 - 45)  SpO2: 100% (03-11-19 @ 21:47) (99% - 100%)  Wt(kg): --  Vent: Mode: AC/ CMV (Assist Control/ Continuous Mandatory Ventilation), RR (machine): 12, RR (patient): 27, TV (machine): 3500, FiO2: 30, PEEP: 5, PIP: 25  ABG: ABG - ( 10 Mar 2019 11:58 )  pH, Arterial: x     pH, Blood: 7.55  /  pCO2: 25    /  pO2: 83    / HCO3: 22    / Base Excess: 0.7   /  SaO2: 97                    03-10 @ 07:01  -  03-11 @ 07:00  --------------------------------------------------------  IN: 2820 mL / OUT: 2000 mL / NET: 820 mL    03-11 @ 07:01  -  03-11 @ 22:12  --------------------------------------------------------  IN: 2725 mL / OUT: 1000 mL / NET: 1725 mL          ## P/E:  Gen: lying comfortably in bed in no apparent distress  Mouth: (+) ETT  Lungs: CTA  Heart: RRR  Abd: Soft/+BS   Ext: (+) edema  Neuro: Nonresponsive    CENTRAL LINE: [ ] YES [ ] NO  LOCATION:   DATE INSERTED:  REMOVE: [ ] YES [ ] NO      PAZ: [ ] YES [ ] NO    DATE INSERTED:  REMOVE:  [ ](+) YES [ ] NO      A-LINE:  [ ] YES [ ] NO  LOCATION:   DATE INSERTED:  REMOVE:  [ ] YES [ ] NO  EXPLAIN:      CODE STATUS: [x] full code  [ ] DNR  [ ] DNI  [ ] MOLST  Goals of care discussion: [ ] yes

## 2019-03-11 NOTE — PROGRESS NOTE ADULT - ASSESSMENT
Pt is an 82 yo M with h/o DM, dementia, CVA, bedbound and contracted at baseline, functional paraplegia,, prior gastric/intestinal pneumatosis managed nonoperatively presented 2 to fevers and dyspnea. Pt with the following labs: WBC 18, glucose of 800s with anion gap 24, Cr 2.37, Ph 7.1, HCO3: 13, lactate 10. Pt intubated for respiratory failure. ICU admitting dx: 1) septic shock, 2 to Staph PNA 2) Acute respiratory failure 2 to SS/PNA 3) DKA 4) ANSLEY 2 to ATN, 5) multiple decubitus ulcers 6)  respiratory alkalosis.     Resp/Social: Daily SBT/ Discuss with family if pt does not wean well; trach vs withdrawal of care  ID: Cont current Abx  CVS: Cont Midodrine  FEN: Cont enteral feeds/ Replace K pt hypokalemic/ May need to decrease free H2O  Endo: Adjust Lantus and Lispro to FS  Renal: Cr decreasing/ Follow BUN/Cr and UO  Neuro: Follow MS    CCT ; 40 min

## 2019-03-11 NOTE — PROGRESS NOTE ADULT - SUBJECTIVE AND OBJECTIVE BOX
Patient is a 84y old  Male who presents with a chief complaint of Acute respiratory failure, DKA, severe sepsis, ANSLEY (10 Mar 2019 17:16)      INTERVAL HPI / OVERNIGHT EVENTS:    MEDICATIONS  (STANDING):  chlorhexidine 0.12% Liquid 15 milliLiter(s) Oral Mucosa two times a day  chlorhexidine 4% Liquid 1 Application(s) Topical <User Schedule>  dextrose 5%. 1000 milliLiter(s) (50 mL/Hr) IV Continuous <Continuous>  dextrose 50% Injectable 12.5 Gram(s) IV Push once  dextrose 50% Injectable 25 Gram(s) IV Push once  dextrose 50% Injectable 25 Gram(s) IV Push once  heparin  Injectable 5000 Unit(s) SubCutaneous every 12 hours  insulin glargine Injectable (LANTUS) 20 Unit(s) SubCutaneous at bedtime  insulin lispro (HumaLOG) corrective regimen sliding scale   SubCutaneous every 6 hours  levoFLOXacin IVPB 500 milliGRAM(s) IV Intermittent every 48 hours  levoFLOXacin IVPB      midodrine 20 milliGRAM(s) Oral every 8 hours  nafcillin  IVPB 2 Gram(s) IV Intermittent every 4 hours  pantoprazole   Suspension 40 milliGRAM(s) Oral daily    MEDICATIONS  (PRN):  acetaminophen    Suspension .. 650 milliGRAM(s) Oral every 6 hours PRN Temp greater or equal to 38C (100.4F)  dextrose 40% Gel 15 Gram(s) Oral once PRN Blood Glucose LESS THAN 70 milliGRAM(s)/deciliter  glucagon  Injectable 1 milliGRAM(s) IntraMuscular once PRN Glucose LESS THAN 70 milligrams/deciliter      Vital Signs Last 24 Hrs  T(C): 37.3 (11 Mar 2019 20:00), Max: 38 (11 Mar 2019 11:44)  T(F): 99.2 (11 Mar 2019 20:00), Max: 100.4 (11 Mar 2019 11:44)  HR: 83 (11 Mar 2019 21:47) (78 - 156)  BP: 98/40 (11 Mar 2019 20:00) (86/44 - 129/61)  BP(mean): 55 (11 Mar 2019 20:00) (53 - 84)  RR: 31 (11 Mar 2019 20:00) (24 - 45)  SpO2: 100% (11 Mar 2019 21:47) (99% - 100%)    Review of systems:  General : no fever /chills,fatigue  CVS : no chest pain, palpitations  Lungs : no shortness of breath, cough  GI : no abdominal pain,vomiting, diarrhea   : no dysuria,hematuria        PHYSICAL EXAM:  General :NAD  Constitutional:  well-groomed, well-developed  Respiratory: CTAB/L  Cardiovascular: S1 and S2, RRR, no M/G/R  Gastrointestinal: BS+, soft, NT/ND  Extremities: No peripheral edema  Vascular: 2+ peripheral pulses  Skin: No rashes      LABS:                        8.8    30.10 )-----------( 509      ( 11 Mar 2019 05:43 )             26.5     03-11    139  |  105  |  49<H>  ----------------------------<  132<H>  3.3<L>   |  24  |  1.14    Ca    6.8<L>      11 Mar 2019 05:43  Phos  3.8     03-11  Mg     1.8     03-11    TPro  5.0<L>  /  Alb  0.9<L>  /  TBili  2.8<H>  /  DBili  x   /  AST  12<L>  /  ALT  13  /  AlkPhos  93  03-11          MICROBIOLOGY:  RECENT CULTURES:  03-07 .Blood XXXX XXXX   No growth to date.    03-06 .Blood XXXX XXXX   No growth at 5 days.          RADIOLOGY & ADDITIONAL STUDIES: Patient is a 84y old  Male who presents with a chief complaint of Acute respiratory failure, DKA, severe sepsis, ANSLEY (10 Mar 2019 17:16)      INTERVAL HPI / OVERNIGHT EVENTS: no new event    MEDICATIONS  (STANDING):  chlorhexidine 0.12% Liquid 15 milliLiter(s) Oral Mucosa two times a day  chlorhexidine 4% Liquid 1 Application(s) Topical <User Schedule>  dextrose 5%. 1000 milliLiter(s) (50 mL/Hr) IV Continuous <Continuous>  dextrose 50% Injectable 12.5 Gram(s) IV Push once  dextrose 50% Injectable 25 Gram(s) IV Push once  dextrose 50% Injectable 25 Gram(s) IV Push once  heparin  Injectable 5000 Unit(s) SubCutaneous every 12 hours  insulin glargine Injectable (LANTUS) 20 Unit(s) SubCutaneous at bedtime  insulin lispro (HumaLOG) corrective regimen sliding scale   SubCutaneous every 6 hours  levoFLOXacin IVPB 500 milliGRAM(s) IV Intermittent every 48 hours  levoFLOXacin IVPB      midodrine 20 milliGRAM(s) Oral every 8 hours  nafcillin  IVPB 2 Gram(s) IV Intermittent every 4 hours  pantoprazole   Suspension 40 milliGRAM(s) Oral daily    MEDICATIONS  (PRN):  acetaminophen    Suspension .. 650 milliGRAM(s) Oral every 6 hours PRN Temp greater or equal to 38C (100.4F)  dextrose 40% Gel 15 Gram(s) Oral once PRN Blood Glucose LESS THAN 70 milliGRAM(s)/deciliter  glucagon  Injectable 1 milliGRAM(s) IntraMuscular once PRN Glucose LESS THAN 70 milligrams/deciliter      Vital Signs Last 24 Hrs  T(C): 37.3 (11 Mar 2019 20:00), Max: 38 (11 Mar 2019 11:44)  T(F): 99.2 (11 Mar 2019 20:00), Max: 100.4 (11 Mar 2019 11:44)  HR: 83 (11 Mar 2019 21:47) (78 - 156)  BP: 98/40 (11 Mar 2019 20:00) (86/44 - 129/61)  BP(mean): 55 (11 Mar 2019 20:00) (53 - 84)  RR: 31 (11 Mar 2019 20:00) (24 - 45)  SpO2: 100% (11 Mar 2019 21:47) (99% - 100%)    Review of systems:  unable to obtain        PHYSICAL EXAM:  General :NAD, sedated and intubated  Constitutional:  thin built  Respiratory: CTAB/L, on vent  Cardiovascular: S1 and S2, RRR, no M/G/R  Gastrointestinal: BS+, soft, NT/ND  Extremities: No peripheral edema  Vascular: 2+ peripheral pulses  Skin: sacral decub s/p debridement multiple foot and hip decub as well      LABS:                        8.8    30.10 )-----------( 509      ( 11 Mar 2019 05:43 )             26.5     03-11    139  |  105  |  49<H>  ----------------------------<  132<H>  3.3<L>   |  24  |  1.14    Ca    6.8<L>      11 Mar 2019 05:43  Phos  3.8     03-11  Mg     1.8     03-11    TPro  5.0<L>  /  Alb  0.9<L>  /  TBili  2.8<H>  /  DBili  x   /  AST  12<L>  /  ALT  13  /  AlkPhos  93  03-11          MICROBIOLOGY:  RECENT CULTURES:  03-07 .Blood XXXX XXXX   No growth to date.    03-06 .Blood XXXX XXXX   No growth at 5 days.          RADIOLOGY & ADDITIONAL STUDIES:

## 2019-03-11 NOTE — CHART NOTE - NSCHARTNOTEFT_GEN_A_CORE
Assessment: Critical care pt admitted to ICU with severe sepsis with septic shock, pneumonia, acute respiratory failure, DKA (resolved), acute renal failure with ATN, lactic acidosis, multiple pressure ulcers, bacteremia.  Pt currently intubated in ICU. Pt with hx of dementia, bedbound, contracted at baseline, DM hx. Pt currently full code, however per [facility] conversation note, awaiting family's decision of comfort with elective withdrawal of care vs trach/PEG vent facility.    Factors impacting intake: [ ] none [ ] nausea  [ ] vomiting [ ] diarrhea [ ] constipation  [ ] chewing problems [ ] swallowing issues [x] other: Obtunded, intubated    Diet Prescription: Diet, NPO with Tube Feed:   Tube Feeding Modality: Nasogastric  Glucerna 1.2 [employer]  Total Volume for 24 Hours (mL): 1320  Continuous  Starting Tube Feed Rate {mL per Hour}: 55  Until Goal Tube Feed Rate (mL per Hour): 55  Tube Feed Duration (in Hours): 24  Tube Feed Start Time: 13:00  No Carb Prosource (1pkg = 15gms Protein)     Qty per Day:  2 (03-03-19 @ 13:12)    Intake: Observed TF running at goal rate 55 ml/hr providing 1320 ml, 1704 kcals & 109 gm protein. TF tolerated well, residuals 0-20 ml and last BM (x1) today 3/11.    Current Weight: 61.5 kg (3/11), 60.1 kg (3/7)  % Weight Change: 2.3% (1.4 kg) wt gain x 4 days during hospitalization likely due to edema    Pertinent Medications: MEDICATIONS  (STANDING):  chlorhexidine 0.12% Liquid 15 milliLiter(s) Oral Mucosa two times a day  chlorhexidine 4% Liquid 1 Application(s) Topical <User Schedule>  dextrose 5%. 1000 milliLiter(s) (50 mL/Hr) IV Continuous <Continuous>  dextrose 50% Injectable 12.5 Gram(s) IV Push once  dextrose 50% Injectable 25 Gram(s) IV Push once  dextrose 50% Injectable 25 Gram(s) IV Push once  heparin  Injectable 5000 Unit(s) SubCutaneous every 12 hours  insulin glargine Injectable (LANTUS) 20 Unit(s) SubCutaneous at bedtime  insulin lispro (HumaLOG) corrective regimen sliding scale   SubCutaneous every 6 hours  levoFLOXacin IVPB 500 milliGRAM(s) IV Intermittent every 48 hours  levoFLOXacin IVPB      midodrine 10 milliGRAM(s) Oral every 8 hours  nafcillin  IVPB 2 Gram(s) IV Intermittent every 4 hours  pantoprazole   Suspension 40 milliGRAM(s) Oral daily    MEDICATIONS  (PRN):  acetaminophen    Suspension .. 650 milliGRAM(s) Oral every 6 hours PRN Temp greater or equal to 38C (100.4F)  dextrose 40% Gel 15 Gram(s) Oral once PRN Blood Glucose LESS THAN 70 milliGRAM(s)/deciliter  glucagon  Injectable 1 milliGRAM(s) IntraMuscular once PRN Glucose LESS THAN 70 milligrams/deciliter    Pertinent Labs: 03-11 Na139 mmol/L Glu 132 mg/dL<H> K+ 3.3 mmol/L<L> Cr  1.14 mg/dL BUN 49 mg/dL<H> 03-11 Phos 3.8 mg/dL 03-11 Alb 0.9 g/dL<L> 03-04 [id number] 9.7 %<H>    CAPILLARY BLOOD GLUCOSE      POCT Blood Glucose.: 112 mg/dL (11 Mar 2019 06:02)  POCT Blood Glucose.: 167 mg/dL (11 Mar 2019 00:25)  POCT Blood Glucose.: 144 mg/dL (10 Mar 2019 17:01)  POCT Blood Glucose.: 149 mg/dL (10 Mar 2019 12:07)    Skin: Pt with stage II pressure ulcer on L hip, unstageable pressure ulcer on R hip, stage IV pressure ulcer on sacrum, and suspected DTI bilateral heel    Estimated Needs:   [x] no change since previous assessment on 3/3/19  [ ] recalculated:     Previous Nutrition Diagnosis:   [ ] Inadequate Energy Intake [ ]Inadequate Oral Intake [ ] Excessive Energy Intake   [ ] Underweight [ ] Increased Nutrient Needs [ ] Overweight/Obesity   [ ] Altered GI Function [ ] Unintended Weight Loss [ ] Food & Nutrition Related Knowledge Deficit [x] Malnutrition - acute, severe    Nutrition Diagnosis is [x] ongoing  [ ] resolved [ ] not applicable    Previous Goal: Pt to meet > 75% energy & protein via TF to support wound healing    New Nutrition Diagnosis: [x] not applicable       Interventions:   Recommend  [ ] Change Diet To:  [ ] Nutrition Supplement  [x] Nutrition Support: Continue with current TF regimen  [ ] Other:     Monitoring and Evaluation:   [ ] PO intake [ x ] Tolerance to diet prescription [ x ] weights [ x ] labs[ x ] follow up per protocol  [ x ] other: TF residuals Assessment: Critical care pt admitted to ICU with severe sepsis with septic shock, pneumonia, acute respiratory failure, DKA (resolved), acute renal failure with ATN, lactic acidosis, multiple pressure ulcers, bacteremia.  Pt currently intubated in ICU. Pt with hx of dementia, bedbound, contracted at baseline, DM hx. Pt currently full code, however per [facility] conversation note, awaiting family's decision of comfort with elective withdrawal of care vs trach/PEG vent facility.    Factors impacting intake: [ ] none [ ] nausea  [ ] vomiting [ ] diarrhea [ ] constipation  [ ] chewing problems [ ] swallowing issues [x] other: Obtunded, intubated    Diet Prescription: Diet, NPO with Tube Feed:   Tube Feeding Modality: Nasogastric  Glucerna 1.2 [employer]  Total Volume for 24 Hours (mL): 1320  Continuous  Starting Tube Feed Rate {mL per Hour}: 55  Until Goal Tube Feed Rate (mL per Hour): 55  Tube Feed Duration (in Hours): 24  Tube Feed Start Time: 13:00  No Carb Prosource (1pkg = 15gms Protein)     Qty per Day:  2 (03-03-19 @ 13:12)    Intake: Observed TF running at goal rate 55 ml/hr providing 1320 ml, 1704 kcals & 109 gm protein. TF tolerated well, residuals 0-20 ml and last BM (x1) today 3/11. Pt received total volume 1360 ml x 24 hrs. Pt received over 100% energy & protein needs.    Current Weight: 61.5 kg (3/11), 60.1 kg (3/7)  % Weight Change: 2.3% (1.4 kg) wt gain x 4 days during hospitalization likely due to edema; Edema 2+ generalized & edema 3+ R arm, R hand, R ankle, R foot    Pertinent Medications: MEDICATIONS  (STANDING):  chlorhexidine 0.12% Liquid 15 milliLiter(s) Oral Mucosa two times a day  chlorhexidine 4% Liquid 1 Application(s) Topical <User Schedule>  dextrose 5%. 1000 milliLiter(s) (50 mL/Hr) IV Continuous <Continuous>  dextrose 50% Injectable 12.5 Gram(s) IV Push once  dextrose 50% Injectable 25 Gram(s) IV Push once  dextrose 50% Injectable 25 Gram(s) IV Push once  heparin  Injectable 5000 Unit(s) SubCutaneous every 12 hours  insulin glargine Injectable (LANTUS) 20 Unit(s) SubCutaneous at bedtime  insulin lispro (HumaLOG) corrective regimen sliding scale   SubCutaneous every 6 hours  levoFLOXacin IVPB 500 milliGRAM(s) IV Intermittent every 48 hours  levoFLOXacin IVPB      midodrine 10 milliGRAM(s) Oral every 8 hours  nafcillin  IVPB 2 Gram(s) IV Intermittent every 4 hours  pantoprazole   Suspension 40 milliGRAM(s) Oral daily    MEDICATIONS  (PRN):  acetaminophen    Suspension .. 650 milliGRAM(s) Oral every 6 hours PRN Temp greater or equal to 38C (100.4F)  dextrose 40% Gel 15 Gram(s) Oral once PRN Blood Glucose LESS THAN 70 milliGRAM(s)/deciliter  glucagon  Injectable 1 milliGRAM(s) IntraMuscular once PRN Glucose LESS THAN 70 milligrams/deciliter    Pertinent Labs: 03-11 Na139 mmol/L Glu 132 mg/dL<H> K+ 3.3 mmol/L<L> Cr  1.14 mg/dL BUN 49 mg/dL<H> 03-11 Phos 3.8 mg/dL 03-11 Alb 0.9 g/dL<L> 03-04 [id number] 9.7 %<H>    CAPILLARY BLOOD GLUCOSE      POCT Blood Glucose.: 112 mg/dL (11 Mar 2019 06:02)  POCT Blood Glucose.: 167 mg/dL (11 Mar 2019 00:25)  POCT Blood Glucose.: 144 mg/dL (10 Mar 2019 17:01)  POCT Blood Glucose.: 149 mg/dL (10 Mar 2019 12:07)    Skin: Pt with stage II pressure ulcer on L hip, unstageable pressure ulcer on R hip, stage IV pressure ulcer on sacrum, and suspected DTI bilateral heel    Estimated Needs:   [x] no change since previous assessment on 3/3/19  [ ] recalculated:     Previous Nutrition Diagnosis:   [ ] Inadequate Energy Intake [ ]Inadequate Oral Intake [ ] Excessive Energy Intake   [ ] Underweight [ ] Increased Nutrient Needs [ ] Overweight/Obesity   [ ] Altered GI Function [ ] Unintended Weight Loss [ ] Food & Nutrition Related Knowledge Deficit [x] Malnutrition - acute, severe    Nutrition Diagnosis is [x] ongoing  [ ] resolved [ ] not applicable    Previous Goal: Pt to meet > 75% energy & protein via TF to support wound healing (goal met)    New Nutrition Diagnosis: [x] not applicable      Interventions:   Recommend  [ ] Change Diet To:   [ ] Nutrition Supplement  [x] Nutrition Support: Continue with Glucerna 1.2 @ 55 ml/hr via NGT + No Carb Prosource 2 packets daily  [ ] Other:     Monitoring and Evaluation:   [ ] PO intake [ x ] Tolerance to diet prescription [ x ] weights [ x ] labs[ x ] follow up per protocol  [ x ] other: TF residuals

## 2019-03-12 LAB
ANION GAP SERPL CALC-SCNC: 10 MMOL/L — SIGNIFICANT CHANGE UP (ref 5–17)
BUN SERPL-MCNC: 44 MG/DL — HIGH (ref 7–23)
CALCIUM SERPL-MCNC: 6.9 MG/DL — LOW (ref 8.5–10.1)
CHLORIDE SERPL-SCNC: 108 MMOL/L — SIGNIFICANT CHANGE UP (ref 96–108)
CO2 SERPL-SCNC: 22 MMOL/L — SIGNIFICANT CHANGE UP (ref 22–31)
CREAT SERPL-MCNC: 1.04 MG/DL — SIGNIFICANT CHANGE UP (ref 0.5–1.3)
CULTURE RESULTS: SIGNIFICANT CHANGE UP
GLUCOSE BLDC GLUCOMTR-MCNC: 106 MG/DL — HIGH (ref 70–99)
GLUCOSE BLDC GLUCOMTR-MCNC: 118 MG/DL — HIGH (ref 70–99)
GLUCOSE BLDC GLUCOMTR-MCNC: 176 MG/DL — HIGH (ref 70–99)
GLUCOSE BLDC GLUCOMTR-MCNC: 189 MG/DL — HIGH (ref 70–99)
GLUCOSE BLDC GLUCOMTR-MCNC: 207 MG/DL — HIGH (ref 70–99)
GLUCOSE BLDC GLUCOMTR-MCNC: 211 MG/DL — HIGH (ref 70–99)
GLUCOSE SERPL-MCNC: 120 MG/DL — HIGH (ref 70–99)
HCT VFR BLD CALC: 26.3 % — LOW (ref 39–50)
HGB BLD-MCNC: 8.6 G/DL — LOW (ref 13–17)
MAGNESIUM SERPL-MCNC: 2.1 MG/DL — SIGNIFICANT CHANGE UP (ref 1.6–2.6)
MCHC RBC-ENTMCNC: 27.7 PG — SIGNIFICANT CHANGE UP (ref 27–34)
MCHC RBC-ENTMCNC: 32.7 GM/DL — SIGNIFICANT CHANGE UP (ref 32–36)
MCV RBC AUTO: 84.6 FL — SIGNIFICANT CHANGE UP (ref 80–100)
NRBC # BLD: 0 /100 WBCS — SIGNIFICANT CHANGE UP (ref 0–0)
PHOSPHATE SERPL-MCNC: 3.8 MG/DL — SIGNIFICANT CHANGE UP (ref 2.5–4.5)
PLATELET # BLD AUTO: 528 K/UL — HIGH (ref 150–400)
POTASSIUM SERPL-MCNC: 3.4 MMOL/L — LOW (ref 3.5–5.3)
POTASSIUM SERPL-SCNC: 3.4 MMOL/L — LOW (ref 3.5–5.3)
RBC # BLD: 3.11 M/UL — LOW (ref 4.2–5.8)
RBC # FLD: 16.9 % — HIGH (ref 10.3–14.5)
SODIUM SERPL-SCNC: 140 MMOL/L — SIGNIFICANT CHANGE UP (ref 135–145)
SPECIMEN SOURCE: SIGNIFICANT CHANGE UP
WBC # BLD: 28.59 K/UL — HIGH (ref 3.8–10.5)
WBC # FLD AUTO: 28.59 K/UL — HIGH (ref 3.8–10.5)

## 2019-03-12 PROCEDURE — 99233 SBSQ HOSP IP/OBS HIGH 50: CPT

## 2019-03-12 PROCEDURE — 99291 CRITICAL CARE FIRST HOUR: CPT

## 2019-03-12 RX ORDER — POTASSIUM CHLORIDE 20 MEQ
20 PACKET (EA) ORAL ONCE
Qty: 0 | Refills: 0 | Status: COMPLETED | OUTPATIENT
Start: 2019-03-12 | End: 2019-03-12

## 2019-03-12 RX ADMIN — CHLORHEXIDINE GLUCONATE 1 APPLICATION(S): 213 SOLUTION TOPICAL at 12:43

## 2019-03-12 RX ADMIN — Medication 650 MILLIGRAM(S): at 14:15

## 2019-03-12 RX ADMIN — Medication 650 MILLIGRAM(S): at 12:43

## 2019-03-12 RX ADMIN — CHLORHEXIDINE GLUCONATE 15 MILLILITER(S): 213 SOLUTION TOPICAL at 17:26

## 2019-03-12 RX ADMIN — Medication 650 MILLIGRAM(S): at 05:32

## 2019-03-12 RX ADMIN — HEPARIN SODIUM 5000 UNIT(S): 5000 INJECTION INTRAVENOUS; SUBCUTANEOUS at 17:26

## 2019-03-12 RX ADMIN — Medication 2: at 12:46

## 2019-03-12 RX ADMIN — MIDODRINE HYDROCHLORIDE 20 MILLIGRAM(S): 2.5 TABLET ORAL at 05:09

## 2019-03-12 RX ADMIN — Medication 2: at 17:25

## 2019-03-12 RX ADMIN — NAFCILLIN 200 GRAM(S): 10 INJECTION, POWDER, FOR SOLUTION INTRAVENOUS at 13:43

## 2019-03-12 RX ADMIN — NAFCILLIN 200 GRAM(S): 10 INJECTION, POWDER, FOR SOLUTION INTRAVENOUS at 09:40

## 2019-03-12 RX ADMIN — NAFCILLIN 200 GRAM(S): 10 INJECTION, POWDER, FOR SOLUTION INTRAVENOUS at 05:09

## 2019-03-12 RX ADMIN — NAFCILLIN 200 GRAM(S): 10 INJECTION, POWDER, FOR SOLUTION INTRAVENOUS at 17:26

## 2019-03-12 RX ADMIN — NAFCILLIN 200 GRAM(S): 10 INJECTION, POWDER, FOR SOLUTION INTRAVENOUS at 20:15

## 2019-03-12 RX ADMIN — HEPARIN SODIUM 5000 UNIT(S): 5000 INJECTION INTRAVENOUS; SUBCUTANEOUS at 05:09

## 2019-03-12 RX ADMIN — Medication 4: at 23:26

## 2019-03-12 RX ADMIN — MIDODRINE HYDROCHLORIDE 20 MILLIGRAM(S): 2.5 TABLET ORAL at 13:43

## 2019-03-12 RX ADMIN — PANTOPRAZOLE SODIUM 40 MILLIGRAM(S): 20 TABLET, DELAYED RELEASE ORAL at 12:43

## 2019-03-12 RX ADMIN — Medication 650 MILLIGRAM(S): at 21:30

## 2019-03-12 RX ADMIN — MIDODRINE HYDROCHLORIDE 20 MILLIGRAM(S): 2.5 TABLET ORAL at 23:26

## 2019-03-12 RX ADMIN — NAFCILLIN 200 GRAM(S): 10 INJECTION, POWDER, FOR SOLUTION INTRAVENOUS at 00:27

## 2019-03-12 RX ADMIN — Medication 650 MILLIGRAM(S): at 20:15

## 2019-03-12 RX ADMIN — INSULIN GLARGINE 20 UNIT(S): 100 INJECTION, SOLUTION SUBCUTANEOUS at 23:16

## 2019-03-12 RX ADMIN — CHLORHEXIDINE GLUCONATE 15 MILLILITER(S): 213 SOLUTION TOPICAL at 05:09

## 2019-03-12 RX ADMIN — Medication 20 MILLIEQUIVALENT(S): at 06:44

## 2019-03-12 NOTE — PROGRESS NOTE ADULT - SUBJECTIVE AND OBJECTIVE BOX
HPI:  Pt is an 84 yo M with h/o DM, dementia, CVA, bedbound and contracted at baseline, functional paraplegia,, prior gastric/intestinal pneumatosis managed nonoperatively presented 2 to fevers and dyspnea. Pt with the following labs: WBC 18, glucose of 800s with anion gap 24, Cr 2.37, Ph 7.1, HCO3: 13, lactate 10. Pt intubated for respiratory failure. ICU admitting dx: 1) septic shock, 2 to Staph PNA 2) Acute respiratory failure 2 to SS/PNA 3) DKA 4) ANSLEY 2 to ATN, 5) multiple decubitus ulcers 6) respiratory alkalosis.       ## Labs:  CBC:                        8.6    28.59 )-----------( 528      ( 12 Mar 2019 03:48 )             26.3     Chem:  03-12    140  |  108  |  44<H>  ----------------------------<  120<H>  3.4<L>   |  22  |  1.04    Ca    6.9<L>      12 Mar 2019 03:48  Phos  3.8     03-12  Mg     2.1     03-12    TPro  5.0<L>  /  Alb  0.9<L>  /  TBili  2.8<H>  /  DBili  x   /  AST  12<L>  /  ALT  13  /  AlkPhos  93  03-11    Coags:      ## Imaging:    ## Medications:  levoFLOXacin IVPB 500 milliGRAM(s) IV Intermittent every 48 hours  levoFLOXacin IVPB      nafcillin  IVPB 2 Gram(s) IV Intermittent every 4 hours    midodrine 20 milliGRAM(s) Oral every 8 hours      dextrose 40% Gel 15 Gram(s) Oral once PRN  dextrose 50% Injectable 12.5 Gram(s) IV Push once  dextrose 50% Injectable 25 Gram(s) IV Push once  dextrose 50% Injectable 25 Gram(s) IV Push once  glucagon  Injectable 1 milliGRAM(s) IntraMuscular once PRN  insulin glargine Injectable (LANTUS) 20 Unit(s) SubCutaneous at bedtime  insulin lispro (HumaLOG) corrective regimen sliding scale   SubCutaneous every 6 hours    heparin  Injectable 5000 Unit(s) SubCutaneous every 12 hours    pantoprazole   Suspension 40 milliGRAM(s) Oral daily    acetaminophen    Suspension .. 650 milliGRAM(s) Oral every 6 hours PRN      ## Vitals:  T(C): 38.1 (19 @ 05:00), Max: 38.1 (19 @ 05:00)  HR: 88 (19 @ 13:39) (77 - 156)  BP: 104/81 (19 @ 13:39) (86/44 - 114/93)  BP(mean): 86 (19 @ 13:39) (52 - 97)  RR: 33 (19 @ 13:39) (22 - 42)  SpO2: 99% (19 @ 13:39) (96% - 100%)  Wt(kg): --  Vent: Mode: AC/ CMV (Assist Control/ Continuous Mandatory Ventilation), RR (machine): 12, RR (patient): 28, TV (machine): 350, FiO2: 30, PEEP: 5, PIP: 18  AB-11 @ 07:01  -   @ 07:00  --------------------------------------------------------  IN: 3920 mL / OUT: 2300 mL / NET: 1620 mL     @ 07:01  -   @ 14:44  --------------------------------------------------------  IN: 735 mL / OUT: 500 mL / NET: 235 mL      ## P/E:  Gen: lying comfortably in bed in no apparent distress  Mouth: (+) ETT  Lungs: CTA  Heart: RRR  Abd: Soft/+BS  Ext: Edema  Neuro: Minimally responsive    CENTRAL LINE: [ ] YES [ ] NO  LOCATION:   DATE INSERTED:  REMOVE: [ ] YES [ ] NO      PAZ: [ ] YES [x] NO    DATE INSERTED:  REMOVE:  [ ] YES [ ] NO      A-LINE:  [ ] YES [ ] NO  LOCATION:   DATE INSERTED:  REMOVE:  [ ] YES [ ] NO  EXPLAIN:    CODE STATUS: [x] full code  [ ] DNR  [ ] DNI  [ ] MOLST  Goals of care discussion: [ ] yes

## 2019-03-12 NOTE — PROGRESS NOTE ADULT - SUBJECTIVE AND OBJECTIVE BOX
Patient is a 84y old  Male who presents with a chief complaint of Acute respiratory failure, DKA, severe sepsis, ANSLEY (12 Mar 2019 14:43)      INTERVAL HPI / OVERNIGHT EVENTS:    MEDICATIONS  (STANDING):  chlorhexidine 0.12% Liquid 15 milliLiter(s) Oral Mucosa two times a day  chlorhexidine 4% Liquid 1 Application(s) Topical <User Schedule>  dextrose 5%. 1000 milliLiter(s) (50 mL/Hr) IV Continuous <Continuous>  dextrose 50% Injectable 12.5 Gram(s) IV Push once  dextrose 50% Injectable 25 Gram(s) IV Push once  dextrose 50% Injectable 25 Gram(s) IV Push once  heparin  Injectable 5000 Unit(s) SubCutaneous every 12 hours  insulin glargine Injectable (LANTUS) 20 Unit(s) SubCutaneous at bedtime  insulin lispro (HumaLOG) corrective regimen sliding scale   SubCutaneous every 6 hours  levoFLOXacin IVPB 500 milliGRAM(s) IV Intermittent every 48 hours  levoFLOXacin IVPB      midodrine 20 milliGRAM(s) Oral every 8 hours  nafcillin  IVPB 2 Gram(s) IV Intermittent every 4 hours  pantoprazole   Suspension 40 milliGRAM(s) Oral daily    MEDICATIONS  (PRN):  acetaminophen    Suspension .. 650 milliGRAM(s) Oral every 6 hours PRN Temp greater or equal to 38C (100.4F)  dextrose 40% Gel 15 Gram(s) Oral once PRN Blood Glucose LESS THAN 70 milliGRAM(s)/deciliter  glucagon  Injectable 1 milliGRAM(s) IntraMuscular once PRN Glucose LESS THAN 70 milligrams/deciliter      Vital Signs Last 24 Hrs  T(C): 38 (12 Mar 2019 20:00), Max: 38.1 (12 Mar 2019 05:00)  T(F): 100.4 (12 Mar 2019 20:00), Max: 100.6 (12 Mar 2019 05:00)  HR: 88 (12 Mar 2019 20:23) (77 - 94)  BP: 119/59 (12 Mar 2019 20:00) (90/55 - 119/59)  BP(mean): 72 (12 Mar 2019 20:00) (52 - 97)  RR: 37 (12 Mar 2019 20:00) (22 - 42)  SpO2: 100% (12 Mar 2019 20:23) (95% - 100%)    Review of systems:  General : no fever /chills,fatigue  CVS : no chest pain, palpitations  Lungs : no shortness of breath, cough  GI : no abdominal pain,vomiting, diarrhea   : no dysuria,hematuria        PHYSICAL EXAM:  General :NAD  Constitutional:  well-groomed, well-developed  Respiratory: CTAB/L  Cardiovascular: S1 and S2, RRR, no M/G/R  Gastrointestinal: BS+, soft, NT/ND  Extremities: No peripheral edema  Vascular: 2+ peripheral pulses  Skin: No rashes      LABS:                        8.6    28.59 )-----------( 528      ( 12 Mar 2019 03:48 )             26.3     03-12    140  |  108  |  44<H>  ----------------------------<  120<H>  3.4<L>   |  22  |  1.04    Ca    6.9<L>      12 Mar 2019 03:48  Phos  3.8     03-12  Mg     2.1     03-12    TPro  5.0<L>  /  Alb  0.9<L>  /  TBili  2.8<H>  /  DBili  x   /  AST  12<L>  /  ALT  13  /  AlkPhos  93  03-11          MICROBIOLOGY:  RECENT CULTURES:  03-07 .Blood XXXX XXXX   No growth at 5 days.    03-06 .Blood XXXX XXXX   No growth at 5 days.          RADIOLOGY & ADDITIONAL STUDIES: Patient is a 84y old  Male who presents with a chief complaint of Acute respiratory failure, DKA, severe sepsis, ANSLEY (12 Mar 2019 14:43)      INTERVAL HPI / OVERNIGHT EVENTS: no new event    MEDICATIONS  (STANDING):  chlorhexidine 0.12% Liquid 15 milliLiter(s) Oral Mucosa two times a day  chlorhexidine 4% Liquid 1 Application(s) Topical <User Schedule>  dextrose 5%. 1000 milliLiter(s) (50 mL/Hr) IV Continuous <Continuous>  dextrose 50% Injectable 12.5 Gram(s) IV Push once  dextrose 50% Injectable 25 Gram(s) IV Push once  dextrose 50% Injectable 25 Gram(s) IV Push once  heparin  Injectable 5000 Unit(s) SubCutaneous every 12 hours  insulin glargine Injectable (LANTUS) 20 Unit(s) SubCutaneous at bedtime  insulin lispro (HumaLOG) corrective regimen sliding scale   SubCutaneous every 6 hours  levoFLOXacin IVPB 500 milliGRAM(s) IV Intermittent every 48 hours  levoFLOXacin IVPB      midodrine 20 milliGRAM(s) Oral every 8 hours  nafcillin  IVPB 2 Gram(s) IV Intermittent every 4 hours  pantoprazole   Suspension 40 milliGRAM(s) Oral daily    MEDICATIONS  (PRN):  acetaminophen    Suspension .. 650 milliGRAM(s) Oral every 6 hours PRN Temp greater or equal to 38C (100.4F)  dextrose 40% Gel 15 Gram(s) Oral once PRN Blood Glucose LESS THAN 70 milliGRAM(s)/deciliter  glucagon  Injectable 1 milliGRAM(s) IntraMuscular once PRN Glucose LESS THAN 70 milligrams/deciliter      Vital Signs Last 24 Hrs  T(C): 38 (12 Mar 2019 20:00), Max: 38.1 (12 Mar 2019 05:00)  T(F): 100.4 (12 Mar 2019 20:00), Max: 100.6 (12 Mar 2019 05:00)  HR: 88 (12 Mar 2019 20:23) (77 - 94)  BP: 119/59 (12 Mar 2019 20:00) (90/55 - 119/59)  BP(mean): 72 (12 Mar 2019 20:00) (52 - 97)  RR: 37 (12 Mar 2019 20:00) (22 - 42)  SpO2: 100% (12 Mar 2019 20:23) (95% - 100%)    Review of systems:  cant be done,intubated        PHYSICAL EXAM:  General :NAD  Constitutional: thin built  Respiratory: Coarse breath sounds   Cardiovascular: S1 and S2, RRR, no M/G/R  Gastrointestinal: BS+, soft, NT/ND  Extremities: No peripheral edema  Vascular: 2+ peripheral pulses  Skin: sacral(s/p debridement) and ankle decub ,left heel decub+      LABS:                        8.6    28.59 )-----------( 528      ( 12 Mar 2019 03:48 )             26.3     03-12    140  |  108  |  44<H>  ----------------------------<  120<H>  3.4<L>   |  22  |  1.04    Ca    6.9<L>      12 Mar 2019 03:48  Phos  3.8     03-12  Mg     2.1     03-12    TPro  5.0<L>  /  Alb  0.9<L>  /  TBili  2.8<H>  /  DBili  x   /  AST  12<L>  /  ALT  13  /  AlkPhos  93  03-11          MICROBIOLOGY:  RECENT CULTURES:  03-07 .Blood XXXX XXXX   No growth at 5 days.    03-06 .Blood XXXX XXXX   No growth at 5 days.          RADIOLOGY & ADDITIONAL STUDIES:

## 2019-03-12 NOTE — PROGRESS NOTE ADULT - ASSESSMENT
Pt is an 82 yo M with h/o DM, dementia, CVA, bedbound and contracted at baseline, functional paraplegia,, prior gastric/intestinal pneumatosis managed nonoperatively presented 2 to fevers and dyspnea. Pt with the following labs: WBC 18, glucose of 800s with anion gap 24, Cr 2.37, Ph 7.1, HCO3: 13, lactate 10. Pt intubated for respiratory failure. ICU admitting dx: 1) septic shock, 2 to Staph PNA 2) Acute respiratory failure 2 to SS/PNA 3) DKA 4) ANSLEY 2 to ATN, 5) multiple decubitus ulcers 6) respiratory alkalosis.     Resp/Social: Cont daily SBT/ Discuss with family if pt does not wean well; trach vs withdrawal of care  ID: Nafcillin + Levaquin (for Staph + Strept) as per ID  CVS: Cont Midodrine  FEN: Cont enteral feeds/ Replace K pt hypokalemic/ Cont free H2O and follow Na  Endo: Adjust Lantus and Lispro to FS  Renal: Cr decreasing/ Follow BUN/Cr and UO  Neuro: Follow MS    CCT: 35 min

## 2019-03-13 LAB
ANION GAP SERPL CALC-SCNC: 10 MMOL/L — SIGNIFICANT CHANGE UP (ref 5–17)
BUN SERPL-MCNC: 46 MG/DL — HIGH (ref 7–23)
CALCIUM SERPL-MCNC: 6.6 MG/DL — LOW (ref 8.5–10.1)
CHLORIDE SERPL-SCNC: 110 MMOL/L — HIGH (ref 96–108)
CO2 SERPL-SCNC: 21 MMOL/L — LOW (ref 22–31)
CREAT SERPL-MCNC: 1.05 MG/DL — SIGNIFICANT CHANGE UP (ref 0.5–1.3)
GLUCOSE BLDC GLUCOMTR-MCNC: 100 MG/DL — HIGH (ref 70–99)
GLUCOSE BLDC GLUCOMTR-MCNC: 113 MG/DL — HIGH (ref 70–99)
GLUCOSE BLDC GLUCOMTR-MCNC: 113 MG/DL — HIGH (ref 70–99)
GLUCOSE BLDC GLUCOMTR-MCNC: 138 MG/DL — HIGH (ref 70–99)
GLUCOSE BLDC GLUCOMTR-MCNC: 50 MG/DL — LOW (ref 70–99)
GLUCOSE BLDC GLUCOMTR-MCNC: 51 MG/DL — LOW (ref 70–99)
GLUCOSE BLDC GLUCOMTR-MCNC: 51 MG/DL — LOW (ref 70–99)
GLUCOSE BLDC GLUCOMTR-MCNC: 54 MG/DL — LOW (ref 70–99)
GLUCOSE BLDC GLUCOMTR-MCNC: 67 MG/DL — LOW (ref 70–99)
GLUCOSE BLDC GLUCOMTR-MCNC: 72 MG/DL — SIGNIFICANT CHANGE UP (ref 70–99)
GLUCOSE BLDC GLUCOMTR-MCNC: 89 MG/DL — SIGNIFICANT CHANGE UP (ref 70–99)
GLUCOSE SERPL-MCNC: 134 MG/DL — HIGH (ref 70–99)
HCT VFR BLD CALC: 24.9 % — LOW (ref 39–50)
HGB BLD-MCNC: 8.1 G/DL — LOW (ref 13–17)
MAGNESIUM SERPL-MCNC: 2 MG/DL — SIGNIFICANT CHANGE UP (ref 1.6–2.6)
MCHC RBC-ENTMCNC: 28.1 PG — SIGNIFICANT CHANGE UP (ref 27–34)
MCHC RBC-ENTMCNC: 32.5 GM/DL — SIGNIFICANT CHANGE UP (ref 32–36)
MCV RBC AUTO: 86.5 FL — SIGNIFICANT CHANGE UP (ref 80–100)
NRBC # BLD: 0 /100 WBCS — SIGNIFICANT CHANGE UP (ref 0–0)
PHOSPHATE SERPL-MCNC: 4 MG/DL — SIGNIFICANT CHANGE UP (ref 2.5–4.5)
PLATELET # BLD AUTO: 484 K/UL — HIGH (ref 150–400)
POTASSIUM SERPL-MCNC: 3.3 MMOL/L — LOW (ref 3.5–5.3)
POTASSIUM SERPL-SCNC: 3.3 MMOL/L — LOW (ref 3.5–5.3)
RBC # BLD: 2.88 M/UL — LOW (ref 4.2–5.8)
RBC # FLD: 17.7 % — HIGH (ref 10.3–14.5)
SODIUM SERPL-SCNC: 141 MMOL/L — SIGNIFICANT CHANGE UP (ref 135–145)
WBC # BLD: 30.64 K/UL — HIGH (ref 3.8–10.5)
WBC # FLD AUTO: 30.64 K/UL — HIGH (ref 3.8–10.5)

## 2019-03-13 PROCEDURE — 99233 SBSQ HOSP IP/OBS HIGH 50: CPT

## 2019-03-13 PROCEDURE — 71045 X-RAY EXAM CHEST 1 VIEW: CPT | Mod: 26

## 2019-03-13 RX ORDER — DEXTROSE 50 % IN WATER 50 %
50 SYRINGE (ML) INTRAVENOUS ONCE
Qty: 0 | Refills: 0 | Status: COMPLETED | OUTPATIENT
Start: 2019-03-13 | End: 2019-03-13

## 2019-03-13 RX ORDER — MEROPENEM 1 G/30ML
INJECTION INTRAVENOUS
Qty: 0 | Refills: 0 | Status: DISCONTINUED | OUTPATIENT
Start: 2019-03-13 | End: 2019-03-13

## 2019-03-13 RX ORDER — ALBUMIN HUMAN 25 %
100 VIAL (ML) INTRAVENOUS
Qty: 0 | Refills: 0 | Status: COMPLETED | OUTPATIENT
Start: 2019-03-13 | End: 2019-03-14

## 2019-03-13 RX ORDER — MEROPENEM 1 G/30ML
INJECTION INTRAVENOUS
Qty: 0 | Refills: 0 | Status: DISCONTINUED | OUTPATIENT
Start: 2019-03-13 | End: 2019-03-18

## 2019-03-13 RX ORDER — FENTANYL CITRATE 50 UG/ML
0.5 INJECTION INTRAVENOUS
Qty: 2500 | Refills: 0 | Status: DISCONTINUED | OUTPATIENT
Start: 2019-03-13 | End: 2019-03-15

## 2019-03-13 RX ORDER — DEXTROSE 50 % IN WATER 50 %
12.5 SYRINGE (ML) INTRAVENOUS ONCE
Qty: 0 | Refills: 0 | Status: COMPLETED | OUTPATIENT
Start: 2019-03-13 | End: 2019-03-13

## 2019-03-13 RX ORDER — MIDAZOLAM HYDROCHLORIDE 1 MG/ML
2 INJECTION, SOLUTION INTRAMUSCULAR; INTRAVENOUS ONCE
Qty: 0 | Refills: 0 | Status: DISCONTINUED | OUTPATIENT
Start: 2019-03-13 | End: 2019-03-13

## 2019-03-13 RX ORDER — NOREPINEPHRINE BITARTRATE/D5W 8 MG/250ML
0.05 PLASTIC BAG, INJECTION (ML) INTRAVENOUS
Qty: 8 | Refills: 0 | Status: DISCONTINUED | OUTPATIENT
Start: 2019-03-13 | End: 2019-03-16

## 2019-03-13 RX ORDER — SODIUM CHLORIDE 9 MG/ML
1000 INJECTION INTRAMUSCULAR; INTRAVENOUS; SUBCUTANEOUS ONCE
Qty: 0 | Refills: 0 | Status: COMPLETED | OUTPATIENT
Start: 2019-03-13 | End: 2019-03-13

## 2019-03-13 RX ORDER — MEROPENEM 1 G/30ML
2000 INJECTION INTRAVENOUS ONCE
Qty: 0 | Refills: 0 | Status: COMPLETED | OUTPATIENT
Start: 2019-03-13 | End: 2019-03-13

## 2019-03-13 RX ORDER — POTASSIUM CHLORIDE 20 MEQ
40 PACKET (EA) ORAL ONCE
Qty: 0 | Refills: 0 | Status: COMPLETED | OUTPATIENT
Start: 2019-03-13 | End: 2019-03-13

## 2019-03-13 RX ORDER — SODIUM CHLORIDE 9 MG/ML
1000 INJECTION, SOLUTION INTRAVENOUS
Qty: 0 | Refills: 0 | Status: DISCONTINUED | OUTPATIENT
Start: 2019-03-13 | End: 2019-03-14

## 2019-03-13 RX ORDER — MEROPENEM 1 G/30ML
2000 INJECTION INTRAVENOUS EVERY 12 HOURS
Qty: 0 | Refills: 0 | Status: DISCONTINUED | OUTPATIENT
Start: 2019-03-14 | End: 2019-03-18

## 2019-03-13 RX ORDER — SODIUM CHLORIDE 9 MG/ML
1000 INJECTION, SOLUTION INTRAVENOUS ONCE
Qty: 0 | Refills: 0 | Status: COMPLETED | OUTPATIENT
Start: 2019-03-13 | End: 2019-03-14

## 2019-03-13 RX ORDER — FENTANYL CITRATE 50 UG/ML
0.5 INJECTION INTRAVENOUS
Qty: 2500 | Refills: 0 | Status: DISCONTINUED | OUTPATIENT
Start: 2019-03-13 | End: 2019-03-13

## 2019-03-13 RX ADMIN — MIDODRINE HYDROCHLORIDE 20 MILLIGRAM(S): 2.5 TABLET ORAL at 14:06

## 2019-03-13 RX ADMIN — FENTANYL CITRATE 2.52 MICROGRAM(S)/KG/HR: 50 INJECTION INTRAVENOUS at 13:09

## 2019-03-13 RX ADMIN — Medication 50 MILLILITER(S): at 19:33

## 2019-03-13 RX ADMIN — Medication 650 MILLIGRAM(S): at 18:50

## 2019-03-13 RX ADMIN — CHLORHEXIDINE GLUCONATE 15 MILLILITER(S): 213 SOLUTION TOPICAL at 05:24

## 2019-03-13 RX ADMIN — Medication 650 MILLIGRAM(S): at 17:52

## 2019-03-13 RX ADMIN — Medication 12.5 GRAM(S): at 12:53

## 2019-03-13 RX ADMIN — MIDAZOLAM HYDROCHLORIDE 2 MILLIGRAM(S): 1 INJECTION, SOLUTION INTRAMUSCULAR; INTRAVENOUS at 12:08

## 2019-03-13 RX ADMIN — MIDODRINE HYDROCHLORIDE 20 MILLIGRAM(S): 2.5 TABLET ORAL at 05:24

## 2019-03-13 RX ADMIN — SODIUM CHLORIDE 30 MILLILITER(S): 9 INJECTION, SOLUTION INTRAVENOUS at 19:30

## 2019-03-13 RX ADMIN — NAFCILLIN 200 GRAM(S): 10 INJECTION, POWDER, FOR SOLUTION INTRAVENOUS at 01:05

## 2019-03-13 RX ADMIN — HEPARIN SODIUM 5000 UNIT(S): 5000 INJECTION INTRAVENOUS; SUBCUTANEOUS at 17:52

## 2019-03-13 RX ADMIN — MEROPENEM 200 MILLIGRAM(S): 1 INJECTION INTRAVENOUS at 14:07

## 2019-03-13 RX ADMIN — NAFCILLIN 200 GRAM(S): 10 INJECTION, POWDER, FOR SOLUTION INTRAVENOUS at 05:24

## 2019-03-13 RX ADMIN — CHLORHEXIDINE GLUCONATE 15 MILLILITER(S): 213 SOLUTION TOPICAL at 17:52

## 2019-03-13 RX ADMIN — PANTOPRAZOLE SODIUM 40 MILLIGRAM(S): 20 TABLET, DELAYED RELEASE ORAL at 12:18

## 2019-03-13 RX ADMIN — SODIUM CHLORIDE 500 MILLILITER(S): 9 INJECTION INTRAMUSCULAR; INTRAVENOUS; SUBCUTANEOUS at 19:33

## 2019-03-13 RX ADMIN — HEPARIN SODIUM 5000 UNIT(S): 5000 INJECTION INTRAVENOUS; SUBCUTANEOUS at 05:25

## 2019-03-13 RX ADMIN — NAFCILLIN 200 GRAM(S): 10 INJECTION, POWDER, FOR SOLUTION INTRAVENOUS at 08:37

## 2019-03-13 RX ADMIN — Medication 40 MILLIEQUIVALENT(S): at 12:17

## 2019-03-13 RX ADMIN — Medication 12.5 MILLILITER(S): at 14:06

## 2019-03-13 RX ADMIN — MIDODRINE HYDROCHLORIDE 20 MILLIGRAM(S): 2.5 TABLET ORAL at 21:31

## 2019-03-13 RX ADMIN — CHLORHEXIDINE GLUCONATE 1 APPLICATION(S): 213 SOLUTION TOPICAL at 05:24

## 2019-03-13 NOTE — PROGRESS NOTE ADULT - SUBJECTIVE AND OBJECTIVE BOX
INTERVAL HPI/OVERNIGHT EVENTS:   HPI:  84 y/o M w/severe dementia, bedbound and contracted at baseline, DM, prior gastric/intestinal pneumatosis managed nonoperatively p/w fevers and dyspnea. Intubated for acute respiratory failure. Patient unable to provide history. Family unavailable. (01 Mar 2019 13:03)  Pt is an 82 yo M with h/o DM, dementia, CVA, bedbound and contracted at baseline, functional paraplegia,, prior gastric/intestinal pneumatosis managed nonoperatively presented 2 to fevers and dyspnea. Pt with the following labs: WBC 18, glucose of 800s with anion gap 24, Cr 2.37, Ph 7.1, HCO3: 13, lactate 10. Pt intubated for respiratory failure. ICU admitting dx: 1) septic shock, 2 to Staph PNA 2) Acute respiratory failure 2 to SS/PNA 3) DKA 4) ANSLEY 2 to ATN, 5) multiple decubitus ulcers 6) respiratory alkalosis.       On rounds noticed he had cuff leak not getting  volumes, looked at ETT with glidesope, ciff was at level of chords, we deflated cuff and advanced tuber from 20 lip to 25 lip , leak resolved.    PAST MEDICAL & SURGICAL HISTORY:  Pneumatosis of intestines  Dementia  DM (diabetes mellitus)  Glaucoma  Dementia  DM (diabetes mellitus)  HTN (hypertension)  History of cholecystectomy  History of appendectomy  History of cholecystectomy  History of nephrolithiasis  Status post open reduction with internal fixation of fracture: right femur      REVIEW OF SYSTEMS:           ICU Vital Signs Last 24 Hrs  T(C): 37.9 (13 Mar 2019 08:24), Max: 38 (12 Mar 2019 20:00)  T(F): 100.3 (13 Mar 2019 08:24), Max: 100.4 (12 Mar 2019 20:00)  HR: 93 (13 Mar 2019 12:05) (81 - 96)  BP: 92/42 (13 Mar 2019 12:00) (82/44 - 126/54)  BP(mean): 54 (13 Mar 2019 12:00) (52 - 93)  ABP: --  ABP(mean): --  RR: 36 (13 Mar 2019 12:05) (21 - 45)  SpO2: 98% (13 Mar 2019 12:05) (95% - 100%)          I&O's Detail    12 Mar 2019 07:01  -  13 Mar 2019 07:00  --------------------------------------------------------  IN:    Enteral Tube Flush: 30 mL    Free Water: 860 mL    Glucerna: 1265 mL    Solution: 300 mL  Total IN: 2455 mL    OUT:    Incontinent per Condom Catheter: 1100 mL  Total OUT: 1100 mL    Total NET: 1355 mL      13 Mar 2019 07:01  -  13 Mar 2019 12:10  --------------------------------------------------------  IN:    Enteral Tube Flush: 50 mL    Glucerna: 220 mL    Solution: 100 mL  Total IN: 370 mL    OUT:    Incontinent per Condom Catheter: 200 mL  Total OUT: 200 mL    Total NET: 170 mL          Mode: AC/ CMV (Assist Control/ Continuous Mandatory Ventilation)  RR (machine): 12  TV (machine): 350  FiO2: 0.3  PEEP: 5  ITime: 1  MAP: 9  PIP: 14    CAPILLARY BLOOD GLUCOSE      POCT Blood Glucose.: 113 mg/dL (13 Mar 2019 05:15)  POCT Blood Glucose.: 211 mg/dL (12 Mar 2019 23:16)  POCT Blood Glucose.: 176 mg/dL (12 Mar 2019 17:21)  POCT Blood Glucose.: 189 mg/dL (12 Mar 2019 12:46)  POCT Blood Glucose.: 207 mg/dL (12 Mar 2019 12:45)    PHYSICAL EXAM:    GENERAL: intubated  HEENt No JVD  Lungs Rhonchi  XCVS S1 S2 RRR  ABD NT/ND  Ext no edema      LABS:                        8.1    30.64 )-----------( 484      ( 13 Mar 2019 04:24 )             24.9      03-13    141  |  110<H>  |  46<H>  ----------------------------<  134<H>  3.3<L>   |  21<L>  |  1.05    Ca    6.6<L>      13 Mar 2019 04:24  Phos  4.0     03-13  Mg     2.0     03-13              RADIOLOGY & ADDITIONAL STUDIES:      Assessment and Plan:    CRITICAL CARE TIME SPENT: INTERVAL HPI/OVERNIGHT EVENTS:   HPI:  82 y/o M w/severe dementia, bedbound and contracted at baseline, DM, prior gastric/intestinal pneumatosis managed nonoperatively p/w fevers and dyspnea. Intubated for acute respiratory failure. Patient unable to provide history. Family unavailable. (01 Mar 2019 13:03)  Pt is an 84 yo M with h/o DM, dementia, CVA, bedbound and contracted at baseline, functional paraplegia,, prior gastric/intestinal pneumatosis managed nonoperatively presented 2 to fevers and dyspnea. Pt with the following labs: WBC 18, glucose of 800s with anion gap 24, Cr 2.37, Ph 7.1, HCO3: 13, lactate 10. Pt intubated for respiratory failure. ICU admitting dx: 1) septic shock, 2 to Staph PNA 2) Acute respiratory failure 2 to SS/PNA 3) DKA 4) ANSLEY 2 to ATN, 5) multiple decubitus ulcers 6) respiratory alkalosis.       On rounds noticed he had cuff leak not getting  volumes, looked at ETT with glidesope, ciff was at level of chords, we deflated cuff and advanced tube from 20 lip to 25 lip , leak resolved.    PAST MEDICAL & SURGICAL HISTORY:  Pneumatosis of intestines  Dementia  DM (diabetes mellitus)  Glaucoma  Dementia  DM (diabetes mellitus)  HTN (hypertension)  History of cholecystectomy  History of appendectomy  History of cholecystectomy  History of nephrolithiasis  Status post open reduction with internal fixation of fracture: right femur      REVIEW OF SYSTEMS:           ICU Vital Signs Last 24 Hrs  T(C): 37.9 (13 Mar 2019 08:24), Max: 38 (12 Mar 2019 20:00)  T(F): 100.3 (13 Mar 2019 08:24), Max: 100.4 (12 Mar 2019 20:00)  HR: 93 (13 Mar 2019 12:05) (81 - 96)  BP: 92/42 (13 Mar 2019 12:00) (82/44 - 126/54)  BP(mean): 54 (13 Mar 2019 12:00) (52 - 93)  ABP: --  ABP(mean): --  RR: 36 (13 Mar 2019 12:05) (21 - 45)  SpO2: 98% (13 Mar 2019 12:05) (95% - 100%)          I&O's Detail    12 Mar 2019 07:01  -  13 Mar 2019 07:00  --------------------------------------------------------  IN:    Enteral Tube Flush: 30 mL    Free Water: 860 mL    Glucerna: 1265 mL    Solution: 300 mL  Total IN: 2455 mL    OUT:    Incontinent per Condom Catheter: 1100 mL  Total OUT: 1100 mL    Total NET: 1355 mL      13 Mar 2019 07:01  -  13 Mar 2019 12:10  --------------------------------------------------------  IN:    Enteral Tube Flush: 50 mL    Glucerna: 220 mL    Solution: 100 mL  Total IN: 370 mL    OUT:    Incontinent per Condom Catheter: 200 mL  Total OUT: 200 mL    Total NET: 170 mL          Mode: AC/ CMV (Assist Control/ Continuous Mandatory Ventilation)  RR (machine): 12  TV (machine): 350  FiO2: 0.3  PEEP: 5  ITime: 1  MAP: 9  PIP: 14    CAPILLARY BLOOD GLUCOSE      POCT Blood Glucose.: 113 mg/dL (13 Mar 2019 05:15)  POCT Blood Glucose.: 211 mg/dL (12 Mar 2019 23:16)  POCT Blood Glucose.: 176 mg/dL (12 Mar 2019 17:21)  POCT Blood Glucose.: 189 mg/dL (12 Mar 2019 12:46)  POCT Blood Glucose.: 207 mg/dL (12 Mar 2019 12:45)    PHYSICAL EXAM:    GENERAL: intubated  HEENt No JVD  Lungs Rhonchi  XCVS S1 S2 RRR  ABD NT/ND  Ext no edema      LABS:                        8.1    30.64 )-----------( 484      ( 13 Mar 2019 04:24 )             24.9      03-13    141  |  110<H>  |  46<H>  ----------------------------<  134<H>  3.3<L>   |  21<L>  |  1.05    Ca    6.6<L>      13 Mar 2019 04:24  Phos  4.0     03-13  Mg     2.0     03-13              RADIOLOGY & ADDITIONAL STUDIES: bedside CXR ETT and NGT in good position      Assessment and Plan:    CRITICAL CARE TIME SPENT:

## 2019-03-13 NOTE — PROGRESS NOTE ADULT - SUBJECTIVE AND OBJECTIVE BOX
Patient is a 84y old  Male who presents with a chief complaint of Acute respiratory failure, DKA, severe sepsis, ANSLEY (13 Mar 2019 12:09)      INTERVAL HPI / OVERNIGHT EVENTS: no new event    MEDICATIONS  (STANDING):  chlorhexidine 0.12% Liquid 15 milliLiter(s) Oral Mucosa two times a day  chlorhexidine 4% Liquid 1 Application(s) Topical <User Schedule>  dextrose 5%. 1000 milliLiter(s) (50 mL/Hr) IV Continuous <Continuous>  dextrose 50% Injectable 12.5 Gram(s) IV Push once  dextrose 50% Injectable 25 Gram(s) IV Push once  dextrose 50% Injectable 25 Gram(s) IV Push once  fentaNYL   Infusion. 0.5 MICROgram(s)/kG/Hr (2.525 mL/Hr) IV Continuous <Continuous>  heparin  Injectable 5000 Unit(s) SubCutaneous every 12 hours  insulin glargine Injectable (LANTUS) 20 Unit(s) SubCutaneous at bedtime  insulin lispro (HumaLOG) corrective regimen sliding scale   SubCutaneous every 6 hours  meropenem  IVPB      midodrine 20 milliGRAM(s) Oral every 8 hours  pantoprazole   Suspension 40 milliGRAM(s) Oral daily    MEDICATIONS  (PRN):  acetaminophen    Suspension .. 650 milliGRAM(s) Oral every 6 hours PRN Temp greater or equal to 38C (100.4F)  dextrose 40% Gel 15 Gram(s) Oral once PRN Blood Glucose LESS THAN 70 milliGRAM(s)/deciliter  glucagon  Injectable 1 milliGRAM(s) IntraMuscular once PRN Glucose LESS THAN 70 milligrams/deciliter      Vital Signs Last 24 Hrs  T(C): 37.9 (13 Mar 2019 08:24), Max: 38 (12 Mar 2019 20:00)  T(F): 100.3 (13 Mar 2019 08:24), Max: 100.4 (12 Mar 2019 20:00)  HR: 90 (13 Mar 2019 14:16) (88 - 96)  BP: 98/84 (13 Mar 2019 14:16) (82/44 - 126/54)  BP(mean): 87 (13 Mar 2019 14:16) (52 - 93)  RR: 23 (13 Mar 2019 14:16) (21 - 45)  SpO2: 100% (13 Mar 2019 14:16) (95% - 100%)    Review of systems:  cant be obtained      PHYSICAL EXAM:  General :NAD,intubated  Constitutional: thin built  Respiratory: CTAB/L  Cardiovascular: S1 and S2, RRR, no M/G/R  Gastrointestinal: BS+, soft, NT/ND  Extremities: No peripheral edema  Vascular: 2+ peripheral pulses  Skin: No rashes      LABS:                        8.1    30.64 )-----------( 484      ( 13 Mar 2019 04:24 )             24.9     03-13    141  |  110<H>  |  46<H>  ----------------------------<  134<H>  3.3<L>   |  21<L>  |  1.05    Ca    6.6<L>      13 Mar 2019 04:24  Phos  4.0     03-13  Mg     2.0     03-13            MICROBIOLOGY:  RECENT CULTURES:  03-07 .Blood XXXX XXXX   No growth at 5 days.          RADIOLOGY & ADDITIONAL STUDIES:

## 2019-03-14 LAB
ALBUMIN SERPL ELPH-MCNC: 1.2 G/DL — LOW (ref 3.3–5)
ALP SERPL-CCNC: 75 U/L — SIGNIFICANT CHANGE UP (ref 40–120)
ALT FLD-CCNC: 10 U/L — LOW (ref 12–78)
ANION GAP SERPL CALC-SCNC: 10 MMOL/L — SIGNIFICANT CHANGE UP (ref 5–17)
AST SERPL-CCNC: 12 U/L — LOW (ref 15–37)
BASOPHILS # BLD AUTO: 0.06 K/UL — SIGNIFICANT CHANGE UP (ref 0–0.2)
BASOPHILS NFR BLD AUTO: 0.2 % — SIGNIFICANT CHANGE UP (ref 0–2)
BILIRUB SERPL-MCNC: 1.7 MG/DL — HIGH (ref 0.2–1.2)
BUN SERPL-MCNC: 45 MG/DL — HIGH (ref 7–23)
CALCIUM SERPL-MCNC: 6.9 MG/DL — LOW (ref 8.5–10.1)
CHLORIDE SERPL-SCNC: 109 MMOL/L — HIGH (ref 96–108)
CO2 SERPL-SCNC: 21 MMOL/L — LOW (ref 22–31)
CREAT SERPL-MCNC: 0.98 MG/DL — SIGNIFICANT CHANGE UP (ref 0.5–1.3)
EOSINOPHIL # BLD AUTO: 0.2 K/UL — SIGNIFICANT CHANGE UP (ref 0–0.5)
EOSINOPHIL NFR BLD AUTO: 0.7 % — SIGNIFICANT CHANGE UP (ref 0–6)
GLUCOSE BLDC GLUCOMTR-MCNC: 127 MG/DL — HIGH (ref 70–99)
GLUCOSE BLDC GLUCOMTR-MCNC: 184 MG/DL — HIGH (ref 70–99)
GLUCOSE BLDC GLUCOMTR-MCNC: 194 MG/DL — HIGH (ref 70–99)
GLUCOSE BLDC GLUCOMTR-MCNC: 202 MG/DL — HIGH (ref 70–99)
GLUCOSE BLDC GLUCOMTR-MCNC: 209 MG/DL — HIGH (ref 70–99)
GLUCOSE SERPL-MCNC: 151 MG/DL — HIGH (ref 70–99)
HCT VFR BLD CALC: 19 % — CRITICAL LOW (ref 39–50)
HCT VFR BLD CALC: 20.2 % — CRITICAL LOW (ref 39–50)
HGB BLD-MCNC: 6.3 G/DL — CRITICAL LOW (ref 13–17)
HGB BLD-MCNC: 6.6 G/DL — CRITICAL LOW (ref 13–17)
IMM GRANULOCYTES NFR BLD AUTO: 1.9 % — HIGH (ref 0–1.5)
LYMPHOCYTES # BLD AUTO: 1.11 K/UL — SIGNIFICANT CHANGE UP (ref 1–3.3)
LYMPHOCYTES # BLD AUTO: 3.7 % — LOW (ref 13–44)
MAGNESIUM SERPL-MCNC: 1.9 MG/DL — SIGNIFICANT CHANGE UP (ref 1.6–2.6)
MCHC RBC-ENTMCNC: 28.3 PG — SIGNIFICANT CHANGE UP (ref 27–34)
MCHC RBC-ENTMCNC: 28.8 PG — SIGNIFICANT CHANGE UP (ref 27–34)
MCHC RBC-ENTMCNC: 32.7 GM/DL — SIGNIFICANT CHANGE UP (ref 32–36)
MCHC RBC-ENTMCNC: 33.2 GM/DL — SIGNIFICANT CHANGE UP (ref 32–36)
MCV RBC AUTO: 86.7 FL — SIGNIFICANT CHANGE UP (ref 80–100)
MCV RBC AUTO: 86.8 FL — SIGNIFICANT CHANGE UP (ref 80–100)
MONOCYTES # BLD AUTO: 1.15 K/UL — HIGH (ref 0–0.9)
MONOCYTES NFR BLD AUTO: 3.8 % — SIGNIFICANT CHANGE UP (ref 2–14)
NEUTROPHILS # BLD AUTO: 27.17 K/UL — HIGH (ref 1.8–7.4)
NEUTROPHILS NFR BLD AUTO: 89.7 % — HIGH (ref 43–77)
NRBC # BLD: 0 /100 WBCS — SIGNIFICANT CHANGE UP (ref 0–0)
NRBC # BLD: 0 /100 WBCS — SIGNIFICANT CHANGE UP (ref 0–0)
PHOSPHATE SERPL-MCNC: 4 MG/DL — SIGNIFICANT CHANGE UP (ref 2.5–4.5)
PLATELET # BLD AUTO: 444 K/UL — HIGH (ref 150–400)
PLATELET # BLD AUTO: 464 K/UL — HIGH (ref 150–400)
POTASSIUM SERPL-MCNC: 3.1 MMOL/L — LOW (ref 3.5–5.3)
POTASSIUM SERPL-SCNC: 3.1 MMOL/L — LOW (ref 3.5–5.3)
PROT SERPL-MCNC: 4.8 GM/DL — LOW (ref 6–8.3)
RBC # BLD: 2.19 M/UL — LOW (ref 4.2–5.8)
RBC # BLD: 2.33 M/UL — LOW (ref 4.2–5.8)
RBC # FLD: 18 % — HIGH (ref 10.3–14.5)
RBC # FLD: 18.3 % — HIGH (ref 10.3–14.5)
SODIUM SERPL-SCNC: 140 MMOL/L — SIGNIFICANT CHANGE UP (ref 135–145)
WBC # BLD: 28.46 K/UL — HIGH (ref 3.8–10.5)
WBC # BLD: 30.27 K/UL — HIGH (ref 3.8–10.5)
WBC # FLD AUTO: 28.46 K/UL — HIGH (ref 3.8–10.5)
WBC # FLD AUTO: 30.27 K/UL — HIGH (ref 3.8–10.5)

## 2019-03-14 PROCEDURE — 99233 SBSQ HOSP IP/OBS HIGH 50: CPT

## 2019-03-14 RX ORDER — POTASSIUM CHLORIDE 20 MEQ
40 PACKET (EA) ORAL ONCE
Qty: 0 | Refills: 0 | Status: COMPLETED | OUTPATIENT
Start: 2019-03-14 | End: 2019-03-14

## 2019-03-14 RX ADMIN — Medication 40 MILLIEQUIVALENT(S): at 05:30

## 2019-03-14 RX ADMIN — PANTOPRAZOLE SODIUM 40 MILLIGRAM(S): 20 TABLET, DELAYED RELEASE ORAL at 11:09

## 2019-03-14 RX ADMIN — SODIUM CHLORIDE 30 MILLILITER(S): 9 INJECTION, SOLUTION INTRAVENOUS at 17:05

## 2019-03-14 RX ADMIN — Medication 2: at 23:15

## 2019-03-14 RX ADMIN — CHLORHEXIDINE GLUCONATE 15 MILLILITER(S): 213 SOLUTION TOPICAL at 05:31

## 2019-03-14 RX ADMIN — Medication 50 MILLILITER(S): at 02:05

## 2019-03-14 RX ADMIN — Medication 650 MILLIGRAM(S): at 07:41

## 2019-03-14 RX ADMIN — CHLORHEXIDINE GLUCONATE 15 MILLILITER(S): 213 SOLUTION TOPICAL at 17:05

## 2019-03-14 RX ADMIN — HEPARIN SODIUM 5000 UNIT(S): 5000 INJECTION INTRAVENOUS; SUBCUTANEOUS at 17:05

## 2019-03-14 RX ADMIN — MIDODRINE HYDROCHLORIDE 20 MILLIGRAM(S): 2.5 TABLET ORAL at 13:57

## 2019-03-14 RX ADMIN — MIDODRINE HYDROCHLORIDE 20 MILLIGRAM(S): 2.5 TABLET ORAL at 05:30

## 2019-03-14 RX ADMIN — MEROPENEM 200 MILLIGRAM(S): 1 INJECTION INTRAVENOUS at 17:06

## 2019-03-14 RX ADMIN — Medication 4: at 11:08

## 2019-03-14 RX ADMIN — Medication 4: at 05:30

## 2019-03-14 RX ADMIN — Medication 50 MILLILITER(S): at 01:00

## 2019-03-14 RX ADMIN — Medication 650 MILLIGRAM(S): at 10:11

## 2019-03-14 RX ADMIN — HEPARIN SODIUM 5000 UNIT(S): 5000 INJECTION INTRAVENOUS; SUBCUTANEOUS at 05:30

## 2019-03-14 RX ADMIN — Medication 4.73 MICROGRAM(S)/KG/MIN: at 08:13

## 2019-03-14 RX ADMIN — Medication 2: at 17:05

## 2019-03-14 RX ADMIN — CHLORHEXIDINE GLUCONATE 1 APPLICATION(S): 213 SOLUTION TOPICAL at 05:30

## 2019-03-14 RX ADMIN — SODIUM CHLORIDE 2000 MILLILITER(S): 9 INJECTION, SOLUTION INTRAVENOUS at 01:01

## 2019-03-14 RX ADMIN — MEROPENEM 200 MILLIGRAM(S): 1 INJECTION INTRAVENOUS at 06:21

## 2019-03-14 RX ADMIN — INSULIN GLARGINE 20 UNIT(S): 100 INJECTION, SOLUTION SUBCUTANEOUS at 23:14

## 2019-03-14 RX ADMIN — MIDODRINE HYDROCHLORIDE 20 MILLIGRAM(S): 2.5 TABLET ORAL at 22:36

## 2019-03-14 RX ADMIN — FENTANYL CITRATE 2.52 MICROGRAM(S)/KG/HR: 50 INJECTION INTRAVENOUS at 14:41

## 2019-03-14 NOTE — PHYSICAL THERAPY INITIAL EVALUATION ADULT - CRITERIA FOR SKILLED THERAPEUTIC INTERVENTIONS
Pt at this time is not a rehab candidate and is unable to actively participate in PT, therefore d/c from PT program at this time.
Pt at this time is not a rehab candidate and is unable to actively participate in PT, therefore d/c from PT program at this time.

## 2019-03-14 NOTE — PROVIDER CONTACT NOTE (CRITICAL VALUE NOTIFICATION) - PERSON GIVING RESULT:
alonzo silva
Florence Hermosillo- JESSICA
LILI RUFFIN/ CORE [facility]
Lexis Lay
Ms. Thakkar
Nicole Costello
Rusty Bonilla
Sonia
Yina Alaniz
brombly
marietta
marietta
Dacia

## 2019-03-14 NOTE — PROGRESS NOTE ADULT - ATTENDING COMMENTS
change antibiotics to meropenem as leukocytosis still high to give broad spectrum coverage
cont  meropenem as leukocytosis still high to give broad spectrum coverage
cont nafcillin and levaquin   increase levaquin dose as Renal failure sec to sepsis resolved  Leukocytosis worsening   repeat blood c/s negative   pt lungs sound clear now

## 2019-03-14 NOTE — PROVIDER CONTACT NOTE (CRITICAL VALUE NOTIFICATION) - TEST AND RESULT REPORTED:
bs 803 lactate 10.2
BLOOD CULTURES 3/1 PRELIMINARY GROWTH  AEROBIC BOTTLE GRAM POSITIVE COCCI IN CLUSTERS PAIRS & CHAINS
Calcium 6.2
Calcium 6.4
H/H 6.3/19.0
Lactate 5.6
Phos 1.0  Phos 1.0
Positive Bld cultures from 3/4/19- Primarily report- Gram positive cocci in clusters in anaerobic bottle.
Positive blood culture/ Primarily report -Drawn 3/1/19- positive Gram cocci in clusters in aerobic bottle.
hgb 6.6 hct 20.2
lactate 4.2
potassium 2.8
troponin 0.055

## 2019-03-14 NOTE — PROVIDER CONTACT NOTE (CRITICAL VALUE NOTIFICATION) - NAME OF MD/NP/PA/DO NOTIFIED:
Dr Aldridge
ANGUS Call
ANGUS Call
ANGUS Gastelum
ANGUS Gutiérrez
Bradley GRECO
Bradley GRECO aware
DR DNAIELS
ICU team, ANGUS Gutiérrez made aware
INOCENCIA Angulo
Zaria GRECO
Zaria GRECO
Dr Aldridge

## 2019-03-14 NOTE — PROGRESS NOTE ADULT - ASSESSMENT
Pt is an 84 yo M with h/o DM, dementia, CVA, bedbound and contracted at baseline, functional paraplegia,, prior gastric/intestinal pneumatosis managed nonoperatively presented 2 to fevers and dyspnea. Pt with the following labs: WBC 18, glucose of 800s with anion gap 24, Cr 2.37, Ph 7.1, HCO3: 13, lactate 10. Pt intubated for respiratory failure. ICU admitting dx: 1) septic shock, 2 to Staph PNA 2) Acute respiratory failure 2 to SS/PNA 3) DKA 4) ANSLEY 2 to ATN, 5) multiple decubitus ulcers 6) respiratory alkalosis. Possible palliative extubation tomorrow (3/15); cont current level of care

## 2019-03-14 NOTE — PROGRESS NOTE ADULT - SUBJECTIVE AND OBJECTIVE BOX
HPI:  Pt is an 84 yo M with h/o DM, dementia, CVA, bedbound and contracted at baseline, functional paraplegia,, prior gastric/intestinal pneumatosis managed nonoperatively presented 2 to fevers and dyspnea. Pt with the following labs: WBC 18, glucose of 800s with anion gap 24, Cr 2.37, Ph 7.1, HCO3: 13, lactate 10. Pt intubated for respiratory failure. ICU admitting dx: 1) septic shock, 2 to Staph PNA 2) Acute respiratory failure 2 to SS/PNA 3) DKA 4) ANSLEY 2 to ATN, 5) multiple decubitus ulcers 6) respiratory alkalosis      ## Labs:  CBC:                        6.3    28.46 )-----------( 444      ( 14 Mar 2019 05:46 )             19.0     Chem:  03    140  |  109<H>  |  45<H>  ----------------------------<  151<H>  3.1<L>   |  21<L>  |  0.98    Ca    6.9<L>      14 Mar 2019 03:41  Phos  4.0     -  Mg     1.9         TPro  4.8<L>  /  Alb  1.2<L>  /  TBili  1.7<H>  /  DBili  x   /  AST  12<L>  /  ALT  10<L>  /  AlkPhos  75  -14    Coags:          ## Imaging:    ## Medications:  meropenem  IVPB 2000 milliGRAM(s) IV Intermittent every 12 hours  meropenem  IVPB        midodrine 20 milliGRAM(s) Oral every 8 hours  norepinephrine Infusion 0.05 MICROgram(s)/kG/Min IV Continuous <Continuous>      dextrose 40% Gel 15 Gram(s) Oral once PRN  dextrose 50% Injectable 12.5 Gram(s) IV Push once  dextrose 50% Injectable 25 Gram(s) IV Push once  dextrose 50% Injectable 25 Gram(s) IV Push once  glucagon  Injectable 1 milliGRAM(s) IntraMuscular once PRN  insulin glargine Injectable (LANTUS) 20 Unit(s) SubCutaneous at bedtime  insulin lispro (HumaLOG) corrective regimen sliding scale   SubCutaneous every 6 hours    heparin  Injectable 5000 Unit(s) SubCutaneous every 12 hours    pantoprazole   Suspension 40 milliGRAM(s) Oral daily    acetaminophen    Suspension .. 650 milliGRAM(s) Oral every 6 hours PRN  fentaNYL   Infusion. 0.5 MICROgram(s)/kG/Hr IV Continuous <Continuous>      ## Vitals:  T(C): 37.1 (19 @ 15:00), Max: 38.1 (19 @ 07:30)  HR: 88 (19 @ 16:36) (75 - 95)  BP: 129/48 (19 @ 16:30) (69/35 - 145/56)  BP(mean): 69 (19 @ 16:30) (43 - 93)  RR: 17 (19 @ 16:30) (14 - 34)  SpO2: 100% (19 @ 16:36) (97% - 100%)  Wt(kg): --  Vent: Mode: AC/ CMV (Assist Control/ Continuous Mandatory Ventilation), RR (machine): 12, RR (patient): 20, TV (machine): 350, FiO2: 30, PEEP: 5, PIP: 26  AB-13 @ 07:01  -   @ 07:00  --------------------------------------------------------  IN: 5190 mL / OUT: 1500 mL / NET: 3690 mL     @ 07:01  -   @ 17:29  --------------------------------------------------------  IN: 1032.7 mL / OUT: 700 mL / NET: 332.7 mL    ## P/E:  Gen: lying comfortably in bed in no apparent distress  Mouth: (+) ETT  Lungs: CTA  Heart: RRR  Abd: Soft/+BS  Ext: Edema  Neuro: Minimally responsive    CENTRAL LINE: [ ] YES [ ] NO  LOCATION:   DATE INSERTED:  REMOVE: [ ] YES [ ] NO      PAZ: [ ] YES [x] NO    DATE INSERTED:  REMOVE:  [ ] YES [ ] NO      A-LINE:  [ ] YES [ ] NO  LOCATION:   DATE INSERTED:  REMOVE:  [ ] YES [ ] NO  EXPLAIN:    CODE STATUS: [x] full code  [ ] DNR  [ ] DNI  [ ] MOLST  Goals of care discussion: [ ] yes

## 2019-03-14 NOTE — PHYSICAL THERAPY INITIAL EVALUATION ADULT - MODALITIES TREATMENT COMMENTS
R foot DTI, sacrum s/p debridement 3/5/2019 now not malodorous, pressure wounds stage II to L hip, and un-stageable to R hip

## 2019-03-14 NOTE — PROGRESS NOTE ADULT - NSICDXPROBLEM_GEN_ALL_CORE_FT
PROBLEM DIAGNOSES  Problem: Acute respiratory failure, unspecified whether with hypoxia or hypercapnia  Assessment and Plan: Acute respiratory failure, unspecified whether with hypoxia or hypercapnia . C/W nafcilin, levaquin as per ID. Repeat Blood cultures have cleared. Check CXR for repositioned ETT  Start fentanyl drip for sedation
PROBLEM DIAGNOSES  Problem: Sacral decubitus ulcer, stage IV  Assessment and Plan:     Problem: ANSLEY (acute kidney injury)  Assessment and Plan:     Problem: Acute respiratory failure, unspecified whether with hypoxia or hypercapnia  Assessment and Plan:     Problem: Septic shock  Assessment and Plan:     Problem: Streptococcal infection  Assessment and Plan:     Problem: MSSA bacteremia  Assessment and Plan:

## 2019-03-15 LAB
GLUCOSE BLDC GLUCOMTR-MCNC: 117 MG/DL — HIGH (ref 70–99)
GLUCOSE BLDC GLUCOMTR-MCNC: 122 MG/DL — HIGH (ref 70–99)
GLUCOSE BLDC GLUCOMTR-MCNC: 175 MG/DL — HIGH (ref 70–99)
GLUCOSE BLDC GLUCOMTR-MCNC: 79 MG/DL — SIGNIFICANT CHANGE UP (ref 70–99)

## 2019-03-15 PROCEDURE — 99233 SBSQ HOSP IP/OBS HIGH 50: CPT

## 2019-03-15 RX ORDER — SODIUM HYPOCHLORITE 0.125 %
1 SOLUTION, NON-ORAL MISCELLANEOUS DAILY
Qty: 0 | Refills: 0 | Status: DISCONTINUED | OUTPATIENT
Start: 2019-03-15 | End: 2019-03-18

## 2019-03-15 RX ORDER — MORPHINE SULFATE 50 MG/1
2 CAPSULE, EXTENDED RELEASE ORAL ONCE
Qty: 0 | Refills: 0 | Status: DISCONTINUED | OUTPATIENT
Start: 2019-03-15 | End: 2019-03-15

## 2019-03-15 RX ORDER — MORPHINE SULFATE 50 MG/1
2 CAPSULE, EXTENDED RELEASE ORAL
Qty: 0 | Refills: 0 | Status: DISCONTINUED | OUTPATIENT
Start: 2019-03-15 | End: 2019-03-18

## 2019-03-15 RX ADMIN — MEROPENEM 200 MILLIGRAM(S): 1 INJECTION INTRAVENOUS at 05:59

## 2019-03-15 RX ADMIN — CHLORHEXIDINE GLUCONATE 15 MILLILITER(S): 213 SOLUTION TOPICAL at 05:48

## 2019-03-15 RX ADMIN — HEPARIN SODIUM 5000 UNIT(S): 5000 INJECTION INTRAVENOUS; SUBCUTANEOUS at 05:48

## 2019-03-15 RX ADMIN — MIDODRINE HYDROCHLORIDE 20 MILLIGRAM(S): 2.5 TABLET ORAL at 23:43

## 2019-03-15 RX ADMIN — Medication 2: at 23:47

## 2019-03-15 RX ADMIN — MORPHINE SULFATE 2 MILLIGRAM(S): 50 CAPSULE, EXTENDED RELEASE ORAL at 20:13

## 2019-03-15 RX ADMIN — Medication 1 APPLICATION(S): at 11:17

## 2019-03-15 RX ADMIN — INSULIN GLARGINE 20 UNIT(S): 100 INJECTION, SOLUTION SUBCUTANEOUS at 23:47

## 2019-03-15 RX ADMIN — MORPHINE SULFATE 2 MILLIGRAM(S): 50 CAPSULE, EXTENDED RELEASE ORAL at 18:00

## 2019-03-15 RX ADMIN — CHLORHEXIDINE GLUCONATE 1 APPLICATION(S): 213 SOLUTION TOPICAL at 02:45

## 2019-03-15 RX ADMIN — MEROPENEM 200 MILLIGRAM(S): 1 INJECTION INTRAVENOUS at 18:09

## 2019-03-15 RX ADMIN — MORPHINE SULFATE 2 MILLIGRAM(S): 50 CAPSULE, EXTENDED RELEASE ORAL at 17:32

## 2019-03-15 RX ADMIN — PANTOPRAZOLE SODIUM 40 MILLIGRAM(S): 20 TABLET, DELAYED RELEASE ORAL at 11:15

## 2019-03-15 RX ADMIN — Medication 650 MILLIGRAM(S): at 07:19

## 2019-03-15 RX ADMIN — FENTANYL CITRATE 2.52 MICROGRAM(S)/KG/HR: 50 INJECTION INTRAVENOUS at 14:56

## 2019-03-15 RX ADMIN — MIDODRINE HYDROCHLORIDE 20 MILLIGRAM(S): 2.5 TABLET ORAL at 13:42

## 2019-03-15 RX ADMIN — MORPHINE SULFATE 2 MILLIGRAM(S): 50 CAPSULE, EXTENDED RELEASE ORAL at 19:58

## 2019-03-15 RX ADMIN — Medication 650 MILLIGRAM(S): at 05:50

## 2019-03-15 RX ADMIN — MIDODRINE HYDROCHLORIDE 20 MILLIGRAM(S): 2.5 TABLET ORAL at 05:48

## 2019-03-15 RX ADMIN — Medication 650 MILLIGRAM(S): at 15:29

## 2019-03-15 RX ADMIN — HEPARIN SODIUM 5000 UNIT(S): 5000 INJECTION INTRAVENOUS; SUBCUTANEOUS at 18:09

## 2019-03-15 RX ADMIN — Medication 650 MILLIGRAM(S): at 17:45

## 2019-03-15 NOTE — PROGRESS NOTE ADULT - SUBJECTIVE AND OBJECTIVE BOX
HPI:  Pt is an 82 yo M with h/o DM, dementia, CVA, bedbound and contracted at baseline, functional paraplegia,, prior gastric/intestinal pneumatosis managed nonoperatively presented 2 to fevers and dyspnea. Pt with the following labs: WBC 18, glucose of 800s with anion gap 24, Cr 2.37, Ph 7.1, HCO3: 13, lactate 10. Pt intubated for respiratory failure. ICU admitting dx: 1) septic shock, 2 to Staph PNA 2) Acute respiratory failure 2 to SS/PNA 3) DKA 4) ANSLEY 2 to ATN, 5) multiple decubitus ulcers 6) respiratory alkalosis. Pt is DNR and palliative extubation today (3/15)    ## Labs:  CBC:                        6.3    28.46 )-----------( 444      ( 14 Mar 2019 05:46 )             19.0     Chem:  03-14    140  |  109<H>  |  45<H>  ----------------------------<  151<H>  3.1<L>   |  21<L>  |  0.98    Ca    6.9<L>      14 Mar 2019 03:41  Phos  4.0     03-14  Mg     1.9     -14    TPro  4.8<L>  /  Alb  1.2<L>  /  TBili  1.7<H>  /  DBili  x   /  AST  12<L>  /  ALT  10<L>  /  AlkPhos  75  03-14    Coags:          ## Imaging:    ## Medications:  meropenem  IVPB 2000 milliGRAM(s) IV Intermittent every 12 hours  meropenem  IVPB        midodrine 20 milliGRAM(s) Oral every 8 hours  norepinephrine Infusion 0.05 MICROgram(s)/kG/Min IV Continuous <Continuous>      dextrose 40% Gel 15 Gram(s) Oral once PRN  dextrose 50% Injectable 12.5 Gram(s) IV Push once  dextrose 50% Injectable 25 Gram(s) IV Push once  dextrose 50% Injectable 25 Gram(s) IV Push once  glucagon  Injectable 1 milliGRAM(s) IntraMuscular once PRN  insulin glargine Injectable (LANTUS) 20 Unit(s) SubCutaneous at bedtime  insulin lispro (HumaLOG) corrective regimen sliding scale   SubCutaneous every 6 hours    heparin  Injectable 5000 Unit(s) SubCutaneous every 12 hours    pantoprazole   Suspension 40 milliGRAM(s) Oral daily    acetaminophen    Suspension .. 650 milliGRAM(s) Oral every 6 hours PRN  fentaNYL   Infusion. 0.5 MICROgram(s)/kG/Hr IV Continuous <Continuous>      ## Vitals:  T(C): 37.6 (03-15-19 @ 11:11), Max: 38.6 (03-15-19 @ 04:09)  HR: 95 (03-15-19 @ 14:30) (84 - 102)  BP: 148/57 (03-15-19 @ 14:30) (96/54 - 148/57)  BP(mean): 80 (03-15-19 @ 14:30) (61 - 83)  RR: 18 (03-15-19 @ 14:30) (16 - 31)  SpO2: 99% (03-15-19 @ 14:30) (92% - 100%)  Wt(kg): --  Vent: Mode: AC/ CMV (Assist Control/ Continuous Mandatory Ventilation), RR (machine): 12, RR (patient): 25, TV (machine): 350, FiO2: 30, PEEP: 5, PIP: 20  AB-14 @ 07:01  -  03-15 @ 07:00  --------------------------------------------------------  IN: 2981.4 mL / OUT: 1600 mL / NET: 1381.4 mL    03-15 @ 07:01  -  03-15 @ 15:40  --------------------------------------------------------  IN: 706.2 mL / OUT: 0 mL / NET: 706.2 mL          ## P/E:  Gen: lying comfortably in bed in no apparent distress  Mouth: (+) ETT  Lungs: Rhonchi  Heart: RRR  Abd: Soft/+BS  Ext: Edema  Neuro: No change    CENTRAL LINE: [ ] YES [ ] NO  LOCATION:   DATE INSERTED:  REMOVE: [ ] YES [ ] NO      PAZ: [ ] YES [ ] NO    DATE INSERTED:  REMOVE:  [ ] YES [ ] NO      A-LINE:  [ ] YES [ ] NO  LOCATION:   DATE INSERTED:  REMOVE:  [ ] YES [ ] NO  EXPLAIN:    CODE STATUS: [ ] full code  [x ] DNR  [ ] DNI  [ ] MOLST  Goals of care discussion: [ ] yes

## 2019-03-15 NOTE — CHART NOTE - NSCHARTNOTEFT_GEN_A_CORE
Pt admitted c septic shock, PNA in acute respiratory distress; trached on 3/10, vent-assist; NGT for nutrition.  PMHx of dementia, functional paraplegic; bedbound. Pt continues to fail weaning trials; per RN terminal wean to take place today.    Factors impacting intake: [ ] none [ ] nausea  [ ] vomiting [ ] diarrhea [ ] constipation  [X ]chewing problems [x ] swallowing issues  [X] other: inability to self-feed    Diet Prescription: Diet, NPO with Tube Feed:   Tube Feeding Modality: Nasogastric  Glucerna 1.2 [employer]  Total Volume for 24 Hours (mL): 1320  Continuous  Starting Tube Feed Rate {mL per Hour}: 55  Until Goal Tube Feed Rate (mL per Hour): 55  Tube Feed Duration (in Hours): 24  Tube Feed Start Time: 13:00  No Carb Prosource (1pkg = 15gms Protein)     Qty per Day:  2 (03-03-19 @ 13:12)    Intake: pt tolerating well; 0% residuals noted    Current Weight:   64.2 kg (3/14); admission wt 50.5 kg (3/1)  % Weight Change:  21% gain x 2 weeks most likely due to fluid retention    Nutrition Focused Physical Exam not appropriate at this time    Physical Appearance:  3+ generalized edema noted; previous 2+ generalized & 3+ right side (hand, arm, ankle, foot)    Pertinent Medications: MEDICATIONS  (STANDING):  chlorhexidine 0.12% Liquid 15 milliLiter(s) Oral Mucosa two times a day  chlorhexidine 4% Liquid 1 Application(s) Topical <User Schedule>  Dakins Solution - 1/4 Strength 1 Application(s) Topical daily  dextrose 5%. 1000 milliLiter(s) (50 mL/Hr) IV Continuous <Continuous>  dextrose 50% Injectable 12.5 Gram(s) IV Push once  dextrose 50% Injectable 25 Gram(s) IV Push once  dextrose 50% Injectable 25 Gram(s) IV Push once  fentaNYL   Infusion. 0.5 MICROgram(s)/kG/Hr (2.525 mL/Hr) IV Continuous <Continuous>  heparin  Injectable 5000 Unit(s) SubCutaneous every 12 hours  insulin glargine Injectable (LANTUS) 20 Unit(s) SubCutaneous at bedtime  insulin lispro (HumaLOG) corrective regimen sliding scale   SubCutaneous every 6 hours  meropenem  IVPB 2000 milliGRAM(s) IV Intermittent every 12 hours  meropenem  IVPB      midodrine 20 milliGRAM(s) Oral every 8 hours  norepinephrine Infusion 0.05 MICROgram(s)/kG/Min (4.734 mL/Hr) IV Continuous <Continuous>  pantoprazole   Suspension 40 milliGRAM(s) Oral daily    MEDICATIONS  (PRN):  acetaminophen    Suspension .. 650 milliGRAM(s) Oral every 6 hours PRN Temp greater or equal to 38C (100.4F)  dextrose 40% Gel 15 Gram(s) Oral once PRN Blood Glucose LESS THAN 70 milliGRAM(s)/deciliter  glucagon  Injectable 1 milliGRAM(s) IntraMuscular once PRN Glucose LESS THAN 70 milligrams/deciliter    Pertinent Labs: 03-14 Na140 mmol/L Glu 151 mg/dL<H> K+ 3.1 mmol/L<L> Cr  0.98 mg/dL BUN 45 mg/dL<H> 03-14 Phos 4.0 mg/dL 03-14 Alb 1.2 g/dL<L>, POCT: 127, 202, 209, 184, 194:   03-04 [id number] 9.7 %<H>    Skin:  Pressure Ulcers as noted:  stage II left hip; stage IV sacrum; unstageable right hip; Suspected DTI b/l heels    Estimated Needs:   [ X] no change since previous assessment (3/3)  [ ] recalculated:     Previous Nutrition Diagnosis:   [ ] Inadequate Energy Intake [ ]Inadequate Oral Intake [ ] Excessive Energy Intake   [ ] Underweight [ ] Increased Nutrient Needs [ ] Overweight/Obesity   [ ] Altered GI Function [ ] Unintended Weight Loss [ ] Food & Nutrition Related Knowledge Deficit   [X ] Severe Malnutrition - acute     Nutrition Diagnosis is [X ] ongoing  [ ] resolved [ ] not applicable     GOAL: Pt to meet > 75% energy/protein need via TF in order to meet calculated needs & support wound healing    New Nutrition Diagnosis: [ X] not applicable    Interventions:   continue current diet rx as noted  Recommend  [ ] Change Diet To:  [ ] Nutrition Supplement  [ ] Nutrition Support  [ ] Other:     Monitoring and Evaluation:   [ ] PO intake [ x ] Tolerance to diet prescription [ x ] weights [ x ] labs[ x ] follow up per protocol  [ ] other:

## 2019-03-15 NOTE — CHART NOTE - NSCHARTNOTEFT_GEN_A_CORE
C discussion with patient daughter Geraldine Chandra reaffirmed family wishes to have palliative wean. Daughter requested patient to have comfort measures in place. no escalation of care will have no further lab draws.

## 2019-03-15 NOTE — PROGRESS NOTE ADULT - ASSESSMENT
Pt is an 84 yo M with h/o DM, dementia, CVA, bedbound and contracted at baseline, functional paraplegia,, prior gastric/intestinal pneumatosis managed nonoperatively presented 2 to fevers and dyspnea. Pt with the following labs: WBC 18, glucose of 800s with anion gap 24, Cr 2.37, Ph 7.1, HCO3: 13, lactate 10. Pt intubated for respiratory failure. ICU admitting dx: 1) septic shock, 2 to Staph PNA 2) Acute respiratory failure 2 to SS/PNA 3) DKA 4) ANSLEY 2 to ATN, 5) multiple decubitus ulcers 6) respiratory alkalosis. Pt is DNR and palliative extubation today (3/15)

## 2019-03-16 LAB
GLUCOSE BLDC GLUCOMTR-MCNC: 100 MG/DL — HIGH (ref 70–99)
GLUCOSE BLDC GLUCOMTR-MCNC: 119 MG/DL — HIGH (ref 70–99)
GLUCOSE BLDC GLUCOMTR-MCNC: 174 MG/DL — HIGH (ref 70–99)
GLUCOSE BLDC GLUCOMTR-MCNC: 182 MG/DL — HIGH (ref 70–99)

## 2019-03-16 PROCEDURE — 99232 SBSQ HOSP IP/OBS MODERATE 35: CPT

## 2019-03-16 RX ADMIN — MIDODRINE HYDROCHLORIDE 20 MILLIGRAM(S): 2.5 TABLET ORAL at 05:51

## 2019-03-16 RX ADMIN — CHLORHEXIDINE GLUCONATE 1 APPLICATION(S): 213 SOLUTION TOPICAL at 11:46

## 2019-03-16 RX ADMIN — Medication 1 APPLICATION(S): at 11:46

## 2019-03-16 RX ADMIN — Medication 2: at 05:50

## 2019-03-16 RX ADMIN — HEPARIN SODIUM 5000 UNIT(S): 5000 INJECTION INTRAVENOUS; SUBCUTANEOUS at 17:29

## 2019-03-16 RX ADMIN — Medication 650 MILLIGRAM(S): at 16:05

## 2019-03-16 RX ADMIN — MORPHINE SULFATE 2 MILLIGRAM(S): 50 CAPSULE, EXTENDED RELEASE ORAL at 05:49

## 2019-03-16 RX ADMIN — Medication 2: at 23:12

## 2019-03-16 RX ADMIN — MEROPENEM 200 MILLIGRAM(S): 1 INJECTION INTRAVENOUS at 17:39

## 2019-03-16 RX ADMIN — MEROPENEM 200 MILLIGRAM(S): 1 INJECTION INTRAVENOUS at 05:49

## 2019-03-16 RX ADMIN — PANTOPRAZOLE SODIUM 40 MILLIGRAM(S): 20 TABLET, DELAYED RELEASE ORAL at 11:46

## 2019-03-16 RX ADMIN — Medication 650 MILLIGRAM(S): at 05:51

## 2019-03-16 RX ADMIN — Medication 650 MILLIGRAM(S): at 05:50

## 2019-03-16 RX ADMIN — INSULIN GLARGINE 20 UNIT(S): 100 INJECTION, SOLUTION SUBCUTANEOUS at 22:42

## 2019-03-16 RX ADMIN — MORPHINE SULFATE 2 MILLIGRAM(S): 50 CAPSULE, EXTENDED RELEASE ORAL at 05:51

## 2019-03-16 RX ADMIN — Medication 650 MILLIGRAM(S): at 16:58

## 2019-03-16 RX ADMIN — HEPARIN SODIUM 5000 UNIT(S): 5000 INJECTION INTRAVENOUS; SUBCUTANEOUS at 05:50

## 2019-03-16 NOTE — PROGRESS NOTE ADULT - ASSESSMENT
Pt is an 84 yo M with h/o DM, dementia, CVA, bedbound and contracted at baseline, functional paraplegia,, prior gastric/intestinal pneumatosis managed nonoperatively presented 2 to fevers and dyspnea. Pt with the following labs: WBC 18, glucose of 800s with anion gap 24, Cr 2.37, Ph 7.1, HCO3: 13, lactate 10. Pt intubated for respiratory failure. ICU admitting dx: 1) septic shock, 2 to Staph PNA 2) Acute respiratory failure 2 to SS/PNA 3) DKA 4) ANSLEY 2 to ATN, 5) multiple decubitus ulcers 6) respiratory alkalosis. Pt is DNR and s/p palliative extubation (3/15). Dc Levophed, no escalation of care and comfort measures

## 2019-03-16 NOTE — CHART NOTE - NSCHARTNOTEFT_GEN_A_CORE
HPI    83M PMH dementia, CVA, bedbound and contracted at baseline, functional paraplegia, DM, prior gastric/intestinal pneumatosis managed nonoperatively p/w fevers and dyspnea. Labs with leukocytosis WBC 18, glucose of 800s with anion gap 24, Cr 2.37, Ph 7.1, HCO3: 13, lactate 10. intubated for respiratory failure. Admitted to ICU for severe sepsis with septic shock, PNA acute respiratory failure, DKA, acute renal failure, lactic acidosis, multiple decubitus ulcers. Pt unable to wean at present time due to underlying tachypnea, persistent acidosis. Patient with left opacities. Bacteremic with staff aureus and strep constellatus.  C/W with [employer]. Severe sepsis with septic shock likely form PNA and decubitus ulcers  are also a source wound consulted for multiple decub,+ debridement for stage IV sacral wound. Pt with DKA on admission was on insulin gtt titrate d to insulin sliding scale. Patient was not able to be weaned from vent. GOC discussion with patient daughter Geraldine Chandra reaffirmed family wishes to have palliative wean. MOLST form signed. DNR /DNI. Daughter requested patient to have comfort measures in place. no escalation of care will have no further lab draws. Patient seen and examined by ICU attending and stable for transfer to medicine service.     Report given to Dr. Bautista, hospitalist service    SERVANDO Garcia  critical care

## 2019-03-16 NOTE — PROGRESS NOTE ADULT - SUBJECTIVE AND OBJECTIVE BOX
HPI:  Pt is an 82 yo M with h/o DM, dementia, CVA, bedbound and contracted at baseline, functional paraplegia,, prior gastric/intestinal pneumatosis managed nonoperatively presented 2 to fevers and dyspnea. Pt with the following labs: WBC 18, glucose of 800s with anion gap 24, Cr 2.37, Ph 7.1, HCO3: 13, lactate 10. Pt intubated for respiratory failure. ICU admitting dx: 1) septic shock, 2 to Staph PNA 2) Acute respiratory failure 2 to SS/PNA 3) DKA 4) ANSLEY 2 to ATN, 5) multiple decubitus ulcers 6) respiratory alkalosis. Pt is DNR and s/p palliative extubation (3/15)      ## Labs:  CBC:    Chem:        Coags:          ## Imaging:    ## Medications:  meropenem  IVPB 2000 milliGRAM(s) IV Intermittent every 12 hours  meropenem  IVPB            dextrose 40% Gel 15 Gram(s) Oral once PRN  dextrose 50% Injectable 12.5 Gram(s) IV Push once  dextrose 50% Injectable 25 Gram(s) IV Push once  dextrose 50% Injectable 25 Gram(s) IV Push once  glucagon  Injectable 1 milliGRAM(s) IntraMuscular once PRN  insulin glargine Injectable (LANTUS) 20 Unit(s) SubCutaneous at bedtime  insulin lispro (HumaLOG) corrective regimen sliding scale   SubCutaneous every 6 hours    heparin  Injectable 5000 Unit(s) SubCutaneous every 12 hours      acetaminophen    Suspension .. 650 milliGRAM(s) Oral every 6 hours PRN  morphine  - Injectable 2 milliGRAM(s) IV Push every 2 hours PRN      ## Vitals:  T(C): 37.7 (19 @ 07:30), Max: 38.8 (19 @ 04:30)  HR: 84 (19 @ 11:30) (83 - 109)  BP: 111/44 (19 @ 11:30) (97/47 - 148/57)  BP(mean): 62 (19 @ 11:30) (54 - 80)  RR: 36 (19 @ 11:30) (16 - 43)  SpO2: 100% (19 @ 11:30) (87% - 100%)  Wt(kg): --  Vent: Mode: AC/ CMV (Assist Control/ Continuous Mandatory Ventilation), RR (machine): 12, RR (patient): 29, TV (machine): 350, FiO2: 30, PEEP: 5, PIP: 17  AB-15 @ 07: @ 07:00  --------------------------------------------------------  IN: 2383.4 mL / OUT: 2100 mL / NET: 283.4 mL     @ 07: @ 12:25  --------------------------------------------------------  IN: 190.5 mL / OUT: 0 mL / NET: 190.5 mL          ## P/E:  Gen: lying comfortably in bed in no apparent distress  Lungs: Rhonchi  Heart: RRR  Abd: Soft/+BS  Ext: Edema  Neuro: Obtunded    CENTRAL LINE: [ ] YES [ ] NO  LOCATION:   DATE INSERTED:  REMOVE: [ ] YES [ ] NO      BRANDI: [ ] YES [ ] NO    DATE INSERTED:  REMOVE:  [ ] YES [ ] NO      A-LINE:  [ ] YES [ ] NO  LOCATION:   DATE INSERTED:  REMOVE:  [ ] YES [ ] NO  EXPLAIN:    CODE STATUS: [ ] full code  [x ] DNR  [x ] DNI  [ ] [facility]  Goals of care discussion: [ ] yes

## 2019-03-17 LAB
GLUCOSE BLDC GLUCOMTR-MCNC: 158 MG/DL — HIGH (ref 70–99)
GLUCOSE BLDC GLUCOMTR-MCNC: 267 MG/DL — HIGH (ref 70–99)
GLUCOSE BLDC GLUCOMTR-MCNC: 87 MG/DL — SIGNIFICANT CHANGE UP (ref 70–99)
GLUCOSE BLDC GLUCOMTR-MCNC: 99 MG/DL — SIGNIFICANT CHANGE UP (ref 70–99)

## 2019-03-17 PROCEDURE — 99232 SBSQ HOSP IP/OBS MODERATE 35: CPT

## 2019-03-17 RX ADMIN — Medication 650 MILLIGRAM(S): at 05:06

## 2019-03-17 RX ADMIN — CHLORHEXIDINE GLUCONATE 1 APPLICATION(S): 213 SOLUTION TOPICAL at 11:29

## 2019-03-17 RX ADMIN — Medication 650 MILLIGRAM(S): at 17:38

## 2019-03-17 RX ADMIN — HEPARIN SODIUM 5000 UNIT(S): 5000 INJECTION INTRAVENOUS; SUBCUTANEOUS at 05:00

## 2019-03-17 RX ADMIN — Medication 1 APPLICATION(S): at 11:29

## 2019-03-17 RX ADMIN — MEROPENEM 200 MILLIGRAM(S): 1 INJECTION INTRAVENOUS at 17:27

## 2019-03-17 RX ADMIN — Medication 650 MILLIGRAM(S): at 06:54

## 2019-03-17 RX ADMIN — MEROPENEM 200 MILLIGRAM(S): 1 INJECTION INTRAVENOUS at 05:00

## 2019-03-17 RX ADMIN — Medication 2: at 17:27

## 2019-03-17 RX ADMIN — INSULIN GLARGINE 20 UNIT(S): 100 INJECTION, SOLUTION SUBCUTANEOUS at 22:39

## 2019-03-17 RX ADMIN — HEPARIN SODIUM 5000 UNIT(S): 5000 INJECTION INTRAVENOUS; SUBCUTANEOUS at 17:26

## 2019-03-17 NOTE — PROGRESS NOTE ADULT - ASSESSMENT
Pt is an 82 yo M with h/o DM, dementia, CVA, bedbound and contracted at baseline, functional paraplegia,, prior gastric/intestinal pneumatosis managed nonoperatively presented 2 to fevers and dyspnea. Pt with the following labs: WBC 18, glucose of 800s with anion gap 24, Cr 2.37, Ph 7.1, HCO3: 13, lactate 10. Pt intubated for respiratory failure. ICU admitting dx:     1) septic shock, 2 to Staph PNA   2) Acute respiratory failure 2 to PNA   3) DKA   4) ANSLEY 2 to ATN,   5) multiple decubitus ulcers   Pt is DNR/DNI and s/p palliative extubation (3/15).   no escalation of care and comfort measures    Patient not in distress  Comfort measures only  No labs draws  Will place palliative care consult  Discussed with daughters bedside Pt is an 84 yo M with h/o DM, dementia, CVA, bedbound and contracted at baseline, functional paraplegia,, prior gastric/intestinal pneumatosis managed nonoperatively presented 2 to fevers and dyspnea. Pt with the following labs: WBC 18, glucose of 800s with anion gap 24, Cr 2.37, Ph 7.1, HCO3: 13, lactate 10. Pt intubated for respiratory failure. ICU admitting dx:     1) septic shock, 2 to Staph PNA   2) Acute respiratory failure 2 to PNA   3) DKA   4) ANSLEY 2 to ATN,   5) multiple decubitus ulcers   Pt is DNR/DNI and s/p palliative extubation (3/15).   no escalation of care and comfort measures    Patient not in distress  Comfort measures only  No labs draws  Will place palliative care consult  Spoke with palliative Dr Kaur Alexander- will see   Discussed with daughters bedside

## 2019-03-17 NOTE — PROGRESS NOTE ADULT - SUBJECTIVE AND OBJECTIVE BOX
CHIEF COMPLAINT/INTERVAL HISTORY:    Patient is a 84y old  Male who presents with a chief complaint of Acute respiratory failure, DKA, severe sepsis, ANSLEY (16 Mar 2019 12:25)      HPI:  82 y/o M w/severe dementia, bedbound and contracted at baseline, DM, prior gastric/intestinal pneumatosis managed nonoperatively p/w fevers and dyspnea. Intubated for acute respiratory failure. Patient unable to provide history. Family unavailable. (01 Mar 2019 13:03)    Overnight issues    Low grade fever  Daughter bedside  No ROS done , as patient unable to participate.      SUBJECTIVE & OBJECTIVE: Pt seen and examined at bedside.   ROS:    T(C): 37.8 (17 Mar 2019 16:00), Max: 38.1 (17 Mar 2019 05:04)  T(F): 100 (17 Mar 2019 16:00), Max: 100.6 (17 Mar 2019 05:04)  HR: 84 (17 Mar 2019 16:00) (80 - 90)  BP: 121/42 (17 Mar 2019 16:00) (100/44 - 121/42)  BP(mean): 61 (17 Mar 2019 16:00) (52 - 68)  ABP: --  ABP(mean): --  RR: 33 (17 Mar 2019 16:00) (24 - 42)  SpO2: 100% (17 Mar 2019 16:00) (100% - 100%)        MEDICATIONS  (STANDING):  chlorhexidine 4% Liquid 1 Application(s) Topical <User Schedule>  Dakins Solution - 1/4 Strength 1 Application(s) Topical daily  dextrose 5%. 1000 milliLiter(s) (50 mL/Hr) IV Continuous <Continuous>  dextrose 50% Injectable 12.5 Gram(s) IV Push once  dextrose 50% Injectable 25 Gram(s) IV Push once  dextrose 50% Injectable 25 Gram(s) IV Push once  heparin  Injectable 5000 Unit(s) SubCutaneous every 12 hours  insulin glargine Injectable (LANTUS) 20 Unit(s) SubCutaneous at bedtime  insulin lispro (HumaLOG) corrective regimen sliding scale   SubCutaneous every 6 hours  meropenem  IVPB 2000 milliGRAM(s) IV Intermittent every 12 hours  meropenem  IVPB        MEDICATIONS  (PRN):  acetaminophen    Suspension .. 650 milliGRAM(s) Oral every 6 hours PRN Temp greater or equal to 38C (100.4F)  dextrose 40% Gel 15 Gram(s) Oral once PRN Blood Glucose LESS THAN 70 milliGRAM(s)/deciliter  glucagon  Injectable 1 milliGRAM(s) IntraMuscular once PRN Glucose LESS THAN 70 milligrams/deciliter  morphine  - Injectable 2 milliGRAM(s) IV Push every 2 hours PRN respirate rate > 30 after pallioative  wean - comfort care        PHYSICAL EXAM:    GENERAL: NAD,   HEAD:  Atraumatic, Normocephalic  NECK: Supple, No JVD  NERVOUS SYSTEM:  Obtunded  CHEST/LUNG: Coarse and decreased breath sounds  HEART: s1 and S2 heard, no gallop  ABDOMEN: Soft,   EXTREMITIES: edema LE    LABS:                CAPILLARY BLOOD GLUCOSE      POCT Blood Glucose.: 87 mg/dL (17 Mar 2019 11:32)  POCT Blood Glucose.: 99 mg/dL (17 Mar 2019 04:57)  POCT Blood Glucose.: 174 mg/dL (16 Mar 2019 22:41)  POCT Blood Glucose.: 119 mg/dL (16 Mar 2019 17:28)      RECENT CULTURES:      RADIOLOGY & ADDITIONAL TESTS:  Imaging Personally Reviewed:  [ ] YES      Consultant(s) Notes Reviewed:  [ ] YES     Care Discussed with [ ] Consultants [X ] Patient [ ] Family  [ ]    [x ]  Other; RN  HEALTH ISSUES - PROBLEM Dx:  Sacral decubitus ulcer, stage IV: Sacral decubitus ulcer, stage IV  ANSLEY (acute kidney injury): ANSLEY (acute kidney injury)  Acute respiratory failure, unspecified whether with hypoxia or hypercapnia  Septic shock: Septic shock  Streptococcal infection: Streptococcal infection  MSSA bacteremia: MSSA bacteremia        DVT/GI ppx  Discussed with pt @ bedside

## 2019-03-17 NOTE — PROGRESS NOTE ADULT - REASON FOR ADMISSION
Acute respiratory failure, DKA, severe sepsis, ANSLEY

## 2019-03-17 NOTE — PROGRESS NOTE ADULT - PROVIDER SPECIALTY LIST ADULT
Critical Care
Hospitalist
Infectious Disease
Critical Care

## 2019-03-18 VITALS
DIASTOLIC BLOOD PRESSURE: 19 MMHG | HEART RATE: 58 BPM | RESPIRATION RATE: 11 BRPM | SYSTOLIC BLOOD PRESSURE: 36 MMHG | TEMPERATURE: 101 F

## 2019-03-18 LAB
GLUCOSE BLDC GLUCOMTR-MCNC: 173 MG/DL — HIGH (ref 70–99)
GLUCOSE BLDC GLUCOMTR-MCNC: 275 MG/DL — HIGH (ref 70–99)

## 2019-03-18 PROCEDURE — 99239 HOSP IP/OBS DSCHRG MGMT >30: CPT

## 2019-03-18 RX ADMIN — MEROPENEM 200 MILLIGRAM(S): 1 INJECTION INTRAVENOUS at 05:29

## 2019-03-18 RX ADMIN — Medication 6: at 00:42

## 2019-03-18 RX ADMIN — HEPARIN SODIUM 5000 UNIT(S): 5000 INJECTION INTRAVENOUS; SUBCUTANEOUS at 05:29

## 2019-03-18 RX ADMIN — MORPHINE SULFATE 2 MILLIGRAM(S): 50 CAPSULE, EXTENDED RELEASE ORAL at 07:36

## 2019-03-18 RX ADMIN — Medication 650 MILLIGRAM(S): at 07:25

## 2019-03-18 RX ADMIN — Medication 2: at 05:30

## 2019-03-18 RX ADMIN — Medication 1 APPLICATION(S): at 12:28

## 2019-03-18 RX ADMIN — CHLORHEXIDINE GLUCONATE 1 APPLICATION(S): 213 SOLUTION TOPICAL at 05:31

## 2019-03-18 RX ADMIN — MORPHINE SULFATE 2 MILLIGRAM(S): 50 CAPSULE, EXTENDED RELEASE ORAL at 07:21

## 2019-03-18 RX ADMIN — Medication 650 MILLIGRAM(S): at 08:15

## 2019-03-18 NOTE — CHART NOTE - NSCHARTNOTEFT_GEN_A_CORE
pt has been terminally extubated on venti mask and DNR /DNI   he is on comfort care, no labs being sone since 14 th   discussion about stopping IV antibiotics sh be made  wound has been debrided

## 2019-03-18 NOTE — DISCHARGE NOTE FOR THE EXPIRED PATIENT - HOSPITAL COURSE
HPI:  84 y/o M w/severe dementia, bedbound and contracted at baseline, DM, prior gastric/intestinal pneumatosis managed nonoperatively p/w fevers and dyspnea. Intubated for acute respiratory failure. Patient unable to provide history. Family unavailable. (01 Mar 2019 13:03)    Pt is an 84 yo M with h/o DM, dementia, CVA, bedbound and contracted at baseline, functional paraplegia,, prior gastric/intestinal pneumatosis managed nonoperatively presented 2 to fevers and dyspnea. Patient noted to be in septic shock, secondary to staph PNA, with DKA, ANSLEY secondary to ATN with multiple decubitus ulcers. Patient is DNR/DNI with palliative extubation 3/15/2019. Family requested comfort measures only.    Patient found in bed. Heart sounds absent, no spontaneous respirations, no palpable B/P or pulse, pupils fixed and dilated. Pronounced @ 1608. Dr. Epstein aware, family at bedside.

## 2019-03-18 NOTE — PATIENT PROFILE ADULT - NSPROPOAURINARYCATHETER_GEN_A_NUR
Encounter Date: 3/18/2019       History     Chief Complaint   Patient presents with    Dental Pain     escorted by family health sitter who states pt told her he broke his tooth eating; not sure when this occurred; pt also c/o ear pain     50yo M with pmh autism, MR, GERD, HLD, with chief complaint R jaw swelling first noticed by caretaker today. No fever. Caretaker states pt c/o broken tooth. No known trauma. No trouble with meals. No hx dental issues. Symptoms acute, constant, severity 2/10.  Caretaker denies any change in behavior. Denies any recent illness, abx, hospitalization, or known sick contacts.           Review of patient's allergies indicates:   Allergen Reactions    Divalproex     Ensure [food supplemt, lactose-reduced]      Past Medical History:   Diagnosis Date    Autism     Bowel obstruction     GERD (gastroesophageal reflux disease)     Hyperlipidemia     Impulse disorder     Mental retardation      Past Surgical History:   Procedure Laterality Date    ABDOMINAL SURGERY      ANOSCOPY N/A 6/5/2016    Performed by Yoanna Surgeon at [facility]    LIVER BIOPSY       History reviewed. No pertinent family history.  Social History     Tobacco Use    Smoking status: Never Smoker    Smokeless tobacco: Never Used   Substance Use Topics    Alcohol use: No    Drug use: No     Review of Systems   Constitutional: Negative for chills and fever.   HENT: Positive for dental problem and facial swelling. Negative for congestion, drooling, ear discharge, nosebleeds, rhinorrhea, sinus pressure, sinus pain, sore throat, tinnitus and trouble swallowing.    Eyes: Negative for discharge and redness.   Respiratory: Negative for shortness of breath.    Cardiovascular: Negative for chest pain.   Gastrointestinal: Negative for abdominal pain, nausea and vomiting.   Genitourinary: Negative for dysuria, flank pain, frequency, penile pain and testicular pain.   Musculoskeletal: Negative for back pain, joint swelling,  myalgias, neck pain and neck stiffness.   Skin: Negative for rash.   Neurological: Negative for dizziness, syncope, weakness, light-headedness and numbness.   Hematological: Does not bruise/bleed easily.   All other systems reviewed and are negative.      Physical Exam     Initial Vitals [03/18/19 1045]   BP Pulse Resp Temp SpO2   (!) 156/97 103 18 98.2 °F (36.8 °C) 99 %      MAP       --         Physical Exam    Nursing note and vitals reviewed.  Constitutional: He appears well-developed and well-nourished. He is not diaphoretic. No distress.   HENT:   Head: Normocephalic and atraumatic.   2-3 cracked molar teeth. Dental caries. No obvious acute fracture. No intraoral swelling. No tenderness to percussion of teeth. No intraoral edema. Oropharynx unremarkable. R submandibular region with mild swelling compared to L; appears tender. No erythema/warmth. No open wound. No swelling below mandible or to soft tissue of neck. No ant cervical lymphadenopathy. Neck supple.    Eyes: Conjunctivae and EOM are normal. Pupils are equal, round, and reactive to light.   Neck: Normal range of motion. Neck supple.   Cardiovascular: Intact distal pulses.   Pulmonary/Chest: No respiratory distress.   Abdominal: Soft. Bowel sounds are normal. He exhibits no distension. There is no tenderness.   Musculoskeletal: Normal range of motion. He exhibits no tenderness.   Neurological: He is alert and oriented to person, place, and time. He has normal strength.   Skin: Skin is warm and dry. Capillary refill takes less than 2 seconds. No rash and no abscess noted. No erythema.   Psychiatric: He has a normal mood and affect. His behavior is normal. Judgment and thought content normal.         ED Course   Procedures  Labs Reviewed - No data to display       Imaging Results    None          Medical Decision Making:   Differential Diagnosis:   Dental abscess, periapical abscess, oral swelling, dental trauma, dental pain, pharyngitis  ED  Management:  No obvious acute injury to mouth. Caretaker denies fall or trauma. No obvious intraoral swelling or evidence of infection. There are some dental caries. There is mild R anterior mandibular swelling, which is mild and appears tender during exam. No obvious bony abnormality. Doubt facial fracture in the absence of trauma. No oropharyngeal erythema/exudate. No cervical lymphadenopathy. Neck supple. Given poor dentition, will d/c with Pen VK x 10 days, have him f/u with his dentist. Caretaker does understand and agree with this plan. Return precautions given.                       Clinical Impression:       ICD-10-CM ICD-9-CM   1. Facial swelling R22.0 784.2         Disposition:   Disposition: Discharged  Condition: Stable                        GILDA CalderónC  03/18/19 4990     yes

## 2019-03-18 NOTE — CHART NOTE - NSCHARTNOTESELECT_GEN_ALL_CORE
Event Note
Nutrition Services
Transfer Note

## 2019-03-18 NOTE — CHART NOTE - NSCHARTNOTEFT_GEN_A_CORE
Assessment: Pt seen for follow-up & continues critical care & severe malnutrition. Pt DNR/DNI with s/p palliative extubation 3/15 & receiving comfort measures.     Factors impacting intake: [x ] none [ ] nausea  [ ] vomiting [ ] diarrhea [ ] constipation  [ ]chewing problems [ ] swallowing issues  [ ] other:     Diet Prescription: Diet, NPO with Tube Feed:   Tube Feeding Modality: Nasogastric  Glucerna 1.2 [employer]  Total Volume for 24 Hours (mL): 1320  Continuous  Starting Tube Feed Rate {mL per Hour}: 55  Until Goal Tube Feed Rate (mL per Hour): 55  Tube Feed Duration (in Hours): 24  Tube Feed Start Time: 13:00  No Carb Prosource (1pkg = 15gms Protein)     Qty per Day:  2 (03-03-19 @ 13:12)    Intake: NGT feeding & prosource 2 x day provides total 1320ml, 1704kcal & 109gm protein. Pt received total volume 1213ml x 24 hours. Pt received 100% energy needs & 143% protein needs. TF tolerated well. Residuals=0-50ml, Last BMx 2 (3/17).     Current Weight: Wt=61.3kg(3/18/19), Wt=61kg(3/10)  % Weight Change Wt remains stable x 8 days    Physical appearance:  +3 generalized edema; Nutrition focused physical exam conducted; Subcutaneous fat loss: [moderate ] Orbital fat pads region, [moderate ]Buccal fat region, [severe ]Triceps region,  [moderate ]Ribs region.  Muscle wasting: [moderate ]Temples region, [moderate ]Clavicle region, [moderate ]Shoulder region, [unable ]Scapula region, [edematous ]Interosseous region,  [contracted- severe ]thigh region, [contracted-severe ]Calf region    Pertinent Medications: MEDICATIONS  (STANDING):  chlorhexidine 4% Liquid 1 Application(s) Topical <User Schedule>  Dakins Solution - 1/4 Strength 1 Application(s) Topical daily  dextrose 5%. 1000 milliLiter(s) (50 mL/Hr) IV Continuous <Continuous>  dextrose 50% Injectable 12.5 Gram(s) IV Push once  dextrose 50% Injectable 25 Gram(s) IV Push once  dextrose 50% Injectable 25 Gram(s) IV Push once  heparin  Injectable 5000 Unit(s) SubCutaneous every 12 hours  insulin glargine Injectable (LANTUS) 20 Unit(s) SubCutaneous at bedtime  insulin lispro (HumaLOG) corrective regimen sliding scale   SubCutaneous every 6 hours  meropenem  IVPB 2000 milliGRAM(s) IV Intermittent every 12 hours  meropenem  IVPB        MEDICATIONS  (PRN):  acetaminophen    Suspension .. 650 milliGRAM(s) Oral every 6 hours PRN Temp greater or equal to 38C (100.4F)  dextrose 40% Gel 15 Gram(s) Oral once PRN Blood Glucose LESS THAN 70 milliGRAM(s)/deciliter  glucagon  Injectable 1 milliGRAM(s) IntraMuscular once PRN Glucose LESS THAN 70 milligrams/deciliter  morphine  - Injectable 2 milliGRAM(s) IV Push every 2 hours PRN respirate rate > 30 after pallioative  wean - comfort care    Pertinent Labs: 03-14 Na 140   Glu 151   K+ 3.1   Cr 0.98   BUN 45   Phos n/a   Alb 1.2   PAB n/a   Hgb 6.3 g/dL<LL> Hct 19.0 %<LL> HgA1C n/a     24Hr FS:142 mg/dL  173 mg/dL  275 mg/dL  267 mg/dL  158 mg/dL    Skin: Lt hip stage II, Rt hip unstageable, Sacrum stage IV, Rt heel, Lt ear, Lt ankle DTI    Estimated Needs:   [ x] no change since previous assessment (3/3/19)  [ ] recalculated:     Previous Nutrition Diagnosis:   [ ] Inadequate Energy Intake [ ]Inadequate Oral Intake [ ] Excessive Energy Intake   [ ] Underweight [ ] Increased Nutrient Needs [ ] Overweight/Obesity   [ ] Altered GI Function [ ] Unintended Weight Loss [ ] Food & Nutrition Related Knowledge Deficit [x ] acute severe Malnutrition (moderate-severe fat loss & muscle wasting)    Nutrition Diagnosis is [x ] ongoing  [ ] resolved  [ ] improved  [ ] not applicable   Previous Goal: Pt to meet  >75% energy & protein via TF to support wound healing; met  New Goal: Pt will be porvided with nutrition within his/HCP wishes for care    New Nutrition Diagnosis: [x] not applicable       Interventions:   Recommend  [x ] Continue:  NGT feeding Glucerna 1.2 @ 55ml/hr & prosource 2 pk daily=total 1320ml, 1704kcal & 109gm protein  [ ] Change Diet To:  [ ] Nutrition Supplement:  [ ] Nutrition Support:  [ ] Other:     Monitoring and Evaluation:   [ ] PO intake [ x ] Tolerance to diet prescription [ x ] weights [ x ] labs[ x ] follow up per protocol  [ ] other:

## 2019-03-20 DIAGNOSIS — F03.90 UNSPECIFIED DEMENTIA WITHOUT BEHAVIORAL DISTURBANCE: ICD-10-CM

## 2019-03-20 DIAGNOSIS — F44.4 CONVERSION DISORDER WITH MOTOR SYMPTOM OR DEFICIT: ICD-10-CM

## 2019-03-20 DIAGNOSIS — Z66 DO NOT RESUSCITATE: ICD-10-CM

## 2019-03-20 DIAGNOSIS — J18.9 PNEUMONIA, UNSPECIFIED ORGANISM: ICD-10-CM

## 2019-03-20 DIAGNOSIS — Z79.4 LONG TERM (CURRENT) USE OF INSULIN: ICD-10-CM

## 2019-03-20 DIAGNOSIS — N17.9 ACUTE KIDNEY FAILURE, UNSPECIFIED: ICD-10-CM

## 2019-03-20 DIAGNOSIS — B95.4 OTHER STREPTOCOCCUS AS THE CAUSE OF DISEASES CLASSIFIED ELSEWHERE: ICD-10-CM

## 2019-03-20 DIAGNOSIS — L89.154 PRESSURE ULCER OF SACRAL REGION, STAGE 4: ICD-10-CM

## 2019-03-20 DIAGNOSIS — E87.2 ACIDOSIS: ICD-10-CM

## 2019-03-20 DIAGNOSIS — E11.10 TYPE 2 DIABETES MELLITUS WITH KETOACIDOSIS WITHOUT COMA: ICD-10-CM

## 2019-03-20 DIAGNOSIS — Z74.01 BED CONFINEMENT STATUS: ICD-10-CM

## 2019-03-20 DIAGNOSIS — Z51.5 ENCOUNTER FOR PALLIATIVE CARE: ICD-10-CM

## 2019-03-20 DIAGNOSIS — E86.0 DEHYDRATION: ICD-10-CM

## 2019-03-20 DIAGNOSIS — J96.00 ACUTE RESPIRATORY FAILURE, UNSPECIFIED WHETHER WITH HYPOXIA OR HYPERCAPNIA: ICD-10-CM

## 2019-03-20 DIAGNOSIS — A41.01 SEPSIS DUE TO METHICILLIN SUSCEPTIBLE STAPHYLOCOCCUS AUREUS: ICD-10-CM

## 2019-03-20 DIAGNOSIS — E87.0 HYPEROSMOLALITY AND HYPERNATREMIA: ICD-10-CM

## 2019-03-20 DIAGNOSIS — R65.21 SEVERE SEPSIS WITH SEPTIC SHOCK: ICD-10-CM

## 2019-03-20 DIAGNOSIS — L89.210 PRESSURE ULCER OF RIGHT HIP, UNSTAGEABLE: ICD-10-CM

## 2019-11-21 NOTE — ED ADULT NURSE NOTE - CAS DISCH BELONGINGS RETURNED
Minocycline Pregnancy And Lactation Text: This medication is Pregnancy Category D and not consider safe during pregnancy. It is also excreted in breast milk. Yes

## 2020-07-26 NOTE — PATIENT PROFILE ADULT - NSASFALLNEEDSASSISTWITH_GEN_A_NUR
Airway    Date/Time: 7/25/2020 7:43 PM  Performed by: Ashwin Morfin M.D.  Authorized by: Ashwin Morfin M.D.     Location:  OR  Urgency:  Elective  Indications for Airway Management:  Anesthesia      Spontaneous Ventilation: absent    Sedation Level:  Deep  Preoxygenated: Yes    Mask Difficulty Assessment:  0 - not attempted  Final Airway Type:  Supraglottic airway  Final Supraglottic Airway:  Standard LMA    SGA Size:  4  Number of Attempts at Approach:  1           toileting/standing

## 2020-07-30 NOTE — ED PROVIDER NOTE - SKIN, MLM
Writer gave update to patient's representative, Cailin, with the patient's permission. Will call her once we have all test results and update her on the plan.   Skin normal color for race, warm, dry and intact. No evidence of rash.

## 2020-09-29 NOTE — DISCHARGE NOTE ADULT - NSCORESITESY/N_GEN_A_CORE_RD
Yes Tazorac Pregnancy And Lactation Text: This medication is not safe during pregnancy. It is unknown if this medication is excreted in breast milk.

## 2020-12-15 NOTE — ED ADULT TRIAGE NOTE - STATUS:
Nursing Note:  Kassie Ramirez presents today for Port access and labs.    Patient seen by provider today: No   present during visit today: Not Applicable.    Note: N/A.    Intravenous Access:  Labs drawn without difficulty.  Implanted Port.    Discharge Plan:   Patient was sent to radiology for CT scan appointment.    Alison Schroeder RN             Intact

## 2020-12-31 PROBLEM — G30.9 ALZHEIMER'S DEMENTIA: Status: RESOLVED | Noted: 2019-01-02 | Resolved: 2020-12-31

## 2021-03-10 NOTE — ED ADULT TRIAGE NOTE - ADDITIONAL SAFETY/BANDS...
61 YO F with PMHx of HTN, DVT on Xarelto, Peritonitis s/p Oral's Procedure and Ileostomy (reversed in 2011) and AARON who presented to [facility] on 11/18 for AHRF second to COVID 19, intubated on 11/23 complicated by lung abscesses on CT CHEST 12/5, multi-bacterial PNA and bacteremia, ARTEMIO s/p CRRT and HD, and s/p Tracheostomy 12/18. Transferred to [facility] for further management, now transferred to . Pt now decannulated and PEG removed, eating dysphagia diet, pending dc to acute rehab.         # CDIFF (+)   - s/p PO Vancomycin 2/1-2/25  - Stool O&P and GI PCR NGTD, c/w Lomotil QID   - GI eval appreciated   -stool more formed as of 3/4, will need at least 48 hrs of formed stool prior to DC. Earliest pt would be able to dc'd is Saturday 3/6  - pending D/C to acute rehab, on hold due to repeat covid positive   - Isolation and quarantine per protocol       # ARDS second to COVID vs Multi-bacterial PNA/ R Lung Abscess  - s/p Intubated on 11/23 and c/b multi-bacterial PNA and lung abscesses.   - s/p Tracheostomy on 12/18 by CTSx. Sutures out 1/1  - s/p Air leak noted and s/p Bivona with CTSx 12/31. s/p Downsized to 6DCT 2/25  - s/p Successfully decannulated on 3/1, Now on 2LNC saturating well  - Proventil and Chest PT q6hr PRN      # ARTEMIO s/p CRRT and HD   - ARTEMIO now resolved and no longer required HD.   - Hypokalemia in setting of diarrhea/ CDIFF. Monitor electrolytes.   - Monitor renal function and avoid nephrotoxins.       # Sepsis second to COVID vs Multi-bacterial PNA/ Bacteremia and R Lung Abscess vs CDIFF (+)   - COVID with superimposed multi-bacterial VAP vs aspiration PNA vs cavitary PNA.   - SCx (11/24) MSSA and BCx (11/27) with MSSA and Proteus Bacteremia  - SCx (12/1, 12/12 and 12/27) with Stenotrophomonas.   - SCx (1/9) with  Pseudomonas and Stenotrophomonas   - Bcx has been persistently negative from 12/1, and most recently 1/12   - CT CHEST 12/4 with right lung cavitation.   - s/p multiple antibiotics, but now completed Fortaz and Flagyl (1/12-1/16)  - RPT CT CHEST 1/28 with improvement of right lung abscess   - SCx with  Pseudomonas and Stenotrophomonas and s/p TED/ Fortaz (1/28-2/6)      # DMII   - Continue on ISS   - FS well controlled      # Hx of DVT   - c/w Xarelto      # Contracted Wrists   - PT/OT working on strength   OMT at the bedside       # CODE STATUS - FULL CODE     # DISPO - PM&R recs ACUTE REHAB       Additional Safety/Bands:

## 2021-10-23 NOTE — DIETITIAN INITIAL EVALUATION ADULT. - PROBLEM SELECTOR PROBLEM 4
Dementia associated with other underlying disease without behavioral disturbance
Patient/Caregiver provided printed discharge information.

## 2021-12-20 NOTE — ED ADULT NURSE NOTE - NSFALLRSKINDICATORS_ED_ALL_ED
Abdomen , soft, nontender, nondistended , no guarding or rigidity , no masses palpable , normal bowel sounds , Liver and Spleen , no hepatomegaly present , no hepatosplenomegaly , liver nontender , spleen not palpable
yes

## 2022-01-07 NOTE — PATIENT PROFILE ADULT - DO YOU FEEL LIKE HURTING OTHERS?
Called and requested refill(s): Patient   Medications: Oxybutynin   Amount: 30 day supply  Pharmacy to be sent to: [facility] on [address]  Call back number: [phone]  Okay to leave detailed voice message: yes     no

## 2022-03-05 NOTE — PROGRESS NOTE ADULT - PROBLEM SELECTOR PLAN 2
can be sec to sacral decub wound infection  also bacteremia   levaquin added right hand/complains of pain/discomfort

## 2022-05-03 NOTE — ED ADULT NURSE NOTE - NS ED NURSE LEVEL OF CONSCIOUSNESS SPEECH
----- Message from Shiva Lowery DO sent at 5/2/2022  4:13 PM CDT -----  Some arthritis of the back- degenerative changes. If they patient would like he could try some PT for his back or see the back specialist to see if they have any recommendations.   
Left message to return call to clinic.  
Patient notified of results and recommended follow up.  Grupo requested a copy of X-ray results, he will contact our office to let us know which one he wants to do.   
Speaking Coherently

## 2022-09-02 NOTE — DISCHARGE NOTE ADULT - CAREGIVER ADDRESS
Returned pt call, spoke with dtr Liz and spouse, permission to communicate per pt.  Pt was discharged from the ER on 8/27/22 with pnuemonia, he was ordered HC services and has not yet been admitted.  Pt is reporting severe loose stools, started yesterday and worsened over the night.  Continues to have severe hiccups and was started on Reglan 4 times daily for 15 days per dtr.which he took at 0400 this morning.  Hiccups have been ongoing for pt with no luck of resolution.  Continues to report shortness of breath, severe cough tessoln pearls are not effective, no oximeter in the home and was not ordered any type of neb tx.  Dtr reports he is now taking Augmentin and Bactrim for the pnuemonia, denies having a fever, is eating ok.  I did follow up with pcp Dr. Sen and he recommended pt be seen and evaluated in the ER today for possible Cdiff prior to suggesting the antibiotics may be the cause of the loose stools.  I did also call [facility]  and spoke with Laura in referrals, she reports they received the referral from our office on 8/30 and the nurse is set to admit the pt tomorrow 9/3/22 and will contact pt.  Informed Liz that if her father was admitted to the ER to contact [facility] and inform them, contact number given.  Family transporting pt to ER   Linnea Falcon RN on 9/2/2022 at 11:50 AM      
[address], [place], [place], [address]

## 2022-10-06 NOTE — OCCUPATIONAL THERAPY INITIAL EVALUATION ADULT - ANTICIPATED DISCHARGE DISPOSITION, OT EVAL
Miko Gomez was evaluated at [facility] on October 6, 2022 at which time his blood pressure was:    BP Readings from Last 3 Encounters:   04/06/22 (!) 196/118   02/02/22 136/87   08/09/21 122/76     Pulse Readings from Last 3 Encounters:   04/06/22 103   02/02/22 73   08/09/21 58       Reviewed lifestyle modifications for blood pressure control and reduction: including making healthy food choices, managing weight, getting regular exercise, smoking cessation, reducing alcohol consumption, monitoring blood pressure regularly.     Symptoms: None    BP Goal:< 140/90 mmHg    BP Assessment:  BP too high    Potential Reasons for BP too high: Tobacco use within past 30 minutes and Unknown/Other: patient has 4 cigarretts and 3 cups of coffee and hasn't taken medications    BP Follow-Up Plan: Recheck BP in 30 days at pharmacy    Recommendation to Provider: patient may benefit for changing medication    Note completed by: Kristina Barrientos, Pharm D  [facility]  [phone]    
short term rehab to assist with return to prior level of function.

## 2022-10-26 NOTE — ED PROVIDER NOTE - CADM POA PRESS ULCER
[Never] : never [TextBox_4] : Patient is a 69-year-old male with past medical history significant for aortic root aneurysm anxiety hypertension diabetes dyslipidemia who tested positive for COVID today and is seen via telehealth.  He complains of intermittent fevers and myalgias.  He also complains of cough and occasional shortness of breath No

## 2022-12-19 NOTE — GOALS OF CARE CONVERSATION - PERSONAL ADVANCE DIRECTIVE - PARTICIPANTS
CC'd Chart Routing History    Routing History    From: Tonio Ray PA-C On: 12/19/2022 04:28 PM   To: Paul Costello MD, MORENA Costello  Nurse Msg Pool (Pool)   Priority: Routine   Routing Comments:   Ms. Álvarez has been experimenting with compression garments to treat her lower extremity edema.  I suggested to her a knee high 20-30 mmHg compression garment may work best for her but also advised her that ultimately she should seek your mind about it.  If you agree and write her a prescription she can get it filled at the outpatient pharmacy in the hospital.  BETHB        Staff.../Family...

## 2023-03-30 NOTE — PATIENT PROFILE ADULT. - STAGE
Stage II
I will STOP taking the medications listed below when I get home from the hospital:    aspirin 81 mg oral tablet  -- orally

## 2023-04-03 NOTE — DISCHARGE NOTE ADULT - INSTRUCTIONS
Dysphagia 3 pureed nectar consistency fluid: aspiration precautions Abnormal Lactate: > 2 Dysphagia 1 pureed nectar consistency fluid: aspiration precautions

## 2023-07-18 NOTE — ED ADULT NURSE NOTE - NS ED NURSE LEVEL OF CONSCIOUSNESS ORIENTATION
Disoriented Rhofade Counseling: Rhofade is a topical medication which can decrease superficial blood flow where applied. Side effects are uncommon and include stinging, redness and allergic reactions.

## 2023-08-31 NOTE — ED PROVIDER NOTE - NSTIMEPROVIDERCAREINITIATE_GEN_ER
Bipolar I disorder, most recent episode mixed, severe with psychotic features   F31.64   04-Dec-2018 13:04

## 2023-09-06 NOTE — H&P ADULT - NSHPPOADEEPVENOUSTHROMB_GEN_A_CORE
no Xelcamiloz Pregnancy And Lactation Text: This medication is Pregnancy Category D and is not considered safe during pregnancy.  The risk during breast feeding is also uncertain.

## 2023-12-12 NOTE — OCCUPATIONAL THERAPY INITIAL EVALUATION ADULT - MANUAL MUSCLE TESTING RESULTS, REHAB EVAL
E-rx'd   not tested due to/RUE fair-, LLE fair, BLE fair Diacetylmorphine overdose, accidental or unintentional, initial encounter

## 2024-12-10 NOTE — DIETITIAN INITIAL EVALUATION ADULT. - PERTINENT LABORATORY DATA
03-03 Na 154 mmol/L<H> Glu 241 mg/dL<H> K+ 3.8 mmol/L Cr  0.84 mg/dL BUN 36 mg/dL<H> Phos 1.2 mg/dL<L> Alb n/a   PAB n/a   Hgb 10.7 g/dL<L> Hct 35.7 %<L> 0 (no pain/absence of nonverbal indicators of pain)

## 2024-12-19 NOTE — CONSULT NOTE ADULT - SUBJECTIVE AND OBJECTIVE BOX
Symptoms well-controlled with current dose of fluoxetine.   Infectious Diseases - Attending at Dr. Lozada    HPI:  84 y/o M w/severe dementia, bedbound and contracted at baseline, DM, prior gastric/intestinal pneumatosis managed nonoperatively p/w fevers and dyspnea. Intubated for acute respiratory failure. Patient unable to provide history. Family unavailable. (01 Mar 2019 13:03)      PAST MEDICAL & SURGICAL HISTORY:  Pneumatosis of intestines  Dementia  DM (diabetes mellitus)  Glaucoma  Dementia  DM (diabetes mellitus)  HTN (hypertension)  History of cholecystectomy  History of appendectomy  History of cholecystectomy  History of nephrolithiasis  Status post open reduction with internal fixation of fracture: right femur      Allergies    No Known Allergies    Intolerances        FAMILY HISTORY:  No pertinent family history in first degree relatives  No pertinent family history in first degree relatives      SOCIAL HISTORY:    REVIEW OF SYSTEMS:    Constitutional: No fever, weight loss or fatigue  Eyes: No eye pain, visual disturbances, or discharge  ENT:  No difficulty hearing, tinnitus, vertigo; No sinus or throat pain  Neck: No pain or stiffness  Respiratory: No cough, wheezing, chills or hemoptysis  Cardiovascular: No chest pain, palpitations, shortness of breath, dizziness or leg swelling  Gastrointestinal: No abdominal or epigastric pain. No nausea, vomiting or hematemesis; No diarrhea or constipation. No melena or hematochezia.  Genitourinary: No dysuria, frequency, hematuria or incontinence  Neurological: No headaches, memory loss, loss of strength, numbness or tremors  Skin: No itching, burning, rashes or lesions       MEDICATIONS  (STANDING):  chlorhexidine 0.12% Liquid 15 milliLiter(s) Oral Mucosa two times a day  chlorhexidine 4% Liquid 1 Application(s) Topical <User Schedule>  dextrose 5%. 1000 milliLiter(s) (50 mL/Hr) IV Continuous <Continuous>  dextrose 50% Injectable 12.5 Gram(s) IV Push once  dextrose 50% Injectable 25 Gram(s) IV Push once  dextrose 50% Injectable 25 Gram(s) IV Push once  fentaNYL   Infusion. 0.5 MICROgram(s)/kG/Hr (2.525 mL/Hr) IV Continuous <Continuous>  heparin  Injectable 5000 Unit(s) SubCutaneous every 12 hours  insulin glargine Injectable (LANTUS) 20 Unit(s) SubCutaneous at bedtime  insulin lispro (HumaLOG) corrective regimen sliding scale   SubCutaneous every 4 hours  levoFLOXacin IVPB      midodrine 10 milliGRAM(s) Oral every 8 hours  nafcillin  IVPB 2 Gram(s) IV Intermittent every 4 hours  norepinephrine Infusion 0.05 MICROgram(s)/kG/Min (4.734 mL/Hr) IV Continuous <Continuous>  pantoprazole   Suspension 40 milliGRAM(s) Oral daily    MEDICATIONS  (PRN):  acetaminophen    Suspension .. 650 milliGRAM(s) Oral every 6 hours PRN Temp greater or equal to 38C (100.4F)  dextrose 40% Gel 15 Gram(s) Oral once PRN Blood Glucose LESS THAN 70 milliGRAM(s)/deciliter  glucagon  Injectable 1 milliGRAM(s) IntraMuscular once PRN Glucose LESS THAN 70 milligrams/deciliter      Vital Signs Last 24 Hrs  T(C): 37.7 (07 Mar 2019 15:30), Max: 38.4 (06 Mar 2019 19:59)  T(F): 99.9 (07 Mar 2019 15:30), Max: 101.2 (06 Mar 2019 19:59)  HR: 95 (07 Mar 2019 17:00) (85 - 104)  BP: 93/42 (07 Mar 2019 17:00) (92/46 - 133/65)  BP(mean): 54 (07 Mar 2019 17:00) (41 - 97)  RR: 35 (07 Mar 2019 17:00) (21 - 39)  SpO2: 94% (07 Mar 2019 17:00) (93% - 100%)    PHYSICAL EXAM:    Constitutional: NAD, well-groomed, well-developed  HEENT: PERRLA, EOMI, Normal Hearing, MMM  Neck: No LAD, No JVD  Back: Normal spine flexure, No CVA tenderness  Respiratory: CTAB/L  Cardiovascular: S1 and S2, RRR, no M/G/R  Gastrointestinal: BS+, soft, NT/ND  Extremities: No peripheral edema  Vascular: 2+ peripheral pulses  Neurological: A/O x 3, no focal deficits  Skin: No rashes      LABS:                        10.2   26.21 )-----------( 223      ( 07 Mar 2019 04:47 )             32.7     03-07    142  |  109<H>  |  55<H>  ----------------------------<  81  4.0   |  21<L>  |  1.39<H>    Ca    6.4<LL>      07 Mar 2019 04:47  Phos  4.5     03-07  Mg     2.0     03-07    TPro  4.6<L>  /  Alb  0.9<L>  /  TBili  2.0<H>  /  DBili  x   /  AST  19  /  ALT  18  /  AlkPhos  140<H>  03-07          MICROBIOLOGY:  RECENT CULTURES:  03-06 .Blood XXXX XXXX   No growth to date.    03-04 .Blood Staphylococcus aureus   Growth in anaerobic bottle: Gram Positive Cocci in Clusters   Growth in anaerobic bottle: Staphylococcus aureus    03-02 .Sputum Staphylococcus aureus   Rare polymorphonuclear leukocytes per low power field  Moderate Squamous epithelial cells per low power field  Numerous Gram Positive Cocci in Clusters seen per oil power field  Moderate Yeast like cells seen per oil power field  Few Gram Variable Rods seen per oil power field   Numerous Staphylococcus aureus  Normal Respiratory Radhika present    03-01 .Urine XXXX XXXX   No growth    03-01 .Blood Blood Culture PCR  Streptococcus constellatus   Upon re-evaluation of gram stain:  Growth in aerobic bottle: Gram Positive Cocci in Clusters and Gram  Positive Cocci in Pairs and Chains  Previously reported as:  Growth in aerobic bottle: Gram Positive Cocci in Clusters   No growth at 5 days.          RADIOLOGY & ADDITIONAL STUDIES: Infectious Diseases - Attending at Dr. Lozada    HPI:  84 y/o M w/severe dementia, bedbound and contracted at baseline, DM, prior gastric/intestinal pneumatosis managed nonoperatively p/w fevers and dyspnea. Intubated for acute respiratory failure. Patient unable to provide history. Family unavailable. (01 Mar 2019 13:03)      PAST MEDICAL & SURGICAL HISTORY:  Pneumatosis of intestines  Dementia  DM (diabetes mellitus)  Glaucoma  Dementia  DM (diabetes mellitus)  HTN (hypertension)  History of cholecystectomy  History of appendectomy  History of cholecystectomy  History of nephrolithiasis  Status post open reduction with internal fixation of fracture: right femur      Allergies    No Known Allergies    Intolerances        FAMILY HISTORY:  No pertinent family history in first degree relatives  No pertinent family history in first degree relatives      SOCIAL HISTORY: unknown,demented    REVIEW OF SYSTEMS:  as per [place]: pt intubated     MEDICATIONS  (STANDING):  chlorhexidine 0.12% Liquid 15 milliLiter(s) Oral Mucosa two times a day  chlorhexidine 4% Liquid 1 Application(s) Topical <User Schedule>  dextrose 5%. 1000 milliLiter(s) (50 mL/Hr) IV Continuous <Continuous>  dextrose 50% Injectable 12.5 Gram(s) IV Push once  dextrose 50% Injectable 25 Gram(s) IV Push once  dextrose 50% Injectable 25 Gram(s) IV Push once  fentaNYL   Infusion. 0.5 MICROgram(s)/kG/Hr (2.525 mL/Hr) IV Continuous <Continuous>  heparin  Injectable 5000 Unit(s) SubCutaneous every 12 hours  insulin glargine Injectable (LANTUS) 20 Unit(s) SubCutaneous at bedtime  insulin lispro (HumaLOG) corrective regimen sliding scale   SubCutaneous every 4 hours  levoFLOXacin IVPB      midodrine 10 milliGRAM(s) Oral every 8 hours  nafcillin  IVPB 2 Gram(s) IV Intermittent every 4 hours  norepinephrine Infusion 0.05 MICROgram(s)/kG/Min (4.734 mL/Hr) IV Continuous <Continuous>  pantoprazole   Suspension 40 milliGRAM(s) Oral daily    MEDICATIONS  (PRN):  acetaminophen    Suspension .. 650 milliGRAM(s) Oral every 6 hours PRN Temp greater or equal to 38C (100.4F)  dextrose 40% Gel 15 Gram(s) Oral once PRN Blood Glucose LESS THAN 70 milliGRAM(s)/deciliter  glucagon  Injectable 1 milliGRAM(s) IntraMuscular once PRN Glucose LESS THAN 70 milligrams/deciliter      Vital Signs Last 24 Hrs  T(C): 37.7 (07 Mar 2019 15:30), Max: 38.4 (06 Mar 2019 19:59)  T(F): 99.9 (07 Mar 2019 15:30), Max: 101.2 (06 Mar 2019 19:59)  HR: 95 (07 Mar 2019 17:00) (85 - 104)  BP: 93/42 (07 Mar 2019 17:00) (92/46 - 133/65)  BP(mean): 54 (07 Mar 2019 17:00) (41 - 97)  RR: 35 (07 Mar 2019 17:00) (21 - 39)  SpO2: 94% (07 Mar 2019 17:00) (93% - 100%)    PHYSICAL EXAM:    Constitutional: NAD, thin built  HEENT: PERRLA, EOMI, Normal Hearing, MMM  Neck: No LAD, No JVD  Back: Normal spine flexure, No CVA tenderness  Respiratory: rhonchi scattered,intubated  Cardiovascular: S1 and S2, RRR, no M/G/R  Gastrointestinal: BS+, soft, NT/ND  Extremities: No peripheral edema  Vascular: 2+ peripheral pulses  Neurological: cant be examined  Skin: No rashes      LABS:                        10.2   26.21 )-----------( 223      ( 07 Mar 2019 04:47 )             32.7     03-07    142  |  109<H>  |  55<H>  ----------------------------<  81  4.0   |  21<L>  |  1.39<H>    Ca    6.4<LL>      07 Mar 2019 04:47  Phos  4.5     03-07  Mg     2.0     03-07    TPro  4.6<L>  /  Alb  0.9<L>  /  TBili  2.0<H>  /  DBili  x   /  AST  19  /  ALT  18  /  AlkPhos  140<H>  03-07          MICROBIOLOGY:  RECENT CULTURES:  03-06 .Blood XXXX XXXX   No growth to date.    03-04 .Blood Staphylococcus aureus   Growth in anaerobic bottle: Gram Positive Cocci in Clusters   Growth in anaerobic bottle: Staphylococcus aureus    03-02 .Sputum Staphylococcus aureus   Rare polymorphonuclear leukocytes per low power field  Moderate Squamous epithelial cells per low power field  Numerous Gram Positive Cocci in Clusters seen per oil power field  Moderate Yeast like cells seen per oil power field  Few Gram Variable Rods seen per oil power field   Numerous Staphylococcus aureus  Normal Respiratory Radhika present    03-01 .Urine XXXX XXXX   No growth    03-01 .Blood Blood Culture PCR  Streptococcus constellatus   Upon re-evaluation of gram stain:  Growth in aerobic bottle: Gram Positive Cocci in Clusters and Gram  Positive Cocci in Pairs and Chains  Previously reported as:  Growth in aerobic bottle: Gram Positive Cocci in Clusters   No growth at 5 days.          RADIOLOGY & ADDITIONAL STUDIES:

## 2025-02-02 NOTE — PATIENT PROFILE ADULT. - VISION (WITH CORRECTIVE LENSES IF THE PATIENT USUALLY WEARS THEM):
unable to assess last chemo ~2months ago, follows at [facility].     Plan  -MRI brain  -f/u records from [facility]   -c/w decadron 4mg q6h (home med: decadron 2mg BID)   -c/w mISS while inpatient for high sugars 2/2 decadron   -palliative care consult   -NSGY recs

## 2025-02-10 NOTE — DISCHARGE NOTE ADULT - NSFTFHOMEHTHYNRD_GEN_ALL_CORE
Yes 68 y/o male with no PMH here for PST. Pt s/p left lithotripsy for kidney stone in 12/2024 "but it didn't work". Pt denies dysuria, hematuria, urinary difficulties, abdominal and flank pain. Pt denies recent UTI. Pt electing for left ureteroscopy, laser, insertion stent on 2/19/25.

## 2025-03-27 NOTE — SWALLOW BEDSIDE ASSESSMENT ADULT - SWALLOW EVAL: CURRENT DIET
NPO pending evaluation
